# Patient Record
Sex: MALE | Race: WHITE | NOT HISPANIC OR LATINO | Employment: OTHER | URBAN - NONMETROPOLITAN AREA
[De-identification: names, ages, dates, MRNs, and addresses within clinical notes are randomized per-mention and may not be internally consistent; named-entity substitution may affect disease eponyms.]

---

## 2018-02-15 ENCOUNTER — OFFICE VISIT CONVERTED (OUTPATIENT)
Dept: FAMILY MEDICINE CLINIC | Age: 38
End: 2018-02-15
Attending: FAMILY MEDICINE

## 2018-07-27 ENCOUNTER — OFFICE VISIT CONVERTED (OUTPATIENT)
Dept: FAMILY MEDICINE CLINIC | Age: 38
End: 2018-07-27
Attending: NURSE PRACTITIONER

## 2018-08-24 ENCOUNTER — OFFICE VISIT CONVERTED (OUTPATIENT)
Dept: FAMILY MEDICINE CLINIC | Age: 38
End: 2018-08-24
Attending: FAMILY MEDICINE

## 2018-09-19 ENCOUNTER — OFFICE VISIT CONVERTED (OUTPATIENT)
Dept: FAMILY MEDICINE CLINIC | Age: 38
End: 2018-09-19
Attending: FAMILY MEDICINE

## 2019-01-09 ENCOUNTER — OFFICE VISIT CONVERTED (OUTPATIENT)
Dept: FAMILY MEDICINE CLINIC | Age: 39
End: 2019-01-09
Attending: NURSE PRACTITIONER

## 2019-11-04 ENCOUNTER — HOSPITAL ENCOUNTER (OUTPATIENT)
Dept: OTHER | Facility: HOSPITAL | Age: 39
Discharge: HOME | End: 2019-11-04

## 2019-11-04 LAB
ALBUMIN SERPL-MCNC: 4.4 G/DL (ref 3.5–5)
ALBUMIN/GLOB SERPL: 1.5 {RATIO} (ref 1.4–2.6)
ALP SERPL-CCNC: 75 U/L (ref 53–128)
ALT SERPL-CCNC: 14 U/L (ref 10–40)
ANION GAP SERPL CALC-SCNC: 19 MMOL/L (ref 8–19)
AST SERPL-CCNC: 17 U/L (ref 15–50)
BASOPHILS # BLD MANUAL: 0.03 10*3/UL (ref 0–0.2)
BASOPHILS NFR BLD MANUAL: 0.6 % (ref 0–3)
BILIRUB SERPL-MCNC: 0.41 MG/DL (ref 0.2–1.3)
BUN SERPL-MCNC: 10 MG/DL (ref 5–25)
BUN/CREAT SERPL: 12 {RATIO} (ref 6–20)
CALCIUM SERPL-MCNC: 9.6 MG/DL (ref 8.7–10.4)
CHLORIDE SERPL-SCNC: 103 MMOL/L (ref 99–111)
CHOLEST SERPL-MCNC: 179 MG/DL (ref 107–200)
CHOLEST/HDLC SERPL: 4.5 {RATIO} (ref 3–6)
CONV CO2: 23 MMOL/L (ref 22–32)
CONV TOTAL PROTEIN: 7.3 G/DL (ref 6.3–8.2)
CREAT UR-MCNC: 0.84 MG/DL (ref 0.7–1.2)
DEPRECATED RDW RBC AUTO: 40.6 FL
EOSINOPHIL # BLD MANUAL: 0.34 10*3/UL (ref 0–0.7)
EOSINOPHIL NFR BLD MANUAL: 6.9 % (ref 0–7)
ERYTHROCYTE [DISTWIDTH] IN BLOOD BY AUTOMATED COUNT: 13 % (ref 11.5–14.5)
EST. AVERAGE GLUCOSE BLD GHB EST-MCNC: 111 MG/DL
GFR SERPLBLD BASED ON 1.73 SQ M-ARVRAT: >60 ML/MIN/{1.73_M2}
GLOBULIN UR ELPH-MCNC: 2.9 G/DL (ref 2–3.5)
GLUCOSE SERPL-MCNC: 92 MG/DL (ref 70–99)
GRANS (ABSOLUTE): 2.56 10*3/UL (ref 2–8)
GRANS: 51.9 % (ref 30–85)
HBA1C MFR BLD: 14.8 G/DL (ref 14–18)
HBA1C MFR BLD: 5.5 % (ref 3.5–5.7)
HCT VFR BLD AUTO: 42.7 % (ref 42–52)
HDLC SERPL-MCNC: 40 MG/DL (ref 40–60)
IMM GRANULOCYTES # BLD: 0.01 10*3/UL (ref 0–0.54)
IMM GRANULOCYTES NFR BLD: 0.2 % (ref 0–0.43)
LDLC SERPL CALC-MCNC: 124 MG/DL (ref 70–100)
LYMPHOCYTES # BLD MANUAL: 1.67 10*3/UL (ref 1–5)
LYMPHOCYTES NFR BLD MANUAL: 6.5 % (ref 3–10)
MCH RBC QN AUTO: 29.4 PG (ref 27–31)
MCHC RBC AUTO-ENTMCNC: 34.7 G/DL (ref 33–37)
MCV RBC AUTO: 84.7 FL (ref 80–96)
MONOCYTES # BLD AUTO: 0.32 10*3/UL (ref 0.2–1.2)
OSMOLALITY SERPL CALC.SUM OF ELEC: 291 MOSM/KG (ref 273–304)
PLATELET # BLD AUTO: 191 10*3/UL (ref 130–400)
PMV BLD AUTO: 10.9 FL (ref 7.4–10.4)
POTASSIUM SERPL-SCNC: 4 MMOL/L (ref 3.5–5.3)
RBC # BLD AUTO: 5.04 10*6/UL (ref 4.7–6.1)
SODIUM SERPL-SCNC: 141 MMOL/L (ref 135–147)
TRIGL SERPL-MCNC: 77 MG/DL (ref 40–150)
VARIANT LYMPHS NFR BLD MANUAL: 33.9 % (ref 20–45)
VLDLC SERPL-MCNC: 15 MG/DL (ref 5–37)
WBC # BLD AUTO: 4.93 10*3/UL (ref 4.8–10.8)

## 2019-11-11 ENCOUNTER — OFFICE VISIT CONVERTED (OUTPATIENT)
Dept: FAMILY MEDICINE CLINIC | Age: 39
End: 2019-11-11
Attending: FAMILY MEDICINE

## 2019-11-11 ENCOUNTER — HOSPITAL ENCOUNTER (OUTPATIENT)
Dept: OTHER | Facility: HOSPITAL | Age: 39
Discharge: HOME | End: 2019-11-11
Attending: FAMILY MEDICINE

## 2019-11-11 LAB
25(OH)D3 SERPL-MCNC: 44.9 NG/ML (ref 30–100)
ALBUMIN SERPL-MCNC: 4.3 G/DL (ref 3.5–5)
ALBUMIN/GLOB SERPL: 1.4 {RATIO} (ref 1.4–2.6)
ALP SERPL-CCNC: 68 U/L (ref 53–128)
ALT SERPL-CCNC: 16 U/L (ref 10–40)
ANION GAP SERPL CALC-SCNC: 17 MMOL/L (ref 8–19)
AST SERPL-CCNC: 17 U/L (ref 15–50)
BASOPHILS # BLD MANUAL: 0.04 10*3/UL (ref 0–0.2)
BASOPHILS NFR BLD MANUAL: 0.7 % (ref 0–3)
BILIRUB SERPL-MCNC: 0.29 MG/DL (ref 0.2–1.3)
BUN SERPL-MCNC: 10 MG/DL (ref 5–25)
BUN/CREAT SERPL: 12 {RATIO} (ref 6–20)
CALCIUM SERPL-MCNC: 9.6 MG/DL (ref 8.7–10.4)
CHLORIDE SERPL-SCNC: 101 MMOL/L (ref 99–111)
CONV CO2: 24 MMOL/L (ref 22–32)
CONV TOTAL PROTEIN: 7.3 G/DL (ref 6.3–8.2)
CREAT UR-MCNC: 0.85 MG/DL (ref 0.7–1.2)
DEPRECATED RDW RBC AUTO: 39.1 FL
EOSINOPHIL # BLD MANUAL: 0.33 10*3/UL (ref 0–0.7)
EOSINOPHIL NFR BLD MANUAL: 6.1 % (ref 0–7)
ERYTHROCYTE [DISTWIDTH] IN BLOOD BY AUTOMATED COUNT: 12.9 % (ref 11.5–14.5)
GFR SERPLBLD BASED ON 1.73 SQ M-ARVRAT: >60 ML/MIN/{1.73_M2}
GLOBULIN UR ELPH-MCNC: 3 G/DL (ref 2–3.5)
GLUCOSE SERPL-MCNC: 99 MG/DL (ref 70–99)
GRANS (ABSOLUTE): 2.88 10*3/UL (ref 2–8)
GRANS: 52.9 % (ref 30–85)
HBA1C MFR BLD: 15.4 G/DL (ref 14–18)
HCT VFR BLD AUTO: 43.9 % (ref 42–52)
IMM GRANULOCYTES # BLD: 0.01 10*3/UL (ref 0–0.54)
IMM GRANULOCYTES NFR BLD: 0.2 % (ref 0–0.43)
LYMPHOCYTES # BLD MANUAL: 1.8 10*3/UL (ref 1–5)
LYMPHOCYTES NFR BLD MANUAL: 7 % (ref 3–10)
MCH RBC QN AUTO: 29.2 PG (ref 27–31)
MCHC RBC AUTO-ENTMCNC: 35.1 G/DL (ref 33–37)
MCV RBC AUTO: 83.3 FL (ref 80–96)
MONOCYTES # BLD AUTO: 0.38 10*3/UL (ref 0.2–1.2)
OSMOLALITY SERPL CALC.SUM OF ELEC: 285 MOSM/KG (ref 273–304)
PLATELET # BLD AUTO: 214 10*3/UL (ref 130–400)
PMV BLD AUTO: 10.5 FL (ref 7.4–10.4)
POTASSIUM SERPL-SCNC: 3.8 MMOL/L (ref 3.5–5.3)
RBC # BLD AUTO: 5.27 10*6/UL (ref 4.7–6.1)
SODIUM SERPL-SCNC: 138 MMOL/L (ref 135–147)
TSH SERPL-ACNC: 2.37 M[IU]/L (ref 0.27–4.2)
VARIANT LYMPHS NFR BLD MANUAL: 33.1 % (ref 20–45)
VIT B12 SERPL-MCNC: 497 PG/ML (ref 211–911)
WBC # BLD AUTO: 5.44 10*3/UL (ref 4.8–10.8)

## 2020-01-15 ENCOUNTER — HOSPITAL ENCOUNTER (OUTPATIENT)
Dept: OTHER | Facility: HOSPITAL | Age: 40
Discharge: HOME | End: 2020-01-15
Attending: FAMILY MEDICINE

## 2020-01-15 ENCOUNTER — OFFICE VISIT CONVERTED (OUTPATIENT)
Dept: FAMILY MEDICINE CLINIC | Age: 40
End: 2020-01-15
Attending: FAMILY MEDICINE

## 2020-01-15 LAB
ALBUMIN SERPL-MCNC: 4.6 G/DL (ref 3.5–5)
ALBUMIN/GLOB SERPL: 1.6 {RATIO} (ref 1.4–2.6)
ALP SERPL-CCNC: 68 U/L (ref 53–128)
ALT SERPL-CCNC: 16 U/L (ref 10–40)
ANION GAP SERPL CALC-SCNC: 19 MMOL/L (ref 8–19)
AST SERPL-CCNC: 16 U/L (ref 15–50)
BILIRUB SERPL-MCNC: 0.64 MG/DL (ref 0.2–1.3)
BUN SERPL-MCNC: 13 MG/DL (ref 5–25)
BUN/CREAT SERPL: 13 {RATIO} (ref 6–20)
CALCIUM SERPL-MCNC: 9.7 MG/DL (ref 8.7–10.4)
CHLORIDE SERPL-SCNC: 99 MMOL/L (ref 99–111)
CHOLEST SERPL-MCNC: 208 MG/DL (ref 107–200)
CHOLEST/HDLC SERPL: 5 {RATIO} (ref 3–6)
CONV CO2: 25 MMOL/L (ref 22–32)
CONV TOTAL PROTEIN: 7.5 G/DL (ref 6.3–8.2)
CREAT UR-MCNC: 1 MG/DL (ref 0.7–1.2)
ERYTHROCYTE [DISTWIDTH] IN BLOOD BY AUTOMATED COUNT: 12.9 % (ref 11.5–14.5)
GFR SERPLBLD BASED ON 1.73 SQ M-ARVRAT: >60 ML/MIN/{1.73_M2}
GLOBULIN UR ELPH-MCNC: 2.9 G/DL (ref 2–3.5)
GLUCOSE SERPL-MCNC: 79 MG/DL (ref 70–99)
HBA1C MFR BLD: 15 G/DL (ref 14–18)
HCT VFR BLD AUTO: 43.8 % (ref 42–52)
HDLC SERPL-MCNC: 42 MG/DL (ref 40–60)
LDLC SERPL CALC-MCNC: 150 MG/DL (ref 70–100)
MCH RBC QN AUTO: 29 PG (ref 27–31)
MCHC RBC AUTO-ENTMCNC: 34.2 G/DL (ref 33–37)
MCV RBC AUTO: 84.7 FL (ref 80–96)
OSMOLALITY SERPL CALC.SUM OF ELEC: 287 MOSM/KG (ref 273–304)
PLATELET # BLD AUTO: 204 10*3/UL (ref 130–400)
PMV BLD AUTO: 10.6 FL (ref 7.4–10.4)
POTASSIUM SERPL-SCNC: 3.6 MMOL/L (ref 3.5–5.3)
RBC # BLD AUTO: 5.17 10*6/UL (ref 4.7–6.1)
SODIUM SERPL-SCNC: 139 MMOL/L (ref 135–147)
TRIGL SERPL-MCNC: 82 MG/DL (ref 40–150)
TSH SERPL-ACNC: 3.07 M[IU]/L (ref 0.27–4.2)
VLDLC SERPL-MCNC: 16 MG/DL (ref 5–37)
WBC # BLD AUTO: 5.71 10*3/UL (ref 4.8–10.8)

## 2020-01-17 ENCOUNTER — HOSPITAL ENCOUNTER (OUTPATIENT)
Dept: OTHER | Facility: HOSPITAL | Age: 40
Discharge: HOME | End: 2020-01-17
Attending: FAMILY MEDICINE

## 2020-02-06 ENCOUNTER — OFFICE VISIT CONVERTED (OUTPATIENT)
Dept: PODIATRY | Facility: CLINIC | Age: 40
End: 2020-02-06
Attending: PODIATRIST

## 2020-02-06 ENCOUNTER — HOSPITAL ENCOUNTER (OUTPATIENT)
Dept: OTHER | Facility: HOSPITAL | Age: 40
Discharge: HOME | End: 2020-02-06
Attending: PODIATRIST

## 2020-02-20 ENCOUNTER — OFFICE VISIT CONVERTED (OUTPATIENT)
Dept: PODIATRY | Facility: CLINIC | Age: 40
End: 2020-02-20
Attending: PODIATRIST

## 2020-02-26 ENCOUNTER — HOSPITAL ENCOUNTER (OUTPATIENT)
Dept: OTHER | Facility: HOSPITAL | Age: 40
Discharge: HOME | End: 2020-02-26
Attending: FAMILY MEDICINE

## 2020-02-26 ENCOUNTER — OFFICE VISIT CONVERTED (OUTPATIENT)
Dept: FAMILY MEDICINE CLINIC | Age: 40
End: 2020-02-26
Attending: FAMILY MEDICINE

## 2020-03-06 ENCOUNTER — HOSPITAL ENCOUNTER (OUTPATIENT)
Dept: OTHER | Facility: HOSPITAL | Age: 40
Discharge: HOME | End: 2020-03-06
Attending: FAMILY MEDICINE

## 2020-03-12 ENCOUNTER — OFFICE VISIT CONVERTED (OUTPATIENT)
Dept: NEUROSURGERY | Facility: CLINIC | Age: 40
End: 2020-03-12
Attending: PHYSICIAN ASSISTANT

## 2020-03-12 ENCOUNTER — HOSPITAL ENCOUNTER (OUTPATIENT)
Dept: PHYSICAL THERAPY | Facility: CLINIC | Age: 40
Setting detail: RECURRING SERIES
Discharge: HOME | End: 2020-03-13
Attending: NEUROLOGICAL SURGERY

## 2020-03-24 ENCOUNTER — HOSPITAL ENCOUNTER (OUTPATIENT)
Dept: OTHER | Facility: HOSPITAL | Age: 40
Discharge: HOME | End: 2020-03-24
Attending: FAMILY MEDICINE

## 2020-04-14 ENCOUNTER — HOSPITAL ENCOUNTER (OUTPATIENT)
Dept: OTHER | Facility: HOSPITAL | Age: 40
Discharge: HOME | End: 2020-04-14
Attending: PHYSICIAN ASSISTANT

## 2020-04-30 ENCOUNTER — TELEPHONE CONVERTED (OUTPATIENT)
Dept: GASTROENTEROLOGY | Facility: CLINIC | Age: 40
End: 2020-04-30
Attending: PHYSICIAN ASSISTANT

## 2020-06-02 ENCOUNTER — HOSPITAL ENCOUNTER (OUTPATIENT)
Dept: OTHER | Facility: HOSPITAL | Age: 40
Discharge: HOME | End: 2020-06-02
Attending: PHYSICIAN ASSISTANT

## 2020-06-02 LAB
BASOPHILS # BLD MANUAL: 0.03 10*3/UL (ref 0–0.2)
BASOPHILS NFR BLD MANUAL: 0.5 % (ref 0–3)
DEPRECATED RDW RBC AUTO: 40.1 FL
EOSINOPHIL # BLD MANUAL: 0.22 10*3/UL (ref 0–0.7)
EOSINOPHIL NFR BLD MANUAL: 3.9 % (ref 0–7)
ERYTHROCYTE [DISTWIDTH] IN BLOOD BY AUTOMATED COUNT: 12.9 % (ref 11.5–14.5)
GRANS (ABSOLUTE): 3.32 10*3/UL (ref 2–8)
GRANS: 58.5 % (ref 30–85)
HBA1C MFR BLD: 14.7 G/DL (ref 14–18)
HCT VFR BLD AUTO: 43.3 % (ref 42–52)
IMM GRANULOCYTES # BLD: 0.01 10*3/UL (ref 0–0.54)
IMM GRANULOCYTES NFR BLD: 0.2 % (ref 0–0.43)
LYMPHOCYTES # BLD MANUAL: 1.74 10*3/UL (ref 1–5)
LYMPHOCYTES NFR BLD MANUAL: 6.3 % (ref 3–10)
MCH RBC QN AUTO: 29 PG (ref 27–31)
MCHC RBC AUTO-ENTMCNC: 33.9 G/DL (ref 33–37)
MCV RBC AUTO: 85.4 FL (ref 80–96)
MONOCYTES # BLD AUTO: 0.36 10*3/UL (ref 0.2–1.2)
PLATELET # BLD AUTO: 200 10*3/UL (ref 130–400)
PMV BLD AUTO: 10.9 FL (ref 7.4–10.4)
RBC # BLD AUTO: 5.07 10*6/UL (ref 4.7–6.1)
VARIANT LYMPHS NFR BLD MANUAL: 30.6 % (ref 20–45)
WBC # BLD AUTO: 5.68 10*3/UL (ref 4.8–10.8)

## 2020-06-03 LAB
ALBUMIN SERPL-MCNC: 4.2 G/DL (ref 3.5–5)
ALBUMIN/GLOB SERPL: 1.5 {RATIO} (ref 1.4–2.6)
ALP SERPL-CCNC: 65 U/L (ref 53–128)
ALT SERPL-CCNC: 12 U/L (ref 10–40)
ANION GAP SERPL CALC-SCNC: 16 MMOL/L (ref 8–19)
AST SERPL-CCNC: 13 U/L (ref 15–50)
BILIRUB SERPL-MCNC: 0.44 MG/DL (ref 0.2–1.3)
BUN SERPL-MCNC: 11 MG/DL (ref 5–25)
BUN/CREAT SERPL: 12 {RATIO} (ref 6–20)
CALCIUM SERPL-MCNC: 9.5 MG/DL (ref 8.7–10.4)
CANCER AG19-9 SERPL-ACNC: 26.5 U/ML (ref 0–35)
CHLORIDE SERPL-SCNC: 105 MMOL/L (ref 99–111)
CONV CO2: 22 MMOL/L (ref 22–32)
CONV TOTAL PROTEIN: 7 G/DL (ref 6.3–8.2)
CREAT UR-MCNC: 0.92 MG/DL (ref 0.7–1.2)
GFR SERPLBLD BASED ON 1.73 SQ M-ARVRAT: >60 ML/MIN/{1.73_M2}
GLOBULIN UR ELPH-MCNC: 2.8 G/DL (ref 2–3.5)
GLUCOSE SERPL-MCNC: 93 MG/DL (ref 70–99)
LIPASE SERPL-CCNC: 3 U/L (ref 5–51)
OSMOLALITY SERPL CALC.SUM OF ELEC: 287 MOSM/KG (ref 273–304)
POTASSIUM SERPL-SCNC: 4.1 MMOL/L (ref 3.5–5.3)
SODIUM SERPL-SCNC: 139 MMOL/L (ref 135–147)

## 2020-06-09 ENCOUNTER — OFFICE VISIT CONVERTED (OUTPATIENT)
Dept: FAMILY MEDICINE CLINIC | Age: 40
End: 2020-06-09
Attending: FAMILY MEDICINE

## 2020-06-29 ENCOUNTER — HOSPITAL ENCOUNTER (OUTPATIENT)
Dept: PHYSICAL THERAPY | Facility: CLINIC | Age: 40
Setting detail: RECURRING SERIES
Discharge: HOME | End: 2020-08-28
Attending: FAMILY MEDICINE

## 2020-07-02 ENCOUNTER — PROCEDURE VISIT CONVERTED (OUTPATIENT)
Dept: PODIATRY | Facility: CLINIC | Age: 40
End: 2020-07-02
Attending: PODIATRIST

## 2020-07-16 ENCOUNTER — OFFICE VISIT CONVERTED (OUTPATIENT)
Dept: FAMILY MEDICINE CLINIC | Age: 40
End: 2020-07-16
Attending: FAMILY MEDICINE

## 2020-08-06 ENCOUNTER — HOSPITAL ENCOUNTER (OUTPATIENT)
Dept: OTHER | Facility: HOSPITAL | Age: 40
Discharge: HOME | End: 2020-08-06
Attending: FAMILY MEDICINE

## 2020-08-06 LAB
25(OH)D3 SERPL-MCNC: 44.7 NG/ML (ref 30–100)
ALBUMIN SERPL-MCNC: 4.4 G/DL (ref 3.5–5)
ALBUMIN/GLOB SERPL: 1.6 {RATIO} (ref 1.4–2.6)
ALP SERPL-CCNC: 63 U/L (ref 53–128)
ALT SERPL-CCNC: 18 U/L (ref 10–40)
ANION GAP SERPL CALC-SCNC: 19 MMOL/L (ref 8–19)
AST SERPL-CCNC: 19 U/L (ref 15–50)
BILIRUB SERPL-MCNC: 0.28 MG/DL (ref 0.2–1.3)
BUN SERPL-MCNC: 20 MG/DL (ref 5–25)
BUN/CREAT SERPL: 18 {RATIO} (ref 6–20)
CALCIUM SERPL-MCNC: 10.1 MG/DL (ref 8.7–10.4)
CHLORIDE SERPL-SCNC: 102 MMOL/L (ref 99–111)
CHOLEST SERPL-MCNC: 239 MG/DL (ref 107–200)
CHOLEST/HDLC SERPL: 5.3 {RATIO} (ref 3–6)
CONV CO2: 25 MMOL/L (ref 22–32)
CONV TOTAL PROTEIN: 7.2 G/DL (ref 6.3–8.2)
CREAT UR-MCNC: 1.12 MG/DL (ref 0.7–1.2)
GFR SERPLBLD BASED ON 1.73 SQ M-ARVRAT: >60 ML/MIN/{1.73_M2}
GLOBULIN UR ELPH-MCNC: 2.8 G/DL (ref 2–3.5)
GLUCOSE SERPL-MCNC: 132 MG/DL (ref 70–99)
HDLC SERPL-MCNC: 45 MG/DL (ref 40–60)
LDLC SERPL CALC-MCNC: 177 MG/DL (ref 70–100)
OSMOLALITY SERPL CALC.SUM OF ELEC: 298 MOSM/KG (ref 273–304)
POTASSIUM SERPL-SCNC: 4.1 MMOL/L (ref 3.5–5.3)
SODIUM SERPL-SCNC: 142 MMOL/L (ref 135–147)
TRIGL SERPL-MCNC: 86 MG/DL (ref 40–150)
VLDLC SERPL-MCNC: 17 MG/DL (ref 5–37)

## 2020-10-12 ENCOUNTER — OFFICE VISIT CONVERTED (OUTPATIENT)
Dept: FAMILY MEDICINE CLINIC | Age: 40
End: 2020-10-12
Attending: FAMILY MEDICINE

## 2020-10-19 ENCOUNTER — PROCEDURE VISIT CONVERTED (OUTPATIENT)
Dept: PODIATRY | Facility: CLINIC | Age: 40
End: 2020-10-19
Attending: PODIATRIST

## 2020-11-02 ENCOUNTER — HOSPITAL ENCOUNTER (OUTPATIENT)
Dept: OTHER | Facility: HOSPITAL | Age: 40
Discharge: HOME | End: 2020-11-02

## 2020-11-02 LAB
ALBUMIN SERPL-MCNC: 4.4 G/DL (ref 3.5–5)
ALBUMIN/GLOB SERPL: 1.6 {RATIO} (ref 1.4–2.6)
ALP SERPL-CCNC: 72 U/L (ref 53–128)
ALT SERPL-CCNC: 17 U/L (ref 10–40)
ANION GAP SERPL CALC-SCNC: 14 MMOL/L (ref 8–19)
AST SERPL-CCNC: 16 U/L (ref 15–50)
BASOPHILS # BLD AUTO: 0.03 10*3/UL (ref 0–0.2)
BASOPHILS NFR BLD AUTO: 0.5 % (ref 0–3)
BILIRUB SERPL-MCNC: 0.22 MG/DL (ref 0.2–1.3)
BUN SERPL-MCNC: 11 MG/DL (ref 5–25)
BUN/CREAT SERPL: 13 {RATIO} (ref 6–20)
CALCIUM SERPL-MCNC: 9.5 MG/DL (ref 8.7–10.4)
CHLORIDE SERPL-SCNC: 104 MMOL/L (ref 99–111)
CHOLEST SERPL-MCNC: 159 MG/DL (ref 107–200)
CHOLEST/HDLC SERPL: 3.9 {RATIO} (ref 3–6)
CONV ABS IMM GRAN: 0.02 10*3/UL (ref 0–0.2)
CONV CO2: 26 MMOL/L (ref 22–32)
CONV IMMATURE GRAN: 0.4 % (ref 0–1.8)
CONV TOTAL PROTEIN: 7.2 G/DL (ref 6.3–8.2)
CREAT UR-MCNC: 0.87 MG/DL (ref 0.7–1.2)
DEPRECATED RDW RBC AUTO: 39.9 FL (ref 35.1–43.9)
EOSINOPHIL # BLD AUTO: 0.16 10*3/UL (ref 0–0.7)
EOSINOPHIL # BLD AUTO: 2.8 % (ref 0–7)
ERYTHROCYTE [DISTWIDTH] IN BLOOD BY AUTOMATED COUNT: 12.7 % (ref 11.6–14.4)
EST. AVERAGE GLUCOSE BLD GHB EST-MCNC: 126 MG/DL
GFR SERPLBLD BASED ON 1.73 SQ M-ARVRAT: >60 ML/MIN/{1.73_M2}
GLOBULIN UR ELPH-MCNC: 2.8 G/DL (ref 2–3.5)
GLUCOSE SERPL-MCNC: 94 MG/DL (ref 70–99)
HBA1C MFR BLD: 6 % (ref 3.5–5.7)
HCT VFR BLD AUTO: 47 % (ref 42–52)
HDLC SERPL-MCNC: 41 MG/DL (ref 40–60)
HGB BLD-MCNC: 15.8 G/DL (ref 14–18)
LDLC SERPL CALC-MCNC: 102 MG/DL (ref 70–100)
LYMPHOCYTES # BLD AUTO: 1.59 10*3/UL (ref 1–5)
LYMPHOCYTES NFR BLD AUTO: 28 % (ref 20–45)
MCH RBC QN AUTO: 29.3 PG (ref 27–31)
MCHC RBC AUTO-ENTMCNC: 33.6 G/DL (ref 33–37)
MCV RBC AUTO: 87 FL (ref 80–96)
MONOCYTES # BLD AUTO: 0.41 10*3/UL (ref 0.2–1.2)
MONOCYTES NFR BLD AUTO: 7.2 % (ref 3–10)
NEUTROPHILS # BLD AUTO: 3.46 10*3/UL (ref 2–8)
NEUTROPHILS NFR BLD AUTO: 61.1 % (ref 30–85)
NRBC CBCN: 0 % (ref 0–0.7)
OSMOLALITY SERPL CALC.SUM OF ELEC: 289 MOSM/KG (ref 273–304)
PLATELET # BLD AUTO: 206 10*3/UL (ref 130–400)
PMV BLD AUTO: 10.7 FL (ref 9.4–12.4)
POTASSIUM SERPL-SCNC: 4 MMOL/L (ref 3.5–5.3)
PROLACTIN SERPL-MCNC: 21.31 NG/ML
RBC # BLD AUTO: 5.4 10*6/UL (ref 4.7–6.1)
SODIUM SERPL-SCNC: 140 MMOL/L (ref 135–147)
TRIGL SERPL-MCNC: 80 MG/DL (ref 40–150)
VLDLC SERPL-MCNC: 16 MG/DL (ref 5–37)
WBC # BLD AUTO: 5.67 10*3/UL (ref 4.8–10.8)

## 2020-12-08 ENCOUNTER — OFFICE VISIT CONVERTED (OUTPATIENT)
Dept: GASTROENTEROLOGY | Facility: CLINIC | Age: 40
End: 2020-12-08
Attending: INTERNAL MEDICINE

## 2020-12-14 ENCOUNTER — HOSPITAL ENCOUNTER (OUTPATIENT)
Dept: SLEEP MEDICINE | Facility: HOSPITAL | Age: 40
Discharge: HOME | End: 2020-12-14
Attending: INTERNAL MEDICINE

## 2020-12-14 ENCOUNTER — OUTSIDE FACILITY SERVICE (OUTPATIENT)
Dept: SLEEP MEDICINE | Facility: HOSPITAL | Age: 40
End: 2020-12-14

## 2020-12-14 PROCEDURE — 99204 OFFICE O/P NEW MOD 45 MIN: CPT | Performed by: INTERNAL MEDICINE

## 2020-12-30 ENCOUNTER — HOSPITAL ENCOUNTER (OUTPATIENT)
Dept: SLEEP MEDICINE | Facility: HOSPITAL | Age: 40
Discharge: HOME | End: 2020-12-30
Attending: INTERNAL MEDICINE

## 2021-01-04 ENCOUNTER — OUTSIDE FACILITY SERVICE (OUTPATIENT)
Dept: SLEEP MEDICINE | Facility: HOSPITAL | Age: 41
End: 2021-01-04

## 2021-01-04 PROCEDURE — 95810 POLYSOM 6/> YRS 4/> PARAM: CPT | Performed by: INTERNAL MEDICINE

## 2021-01-08 ENCOUNTER — HOSPITAL ENCOUNTER (OUTPATIENT)
Dept: PREADMISSION TESTING | Facility: HOSPITAL | Age: 41
Discharge: HOME | End: 2021-01-08
Attending: INTERNAL MEDICINE

## 2021-01-09 LAB — SARS-COV-2 RNA SPEC QL NAA+PROBE: NOT DETECTED

## 2021-01-11 ENCOUNTER — PROCEDURE VISIT CONVERTED (OUTPATIENT)
Dept: PODIATRY | Facility: CLINIC | Age: 41
End: 2021-01-11
Attending: PODIATRIST

## 2021-01-13 ENCOUNTER — HOSPITAL ENCOUNTER (OUTPATIENT)
Dept: SLEEP MEDICINE | Facility: HOSPITAL | Age: 41
Discharge: HOME | End: 2021-01-13
Attending: INTERNAL MEDICINE

## 2021-01-18 ENCOUNTER — OUTSIDE FACILITY SERVICE (OUTPATIENT)
Dept: SLEEP MEDICINE | Facility: HOSPITAL | Age: 41
End: 2021-01-18

## 2021-01-18 ENCOUNTER — HOSPITAL ENCOUNTER (OUTPATIENT)
Dept: MRI IMAGING | Facility: HOSPITAL | Age: 41
Discharge: HOME | End: 2021-01-18
Attending: INTERNAL MEDICINE

## 2021-01-18 LAB
CREAT BLD-MCNC: 0.9 MG/DL (ref 0.6–1.4)
GFR SERPLBLD BASED ON 1.73 SQ M-ARVRAT: >60 ML/MIN/{1.73_M2}

## 2021-01-18 PROCEDURE — 95811 POLYSOM 6/>YRS CPAP 4/> PARM: CPT | Performed by: INTERNAL MEDICINE

## 2021-04-05 ENCOUNTER — PROCEDURE VISIT CONVERTED (OUTPATIENT)
Dept: PODIATRY | Facility: CLINIC | Age: 41
End: 2021-04-05
Attending: PODIATRIST

## 2021-05-06 ENCOUNTER — HOSPITAL ENCOUNTER (OUTPATIENT)
Dept: OTHER | Facility: HOSPITAL | Age: 41
Discharge: HOME | End: 2021-05-06
Attending: FAMILY MEDICINE

## 2021-05-06 ENCOUNTER — OFFICE VISIT CONVERTED (OUTPATIENT)
Dept: FAMILY MEDICINE CLINIC | Age: 41
End: 2021-05-06
Attending: FAMILY MEDICINE

## 2021-05-06 LAB
25(OH)D3 SERPL-MCNC: 40.5 NG/ML (ref 30–100)
ALBUMIN SERPL-MCNC: 4.6 G/DL (ref 3.5–5)
ALBUMIN/GLOB SERPL: 1.4 {RATIO} (ref 1.4–2.6)
ALP SERPL-CCNC: 77 U/L (ref 53–128)
ALT SERPL-CCNC: 21 U/L (ref 10–40)
ANION GAP SERPL CALC-SCNC: 13 MMOL/L (ref 8–19)
AST SERPL-CCNC: 16 U/L (ref 15–50)
BASOPHILS # BLD MANUAL: 0.03 10*3/UL (ref 0–0.2)
BASOPHILS NFR BLD MANUAL: 0.4 % (ref 0–3)
BILIRUB SERPL-MCNC: 0.26 MG/DL (ref 0.2–1.3)
BUN SERPL-MCNC: 8 MG/DL (ref 5–25)
BUN/CREAT SERPL: 9 {RATIO} (ref 6–20)
CALCIUM SERPL-MCNC: 9.9 MG/DL (ref 8.7–10.4)
CHLORIDE SERPL-SCNC: 102 MMOL/L (ref 99–111)
CHOLEST SERPL-MCNC: 174 MG/DL (ref 107–200)
CHOLEST/HDLC SERPL: 3.7 {RATIO} (ref 3–6)
CONV CO2: 29 MMOL/L (ref 22–32)
CONV TOTAL PROTEIN: 7.8 G/DL (ref 6.3–8.2)
CREAT UR-MCNC: 0.92 MG/DL (ref 0.7–1.2)
DEPRECATED RDW RBC AUTO: 41.2 FL
EOSINOPHIL # BLD MANUAL: 0.07 10*3/UL (ref 0–0.7)
EOSINOPHIL NFR BLD MANUAL: 0.9 % (ref 0–7)
ERYTHROCYTE [DISTWIDTH] IN BLOOD BY AUTOMATED COUNT: 13 % (ref 11.5–14.5)
GFR SERPLBLD BASED ON 1.73 SQ M-ARVRAT: >60 ML/MIN/{1.73_M2}
GLOBULIN UR ELPH-MCNC: 3.2 G/DL (ref 2–3.5)
GLUCOSE SERPL-MCNC: 95 MG/DL (ref 70–99)
GRANS (ABSOLUTE): 5.73 10*3/UL (ref 2–8)
GRANS: 72.8 % (ref 30–85)
HBA1C MFR BLD: 15.7 G/DL (ref 14–18)
HCT VFR BLD AUTO: 46.2 % (ref 42–52)
HDLC SERPL-MCNC: 47 MG/DL (ref 40–60)
IMM GRANULOCYTES # BLD: 0.01 10*3/UL (ref 0–0.54)
IMM GRANULOCYTES NFR BLD: 0.1 % (ref 0–0.43)
LDLC SERPL CALC-MCNC: 110 MG/DL (ref 70–100)
LYMPHOCYTES # BLD MANUAL: 1.51 10*3/UL (ref 1–5)
LYMPHOCYTES NFR BLD MANUAL: 6.6 % (ref 3–10)
MCH RBC QN AUTO: 29.5 PG (ref 27–31)
MCHC RBC AUTO-ENTMCNC: 34 G/DL (ref 33–37)
MCV RBC AUTO: 86.7 FL (ref 80–96)
MONOCYTES # BLD AUTO: 0.52 10*3/UL (ref 0.2–1.2)
OSMOLALITY SERPL CALC.SUM OF ELEC: 286 MOSM/KG (ref 273–304)
PLATELET # BLD AUTO: 219 10*3/UL (ref 130–400)
PMV BLD AUTO: 9.9 FL (ref 7.4–10.4)
POTASSIUM SERPL-SCNC: 4.8 MMOL/L (ref 3.5–5.3)
RBC # BLD AUTO: 5.33 10*6/UL (ref 4.7–6.1)
SODIUM SERPL-SCNC: 139 MMOL/L (ref 135–147)
TRIGL SERPL-MCNC: 87 MG/DL (ref 40–150)
TSH SERPL-ACNC: 1.99 M[IU]/L (ref 0.27–4.2)
VARIANT LYMPHS NFR BLD MANUAL: 19.2 % (ref 20–45)
VLDLC SERPL-MCNC: 17 MG/DL (ref 5–37)
WBC # BLD AUTO: 7.87 10*3/UL (ref 4.8–10.8)

## 2021-05-10 ENCOUNTER — OUTSIDE FACILITY SERVICE (OUTPATIENT)
Dept: SLEEP MEDICINE | Facility: HOSPITAL | Age: 41
End: 2021-05-10

## 2021-05-10 ENCOUNTER — HOSPITAL ENCOUNTER (OUTPATIENT)
Dept: SLEEP MEDICINE | Facility: HOSPITAL | Age: 41
Discharge: HOME | End: 2021-05-10
Attending: INTERNAL MEDICINE

## 2021-05-10 PROCEDURE — 99214 OFFICE O/P EST MOD 30 MIN: CPT | Performed by: INTERNAL MEDICINE

## 2021-05-12 NOTE — PROGRESS NOTES
Quick Note      Patient Name: Kang Grey   Patient ID: [id number]   Sex: Male   YOB: 1980    Primary Care Provider: Hien Parham MD   Referring Provider: Hien Parham MD    Visit Date: April 30, 2020    Provider: Isaac Page PA-C   Location: [facility]   Location Address: [address] [place] [address]  [id number]   Location Phone: [phone]          History Of Present Illness  TELEHEALTH TELEPHONE VISIT  Chief Complaint: Pancreatic lesion   Kang Grey is a 39 year old /White male who is presenting for evaluation via telehealth telephone visit. Verbal consent obtained before beginning visit.   Provider spent 11 minutes with the patient during telehealth visit.   The following staff were present during this visit: Agatha Neri MA   Past Medical History/Overview of Patient Symptoms     39 year old male with cerebral palsy whos mother is on the phone agrees to a telehealth visit.  He had a CT scan A/P with contrast 03/2020 showing a 2.3 simple appearing cyst at the head of the pancreas.  This was an incident finding as he had the CT scan for a fall and had back pain.  His mother denies abdominal pain, jaundice, altered bowel habits.           Assessment  · Pancreatic mass     577.8/K86.89  · Abnormal abdominal CT scan     793.6/R93.5      Plan  · Orders  o Physician Telephone Evaluation, 11-20 minutes (12638) - 577.8/K86.89, 793.6/R93.5 - 04/30/2020  o CBC with Auto Diff [facility] (26534) - 577.8/K86.89, 793.6/R93.5 - 04/30/2020  o CMP Non-fasting H (79041) - 577.8/K86.89, 793.6/R93.5 - 04/30/2020  o Lipase (85289) - 577.8/K86.89, 793.6/R93.5 - 04/30/2020  o CA 19-9 ag ser/plas (85787) - 577.8/K86.89, 793.6/R93.5 - 04/30/2020  o MRCP and MRI ABD wwo [facility] ([id number], 10031) - 577.8/K86.89, 793.6/R93.5 - 04/30/2020  · Medications  o Medications have been Reconciled  o Transition of Care or Provider Policy  · Instructions  o Plan Of Care: 39 year old male with cerebral palsy with an  incidental finding of a 2.3 cm cyst at the head of the pancreas. I will order the labwork above and MRI/MRCP to further characterize the cyst and ensure stability. His follow up will be determined upon labs and imaging.   o Chronic conditions reviewed and taken into consideration for today's treatment plan.  o Patient instructed to seek medical attention urgently for new or worsening symptoms.  o Patient was educated/instructed on their diagnosis, treatment and medications prior to discharge from the clinic today.            Electronically Signed by: Isaac Page PA-C -Author on April 30, 2020 09:34:17 AM  Electronically Co-signed by: Ivett Delgadillo MA -Reviewer on May 4, 2020 02:48:31 PM  Electronically Co-signed by: Willian Boggs MD -Reviewer on May 12, 2020 03:39:40 PM

## 2021-05-13 NOTE — PROGRESS NOTES
Progress Note      Patient Name: Kang Grey   Patient ID: [id number]   Sex: Male   YOB: 1980    Primary Care Provider: Hien Parham MD   Referring Provider: Hien Parham MD    Visit Date: July 2, 2020    Provider: Roni Osullivan DPM   Location: [facility]   Location Address: [address] [place] [address] [facility]  [address]  [place], [place]  [id number]   Location Phone: [phone]          Chief Complaint  · Routine Foot Care Visit      History Of Present Illness  Kang Grey is a 40 year old /White male who presents to the [facility] today a follow up for:   Kang Grey complains of painful, elongated toenails which are thickened, yellowed, chalky, and cause pain with shoe gear and ambulation.      New, Established, New Problem: Established  Location: Toenails  Duration:  Greater than five years  Onset: Gradual  Nature: sore with palpation.  Stable, worsening, improving: Worsening  Aggravating factors: Pain with shoe gear and ambulation.  Previous Treatment: Unable to trim himself  Patient denies any fevers, chills, nausea, vomiting, shortness of breathe, nor any other constitutional signs nor symptoms.    Patient's mother reports no medical changes for the patient since his last visit.       Past Medical History  Ankle pain, left; Bunion; Compression fracture; Epilepsy; Foot pain, right; Hammertoe; Hyperlipemia; Ingrown toenail; Maltracking of right patella; Meningitis; Seizures         Past Surgical History  Hammertoe repair; Hypospadias; Lumbar puncture         Medication List  buspirone 10 mg oral tablet; Calcium 600 600 mg calcium (1,500 mg) oral tablet; folic acid 800 mcg oral tablet; Invega Sustenna 234 mg/1.5 mL intramuscular syringe; Keppra 500 mg oral tablet; Lamictal 150 mg oral tablet; lamotrigine 100 mg oral tablet; Norco 5-325 mg oral tablet; venlafaxine 150 mg oral tablet extended release 24hr; Vitamin C 1,000 mg oral tablet; Vitamin D3  "1,000 unit oral capsule         Allergy List  Depakote       Allergies Reconciled  Family Medical History  Diabetes, unspecified type; - No Family History of Colorectal Cancer; Family history of certain chronic disabling diseases; arthritis         Social History  Alcohol Use (Never); lives with parents; Recreational Drug Use (Never); Single.; Tobacco (Never); Unemployed.         Review of Systems  · Constitutional  o Denies  o : fatigue, night sweats  · Eyes  o Denies  o : double vision, blurred vision  · HENT  o Denies  o : vertigo, recent head injury  · Cardiovascular  o Denies  o : chest pain, irregular heart beats  · Respiratory  o Denies  o : shortness of breath, productive cough  · Gastrointestinal  o Denies  o : nausea, vomiting  · Genitourinary  o Denies  o : dysuria, urinary retention  · Integument  o * See HPI  · Neurologic  o * See HPI  · Musculoskeletal  o * See HPI  · Endocrine  o Denies  o : cold intolerance, heat intolerance  · Heme-Lymph  o Denies  o : petechiae, lymph node enlargement or tenderness  · Allergic-Immunologic  o Denies  o : frequent illnesses      Vitals  Date Time BP Position Site L\R Cuff Size HR RR TEMP (F) WT  HT  BMI kg/m2 BSA m2 O2 Sat        07/02/2020 09:22 /65 Sitting    108 - R   172lbs 3oz 5'  9.5\" 25.06 1.96 92 %          Physical Examination  · Constitutional  o Appearance  o : well-nourished, normal body habitus, multiple deformities present, appears to be chronically ill   · Respiratory  o Respiratory Effort  o : No labored breathing. Good respiratory effort.   · Cardiovascular  o Peripheral Vascular System  o :   § Pedal Pulses  § : pulses 2 + and symmetrical  § Extremities  § : no edema in lower extremities  · Musculoskeletal  o General  o :   § General Musculoskeletal  § : Lower extremity muscle and strength and range of motion is equal and symmetrical bilaterally. The knees are noted to be normal in alignment. General weakness throughout lower extremities " bilaterally. Right side shows 0/5 in the anterior muscle group and 1/5 in the lateral muscle groups in the lower leg. Otherwise, 4/5 muscle strength in all other muscle and tendon insertions in the foot and ankle. The left side shows 4/5 muscle strength in all other muscle and tendon insertions in the foot and ankle. Right first MPJ 0/5 dorsiflexion. Overlapping second toe. Well-healed surgical scars along the first ray. No range of motion in the IP joint of the hallux consistent with a probable effusion.  o Extremeties/Joint  o : Lower extremity muscle strength and range of motion is equal and symmetrical bilaterally. The knees are noted to be in normal alignment.   · Skin and Subcutaneous Tissue  o General Inspection  o : Skin is noted to have normal texture and turgor, with no excrescences noted.   o Digits and Nails  o : The toenails are noted to be without disese.  · Neurologic  o Sensation  o : Sharp/dull sensation is diminished bilaterally. Monofilament sensation examination of the left foot is diminished. Monofilament sensation examination of the right foot is diminished.   · Toes  o Toes: Right Foot  o :   § Toenails  § : Toenails are hypertrophic, mycotic, dystrophic, brittle toenail(s) at nail 1, 2, 3, 4, 5 with onycholysis of the right foot. The 1st, 2nd, 3rd, 4th, 5th toenail(s) on the right have 2 mm in thickness with subungual detritus. There is an incurvated toenail at the distal border of the 1st, 2nd, 3rd, 4th, 5th toe  o Toes: Left Foot  o :   § Toenails  § : There are hypertrophic, mycotic, dystrophic, brittle toenail(s) at the 1, 2, 3, 4, 5 with onycholysis of the left foot. The 1st, 2nd, 3rd, 4th, 5th toenail(s) on the left have 2 mm in thickness with subungual detritus. There is an incurvated toenail at the distal border of the 1st, 2nd, 3rd, 4th, 5th toe  · Procedures  o Nail Debridement  o : Nail debridement is indicated for the following toenails:, left hallux, left 2nd toe, left 3rd toe,  left 4th toe, left 5th toe, right hallux, right 2nd toe, right 3rd toe, right 4th toe, right 5th toe. The nail was debrided of excessive thickness to appropriate levels of comfort and contour using, nail nippers. The procedure was without complications                Assessment  · Foot pain, left     729.5/M79.672  · Foot pain, right     729.5/M79.671  · Dropfoot     736.79/M21.379  · Hallux abducto valgus, right     735.0/M20.11  · Weakness     780.79/R53.1  · Neuropathy     355.9/G62.9  · Difficulty walking     719.7/R26.2  · Ingrowing nail     703.0/L60.0  · Tinea unguium     110.1/B35.1      Plan  · Orders  o Debridement of six or more nails (88132) - - 07/02/2020  · Medications  o Medications have been Reconciled  o Transition of Care or Provider Policy  · Instructions  o Discuss Findings: I have discussed the findings of this evaluation with the patient. The discussion included a complete verbal explanation of any changes in the examination results, diagnosis, and the current treatment plan. A schedule for future care needs was explained. If any questions should arise after returning home, I have encouraged the patient to feel free to contact Dr. Osullivan. The patient states understanding and agreement with this plan.  o Follow Up in 9 weeks  o Pt to monitor for problems and to contact Dr. Osullivan for follow-up should such signs occur. Patient states understanding and agreement with this plan.   o Onychomycosis is present in 2-3% of the population with the most common source of contamination coming from the patient's own skin. Fifteen to 20 percent of people between the ages of 40 and 60 have onychomycosis, 32 percent of 60- to [id number] have nail fungus and approximately 50 percent of those over 70 are afflicted. Seventy-five percent of the people who have this infection exhibit psychosocial concerns. These people face the dilemma of not being able to go to the swimming pool, public shower areas or even  wear open-toed sandals. More important is the fact that 48 percent of the people with onychomycosis have pain. The pain is intense enough to have these patients miss 1.8 days of work on average over a six-month period. Traditional topical therapies used alone are generally ineffective for clearing the primary infection, and even oral therapy is associated with a high rate of initial treatment failure or recurrence. In many patients combination oral and topical therapy is the treatment of choice.   o I have recommended debridement of the devitalized or contaminated tissue. Debridement will relieve the pressure of the necrotic presence of on the nail and provides for a better cosmetic appearance. Debulking the nail does help in combination with other treatments in that it can decrease the fungal load of the nail itself.   o Electronically Identified Patient Education Materials Provided Electronically  · Disposition  o Call or Return if symptoms worsen or persist.  · Referrals  o ID: [id number] Date: 02/06/2020 Type: Inbound  Specialty: Podiatry            Electronically Signed by: Roni Osullivan DPM -Author on July 2, 2020 09:40:00 AM

## 2021-05-13 NOTE — PROGRESS NOTES
Progress Note      Patient Name: Kang Grey   Patient ID: [id number]   Sex: Male   YOB: 1980    Primary Care Provider: Hien Parham MD    Visit Date: December 8, 2020    Provider: Willian Boggs MD   [facility]   Location Address: [address] [place] [address]  [id number]   Location Phone: [phone]          Chief Complaint     Follow-up pancreatic cyst       History Of Present Illness     40-year-old male with cerebral palsy returns along with his mother in follow-up of a pancreatic head cyst.  Most recent imaging in June 2020 showed a 2 to 3 cm pancreatic head cyst that was thought to be stable in nature.  The patient currently has no symptoms of this finding had been found previously incidentally.  He denies nausea vomiting fevers chills abdominal pain weight loss or weight gain.       Past Medical History  Ankle pain, left; Bunion; Compression fracture; Epilepsy; Foot pain, right; Hammertoe; Hyperlipemia; Ingrown toenail; Maltracking of right patella; Meningitis; Seizures         Past Surgical History  Hammertoe repair; Hypospadias; Lumbar puncture         Medication List  buspirone 10 mg oral tablet; Calcium 600 600 mg calcium (1,500 mg) oral tablet; folic acid 800 mcg oral tablet; ibuprofen 800 mg oral tablet; Invega Sustenna 234 mg/1.5 mL intramuscular syringe; Keppra 500 mg oral tablet; Lamictal 150 mg oral tablet; lamotrigine 100 mg oral tablet; melatonin 5 mg oral capsule; pravastatin 20 mg oral tablet; venlafaxine 150 mg oral tablet extended release 24hr; Vitamin C 1,000 mg oral tablet; Vitamin D3 1,000 unit oral capsule         Allergy List  Depakote       Allergies Reconciled  Family Medical History  Diabetes, unspecified type; - No Family History of Colorectal Cancer; Family history of certain chronic disabling diseases; arthritis         Social History  Alcohol Use (Never); lives with parents; Recreational Drug Use (Never); Single.; Tobacco  "(Never); Unemployed.         Review of Systems  · Constitutional  o Denies  o : chills, fever  · Cardiovascular  o Denies  o : exertional chest pain  · Respiratory  o Denies  o : shortness of breath  · Gastrointestinal  o Denies  o : nausea, vomiting, dysphagia  · Endocrine  o Denies  o : weight gain, weight loss      Vitals  Date Time BP Position Site L\R Cuff Size HR RR TEMP (F) WT  HT  BMI kg/m2 BSA m2 O2 Sat FR L/min FiO2 HC       12/08/2020 01:33 /85 Sitting    94 - R 12  181lbs 7oz 5'  9\" 26.79 2 98 %            Physical Examination  · Constitutional  o Appearance  o : Healthy-appearing, awake and alert in no acute distress  · Head and Face  o Head  o : Normocephalic with no worriesome skin lesions  · Eyes  o Vision  o :   § Visual Fields  § : eyes move symmetrical in all directions  o Sclerae  o : sclerae anicteric  · Neck  o Inspection/Palpation  o : Trachea is midline, no adenopathy  · Respiratory  o Respiratory Effort  o : Breathing is unlabored.  o Inspection of Chest  o : normal appearance  o Auscultation of Lungs  o : Chest is clear to auscultation bilaterally.  · Cardiovascular  o Heart  o :   § Auscultation of Heart  § : no murmurs, rubs, or gallops  o Peripheral Vascular System  o :   § Extremities  § : no cyanosis, clubbing or edema;   · Gastrointestinal  o Abdominal Examination  o : Abdomen is soft, nontender to palpation, with normal active bowel sounds, no appreciable hepatosplenomegaly.          Assessment  · Pancreatic cyst     577.2/K86.2      Plan  · Medications  o Medications have been Reconciled  o Transition of Care or Provider Policy  · Instructions  o I will schedule the patient for an MRI/MRCP of the abdomen to ensure stability of the pancreatic head cyst previously seen by imaging. If the lesion has enlarged then I will consider referral for endoscopic ultrasound. If stable we will consider long-term imaging surveillance.            Electronically Signed by: Willian Boggs MD " -Author on December 8, 2020 02:07:09 PM

## 2021-05-13 NOTE — PROGRESS NOTES
Progress Note      Patient Name: Kang Grey   Patient ID: [id number]   Sex: Male   YOB: 1980    Primary Care Provider: Hien Parham MD   Referring Provider: Hien Parham MD    Visit Date: October 19, 2020    Provider: Roni Osullivan DPM   Location: [facility]   Location Address: [address] [place] [address]  [id number]   Location Phone: [phone]          Chief Complaint  · Routine Foot Care Visit      History Of Present Illness  Kang Grey is a 40 year old /White male who presents to the [facility] today a follow up for:   Kang Grey complains of painful, elongated toenails which are thickened, yellowed, chalky, and cause pain with shoe gear and ambulation.      New, Established, New Problem: Established  Location: Toenails  Duration:  Greater than five years  Onset: Gradual  Nature: sore with palpation.  Stable, worsening, improving: Worsening  Aggravating factors: Pain with shoe gear and ambulation.  Previous Treatment: debridement    Patient denies any fevers, chills, nausea, vomiting, shortness of breathe, nor any other constitutional signs nor symptoms.    Patient reports that no changes in their medications with their recent appointment with their primary care provider.       Past Medical History  Ankle pain, left; Bunion; Compression fracture; Epilepsy; Foot pain, right; Hammertoe; Hyperlipemia; Ingrown toenail; Maltracking of right patella; Meningitis; Seizures         Past Surgical History  Hammertoe repair; Hypospadias; Lumbar puncture         Medication List  buspirone 10 mg oral tablet; Calcium 600 600 mg calcium (1,500 mg) oral tablet; folic acid 800 mcg oral tablet; Invega Sustenna 234 mg/1.5 mL intramuscular syringe; Keppra 500 mg oral tablet; Lamictal 150 mg oral tablet; lamotrigine 100 mg oral tablet; Norco 5-325 mg oral tablet; venlafaxine 150 mg oral tablet extended release 24hr; Vitamin C 1,000 mg oral tablet;  "Vitamin D3 1,000 unit oral capsule         Allergy List  Depakote       Allergies Reconciled  Family Medical History  Diabetes, unspecified type; - No Family History of Colorectal Cancer; Family history of certain chronic disabling diseases; arthritis         Social History  Alcohol Use (Never); lives with parents; Recreational Drug Use (Never); Single.; Tobacco (Never); Unemployed.         Review of Systems  · Constitutional  o Denies  o : fatigue, night sweats  · Eyes  o Denies  o : double vision, blurred vision  · HENT  o Denies  o : vertigo, recent head injury  · Cardiovascular  o Denies  o : chest pain, irregular heart beats  · Respiratory  o Denies  o : shortness of breath, productive cough  · Gastrointestinal  o Denies  o : nausea, vomiting  · Genitourinary  o Denies  o : dysuria, urinary retention  · Integument  o * See HPI  · Neurologic  o * See HPI  · Musculoskeletal  o * See HPI  · Endocrine  o Denies  o : cold intolerance, heat intolerance  · Heme-Lymph  o Denies  o : petechiae, lymph node enlargement or tenderness  · Allergic-Immunologic  o Denies  o : frequent illnesses      Vitals  Date Time BP Position Site L\R Cuff Size HR RR TEMP (F) WT  HT  BMI kg/m2 BSA m2 O2 Sat FR L/min FiO2 HC       10/19/2020 10:46 /78 Sitting    99 - R  97.5 178lbs 0oz 5'  9\" 26.29 1.98 97 %            Physical Examination  · Constitutional  o Appearance  o : well-nourished, normal body habitus, multiple deformities present, appears to be chronically ill   · Respiratory  o Respiratory Effort  o : No labored breathing. Good respiratory effort.   · Cardiovascular  o Peripheral Vascular System  o :   § Pedal Pulses  § : pulses 2 + and symmetrical  § Extremities  § : no edema in lower extremities  · Musculoskeletal  o General  o :   § General Musculoskeletal  § : Lower extremity muscle and strength and range of motion is equal and symmetrical bilaterally. The knees are noted to be normal in alignment. General weakness " throughout lower extremities bilaterally. Right side shows 0/5 in the anterior muscle group and 1/5 in the lateral muscle groups in the lower leg. Otherwise, 4/5 muscle strength in all other muscle and tendon insertions in the foot and ankle. The left side shows 4/5 muscle strength in all other muscle and tendon insertions in the foot and ankle. Right first MPJ 0/5 dorsiflexion. Overlapping second toe. Well-healed surgical scars along the first ray. No range of motion in the IP joint of the hallux consistent with a probable effusion.  o Extremeties/Joint  o : Lower extremity muscle strength and range of motion is equal and symmetrical bilaterally. The knees are noted to be in normal alignment.   · Skin and Subcutaneous Tissue  o General Inspection  o : Skin is noted to have normal texture and turgor, with no excrescences noted.   o Digits and Nails  o : The toenails are noted to be without disese.  · Neurologic  o Sensation  o : Sharp/dull sensation is diminished bilaterally. Monofilament sensation examination of the left foot is diminished. Monofilament sensation examination of the right foot is diminished.   · Toes  o Toes: Right Foot  o :   § Toenails  § : Toenails are hypertrophic, mycotic, dystrophic, brittle toenail(s) at nail 1, 2, 3, 4, 5 with onycholysis of the right foot. The 1st, 2nd, 3rd, 4th, 5th toenail(s) on the right have 2 mm in thickness with subungual detritus. There is an incurvated toenail at the distal border of the 1st, 2nd, 3rd, 4th, 5th toe  o Toes: Left Foot  o :   § Toenails  § : There are hypertrophic, mycotic, dystrophic, brittle toenail(s) at the 1, 2, 3, 4, 5 with onycholysis of the left foot. The 1st, 2nd, 3rd, 4th, 5th toenail(s) on the left have 2 mm in thickness with subungual detritus. There is an incurvated toenail at the distal border of the 1st, 2nd, 3rd, 4th, 5th toe  · Procedures  o Nail Debridement  o : Nail debridement is indicated for the following toenails:, left hallux,  left 2nd toe, left 3rd toe, left 4th toe, left 5th toe, right hallux, right 2nd toe, right 3rd toe, right 4th toe, right 5th toe. The nail was debrided of excessive thickness to appropriate levels of comfort and contour using, nail nippers. The procedure was without complications                Assessment  · Foot pain, left     729.5/M79.672  · Foot pain, right     729.5/M79.671  · Dropfoot     736.79/M21.379  · Hallux abducto valgus, right     735.0/M20.11  · Weakness     780.79/R53.1  · Neuropathy     355.9/G62.9  · Difficulty walking     719.7/R26.2  · Ingrowing nail     703.0/L60.0  · Tinea unguium     110.1/B35.1      Plan  · Orders  o Debridement of six or more nails (00932) - - 10/19/2020  · Medications  o Medications have been Reconciled  o Transition of Care or Provider Policy  · Instructions  o Discuss Findings: I have discussed the findings of this evaluation with the patient. The discussion included a complete verbal explanation of any changes in the examination results, diagnosis, and the current treatment plan. A schedule for future care needs was explained. If any questions should arise after returning home, I have encouraged the patient to feel free to contact Dr. Osullivan. The patient states understanding and agreement with this plan.  o Follow Up in 9 weeks  o Pt to monitor for problems and to contact Dr. Osullivan for follow-up should such signs occur. Patient states understanding and agreement with this plan.   o Onychomycosis is present in 2-3% of the population with the most common source of contamination coming from the patient's own skin. Fifteen to 20 percent of people between the ages of 40 and 60 have onychomycosis, 32 percent of 60- to [id number] have nail fungus and approximately 50 percent of those over 70 are afflicted. Seventy-five percent of the people who have this infection exhibit psychosocial concerns. These people face the dilemma of not being able to go to the swimming pool,  public shower areas or even wear open-toed sandals. More important is the fact that 48 percent of the people with onychomycosis have pain. The pain is intense enough to have these patients miss 1.8 days of work on average over a six-month period. Traditional topical therapies used alone are generally ineffective for clearing the primary infection, and even oral therapy is associated with a high rate of initial treatment failure or recurrence. In many patients combination oral and topical therapy is the treatment of choice.   o I have recommended debridement of the devitalized or contaminated tissue. Debridement will relieve the pressure of the necrotic presence of on the nail and provides for a better cosmetic appearance. Debulking the nail does help in combination with other treatments in that it can decrease the fungal load of the nail itself.   o Electronically Identified Patient Education Materials Provided Electronically  · Disposition  o Call or Return if symptoms worsen or persist.  · Referrals  o ID: [id number] Date: 02/06/2020 Type: Inbound  Specialty: Podiatry            Electronically Signed by: Roni Osullivan DPM -Author on October 19, 2020 11:15:22 AM

## 2021-05-14 VITALS
DIASTOLIC BLOOD PRESSURE: 85 MMHG | HEIGHT: 69 IN | RESPIRATION RATE: 12 BRPM | OXYGEN SATURATION: 98 % | WEIGHT: 181.44 LBS | HEART RATE: 94 BPM | BODY MASS INDEX: 26.87 KG/M2 | SYSTOLIC BLOOD PRESSURE: 125 MMHG

## 2021-05-14 VITALS
WEIGHT: 188 LBS | DIASTOLIC BLOOD PRESSURE: 74 MMHG | OXYGEN SATURATION: 94 % | SYSTOLIC BLOOD PRESSURE: 109 MMHG | HEIGHT: 69 IN | HEART RATE: 103 BPM | BODY MASS INDEX: 27.85 KG/M2 | TEMPERATURE: 97.6 F

## 2021-05-14 VITALS
BODY MASS INDEX: 26.36 KG/M2 | WEIGHT: 178 LBS | DIASTOLIC BLOOD PRESSURE: 78 MMHG | HEIGHT: 69 IN | HEART RATE: 99 BPM | OXYGEN SATURATION: 97 % | TEMPERATURE: 97.5 F | SYSTOLIC BLOOD PRESSURE: 125 MMHG

## 2021-05-14 VITALS
SYSTOLIC BLOOD PRESSURE: 118 MMHG | DIASTOLIC BLOOD PRESSURE: 77 MMHG | HEIGHT: 69 IN | HEART RATE: 103 BPM | WEIGHT: 182 LBS | OXYGEN SATURATION: 99 % | BODY MASS INDEX: 26.96 KG/M2

## 2021-05-14 NOTE — PROGRESS NOTES
Progress Note      Patient Name: Kang Grey   Patient ID: [id number]   Sex: Male   YOB: 1980    Primary Care Provider: Hien Parham MD   Referring Provider: Hien Parham MD    Visit Date: January 11, 2021    Provider: Roni Osullivan DPM   Location: [facility]   Location Address: [address] [place] [address]  [id number]   Location Phone: [phone]          Chief Complaint  · Routine Foot Care Visit      History Of Present Illness  Kang Grey is a 40 year old /White male who presents to the [facility] today a follow up for:   Kang Grey complains of painful, elongated toenails which are thickened, yellowed, chalky, and cause pain with shoe gear and ambulation.      New, Established, New Problem: Established  Location: Toenails  Duration:  Greater than five years  Onset: Gradual  Nature: sore with palpation.  Stable, worsening, improving: Worsening  Aggravating factors: Pain with shoe gear and ambulation.  Previous Treatment: debridement    Patient denies any fevers, chills, nausea, vomiting, shortness of breathe, nor any other constitutional signs nor symptoms.    Patient reports the following medical changes since their last visit:    -Recently diagnosed with sleep apnea and will be starting a CPAP machine soon.       Past Medical History  Ankle pain, left; Bunion; Compression fracture; Epilepsy; Foot pain, right; Hammertoe; Hyperlipemia; Ingrown toenail; Maltracking of right patella; Meningitis; Seizures         Past Surgical History  Hammertoe repair; Hypospadias; Lumbar puncture         Medication List  buspirone 10 mg oral tablet; Calcium 600 600 mg calcium (1,500 mg) oral tablet; folic acid 800 mcg oral tablet; ibuprofen 800 mg oral tablet; Invega Sustenna 234 mg/1.5 mL intramuscular syringe; Keppra 500 mg oral tablet; Lamictal 150 mg oral tablet; lamotrigine 100 mg oral tablet; melatonin 5 mg oral capsule; pravastatin 20 mg oral  "tablet; venlafaxine 150 mg oral tablet extended release 24hr; Vitamin C 1,000 mg oral tablet; Vitamin D3 1,000 unit oral capsule         Allergy List  Depakote       Allergies Reconciled  Family Medical History  Diabetes, unspecified type; - No Family History of Colorectal Cancer; Family history of certain chronic disabling diseases; arthritis         Social History  Alcohol Use (Never); lives with parents; Recreational Drug Use (Never); Single.; Tobacco (Never); Unemployed.         Review of Systems  · Constitutional  o Denies  o : fatigue, night sweats  · Eyes  o Denies  o : double vision, blurred vision  · HENT  o Denies  o : vertigo, recent head injury  · Cardiovascular  o Denies  o : chest pain, irregular heart beats  · Respiratory  o Denies  o : shortness of breath, productive cough  · Gastrointestinal  o Denies  o : nausea, vomiting  · Genitourinary  o Denies  o : dysuria, urinary retention  · Integument  o * See HPI  · Neurologic  o * See HPI  · Musculoskeletal  o * See HPI  · Endocrine  o Denies  o : cold intolerance, heat intolerance  · Heme-Lymph  o Denies  o : petechiae, lymph node enlargement or tenderness  · Allergic-Immunologic  o Denies  o : frequent illnesses      Vitals  Date Time BP Position Site L\R Cuff Size HR RR TEMP (F) WT  HT  BMI kg/m2 BSA m2 O2 Sat FR L/min FiO2 HC       01/11/2021 11:04 /77 Sitting    103 - R   182lbs 0oz 5'  9\" 26.88 2 99 %            Physical Examination  · Constitutional  o Appearance  o : well-nourished, normal body habitus, multiple deformities present, appears to be chronically ill   · Respiratory  o Respiratory Effort  o : No labored breathing. Good respiratory effort.   · Cardiovascular  o Peripheral Vascular System  o :   § Pedal Pulses  § : pulses 2 + and symmetrical  § Extremities  § : no edema in lower extremities  · Musculoskeletal  o General  o :   § General Musculoskeletal  § : Lower extremity muscle and strength and range of motion is equal and " symmetrical bilaterally. The knees are noted to be normal in alignment. General weakness throughout lower extremities bilaterally. Right side shows 0/5 in the anterior muscle group and 1/5 in the lateral muscle groups in the lower leg. Otherwise, 4/5 muscle strength in all other muscle and tendon insertions in the foot and ankle. The left side shows 4/5 muscle strength in all other muscle and tendon insertions in the foot and ankle. Right first MPJ 0/5 dorsiflexion. Overlapping second toe. Well-healed surgical scars along the first ray. No range of motion in the IP joint of the hallux consistent with a probable effusion.  o Extremeties/Joint  o : Lower extremity muscle strength and range of motion is equal and symmetrical bilaterally. The knees are noted to be in normal alignment.   · Skin and Subcutaneous Tissue  o General Inspection  o : Skin is noted to have normal texture and turgor, with no excrescences noted.   o Digits and Nails  o : The toenails are noted to be without disese.  · Neurologic  o Sensation  o : Sharp/dull sensation is diminished bilaterally. Monofilament sensation examination of the left foot is diminished. Monofilament sensation examination of the right foot is diminished.   · Toes  o Toes: Right Foot  o :   § Toenails  § : Toenails are hypertrophic, mycotic, dystrophic, brittle toenail(s) at nail 1, 2, 3, 4, 5 with onycholysis of the right foot. The 1st, 2nd, 3rd, 4th, 5th toenail(s) on the right have 2 mm in thickness with subungual detritus. There is an incurvated toenail at the distal border of the 1st, 2nd, 3rd, 4th, 5th toe  o Toes: Left Foot  o :   § Toenails  § : There are hypertrophic, mycotic, dystrophic, brittle toenail(s) at the 1, 2, 3, 4, 5 with onycholysis of the left foot. The 1st, 2nd, 3rd, 4th, 5th toenail(s) on the left have 2 mm in thickness with subungual detritus. There is an incurvated toenail at the distal border of the 1st, 2nd, 3rd, 4th, 5th toe  · Procedures  o Nail  Debridement  o : Nail debridement is indicated for the following toenails:, left hallux, left 2nd toe, left 3rd toe, left 4th toe, left 5th toe, right hallux, right 2nd toe, right 3rd toe, right 4th toe, right 5th toe. The nail was debrided of excessive thickness to appropriate levels of comfort and contour using, nail nippers. The procedure was without complications                Assessment  · Foot pain, left     729.5/M79.672  · Foot pain, right     729.5/M79.671  · Dropfoot     736.79/M21.379  · Hallux abducto valgus, right     735.0/M20.11  · Weakness     780.79/R53.1  · Neuropathy     355.9/G62.9  · Difficulty walking     719.7/R26.2  · Ingrowing nail     703.0/L60.0  · Tinea unguium     110.1/B35.1      Plan  · Orders  o Debridement of six or more nails (35289) - - 01/11/2021  · Medications  o Medications have been Reconciled  o Transition of Care or Provider Policy  · Instructions  o Discuss Findings: I have discussed the findings of this evaluation with the patient. The discussion included a complete verbal explanation of any changes in the examination results, diagnosis, and the current treatment plan. A schedule for future care needs was explained. If any questions should arise after returning home, I have encouraged the patient to feel free to contact Dr. Osullivan. The patient states understanding and agreement with this plan.  o Follow Up in 9 weeks  o Pt to monitor for problems and to contact Dr. Osullivan for follow-up should such signs occur. Patient states understanding and agreement with this plan.   o Onychomycosis is present in 2-3% of the population with the most common source of contamination coming from the patient's own skin. Fifteen to 20 percent of people between the ages of 40 and 60 have onychomycosis, 32 percent of 60- to [id number] have nail fungus and approximately 50 percent of those over 70 are afflicted. Seventy-five percent of the people who have this infection exhibit  psychosocial concerns. These people face the dilemma of not being able to go to the swimming pool, public shower areas or even wear open-toed sandals. More important is the fact that 48 percent of the people with onychomycosis have pain. The pain is intense enough to have these patients miss 1.8 days of work on average over a six-month period. Traditional topical therapies used alone are generally ineffective for clearing the primary infection, and even oral therapy is associated with a high rate of initial treatment failure or recurrence. In many patients combination oral and topical therapy is the treatment of choice.   o I have recommended debridement of the devitalized or contaminated tissue. Debridement will relieve the pressure of the necrotic presence of on the nail and provides for a better cosmetic appearance. Debulking the nail does help in combination with other treatments in that it can decrease the fungal load of the nail itself.   o Electronically Identified Patient Education Materials Provided Electronically  · Disposition  o Call or Return if symptoms worsen or persist.  · Referrals  o ID: [id number] Date: 02/06/2020 Type: Inbound  Specialty: Podiatry            Electronically Signed by: Roni Osullivan DPM -Author on January 11, 2021 11:10:55 AM

## 2021-05-14 NOTE — PROGRESS NOTES
Progress Note      Patient Name: Kang Grey   Patient ID: [id number]   Sex: Male   YOB: 1980    Primary Care Provider: Hien Parham MD   Referring Provider: Hien Parham MD    Visit Date: April 5, 2021    Provider: Roni Osullivan DPM   Location: [facility]   Location Address: [address] [place] [address]  [id number]   Location Phone: [phone]          Chief Complaint  · Routine Foot Care Visit      History Of Present Illness  Kang Grey is a 40 year old /White male who presents to the [facility] today a follow up for:   Kang Grey complains of painful, elongated toenails which are thickened, yellowed, chalky, and cause pain with shoe gear and ambulation.      New, Established, New Problem: Established  Location: Toenails  Duration:  Greater than five years  Onset: Gradual  Nature: sore with palpation.  Stable, worsening, improving: Worsening  Aggravating factors: Pain with shoe gear and ambulation.  Previous Treatment: debridement    Patient denies any fevers, chills, nausea, vomiting, shortness of breathe, nor any other constitutional signs nor symptoms.    Patient presents with Nikky Ramos.       Past Medical History  Ankle pain, left; Bunion; Compression fracture; Epilepsy; Foot pain, right; Hammertoe; Hyperlipemia; Ingrown toenail; Maltracking of right patella; Meningitis; Seizures         Past Surgical History  Hammertoe repair; Hypospadias; Lumbar puncture         Medication List  buspirone 10 mg oral tablet; Calcium 600 600 mg calcium (1,500 mg) oral tablet; folic acid 800 mcg oral tablet; ibuprofen 800 mg oral tablet; Invega Sustenna 234 mg/1.5 mL intramuscular syringe; Keppra 500 mg oral tablet; Lamictal 150 mg oral tablet; lamotrigine 100 mg oral tablet; melatonin 5 mg oral capsule; pravastatin 20 mg oral tablet; venlafaxine 150 mg oral tablet extended release 24hr; Vitamin C 1,000 mg oral tablet; Vitamin D3 1,000 unit oral  "capsule         Allergy List  Depakote       Allergies Reconciled  Family Medical History  Diabetes, unspecified type; - No Family History of Colorectal Cancer; Family history of certain chronic disabling diseases; arthritis         Social History  Alcohol Use (Never); lives with parents; Recreational Drug Use (Never); Single.; Tobacco (Never); Unemployed.         Review of Systems  · Constitutional  o Denies  o : fatigue, night sweats  · Eyes  o Denies  o : double vision, blurred vision  · HENT  o Denies  o : vertigo, recent head injury  · Cardiovascular  o Denies  o : chest pain, irregular heart beats  · Respiratory  o Denies  o : shortness of breath, productive cough  · Gastrointestinal  o Denies  o : nausea, vomiting  · Genitourinary  o Denies  o : dysuria, urinary retention  · Integument  o * See HPI  · Neurologic  o * See HPI  · Musculoskeletal  o * See HPI  · Endocrine  o Denies  o : cold intolerance, heat intolerance  · Heme-Lymph  o Denies  o : petechiae, lymph node enlargement or tenderness  · Allergic-Immunologic  o Denies  o : frequent illnesses      Vitals  Date Time BP Position Site L\R Cuff Size HR RR TEMP (F) WT  HT  BMI kg/m2 BSA m2 O2 Sat FR L/min FiO2        04/05/2021 10:49 /74 Sitting    103 - R  97.6 188lbs 0oz 5'  9\" 27.76 2.04 94 %            Physical Examination  · Constitutional  o Appearance  o : well-nourished, normal body habitus, multiple deformities present, appears to be chronically ill   · Respiratory  o Respiratory Effort  o : No labored breathing. Good respiratory effort.   · Cardiovascular  o Peripheral Vascular System  o :   § Pedal Pulses  § : pulses 2 + and symmetrical  § Extremities  § : no edema in lower extremities  · Musculoskeletal  o General  o :   § General Musculoskeletal  § : Lower extremity muscle and strength and range of motion is equal and symmetrical bilaterally. The knees are noted to be normal in alignment. General weakness throughout lower extremities " bilaterally. Right side shows 0/5 in the anterior muscle group and 1/5 in the lateral muscle groups in the lower leg. Otherwise, 4/5 muscle strength in all other muscle and tendon insertions in the foot and ankle. The left side shows 4/5 muscle strength in all other muscle and tendon insertions in the foot and ankle. Right first MPJ 0/5 dorsiflexion. Overlapping second toe. Well-healed surgical scars along the first ray. No range of motion in the IP joint of the hallux consistent with a probable effusion.  o Extremeties/Joint  o : Lower extremity muscle strength and range of motion is equal and symmetrical bilaterally. The knees are noted to be in normal alignment.   · Skin and Subcutaneous Tissue  o General Inspection  o : Skin is noted to have normal texture and turgor, with no excrescences noted.   o Digits and Nails  o : The toenails are noted to be without disese.  · Neurologic  o Sensation  o : Sharp/dull sensation is diminished bilaterally. Monofilament sensation examination of the left foot is diminished. Monofilament sensation examination of the right foot is diminished.   · Toes  o Toes: Right Foot  o :   § Toenails  § : Toenails are hypertrophic, mycotic, dystrophic, brittle toenail(s) at nail 1, 2, 3, 4, 5 with onycholysis of the right foot. The 1st, 2nd, 3rd, 4th, 5th toenail(s) on the right have 2 mm in thickness with subungual detritus. There is an incurvated toenail at the distal border of the 1st, 2nd, 3rd, 4th, 5th toe  o Toes: Left Foot  o :   § Toenails  § : There are hypertrophic, mycotic, dystrophic, brittle toenail(s) at the 1, 2, 3, 4, 5 with onycholysis of the left foot. The 1st, 2nd, 3rd, 4th, 5th toenail(s) on the left have 2 mm in thickness with subungual detritus. There is an incurvated toenail at the distal border of the 1st, 2nd, 3rd, 4th, 5th toe  · Procedures  o Nail Debridement  o : Nail debridement is indicated for the following toenails:, left hallux, left 2nd toe, left 3rd toe,  left 4th toe, left 5th toe, right hallux, right 2nd toe, right 3rd toe, right 4th toe, right 5th toe. The nail was debrided of excessive thickness to appropriate levels of comfort and contour using, nail nippers. The procedure was without complications                Assessment  · Foot pain, left     729.5/M79.672  · Foot pain, right     729.5/M79.671  · Dropfoot     736.79/M21.379  · Hallux abducto valgus, right     735.0/M20.11  · Weakness     780.79/R53.1  · Neuropathy     355.9/G62.9  · Difficulty walking     719.7/R26.2  · Ingrowing nail     703.0/L60.0  · Tinea unguium     110.1/B35.1      Plan  · Orders  o Debridement of six or more nails (51488) - - 04/05/2021  · Medications  o Medications have been Reconciled  o Transition of Care or Provider Policy  · Instructions  o Discuss Findings: I have discussed the findings of this evaluation with the patient. The discussion included a complete verbal explanation of any changes in the examination results, diagnosis, and the current treatment plan. A schedule for future care needs was explained. If any questions should arise after returning home, I have encouraged the patient to feel free to contact Dr. Osullivan. The patient states understanding and agreement with this plan.  o Follow Up in 9 weeks  o Pt to monitor for problems and to contact Dr. Osullivan for follow-up should such signs occur. Patient states understanding and agreement with this plan.   o Onychomycosis is present in 2-3% of the population with the most common source of contamination coming from the patient's own skin. Fifteen to 20 percent of people between the ages of 40 and 60 have onychomycosis, 32 percent of 60- to [id number] have nail fungus and approximately 50 percent of those over 70 are afflicted. Seventy-five percent of the people who have this infection exhibit psychosocial concerns. These people face the dilemma of not being able to go to the swimming pool, public shower areas or even  wear open-toed sandals. More important is the fact that 48 percent of the people with onychomycosis have pain. The pain is intense enough to have these patients miss 1.8 days of work on average over a six-month period. Traditional topical therapies used alone are generally ineffective for clearing the primary infection, and even oral therapy is associated with a high rate of initial treatment failure or recurrence. In many patients combination oral and topical therapy is the treatment of choice.   o I have recommended debridement of the devitalized or contaminated tissue. Debridement will relieve the pressure of the necrotic presence of on the nail and provides for a better cosmetic appearance. Debulking the nail does help in combination with other treatments in that it can decrease the fungal load of the nail itself.   o Electronically Identified Patient Education Materials Provided Electronically  · Referrals  o ID: [id number] Date: 02/06/2020 Type: Inbound  Specialty: Podiatry            Electronically Signed by: Roni Osullivan DPM -Author on April 5, 2021 11:01:19 AM

## 2021-05-15 VITALS
DIASTOLIC BLOOD PRESSURE: 77 MMHG | HEIGHT: 68 IN | SYSTOLIC BLOOD PRESSURE: 121 MMHG | WEIGHT: 156 LBS | HEART RATE: 77 BPM | BODY MASS INDEX: 23.64 KG/M2 | OXYGEN SATURATION: 93 %

## 2021-05-15 VITALS
SYSTOLIC BLOOD PRESSURE: 132 MMHG | DIASTOLIC BLOOD PRESSURE: 90 MMHG | HEART RATE: 122 BPM | HEIGHT: 68 IN | BODY MASS INDEX: 26.37 KG/M2 | OXYGEN SATURATION: 96 % | WEIGHT: 174 LBS

## 2021-05-15 VITALS
DIASTOLIC BLOOD PRESSURE: 65 MMHG | OXYGEN SATURATION: 92 % | HEART RATE: 108 BPM | WEIGHT: 172.19 LBS | SYSTOLIC BLOOD PRESSURE: 111 MMHG | HEIGHT: 69 IN | BODY MASS INDEX: 25.5 KG/M2

## 2021-05-15 VITALS
SYSTOLIC BLOOD PRESSURE: 120 MMHG | DIASTOLIC BLOOD PRESSURE: 76 MMHG | HEIGHT: 69 IN | BODY MASS INDEX: 26.43 KG/M2 | WEIGHT: 178.44 LBS

## 2021-05-18 NOTE — PROGRESS NOTES
"Kang Grey. 1980     Office/Outpatient Visit    Visit Date: Thu, Feb 15, 2018 12:06 pm    Provider: Hien Parham MD (Assistant: Anna Marie Arthur MA)    Location: [facility]        Electronically signed by Hien Parham MD on  02/22/2018 01:15:03 PM                             SUBJECTIVE:        CC:     Mr. Grey is a 37 year old White male.  He is here today following a transition of care from an inpatient hospital: 2/7/18. schizoaffective disorder.   Patient was admitted to [facility] on 2-7-18 and discharged on 2-10-18.  During the recent hospitalization patient was treated by MD Dickinson.   Mother complains pt is sleeping a lot and having a lot of anger         HPI:     Kang is a 38 yo male with underlying h/o cerebral palsy, hydrocephalus, depression and seizure disorder.  Psychiatric history includes being hospitalized twice in past for depression, without suicidal attempts.  On 2/9/18, he was taken to ER for severe depression and anxiety with fatigue and terrible anger and mood swings (per his mother).  He was easily agitated, not eating well, feeling hopeless and hlepless.  He did not have any suicidal ideations.  He did report no hallucinations the night of admit, but states he was more \"intuitive\" and that \"mother earth\" often talks to him.  Home meds include buspar, effexor, keppra, trazodone and ziprasidone.  Kang was admitted for recurrent severe major depression without suicidal ideation, and for anxiety.  His medications were adjusted while in the hospital by psychiatry, he was d/c home off effexor and was started on trintellix, buspar was increased to 10 mg tid,  and ziprasidone was increased  to 40 mg bid.  He was also dx with schizoaffective disorder.  His mom states he is no better now that he is home, and they have been unable to start him on his trintellix because insurance will not pay for it.  Kang states his depression is better but not great as he is still having the " anger spells and mood swings.   He is sleeping a lot and not getting out of bed.  He denies suicidal thoughts. He is still not eating well. BM are normal     ROS:     CONSTITUTIONAL:  Positive for fatigue.   Negative for chills or fever.      EYES:  Negative for blurred vision.      E/N/T:  Negative for ear pain, nasal congestion and sore throat.      CARDIOVASCULAR:  Negative for chest pain, dizziness, palpitations and tachycardia.      GASTROINTESTINAL:  Positive for loose stools.   Negative for abdominal pain, constipation, nausea or vomiting.      INTEGUMENTARY:  Negative for rash.      NEUROLOGICAL:  Positive for headaches.   Negative for dizziness or weakness.          PMH/FMH/SH:     Last Reviewed on 8/23/2017 01:03 PM by Hien Parham    Past Medical History:             PAST MEDICAL HISTORY         Cerebral Palsy: diagnoised as infant;         CURRENT MEDICAL PROVIDERS:    Chiropractor    Neurologist             ADVANCE DIRECTIVES: Living will on file, signed 7/23/2013./pr and Organ Donation         Surgical History:         hypospadius repair;    hamstring repair a few years go;         Family History:     Father: Healthy     Mother:    Positive for Hypothyroidism;     Brother(s): 1 brother(s) total    ; Positive for PUD;     Sister(s): 1 sister(s) total         Social History:     Occupation:    Disabled     Marital Status: Single     Children: None         Tobacco/Alcohol/Supplements:     Last Reviewed on 2/15/2018 12:10 PM by Anna Marie Arthur    Tobacco: He has never smoked.          Substance Abuse History:     Last Reviewed on 11/11/2016 10:14 AM by Xiao Villalba        Mental Health History:     Last Reviewed on 11/11/2016 10:14 AM by Xiao Villalba        Communicable Diseases (eg STDs):     Last Reviewed on 11/11/2016 10:14 AM by Xiao Villalba            Allergies:     Last Reviewed on 2/15/2018 12:09 PM by Anna Marie Arthur    Depakote:        Current Medications:     Last Reviewed  on 2/15/2018 12:08 PM by Anna Marie Arthur    Pravastatin 20mg Tablet Take 1 tablet(s) by mouth daily     Venlafaxine HCl 225mg Tablets, Extended Release Take 1 tablet(s) by mouth daily     Calcium 600 1.5gm Tablet Take 1 tablet(s) by mouth bid     Vitamin D3 1,000IU Tablet Take 2 tablet(s) by mouth daily     Keppra 500mg Tablet Take 3 tablet(s) by mouth q12h     Buspirone HCl 5mg Tablet 1 tab PO TID prn anxiety     Trazodone HCl 100mg Tablet 1 tab at bedtime     Ziprasidone 20mg Capsules 1 Tab bid         OBJECTIVE:        Vitals:         Current: 2/15/2018 12:08:53 PM    Ht:  5 ft, 9.5 in;  Wt: 168.8 lbs;  BMI: 24.6    T: 97.5 F (oral);  BP: 105/68 mm Hg (left arm, sitting);  P: 78 bpm (left arm (BP Cuff), sitting);  sCr: 0.91 mg/dL;  GFR: 103.58        Exams:     PHYSICAL EXAM:     GENERAL: Vitals recorded well developed, well nourished;  no apparent distress;     NECK: trachea is midline; thyroid is non-palpable;     RESPIRATORY: normal respiratory rate and pattern with no distress; normal breath sounds with no rales, rhonchi, wheezes or rubs;     CARDIOVASCULAR: normal rate; rhythm is regular;  no systolic murmur; no edema;     GASTROINTESTINAL: nontender;     MUSCULOSKELETAL: gait: unsteady and uses a cane;     NEUROLOGIC: mental status: alert and oriented x 3; GROSSLY INTACT     PSYCHIATRIC: affect/demeanor: flat;  psychomotor: displays psychomotor retardation;  speech pattern: flat;  normal thought and perception;         Procedures:     Vaccination against other viral diseases, Influenza     1. Influenza, seasonal PF (children 3 years to adult): 0.5 ml unit dose given IM in the left upper arm; administered by ;  lot number [id number]; expires 30JUN18 Regarding contraindications to an Influenza vaccine: Denies moderate/severe illness with/without fever; serious reaction to eggs, egg proteins, gentamicin, gelatin, arginine, neomycin or polymixin; serious reaction after recieving previous influenza vaccines;  and history of Guillain-Yutan Syndrome.              ASSESSMENT           331.4   G91.4  Hydrocephalus              DDx:     296.22   F32.1  Moderate depression              DDx:     345.10   G40.409  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              DDx:     295.70   F25.9  Schizoaffective disorder, unspecified              DDx:     V04.81   Z23  Vaccination against other viral diseases, Influenza              DDx:         ORDERS:         Other Orders:       95789  Influenza virus vaccine, quadrivalent, split virus, preservative free 3 years of age & older  (In-House)           Administration of influenza virus vaccine (x1)                 PLAN:          Hydrocephalus no acute symptoms          Moderate depression ongoing          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 stable          Schizoaffective disorder, unspecified he sees his therapist tomorrow and then [facility] next week          Vaccination against other viral diseases, Influenza           Orders:       [id number]  Influenza virus vaccine, quadrivalent, split virus, preservative free 3 years of age & older  (In-House)                     Administration of influenza virus vaccine (x1)             CHARGE CAPTURE           **Please note: ICD descriptions below are intended for billing purposes only and may not represent clinical diagnoses**        Primary Diagnosis:         331.4 Hydrocephalus            G91.4    Hydrocephalus in diseases classified elsewhere              Orders:          [id number]   Transitional care manage service 7 day discharge  (In-House)           296.22 Moderate depression            F32.1    Major depressive disorder, single episode, moderate    345.10 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    295.70 Schizoaffective disorder, unspecified            F25.9    Schizoaffective disorder, unspecified    V04.81 Vaccination against other viral diseases,  Influenza            Z23    Encounter for immunization              Orders:          [id number]   Influenza virus vaccine, quadrivalent, split virus, preservative free 3 years of age & older  (In-House)                                           Administration of influenza virus vaccine (x1)

## 2021-05-18 NOTE — PROGRESS NOTES
Kang Grey  1980     Office/Outpatient Visit    Visit Date: Thu, Jul 16, 2020 09:27 am    Provider: Hien Parham MD (Assistant: Alina Noland MA)    Location: [facility]        Electronically signed by Hien Parham MD on  07/21/2020 02:13:18 PM                             Subjective:        CC: Mr. Grey is a 40 year old White male.  This is a follow-up visit.          HPI:           Patient presents with pure hypercholesterolemia, unspecified.  Current treatment includes a lipid lowering agent.  Compliance with treatment has been good.  He denies experiencing any hypercholesterolemia related symptoms.  Most recent lab tests include Total Cholesterol:  208 (mg/dL) (01/15/2020), HDL:  42 (mg/dL) (01/15/2020), Triglycerides:  82 (mg/dL) (01/15/2020), LDL:  150 (mg/dL) (01/15/2020), Vitamin D, 25-Hydroxy:  44.9 (ng/mL) (11/11/2019), Creatinine, Serum:  1.00 (mg/dl) (01/15/2020), Glom Filt Rate, Est:  >60 (ml/min/1.73m2) (01/15/2020), Alkaline Phosphatase, Serum:  68 (U/L) (01/15/2020), ALT (SGPT):  16 (U/L) (01/15/2020), AST (SGOT):  16 (U/L) (01/15/2020), Hemoglobin:  15.00 (gm/dl) (01/15/2020), Hematocrit:  43.8 (%) (01/15/2020).        chronic ongoing constipation, he is on a stool softener every other day and gets diarrhea with this.     ROS:     CONSTITUTIONAL:  Positive for fatigue.   Negative for chills or fever.      EYES:  Negative for blurred vision.      E/N/T:  Negative for nasal congestion and sore throat.      CARDIOVASCULAR:  Negative for chest pain and palpitations.      RESPIRATORY:  Negative for recent cough and dyspnea.      GASTROINTESTINAL:  Negative for abdominal pain, constipation, diarrhea, nausea and vomiting.      MUSCULOSKELETAL:  Negative for myalgias.      NEUROLOGICAL:  Negative for paresthesias and weakness.          Past Medical History / Family History / Social History:         Last Reviewed on 7/16/2020 10:20 AM by Hien Parham    Past  Medical History:             PAST MEDICAL HISTORY         Cerebral Palsy: diagnoised as infant;         CURRENT MEDICAL PROVIDERS:    Chiropractor    Neurologist: Dr. Dr Sanford Mccoy    Ophthalmologist: Dr. Dr Roman    Psychologist: Dr. Burger             ADVANCE DIRECTIVES: Living will on file, signed 7/23/2013./pr and Organ Donation         PREVENTIVE HEALTH MAINTENANCE             PSA: none documented ./mlb         PAST MEDICAL HISTORY             CURRENT MEDICAL PROVIDERS:    Neurologist: HEIKE MCCOY    Ophthalmologist: HIEU    Psychiatrist: KIARA BURGER- DR GEORGES         Surgical History:         hypospadius repair;    hamstring repair a few years go;         Family History:     Father: Healthy     Mother:    Positive for Hypothyroidism;     Brother(s): 1 brother(s) total    ; Positive for PUD;     Sister(s): 1 sister(s) total         Social History:     Occupation:    Disabled     Marital Status: Single     Children: None         Tobacco/Alcohol/Supplements:     Last Reviewed on 7/16/2020 09:30 AM by Alina Noland    Tobacco: He has never smoked.          Substance Abuse History:     Last Reviewed on 11/11/2019 06:37 PM by Dinesh Carter        Mental Health History:     Last Reviewed on 11/11/2019 06:37 PM by Dinesh Carter        Communicable Diseases (eg STDs):     Last Reviewed on 11/11/2019 06:37 PM by Dinesh Carter        Allergies:     Last Reviewed on 6/09/2020 04:22 PM by Bridgette Maldonado    Depakote:          Current Medications:     Last Reviewed on 6/09/2020 04:22 PM by Bridgette Maldonado    vitamin c  [1000mg]    folic acid   [800mcg]    lamoTRIgine 100 mg oral tablet [take 1 tablet (100 mg) by oral route daily]    Keppra 500 mg oral tablet [Take 3 tablet(s) by mouth q12h]    Calcium 600 600 mg calcium (1,500 mg) oral tablet [Take 1 tablet(s) by mouth bid]    Vitamin D3 25 mcg (1,000 unit) oral tablet [TAKE TWO TABLETS BY MOUTH ONCE DAILY]    Invega Sustenna 234 mg/1.5 mL  intramuscular Syringe [Inject monthly]    BUSPIRONE    TAB 10MG  [ TID, PRN]    VENLAFAXINE  CAP 150MG ER  [Q DAY]    ibuprofen 800 mg oral tablet [take 1 tablet (800 mg) by oral route 3 times per day with food]        Objective:        Vitals:         Current: 7/16/2020 9:29:58 AM    Ht:  5 ft, 9.5 in;  Wt: 176 lbs;  BMI: 25.6T: 97.8 F (oral);  BP: 118/86 mm Hg (left arm, sitting);  P: 93 bpm (finger clip, sitting);  sCr: 1 mg/dL;  GFR: 93.22        Exams:     PHYSICAL EXAM:     GENERAL: Vitals recorded well developed, well nourished;  no apparent distress;     EYES: conjunctiva and cornea are normal;     E/N/T: EARS: external auditory canal normal bilaterally;  both TMs are red;  NOSE: nasal mucosa is erythematous;  OROPHARYNX: posterior pharynx shows no exudate and erythema;     NECK: trachea is midline; thyroid is non-palpable;     RESPIRATORY: CTA B, no wheezing/rales/rhonchi     CARDIOVASCULAR: normal rate; rhythm is regular;  No murmurs, clicks, gallops or rubs appreciated; no edema;     GASTROINTESTINAL: nontender; Soft and nondistended; normal bowel sounds; no organomegaly;     SKIN:  no significant rashes or lesions; no suspicious moles;     NEUROLOGIC: Grossly intact; mental status: alert and oriented x 3;     PSYCHIATRIC: appropriate affect and demeanor; normal speech pattern; thought/perception: delusions;         Assessment:         E78.00   Pure hypercholesterolemia, unspecified       G40.409   Other generalized epilepsy and epileptic syndromes, not intractable, without status epilepticus       F32.1   Major depressive disorder, single episode, moderate       F51.01   Primary insomnia       E55.9   Vitamin D deficiency, unspecified       F25.9   Schizoaffective disorder, unspecified       K59.00   Constipation, unspecified       R53.83   Other fatigue           ORDERS:         Meds Prescribed:       [Recorded] melatonin 5 mg oral tablet [1 po qhs nightly.]       [Refilled] ibuprofen 800 mg oral tablet  [take 1 tablet (800 mg) by oral route 3 times per day with food], #60 (sixty) tablets, Refills: 0 (zero)       [Refilled] melatonin 5 mg oral tablet [1 po qhs nightly.], #100 (one hundred) tablets, Refills: 0 (zero)       [Recorded] inulin 2 gram oral tablet,chewable [1 po daily]       [Refilled] inulin 2 gram oral tablet,chewable [1 po daily], #100 (one hundred) tablets, Refills: 1 (one)         Lab Orders:       [id number]  HTNLP - [facility] CMP AND LIPID: [id number], [id number]  (Send-Out)            82060  VITD - [facility] Vitamin D, 25 Hydroxy  (Send-Out)                      Plan:         Pure hypercholesterolemia, unspecified    LABORATORY:  Labs ordered to be performed today include HTN/Lipid Panel: CMP, Lipid.            Orders:       [id number]  HTNLP - [facility] CMP AND LIPID: [id number], [id number]  (Send-Out)              Other generalized epilepsy and epileptic syndromes, not intractable, without status epilepticusstable on kera, he sees his neurologist again in Nov.        Major depressive disorder, single episode, moderatestable, he is sleeping a lot        Primary insomniatry melatonin 5 mg nightly        Vitamin D deficiency, unspecified    LABORATORY:  Labs ordered to be performed today include Vitamin D.            Orders:       [id number]  VITD - [facility] Vitamin D, 25 Hydroxy  (Send-Out)              Schizoaffective disorder, unspecifiedstable        Constipation, unspecifiedchronic ongoing constipation, he is on a stool softener every other day and gets diarrhea with this.  will start him on 4 prunes a day and fiber gummy        Other fatiguehe is on vitamin B12 2000 mcg a night          Prescriptions:       [Recorded] melatonin 5 mg oral tablet [1 po qhs nightly.]       [Refilled] ibuprofen 800 mg oral tablet [take 1 tablet (800 mg) by oral route 3 times per day with food], #60 (sixty) tablets, Refills: 0 (zero)       [Refilled] melatonin 5 mg oral tablet [1 po qhs nightly.], #100 (one hundred) tablets, Refills: 0 (zero)       [Recorded] inulin 2  gram oral tablet,chewable [1 po daily]       [Refilled] inulin 2 gram oral tablet,chewable [1 po daily], #100 (one hundred) tablets, Refills: 1 (one)             Charge Capture:         Primary Diagnosis:     E78.00  Pure hypercholesterolemia, unspecified           Orders:      [id number]  Office/outpatient visit; established patient, level 4  (In-House)              G40.409  Other generalized epilepsy and epileptic syndromes, not intractable, without status epilepticus     F32.1  Major depressive disorder, single episode, moderate     F51.01  Primary insomnia     E55.9  Vitamin D deficiency, unspecified     F25.9  Schizoaffective disorder, unspecified     K59.00  Constipation, unspecified     R53.83  Other fatigue

## 2021-05-18 NOTE — PROGRESS NOTES
Kang Grey  1980     Office/Outpatient Visit    Visit Date: Wed, Claude 15, 2020 08:30 am    Provider: Hien Parham MD (Assistant: Alina Noland MA)    Location: [facility]        Electronically signed by Hien Parham MD on  01/15/2020 03:10:31 PM                             Subjective:        CC: Mr. Grey is a 39 year old White male.  MEDICARE WELLNESS         HPI:           Mr. Grey is here for a Medicare wellness visit.  The required HRA questions are integrated within this visit note. Family medical history and individual medical/surgical history were reviewed and updated.  A current height, weight, BMI, blood pressure, and pulse were recorded in the vitals section of the note and have been reviewed. Patient's medications, including supplements, were recorded in the chart and reviewed.  Current providers and suppliers were reviewed and updated.          Self-Assessment of Health: He rates his health as poor. He rates his confidence of being able to control/manage most of his health problems as not very confident. His physical/emotional health has limited his social activites extremely.  A review of possible cognitive impairment was performed and the following was noted: he is having difficulty driving;  memory changes are noted;  he is having trouble with his finances A review of functional ability, including bathing, dressing, walking, and urine/bowel continence as well as level of safety was performed and was found to be negative.  Falls Risk: Has not had any falls or only one fall without injury in the past year.  In regard to hearing, he reports having to strain to hear or understand conversations, but not having trouble hearing the TV/radio when others do not or wearing hearing aid(s).  Concerning home safety, he reports that at home he DOES have a skid resistant shower/tub, grab bars in the bath, handrails on stairs, functioning smoke alarms and absence of throw rugs,  "but not adequate lighting.  Physical Activity: He exercises for at least 20 minutes 3 or more days/week.; Type of diet patient normally eats is described as poor--needs improvement.  Tobacco: He has never smoked.  Preventative Health updated today.            PHQ-9 Depression Screening: Completed form scanned and in chart; Total Score 11       Kang is a 36 yo male with underlying h/o cerebral palsy, hydrocephalus, depression and seizure disorder.  Psychiatric history includes being hospitalized twice in past for depression, without suicidal attempts.  On 2/9/18, he was taken to ER for severe depression and anxiety with fatigue and terrible anger and mood swings (per his mother).  He was easily agitated, not eating well, feeling hopeless and hlepless.  He did not have any suicidal ideations.  He did report no hallucinations the night of admit, but states he was more \"intuitive\" and that \"mother earth\" often talks to him.  Home meds include buspar, effexor, keppra, trazodone and ziprasidone.  Kang was admitted for recurrent severe major depression without suicidal ideation, and for anxiety.  His medications were adjusted while in the hospital by psychiatry, he was d/c home off effexor and was started on trintellix, buspar was increased to 10 mg tid,  and ziprasidone was increased  to 40 mg bid.  He was also dx with schizoaffective disorder.  His mom states he is no better now that he is home, and they have been unable to start him on his trintellix because insurance will not pay for it.  Kang states his depression is better but not great as he is still having the anger spells and mood swings.   He is sleeping a lot and not getting out of bed.  He denies suicidal thoughts. He is still not eating well. BM are normal     ROS:     CONSTITUTIONAL:  Positive for fatigue.   Negative for chills or fever.      EYES:  Negative for blurred vision.      E/N/T:  Negative for nasal congestion and sore throat.      " CARDIOVASCULAR:  Negative for chest pain and palpitations.      RESPIRATORY:  Negative for recent cough and dyspnea.      GASTROINTESTINAL:  Negative for abdominal pain, constipation, diarrhea, nausea and vomiting.      MUSCULOSKELETAL:  Negative for myalgias.      NEUROLOGICAL:  Negative for paresthesias and weakness.          Past Medical History / Family History / Social History:         Last Reviewed on 11/11/2019 06:37 PM by Dinesh Carter    Past Medical History:             PAST MEDICAL HISTORY         Cerebral Palsy: diagnoised as infant;         CURRENT MEDICAL PROVIDERS:    Chiropractor    Neurologist: Dr. Dr Sanford Mccoy    Ophthalmologist: Dr. Dr Roman    Psychologist: Dr. Burger             ADVANCE DIRECTIVES: Living will on file, signed 7/23/2013./pr and Organ Donation         PREVENTIVE HEALTH MAINTENANCE             PSA: none documented ./mlb         Surgical History:         hypospadius repair;    hamstring repair a few years go;         Family History:     Father: Healthy     Mother:    Positive for Hypothyroidism;     Brother(s): 1 brother(s) total    ; Positive for PUD;     Sister(s): 1 sister(s) total         Social History:     Occupation:    Disabled     Marital Status: Single     Children: None         Tobacco/Alcohol/Supplements:     Last Reviewed on 11/11/2019 06:37 PM by Dinesh Carter    Tobacco: He has never smoked.          Substance Abuse History:     Last Reviewed on 11/11/2019 06:37 PM by Dinesh Carter        Mental Health History:     Last Reviewed on 11/11/2019 06:37 PM by Dinesh Carter        Communicable Diseases (eg STDs):     Last Reviewed on 11/11/2019 06:37 PM by Dinesh Carter        Allergies:     Last Reviewed on 11/11/2019 06:37 PM by Dinesh Carter    Depakote:          Current Medications:     Last Reviewed on 1/15/2020 08:42 AM by Alina Nolandra 500 mg oral tablet [Take 3 tablet(s) by mouth q12h]    Calcium 600 600 mg calcium (1,500 mg) oral tablet  "[Take 1 tablet(s) by mouth bid]    Vitamin D3 25 mcg (1,000 unit) oral tablet [Take 2 tablet(s) by mouth daily]    Melatonin 5 mg oral tablet [Take 1 tablet(s) by mouth at bedtime]    Invega Sustenna 234 mg/1.5 mL intramuscular Syringe [Inject monthly]    vitamin c  [1000mg]    folic acid   [800mcg]    lamoTRIgine 100 mg oral tablet [take 1 tablet (100 mg) by oral route daily]    BUSPIRONE    TAB 10MG  [ TID, PRN]    VENLAFAXINE  CAP 150MG ER  [Q DAY]        Objective:        Vitals:         Current: 1/15/2020 8:48:24 AM    Ht:  5 ft, 9.5 in;  Wt: 177.8 lbs;  BMI: 25.9T: 97.5 F (oral);  BP: 96/70 mm Hg (right arm, sitting);  P: 91 bpm (right arm (BP Cuff), sitting);  sCr: 0.85 mg/dL;  GFR: 111.22VA: 20/100 OD, 20/400 OS (near, with correction)        Repeat:     9:0:24 AM  BP:   106/68mm Hg (right arm, sitting, HR  98)     Exams:     PHYSICAL EXAM:     GENERAL: Vitals recorded well developed, well nourished;  no apparent distress;     EYES: conjunctiva and cornea are normal;     E/N/T: EARS: external auditory canal normal bilaterally;  both TMs are red;  NOSE: nasal mucosa is erythematous;  OROPHARYNX: posterior pharynx shows no exudate and erythema;     NECK: trachea is midline; thyroid is non-palpable;     RESPIRATORY: Clear to auscultation bilateally; no rales (\"crackles\") present; no rhonchi; no wheezes;     CARDIOVASCULAR: normal rate; rhythm is regular;  No murmurs, clicks, gallops or rubs appreciated; no edema;     GASTROINTESTINAL: nontender; Soft and nondistended; normal bowel sounds; no organomegaly; no masses;     LYMPHATIC: no enlargement of cervical or facial nodes; no supraclavicular nodes;     SKIN:  No significant rashes, lesions or suspicious moles within limits of examination; 2 skin tags under R eye 2 mm and 4 mm    NEUROLOGIC: Grossly intact; mental status: alert and oriented x 3;     PSYCHIATRIC: appropriate affect and demeanor; normal speech pattern; thought/perception: delusions ( (DESCRIBE if " desired) );         Assessment:         Z00.00   Encounter for general adult medical examination without abnormal findings       Z13.31   Encounter for screening for depression       Z13.6   Encounter for screening for cardiovascular disorders       R53.83   Other fatigue       E78.00   Pure hypercholesterolemia, unspecified       G40.409   Other generalized epilepsy and epileptic syndromes, not intractable, without status epilepticus       G80.8   Other cerebral palsy       F32.1   Major depressive disorder, single episode, moderate       F51.01   Primary insomnia       E55.9   Vitamin D deficiency, unspecified       G91.4   Hydrocephalus in diseases classified elsewhere       K59.00   Constipation, unspecified       F20.0   Paranoid schizophrenia       M81.0   Age-related osteoporosis without current pathological fracture       L91.8   Other hypertrophic disorders of the skin       M25.572   Pain in left ankle and joints of left foot           ORDERS:         Radiology/Test Orders:       90703  DXA, bone density study, 1 or more sites; axial skeleton (eg hips, pelvis, spine)  (Send-Out)            43539ID  Radiologic examination, left ankle; complete minimum 3 views  (Send-Out)              Lab Orders:       [id number]  HTNLP - [facility] CMP AND LIPID: [id number], [id number]  (Send-Out)            57286  BDCB2 - [facility] CBC w/o diff  (Send-Out)            93209  TSH - [facility] TSH  (Send-Out)              Procedures Ordered:         Annual wellness visit, includes a PPPS, subsequent visit  (In-House)              Other Orders:         Depression screen positive and follow up plan documented  (In-House)            1101F  Pt screen for fall risk; document no falls in past year or only 1 fall w/o injury in past year (DARWIN)  (In-House)                      Plan:         Encounter for general adult medical examination without abnormal findingsMEDICARE WELLNESS EXAM-HE DOES NOT NEED A COLONOSCOPY AT HIS AGE,  UTD ONFLU VACCINE, INCREASED  FALL RISK-NEEDS TO USE HIS CANE, ONGOING STABLE MEMORY ISSUES-MR,  DEPRESSION IS UNDER TREATMENT, HE LIVES WITH HIS PARENTS, SAFETY ISSUES REVIEWED WITH HIM TODAY. HE DOES NOT DRIVE DUE TO POOR EYESIGHT, HE IS ABLE PERFORM ADL'S-GETS ASSISTANCE WITH MEDICATION AND FINANCES, HE WAS EDUCATED ON THE LIVING WILL AND A PREV SERVICES/SAFETY HANDOUT WAS GIVEN TO HIM,  HEARING IS ADEQUATE     ADVANCE DIRECTIVES (Please update in [facility]): Living will on file and Durable Power of  on file           Orders:         Annual wellness visit, includes a PPPS, subsequent visit  (In-House)              Encounter for screening for depression    MIPS PHQ-9 Depression Screening: Completed form scanned and in chart; Total Score 11 Positive Depression Screen: Suicide Risk Assessment completed--denies suicidal/homicidal ideation; Stable on medications. No suicidal ideation.            Orders:         Depression screen positive and follow up plan documented  (In-House)            1101F  Pt screen for fall risk; document no falls in past year or only 1 fall w/o injury in past year (DARWIN)  (In-House)              Encounter for screening for cardiovascular disorders    LABORATORY:  Labs ordered to be performed today include HTN/Lipid Panel: CMP, Lipid.            Orders:       81898  HTNLP - [facility] CMP AND LIPID: [id number], [id number]  (Send-Out)              Other fatigue    LABORATORY:  Labs ordered to be performed today include CBC W/O DIFF and TSH.            Orders:       [id number]  BDCB2 - [facility] CBC w/o diff  (Send-Out)            56147  TSH - [facility] TSH  (Send-Out)              Pure hypercholesterolemia, unspecifiedNOT ON MEDICATION, DIET CONTROLLED, DUE FOR LABS        Other generalized epilepsy and epileptic syndromes, not intractable, without status epilepticusstable on medication, he sees Dr Mccoy neurology today        Other cerebral palsyunchanged        Major depressive disorder, single episode, moderatecont eval with psychiatry, he is  overall stable on his medications lamotrigine, buspirone and venlafexine        Primary insomniastable        Vitamin D deficiency, unspecifiedstable on vitamin D        Hydrocephalus in diseases classified elsewhereunchanged        Constipation, unspecifiedstable        Paranoid schizophreniacont eval with psychiatry, he is overall stable on his medications lamotrigine, buspirone and venlafexine        Age-related osteoporosis without current pathological fracture          Orders:       95757  DXA, bone density study, 1 or more sites; axial skeleton (eg hips, pelvis, spine)  (Send-Out)              Other hypertrophic disorders of the skinhe wants the skin tags removed-he is to f/u for excision at a later day        Pain in left ankle and joints of left foot        RADIOLOGY:  I have ordered a left ankle xray to be done today.            Orders:       [id number]  Radiologic examination, left ankle; complete minimum 3 views  (Send-Out)                  Patient Recommendations:        For  Pain in left ankle and joints of left foot:    left ankle x-ray             Charge Capture:         Primary Diagnosis:     Z00.00  Encounter for general adult medical examination without abnormal findings           Orders:      57616  Preventive medicine, established patient, age 18-39 years  (In-House)              Annual wellness visit, includes a PPPS, subsequent visit  (In-House)              Z13.31  Encounter for screening for depression           Orders:      61556-38  Office/outpatient visit; established patient, level 4  (In-House)              Depression screen positive and follow up plan documented  (In-House)            1101F  Pt screen for fall risk; document no falls in past year or only 1 fall w/o injury in past year (DARWIN)  (In-House)              Z13.6  Encounter for screening for cardiovascular disorders     R53.83  Other fatigue     E78.00  Pure hypercholesterolemia, unspecified     G40.409  Other generalized  epilepsy and epileptic syndromes, not intractable, without status epilepticus     G80.8  Other cerebral palsy     F32.1  Major depressive disorder, single episode, moderate     F51.01  Primary insomnia     E55.9  Vitamin D deficiency, unspecified     G91.4  Hydrocephalus in diseases classified elsewhere     K59.00  Constipation, unspecified     F20.0  Paranoid schizophrenia     M81.0  Age-related osteoporosis without current pathological fracture     L91.8  Other hypertrophic disorders of the skin     M25.572  Pain in left ankle and joints of left foot

## 2021-05-18 NOTE — PROGRESS NOTES
Kang Grey  1980     Office/Outpatient Visit    Visit Date: Thu, May 6, 2021 10:07 am    Provider: Hien Parham MD (Assistant: Fabienne Yang,  )    Location: [facility]        Electronically signed by Hien Parham MD on  05/10/2021 05:37:40 PM                             Subjective:        CC: Medicare wellness exam, back pain    HPI:           Mr. Grey is here for a Medicare wellness visit.          Self-Assessment of Health: He rates his health as poor. He rates his confidence of being able to control/manage most of his health problems as not very confident. His physical/emotional health has limited his social activites extremely.  A review of possible cognitive impairment was performed and the following was noted: he is having difficulty driving;  memory changes are noted;  he is having trouble with his finances A review of functional ability and level of safety was performed and the following was noted: Transferring: ( Performs with assistance ); Urine and Bowel continence: ( Abnormal ) Falls Risk: Has fallen 2 or more times or had one fall with injury in the past year.  In regard to hearing, he reports having trouble hearing the TV/radio when others do not and having to strain to hear or understand conversations, but not wearing hearing aid(s).  Concerning home safety, he reports that at home he DOES have adequate lighting, a skid resistant shower/tub, grab bars in the bath and functioning smoke alarms, but not handrails on stairs or absence of throw rugs.  Physical Activity: He exercises but less than 20 minutes 3 days per week; Type of diet patient normally eats is described as poor--needs improvement.  Tobacco: He has never smoked.  Preventative Health updated today.            PHQ-9 Depression Screening: Completed form scanned and in chart; Total Score 12       He is in today with worsening LBp and R foot toe pain, s/p a fall backwards at his [employer] class, her R  foot toes all hurt and he had surgery with R great toe fusion and hammer toe fixation in past.          He has paranoid schizophrenia and is managed by psychiatry for this        He has sleep apnea and is on cpap and he is not using it as he is suppose to, he doesnt tolerate it        chronic constipation and he quit using his prunes          Concerning pure hypercholesterolemia, unspecified, current treatment includes a lipid lowering agent.  Compliance with treatment has been good.  He denies experiencing any hypercholesterolemia related symptoms.      ROS:     CONSTITUTIONAL:  Positive for fatigue.   Negative for chills or fever.      EYES:  Negative for blurred vision.      E/N/T:  Negative for nasal congestion and sore throat.      CARDIOVASCULAR:  Negative for chest pain and palpitations.      RESPIRATORY:  Negative for recent cough and dyspnea.      GASTROINTESTINAL:  Negative for abdominal pain, constipation, diarrhea, nausea and vomiting.      MUSCULOSKELETAL:  Positive for back pain.   Negative for myalgias.      INTEGUMENTARY:  Negative for rash.      NEUROLOGICAL:  Negative for paresthesias and weakness.          Past Medical History / Family History / Social History:         Last Reviewed on 5/06/2021 12:31 PM by Hien Parham    Past Medical History:             PAST MEDICAL HISTORY         Cerebral Palsy: diagnoised as infant;         CURRENT MEDICAL PROVIDERS:    Chiropractor    Neurologist: Dr. Dr Sanford Mccoy    Ophthalmologist: Dr. Dr Roman    Psychologist: Dr. Burger             ADVANCE DIRECTIVES: Living will on file, signed 7/23/2013./pr and Organ Donation         PREVENTIVE HEALTH MAINTENANCE             PSA: none documented ./mlb         PAST MEDICAL HISTORY             CURRENT MEDICAL PROVIDERS:    Neurologist: HEIKE MCCOY    Ophthalmologist: HIEU    Psychiatrist: KIARA BURGER- DR GEORGES         Surgical History:         hypospadius repair;    hamstring repair a few  years go;         Family History:     Father: Healthy     Mother:    Positive for Hypothyroidism;     Brother(s): 1 brother(s) total    ; Positive for PUD;     Sister(s): 1 sister(s) total         Social History:     Occupation:    Disabled     Marital Status: Single     Children: None         Tobacco/Alcohol/Supplements:     Last Reviewed on 5/06/2021 10:12 AM by Fabienne Yang    Tobacco: He has never smoked.          Substance Abuse History:     Last Reviewed on 10/12/2020 02:15 PM by Riccardo Richardson        Mental Health History:     Last Reviewed on 10/12/2020 02:15 PM by Riccardo Richardson        Communicable Diseases (eg STDs):     Last Reviewed on 10/12/2020 02:15 PM by Riccardo Richardson        Allergies:     Last Reviewed on 10/12/2020 02:15 PM by Riccardo Richardson    Depakote:          Current Medications:     Last Reviewed on 10/12/2020 02:15 PM by Riccardo Richardson    vitamin c  [1000mg]    folic acid   [800mcg]    fiber tablets     Keppra 500 mg oral tablet [Take 3 tablet(s) by mouth q12h]    Calcium 600 600 mg calcium (1,500 mg) oral tablet [Take 1 tablet(s) by mouth bid]    Vitamin D3 25 mcg (1,000 unit) oral tablet [TAKE TWO TABLETS BY MOUTH ONCE DAILY]    Invega Sustenna 234 mg/1.5 mL intramuscular Syringe [Inject monthly]    BUSPIRONE    TAB 10MG  [ TID, PRN]    VENLAFAXINE  CAP 150MG ER  [Q DAY]    ibuprofen 800 mg oral tablet [take 1 tablet (800 mg) by oral route 3 times per day with food]    melatonin 5 mg oral tablet [TAKE ONE TABLET BY MOUTH AT BEDTIME NIGHTLY]    Fiber Gummies 2 gram oral tablet,chewable [CHEW AND SWALLOW 1 TABLET ONCE DAILY]    pravastatin 20 mg oral tablet [TAKE ONE TABLET BY MOUTH ONCE DAILY AT BEDTIME FOR CHOLESTEROL.]    LAMOTRIGINE  TAB 200MG         Objective:        Vitals:         Current: 5/6/2021 10:09:48 AM    Ht:  5 ft, 9.5 in;  Wt: 189.8 lbs;  BMI: 27.6BP: 120/90 mm Hg (left arm, sitting);  P: 78 bpm (apical, sitting);  sCr: 0.87 mg/dL;  GFR: 110.64        Exams:     " PHYSICAL EXAM:     GENERAL: Vitals recorded well developed, well nourished;  no apparent distress;     EYES: conjunctiva and cornea are normal;     E/N/T: EARS: external auditory canal normal bilaterally;  both TMs are red;  NOSE: nasal mucosa is erythematous;  OROPHARYNX: posterior pharynx shows no exudate and erythema;     NECK: trachea is midline; thyroid is non-palpable;     RESPIRATORY: Clear to auscultation bilateally; no rales (\"crackles\") present; no rhonchi; no wheezes;     CARDIOVASCULAR: normal rate; rhythm is regular;  No murmurs, clicks, gallops or rubs appreciated; no edema;     GASTROINTESTINAL: nontender; Soft and nondistended; normal bowel sounds; no organomegaly; no masses;     LYMPHATIC: no enlargement of cervical or facial nodes; no supraclavicular nodes;     SKIN:  No significant rashes, lesions or suspicious moles within limits of examination;     MUSCULOSKELETAL: gait: unsteady;  5/5 LE/UE B WITH symm sensory exam B, 2/4 patellar DTR B,; tender over paralumbar muscles B, neg straight leg raise B; R toes, FROM, NT palpation, no bruising/edema/warmth/erythema;     NEUROLOGIC: mental status: alert and oriented x 3; Grossly intact;     PSYCHIATRIC: appropriate affect and demeanor; normal speech pattern; thought/perception: delusions;         Assessment:         Z00.00   Encounter for general adult medical examination without abnormal findings       Z13.31   Encounter for screening for depression       M54.5   Low back pain       M79.674   Pain in right toe(s)       E78.00   Pure hypercholesterolemia, unspecified       G80.8   Other cerebral palsy       F32.1   Major depressive disorder, single episode, moderate       F51.01   Primary insomnia       E55.9   Vitamin D deficiency, unspecified       K59.00   Constipation, unspecified       F20.0   Paranoid schizophrenia       Z13.6   Encounter for screening for cardiovascular disorders           ORDERS:         Meds Prescribed:       [Recorded] " diclofenac sodium 1 % Topical Gel [apply 2 grams to the affected area(s) by topical route 4 times per day]       [Recorded] tiZANidine 2 mg oral capsule [take 1-2 capsule (2-4 mg) by oral route QHS]       [Refilled] diclofenac sodium 1 % Topical Gel [apply 2 grams to the affected area(s) by topical route 4 times per day], #100 (one hundred) grams, Refills: 2 (two)       [Refilled] tiZANidine 2 mg oral capsule [take 1-2 capsule (2-4 mg) by oral route QHS], #40 (forty) capsules, Refills: 2 (two)       [Refilled] Calcium 600 600 mg calcium (1,500 mg) oral tablet [Take 1 tablet(s) by mouth bid], #60 (sixty) tablets, Refills: 5 (five)       [Refilled] ibuprofen 800 mg oral tablet [take 1 tablet (800 mg) by oral route 3 times per day with food], #60 (sixty) tablets, Refills: 2 (two)       [Refilled] Vitamin D3 25 mcg (1,000 unit) oral tablet [TAKE TWO TABLETS BY MOUTH ONCE DAILY], #60 (sixty) tablets, Refills: 5 (five)       [Refilled] melatonin 10 mg oral tablet [1 po qhs], #90 (ninety) tablets, Refills: 1 (one)       [Refilled] pravastatin 20 mg oral tablet [TAKE ONE TABLET BY MOUTH ONCE DAILY AT BEDTIME FOR CHOLESTEROL.], #90 (ninety) tablets, Refills: 1 (one)         Lab Orders:       10184  HTNLP - [facility] CMP AND LIPID: [id number]  (Send-Out)            53640  VITD - H Vitamin D, 25 Hydroxy  (Send-Out)            86791  HTNLP - [facility] CMP AND LIPID: [id number]  (Send-Out)            80484  BDCB2 - H CBC w/o diff  (Send-Out)            64343  TSH - [facility] TSH  (Send-Out)              Procedures Ordered:         Annual wellness visit, includes a PPPS, subsequent visit  (In-House)            RFPT  Physical/Occupational Therapy Referral  (Send-Out)              Other Orders:         Depression screen positive and follow up plan documented  (In-House)            1100F  Pt screen for fall risk; document 2+ falls in the past yr or any fall w/injury in past year (DARWIN)  (In-House)                      Plan:          Encounter for general adult medical examination without abnormal findingsMEDICARE WELLNESS EXAM-HE DOES NOT NEED A COLONOSCOPY AT HIS AGE,  UTD ON FLU VACCINE, INCREASED FALL RISK-NEEDS TO USE HIS CANE, ONGOING STABLE MEMORY ISSUES-MR/CEREBRAL PALSY,  DEPRESSION IS UNDER TREATMENT-SEES PSYCHIATRY, HE LIVES WITH HIS PARENTS, SAFETY ISSUES REVIEWED WITH HIM TODAY. HE DOES NOT DRIVE DUE TO POOR EYESIGHT, HE IS ABLE PERFORM ADL'S-GETS ASSISTANCE WITH MEDICATION AND FINANCES, HE WAS EDUCATED ON THE LIVING WILL AND A PREV SERVICES/SAFETY HANDOUT WAS GIVEN TO HIM,  HEARING IS ADEQUATE     ADVANCE DIRECTIVES (Please update in PMH): Living will on file and Durable Power of  on file               Orders:         Annual wellness visit, includes a PPPS, subsequent visit  (In-House)              Encounter for screening for depression    MIPS PHQ-9 Depression Screening: Completed form scanned and in chart; Total Score 12 Positive Depression Screen: Suicide Risk Assessment completed--denies suicidal/homicidal ideation; Stable on medications. No suicidal ideation.            Orders:         Depression screen positive and follow up plan documented  (In-House)            1100F  Pt screen for fall risk; document 2+ falls in the past yr or any fall w/injury in past year (DARWIN)  (In-House)              Low back painHe is in today with worsening LBp and R foot toe pain, s/p a fall backwards at his [employer] class, her R foot toes all hurt and he had surgery with R great toe fusion and hammer toe fixation in past.          -will get him back in to PT        REFERRALS:  Referral initiated to physical therapy ( [facility] ) for evaluation and treatment.            Prescriptions:       [Recorded] diclofenac sodium 1 % Topical Gel [apply 2 grams to the affected area(s) by topical route 4 times per day]       [Recorded] tiZANidine 2 mg oral capsule [take 1-2 capsule (2-4 mg) by oral route QHS]        [Refilled] diclofenac sodium 1 % Topical Gel [apply 2 grams to the affected area(s) by topical route 4 times per day], #100 (one hundred) grams, Refills: 2 (two)       [Refilled] tiZANidine 2 mg oral capsule [take 1-2 capsule (2-4 mg) by oral route QHS], #40 (forty) capsules, Refills: 2 (two)       [Refilled] Calcium 600 600 mg calcium (1,500 mg) oral tablet [Take 1 tablet(s) by mouth bid], #60 (sixty) tablets, Refills: 5 (five)       [Refilled] ibuprofen 800 mg oral tablet [take 1 tablet (800 mg) by oral route 3 times per day with food], #60 (sixty) tablets, Refills: 2 (two)       [Refilled] Vitamin D3 25 mcg (1,000 unit) oral tablet [TAKE TWO TABLETS BY MOUTH ONCE DAILY], #60 (sixty) tablets, Refills: 5 (five)       [Refilled] melatonin 10 mg oral tablet [1 po qhs], #90 (ninety) tablets, Refills: 1 (one)       [Refilled] pravastatin 20 mg oral tablet [TAKE ONE TABLET BY MOUTH ONCE DAILY AT BEDTIME FOR CHOLESTEROL.], #90 (ninety) tablets, Refills: 1 (one)           Orders:       RFPT  Physical/Occupational Therapy Referral  (Send-Out)              Pain in right toe(s)toe sprain, no sign of fracture, advised to use ice and will start him on topical diclofenac        Pure hypercholesterolemia, unspecified    LABORATORY:  Labs ordered to be performed today include HTN/Lipid Panel: CMP, Lipid.      RECOMMENDATIONS given include: avoidance of caffeine, avoidance of cigarette smoke, keep blood pressure log as directed, healthy carb, high healthy protein and high fiber diet, avoid salt in diet, f/u every 6 months for BP checks and labs, and exercise 30 min 3-4 days a week.            Orders:       84039  HTNLP - [facility] CMP AND LIPID: [id number]  (Send-Out)              Other cerebral palsynothing new        Major depressive disorder, single episode, moderatef/u psychiatry, cont meds        Primary insomnianot sleeping well, not tolerating his cpap        Vitamin D deficiency, unspecified    LABORATORY:  Labs ordered to be  performed today include Vitamin D.            Orders:       83750  VITD - [facility] Vitamin D, 25 Hydroxy  (Send-Out)              Constipation, unspecifiedrestart 4 prunes a day        Paranoid schizophreniaHe has paranoid schizophrenia and is managed by psychiatry for this                Encounter for screening for cardiovascular disorders    LABORATORY:  Labs ordered to be performed today include CBC W/O DIFF, HTN/Lipid Panel: CMP, Lipid, and TSH.      RECOMMENDATIONS given include: avoidance of caffeine, avoidance of cigarette smoke, keep blood pressure log as directed, healthy carb, high healthy protein and high fiber diet, avoid salt in diet, f/u every 6 months for BP checks and labs, and exercise 30 min 3-4 days a week.            Orders:       87420  HTNLP - [facility] CMP AND LIPID: [id number]  (Send-Out)            45027  BDCB2 - [facility] CBC w/o diff  (Send-Out)            70790  TSH - [facility] TSH  (Send-Out)                  Patient Recommendations:        For  Pure hypercholesterolemia, unspecified:    Try to avoid or reduce the amount of caffeine intake. Avoid cigarette smoke. Keep a daily blood pressure log and report elevated blood pressure to provider as directed. Drink plenty of fluids.  Fever increases the loss of fluids and can lead to dehydration.          For  Encounter for screening for cardiovascular disorders:    Try to avoid or reduce the amount of caffeine intake. Avoid cigarette smoke. Keep a daily blood pressure log and report elevated blood pressure to provider as directed. Drink plenty of fluids.  Fever increases the loss of fluids and can lead to dehydration.              Charge Capture:         Primary Diagnosis:     Z00.00  Encounter for general adult medical examination without abnormal findings           Orders:      14530  Preventive medicine, established patient, age 40-64 years  (In-House)              Annual wellness visit, includes a PPPS, subsequent visit  (In-House)              Z13.31   Encounter for screening for depression           Orders:      02253-85  Office/outpatient visit; established patient, level 4  (In-House)              Depression screen positive and follow up plan documented  (In-House)            1100F  Pt screen for fall risk; document 2+ falls in the past yr or any fall w/injury in past year (DARWIN)  (In-House)              M54.5  Low back pain     M79.674  Pain in right toe(s)     E78.00  Pure hypercholesterolemia, unspecified     G80.8  Other cerebral palsy     F32.1  Major depressive disorder, single episode, moderate     F51.01  Primary insomnia     E55.9  Vitamin D deficiency, unspecified     K59.00  Constipation, unspecified     F20.0  Paranoid schizophrenia     Z13.6  Encounter for screening for cardiovascular disorders

## 2021-05-18 NOTE — PROGRESS NOTES
"Kang Grey  1980     Office/Outpatient Visit    Visit Date: Mon, Nov 11, 2019 02:53 pm    Provider: Dinesh Carter MD (Assistant: Leana Littlejohn MA)    Location: [facility]        Electronically signed by Dinesh Carter MD on  11/11/2019 06:37:49 PM                             Subjective:        CC: Mr. Grey is a 39 year old White male.  \"just dont feel good' no energy med refill         HPI: Patient c/o that he \"just hasn't felt good. I just don't feel right. Something is off.\" His mother reports that he has been overly tired. He endorses cough and sneezing with associated congestion. No fever or  chills. No nausea, vomiting or diarrhea. No new medications but he says that his venlafaxine dose was changed back to 25 mg last week after a failed attempt at decreasing. He says that he has been feeling down lately. He denies irritability.  No SI or HI.      Patient has a known history of vitamin D deficiency.  He says that he is out of his vitamin D3 and is requesting a refill today.  His most recent vitamin D level was 41.6 on 9/19/2018.    ROS:     CONSTITUTIONAL:  Positive for fatigue.   Negative for chills or fever.      EYES:  Negative for blurred vision.      E/N/T:  Positive for nasal congestion and frequent rhinorrhea.   Negative for ear pain, tinnitus, hoarseness, sinus pressure or sore throat.      CARDIOVASCULAR:  Negative for chest pain, dizziness, palpitations and edema.      RESPIRATORY:  Positive for cough.   Negative for dyspnea.      GASTROINTESTINAL:  Negative for diarrhea, nausea and vomiting.      NEUROLOGICAL:  Negative for headaches, paresthesias and weakness.      ENDOCRINE:  Negative for hair loss, heat/cold intolerance, polydipsia, and polyphagia.      PSYCHIATRIC:  Positive for schizoaffective disorder.          Past Medical History / Family History / Social History:         Last Reviewed on 11/11/2019 06:37 PM by Dinesh Carter    Past Medical History:             PAST " MEDICAL HISTORY         Cerebral Palsy: diagnoised as infant;         CURRENT MEDICAL PROVIDERS:    Chiropractor    Neurologist: Dr. Dr Sanford Mccoy    Ophthalmologist: Dr. Dr Roman    Psychologist: Dr. Burger             ADVANCE DIRECTIVES: Living will on file, signed 7/23/2013./pr and Organ Donation         PREVENTIVE HEALTH MAINTENANCE             PSA: none documented ./mlb         Surgical History:         hypospadius repair;    hamstring repair a few years go;         Family History:     Father: Healthy     Mother:    Positive for Hypothyroidism;     Brother(s): 1 brother(s) total    ; Positive for PUD;     Sister(s): 1 sister(s) total         Social History:     Occupation:    Disabled     Marital Status: Single     Children: None         Tobacco/Alcohol/Supplements:     Last Reviewed on 11/11/2019 06:37 PM by Dinesh Carter    Tobacco: He has never smoked.          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    Other generalized epilepsy and epileptic syndromes, not intractable, without status epilepticus    Other cerebral palsy    Infantile cerebral palsy    Major depressive disorder, single episode, moderate    Acquired hammer toe    Moderate depression    Primary insomnia    Chronic insomnia    Vitamin D deficiency, unspecified    Vitamin D deficiency, unspecified    Hydrocephalus in diseases classified elsewhere    Hydrocephalus    Osteopenia    Age-related osteoporosis without current pathological fracture    Schizoaffective disorder, unspecified     Schizoaffective disorder, unspecified    Constipation, unspecified    Constipation    Aggressive personality disorder    Paranoid schizophrenia    Acute upper respiratory infection, unspecified    Other fatigue        Immunizations:     zzFluzone pf-quadrivalent 3 and up 10/14/2014    zzFluzone pf-quadrivalent 3 and up 1/20/2016    zzFluzone pf-quadrivalent 3 and up 10/7/2016    zzFluzone pf-quadrivalent 3 and up 2/15/2018    Fluzone (3 + years dose) 10/5/2011    Fluzone pf (3+ years dose) 10/1/2013    Fluzone Quadrivalent (3+ years) 9/19/2018    Fluzone Quadrivalent (3+ years) 11/4/2019        Allergies:     Last Reviewed on 11/11/2019 06:37 PM by Dinesh Carter    Depakote:          Current Medications:     Last Reviewed on 11/11/2019 06:37 PM by Dinesh Carter    Keppra 500 mg oral tablet [Take 3 tablet(s) by mouth q12h]    Calcium 600 600 mg calcium (1,500 mg) oral tablet [Take 1 tablet(s) by mouth bid]    Vitamin D3 1,000IU Tablet [Take 2 tablet(s) by mouth daily]    Pravastatin 20mg Tablet [Take 1 tablet(s) by mouth daily]    Melatonin 5 mg oral tablet [Take 1 tablet(s) by mouth at bedtime]    Venlafaxine HCl 225mg Tablets, Extended Release [Take 1 tablet(s) by mouth daily]    busPIRone 5 mg oral tablet [1 tab PO TID prn anxiety]    Invega Sustenna 234 mg/1.5 mL intramuscular Syringe [Inject monthly]        Objective:        Vitals:         Current: 11/11/2019 3:02:02 PM    Ht:  5 ft, 9.5 in;  Wt: 175.4 lbs;  BMI: 25.5T: 97.9 F (oral);  BP: 106/72 mm Hg (right arm, sitting);  P: 100 bpm (right arm (BP Cuff), sitting);  sCr: 0.93 mg/dL;  GFR: 101.07        Exams:     PHYSICAL EXAM:     GENERAL: Vitals recorded well developed, well nourished;  no apparent distress;     EYES: conjunctiva and cornea are normal;     E/N/T: EARS: external auditory canal normal bilaterally;  both TMs are red;  NOSE: nasal mucosa is erythematous;  OROPHARYNX: posterior pharynx shows no exudate and erythema;     NECK: trachea is midline;  "thyroid is non-palpable;     RESPIRATORY: Clear to auscultation bilateally; no rales (\"crackles\") present; no rhonchi; no wheezes;     CARDIOVASCULAR: normal rate; rhythm is regular;  No murmurs, clicks, gallops or rubs appreciated; no edema;     GASTROINTESTINAL: nontender; Soft and nondistended; normal bowel sounds; no organomegaly; no masses;     LYMPHATIC: no enlargement of cervical or facial nodes; no supraclavicular nodes;     SKIN:  No significant rashes, lesions or suspicious moles within limits of examination;     NEUROLOGIC: mental status: alert and oriented x 3; Grossly intact;     PSYCHIATRIC: appropriate affect and demeanor; normal speech pattern; thought/perception: delusions ( (DESCRIBE if desired) );         Assessment:         J06.9   Acute upper respiratory infection, unspecified       R53.83   Other fatigue         DDx: - DDx is long and includes but is not limited to Viral illness vs medication side effect     E55.9   Vitamin D deficiency, unspecified           ORDERS:         Meds Prescribed:       [Refilled] Vitamin D3 25 mcg (1,000 unit) oral tablet [Take 2 tablet(s) by mouth daily], #60 (sixty) tablets, Refills: 2 (two)         Lab Orders:       [id number]  FFAT - H CBC, CMP, TSH, B12 levels FATIGUE PANEL  (Send-Out)            30551  VITD - H Vitamin D, 25 Hydroxy  (Send-Out)                      Plan:         Acute upper respiratory infection, unspecified- We will treat supportively: Adequate rest, good hydration, over-the-counter decongestants class/cough suppressants as needed and Tylenol/ibuprofen as needed for fever/discomfort.  ED/return precautions given        Other fatigue- Basic fatigue work-up ordered including CBC, CMP, TSH, B12 and vitamin D.  If no abnormalities are found on this work-up, will discuss current medication regimen and possibly related anxiety/mood symptoms with patient    LABORATORY:  Labs ordered to be performed today include Female Fatigue Panel (CBC, CMP, TSH, " B12).            Orders:       [id number]  FFAT - [facility] CBC, CMP, TSH, B12 levels FATIGUE PANEL  (Send-Out)              Vitamin D deficiency, unspecified- Continue vitamin D3 1000 units daily; refill provided today.  Vitamin D level ordered.    LABORATORY:  Labs ordered to be performed today include Vitamin D.            Prescriptions:       [Refilled] Vitamin D3 25 mcg (1,000 unit) oral tablet [Take 2 tablet(s) by mouth daily], #60 (sixty) tablets, Refills: 2 (two)           Orders:       [id number]  VITD - [facility] Vitamin D, 25 Hydroxy  (Send-Out)                  Charge Capture:         Primary Diagnosis:     J06.9  Acute upper respiratory infection, unspecified           Orders:      [id number]  Office/outpatient visit; established patient, level 4  (In-House)              R53.83  Other fatigue     E55.9  Vitamin D deficiency, unspecified

## 2021-05-18 NOTE — PROGRESS NOTES
"Kang Grey 1980     Office/Outpatient Visit    Visit Date: Fri, Aug 24, 2018 11:53 am    Provider: Hien Parham MD (Assistant: Delores Saleh LPN)    Location: [facility]        Electronically signed by Hien Parham MD on  08/28/2018 11:17:15 AM                             SUBJECTIVE:        CC: ongoing fatigue         HPI:         Patient to be evaluated for fatigue.  Patient describes for the last 8 months.  This has been a constant problem.  Sleep quality: He has problems initiating sleep.  He averages 9 to 10 hours of sleep per night.  Patient states that he is depressed. He denies contemplating suicide.  He denies associated symptoms, including abdominal pain, bruising, chest pain, cough, fever, headaches, myalgia, night sweats, pedal edema, restless legs and vomiting.  Current affective symptoms include anhedonia.  He denies anxious mood, a change in appetite, altered sleep habits, crying spells, guilt, sadness or feelings of worthlessness.  Medical history is pertinent for depression.  Mr. Grey denies significant social or occupational stressors.  Other details: sleep study normal 2 yrs ago at dx 2 go per mother..      Kang is schizoaffective with underlying grand mal seizure d/o, he sees neurology and states his \"eyes\" are jumping but that is the only seizure symptom he has.  He also goes to  [facility]  Dr Chambers in  [facility] and he is doing well.     ROS:     CONSTITUTIONAL:  Positive for fatigue.   Negative for chills or fever.      EYES:  Negative for blurred vision.      E/N/T:  Negative for nasal congestion and sore throat.      CARDIOVASCULAR:  Negative for chest pain and palpitations.      RESPIRATORY:  Negative for recent cough and dyspnea.      GASTROINTESTINAL:  Positive for diarrhea.   Negative for abdominal pain, constipation, nausea or vomiting.      MUSCULOSKELETAL:  Negative for myalgias.      NEUROLOGICAL:  Negative for paresthesias and weakness.          " PMH/FMH/SH:     Last Reviewed on 8/24/2018 12:14 PM by Hien Parham    Past Medical History:             PAST MEDICAL HISTORY         Cerebral Palsy: diagnoised as infant;         CURRENT MEDICAL PROVIDERS:    Chiropractor    Neurologist             ADVANCE DIRECTIVES: Living will on file, signed 7/23/2013./pr and Organ Donation         Surgical History:         hypospadius repair;    hamstring repair a few years go;         Family History:     Father: Healthy     Mother:    Positive for Hypothyroidism;     Brother(s): 1 brother(s) total    ; Positive for PUD;     Sister(s): 1 sister(s) total         Social History:     Occupation:    Disabled     Marital Status: Single     Children: None         Tobacco/Alcohol/Supplements:     Last Reviewed on 8/24/2018 12:14 PM by Hien Parham    Tobacco: He has never smoked.          Substance Abuse History:     Last Reviewed on 11/11/2016 10:14 AM by Xiao Villalba        Mental Health History:     Last Reviewed on 11/11/2016 10:14 AM by Xiao Villalba        Communicable Diseases (eg STDs):     Last Reviewed on 11/11/2016 10:14 AM by Xiao Villalba            Allergies:     Last Reviewed on 8/24/2018 11:53 AM by Delores Saleh April    Depakote:        Current Medications:     Last Reviewed on 8/24/2018 11:54 AM by Delores Saleh April    Pravastatin 20mg Tablet Take 1 tablet(s) by mouth daily     Vitamin D3 1,000IU Tablet Take 2 tablet(s) by mouth daily     Venlafaxine HCl 225mg Tablets, Extended Release Take 1 tablet(s) by mouth daily     Calcium 600 1.5gm Tablet Take 1 tablet(s) by mouth bid     Keppra 500mg Tablet Take 3 tablet(s) by mouth q12h     Invega Sustenna 117mg Injection Suspension, Extended-Release 1 x month     Melatonin 5mg Tablet Take 1 tablet(s) by mouth at bedtime     Buspirone HCl 5mg Tablet 1 tab PO TID prn anxiety         OBJECTIVE:        Vitals:         Current: 8/24/2018 11:53:39 AM    Ht:  5 ft, 9.5 in;  Wt: 180.4 lbs;  BMI:  26.3    T: 97.6 F (oral);  BP: 109/69 mm Hg (left arm, sitting);  P: 102 bpm (left arm (BP Cuff), sitting);  sCr: 1.08 mg/dL;  GFR: 88.93        Exams:     PHYSICAL EXAM:     GENERAL: Vitals recorded well developed, well nourished;  well groomed;  no apparent distress;     EYES: PERRL, EOMI     E/N/T: OROPHARYNX:  normal mucosa, dentition, gingiva, and posterior pharynx;     NECK: thyroid exam reveals not enlarged;     RESPIRATORY: normal respiratory rate and pattern with no distress; normal breath sounds with no rales, rhonchi, wheezes or rubs;     CARDIOVASCULAR: normal rate; rhythm is regular;  no edema;     GASTROINTESTINAL: nontender; normal bowel sounds;     MUSCULOSKELETAL: normal gait; normal overall tone     NEUROLOGICAL:  cranial nerves, motor and sensory function, reflexes, gait and coordination are all intact;     PSYCHIATRIC:  appropriate affect and demeanor; normal speech pattern; grossly normal memory;         ASSESSMENT           780.79   R53.83  Fatigue              DDx:     295.70   F25.9  Schizoaffective disorder, unspecified              DDx:     345.10   G40.409  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              DDx:     343.8   G80.8  Infantile cerebral palsy              DDx:     268.9   E55.9  Vitamin D deficiency, unspecified              DDx:     272.4   E78.00  Hyperlipidemia              DDx:     331.4   G91.4  Hydrocephalus              DDx:     564.01   K59.00  Constipation              DDx:         ORDERS:         Lab Orders:       [id number]  VITD - [facility] Vitamin D, 25 Hydroxy  (Send-Out)         18383  LPDP - [facility] Lipid Panel  (Send-Out)                   PLAN:          Fatigue taking melatonin for sleep which is helping, anemia profile, TSH,testosterone, kidney, liver and B12 all normal on labs, will check vit D and lipids today          Schizoaffective disorder, unspecified communicare          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            Orders:       [id number]  VITD - [facility] Vitamin D, 25 Hydroxy  (Send-Out)            Hyperlipidemia     LABORATORY:  Labs ordered to be performed today include lipid panel.            Orders:       [id number]  LPDP - [facility] Lipid Panel  (Send-Out)            Hydrocephalus stable, no HA          Constipation start senokot daily             CHARGE CAPTURE           **Please note: ICD descriptions below are intended for billing purposes only and may not represent clinical diagnoses**        Primary Diagnosis:         780.79 Fatigue            R53.83    Other fatigue              Orders:          91959   Office/outpatient visit; established patient, level 4  (In-House)           295.70 Schizoaffective disorder, unspecified            F25.9    Schizoaffective disorder, unspecified    345.10 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    343.8 Infantile cerebral palsy            G80.8    Other cerebral palsy    268.9 Vitamin D deficiency, unspecified            E55.9    Vitamin D deficiency, unspecified    272.4 Hyperlipidemia            E78.00    Pure hypercholesterolemia, unspecified    331.4 Hydrocephalus            G91.4    Hydrocephalus in diseases classified elsewhere    564.01 Constipation            K59.00    Constipation, unspecified

## 2021-05-18 NOTE — PROGRESS NOTES
Kang GreyMarshall 1980     Office/Outpatient Visit    Visit Date: Wed, Jan 9, 2019 12:47 pm    Provider: Scott Cherry N.P. (Assistant: Anna Marie Arthur MA)    Location: [facility]        Electronically signed by Scott Cherry N.P. on  01/09/2019 01:38:16 PM                             SUBJECTIVE:        CC:     Mr. Grey is a 38 year old White male.  Patient complains of cough and sinus congestion, along with fatigue.          HPI:         Mr. Grey is here for a Medicare wellness visit.  ADVANCE DIRECTIVES (Please update in PMH): Living will on file Returning to health checkup, the required HRA questions are integrated within this visit note. Family medical history and individual medical/surgical history were reviewed and updated.  A current height, weight, BMI, blood pressure, and pulse were recorded in the vitals section of the note and have been reviewed. Patient's medications, including supplements, were recorded in the chart and reviewed.  Current providers and suppliers were reviewed and updated.          Self-Assessment of Health: He rates his health as poor. He rates his confidence of being able to control/manage most of his health problems as not very confident. His physical/emotional health has limited his social activites extremely.  A review of possible cognitive impairment was performed and the following was noted: he is having difficulty driving;  memory changes are noted A review of functional ability, including bathing, dressing, walking, and urine/bowel continence as well as level of safety was performed and was found to be negative.  Falls Risk: Has not had any falls or only one fall without injury in the past year.  He denies having trouble hearing the TV/radio when others do not, having to strain to hear or understand conversations and wearing hearing aid(s).  Concerning home safety, he reports that at home he DOES have a skid resistant shower/tub, grab bars in the  bath, functioning smoke alarms and absence of throw rugs, but not adequate lighting or handrails on stairs.          Immunization Status: Up to date; Physical Activity: He never excercises.; Type of diet patient normally eats is described as poor--needs improvement.  Tobacco: He has never smoked.  Preventative Health updated today.      ROS:     CONSTITUTIONAL:  Positive for fatigue and Wanted to do MW at time of visit.   Negative for chills, fever or weight change.      E/N/T:  Positive for nasal congestion and sinus pressure.      CARDIOVASCULAR:  Negative for chest pain, orthopnea, paroxysmal nocturnal dyspnea and pedal edema.      RESPIRATORY:  Positive for recent cough ( with copious amounts of purulent sputum ).   Negative for dyspnea.      GASTROINTESTINAL:  Negative for abdominal pain, heartburn, constipation, diarrhea, and stool changes.      NEUROLOGICAL:  Negative for fainting.      PSYCHIATRIC:  Negative for anxiety and depression.          PMH/FMH/SH:     Last Reviewed on 1/09/2019 01:22 PM by Scott Cherry    Past Medical History:             PAST MEDICAL HISTORY         Cerebral Palsy: diagnoised as infant;         CURRENT MEDICAL PROVIDERS:    Chiropractor    Neurologist: Dr. Dr Sanford Mccoy    Ophthalmologist: Dr. Dr Roman    Psychologist: Dr. Burger             ADVANCE DIRECTIVES: Living will on file, signed 7/23/2013./pr and Organ Donation         PREVENTIVE HEALTH MAINTENANCE             PSA: none documented ./mlb         Surgical History:         hypospadius repair;    hamstring repair a few years go;         Family History:     Father: Healthy     Mother:    Positive for Hypothyroidism;     Brother(s): 1 brother(s) total    ; Positive for PUD;     Sister(s): 1 sister(s) total         Social History:     Occupation:    Disabled     Marital Status: Single     Children: None         Tobacco/Alcohol/Supplements:     Last Reviewed on 1/09/2019 01:22 PM by Scott Cherry    Tobacco:  He has never smoked.          Mental Health History:     Reviewed.             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     Hyperlipidemia     Osteoporosis     Benign neoplasm of hard palate     Acute sinusitis, unspecified         Immunizations:     zzFluzone pf-quadrivalent 3 and up 10/14/2014     zzFluzone pf-quadrivalent 3 and up 1/20/2016     zzFluzone pf-quadrivalent 3 and up 10/7/2016     zzFluzone pf-quadrivalent 3 and up 2/15/2018     Fluzone (3 + years dose) 10/5/2011     Fluzone pf (3+ years dose) 10/1/2013     Fluzone Quadrivalent (3+ years) 9/19/2018         Allergies:     Last Reviewed on 1/09/2019 01:22 PM by Scott Cherry    Depakote:        Current Medications:     Last Reviewed on 1/09/2019 01:22 PM by Scott Cherry    Vitamin D3 1,000IU Tablet Take 2 tablet(s) by mouth daily     Pravastatin 20mg Tablet Take 1 tablet(s) by mouth daily     Venlafaxine HCl 225mg Tablets, Extended Release Take 1 tablet(s) by mouth daily     Calcium 600 1.5gm Tablet Take 1 tablet(s) by mouth bid     Keppra 500mg Tablet Take 3 tablet(s) by mouth q12h     Invega Sustenna 234mg Injection Suspension, Extended-Release Inject monthly     Melatonin 5mg Tablet Take 1 tablet(s) by mouth at bedtime     Buspirone HCl 5mg Tablet 1 tab PO TID prn anxiety         OBJECTIVE:        Vitals:         Current: 1/9/2019 12:53:38 PM    Ht:  5 ft, 9.5 in;  Wt: 186.4 lbs;  BMI: 27.1    T: 98.1 F (oral);  BP: 107/86 mm Hg (right arm, sitting);  P: 102 bpm (right arm (BP Cuff), sitting);  sCr: 0.93 mg/dL;  GFR: 104.72    VA: 20/100 OD, 20/100 OS (near, with correction)        Exams:     PHYSICAL  EXAM:     GENERAL: Vitals recorded well developed, well nourished;  well groomed;  no apparent distress;     E/N/T: NOSE: right maxillary and right frontal sinus tenderness present;     RESPIRATORY: normal respiratory rate and pattern with no distress; normal breath sounds with no rales, rhonchi, wheezes or rubs;     CARDIOVASCULAR: normal rate; rhythm is regular;  normal S1; normal S2; no systolic murmur; no cyanosis; no edema;     GASTROINTESTINAL: nontender, nondistended; no hepatosplenomegaly or masses; no bruits;     SKIN:  no significant rashes or lesions; no suspicious moles;     MUSCULOSKELETAL: normal gait, no change in tone;     NEUROLOGIC: GROSSLY INTACT     PSYCHIATRIC:  appropriate affect and demeanor; normal speech pattern; grossly normal memory;         ASSESSMENT:           V70.0   Z00.00  Health checkup              DDx:     461.9   J01.90  Acute sinusitis, unspecified              DDx:         ORDERS:         Meds Prescribed:       Amoxicillin 875mg Tablet One PO BID X 10 days.  #20 (Twenty) tablet(s) Refills: 0       Guaifenesin 200mg Tablet take 2 tablets twice daily  #40 (Forty) tablet(s) Refills: 0         Procedures Ordered:         Annual wellness visit, includes a PPPS, subsequent visit  (In-House)           Other Orders:       1101F  Pt screen for fall risk; document no falls in past year or only 1 fall w/o injury in past year (DARWIN)  (In-House)           Negative EtOH screen  (In-House)           Calculated BMI above the upper parameter and a follow-up plan was documented in the medical record  (In-House)                   PLAN:          Health checkup     MIPS Current diagnosis of depression and/or bipolar disorder Negative alcohol screen     BMI Elevated - Follow-Up Plan: He was provided education on weight loss strategies           Orders:       1101F  Pt screen for fall risk; document no falls in past year or only 1 fall w/o injury in past year (DARWIN)  (In-House)            Negative EtOH screen  (In-House)           Calculated BMI above the upper parameter and a follow-up plan was documented in the medical record  (In-House)           Annual wellness visit, includes a PPPS, subsequent visit  (In-House)            Acute sinusitis, unspecified           Prescriptions:       Amoxicillin 875mg Tablet One PO BID X 10 days.  #20 (Twenty) tablet(s) Refills: 0       Guaifenesin 200mg Tablet take 2 tablets twice daily  #40 (Forty) tablet(s) Refills: 0             CHARGE CAPTURE:           Primary Diagnosis:     V70.0 Health checkup            Z00.00    Encounter for general adult medical examination without abnormal findings              Orders:          99134   Preventive medicine, established patient, age 18-39 years  (In-House)             1101F   Pt screen for fall risk; document no falls in past year or only 1 fall w/o injury in past year (DARWIN)  (In-House)                Negative EtOH screen  (In-House)                Calculated BMI above the upper parameter and a follow-up plan was documented in the medical record  (In-House)                Annual wellness visit, includes a PPPS, subsequent visit  (In-House)           461.9 Acute sinusitis, unspecified            J01.90    Acute sinusitis, unspecified

## 2021-05-18 NOTE — PROGRESS NOTES
Kang Grey  1980     Office/Outpatient Visit    Visit Date: Wed, Feb 26, 2020 02:41 pm    Provider: Hien Parham MD (Assistant: Alina Noland MA)    Location: [facility]        Electronically signed by Hien Parham MD on  02/27/2020 04:36:45 PM                             Subjective:        CC: Mr. Grey is a 39 year old White male.  pt fell 2/21/20, complained of back/buttock pain after, 5-6 times since. Denies dizziness         HPI:       repeated falls forward, onset two week ago, the first time he fell backwards (on his buttocks-hardwood surface) and since he has been falling forwards, he hurt his back with his first fall, did not hit his head and he denies N/V/loss of consciousness/dizzy spells/HA/abd pain/CP/SOA.  He c/o back pain in his back, he cant pinpoint where it hurts, pain is 9/10 and constant and does not radiate.  He is not sleeping well due to pain.He has been taking ibuprofen without relief.  LE strength and sensation are normal    ROS:     CONSTITUTIONAL:  Positive for fatigue.   Negative for chills or fever.      EYES:  Negative for blurred vision.      E/N/T:  Negative for nasal congestion and sore throat.      CARDIOVASCULAR:  Negative for chest pain and palpitations.      RESPIRATORY:  Negative for recent cough and dyspnea.      GASTROINTESTINAL:  Negative for abdominal pain, constipation, diarrhea, nausea and vomiting.      MUSCULOSKELETAL:  Negative for myalgias.      NEUROLOGICAL:  Negative for paresthesias and weakness.          Past Medical History / Family History / Social History:         Last Reviewed on 2/26/2020 03:16 PM by Hien Parham    Past Medical History:             PAST MEDICAL HISTORY         Cerebral Palsy: diagnoised as infant;         CURRENT MEDICAL PROVIDERS:    Chiropractor    Neurologist: Dr. Dr Sanford Mccoy    Ophthalmologist: Dr. Dr Roman    Psychologist: Dr. Burger             ADVANCE DIRECTIVES: Living will on  file, signed 7/23/2013./pr and Organ Donation         PREVENTIVE HEALTH MAINTENANCE             PSA: none documented ./mlb         PAST MEDICAL HISTORY             CURRENT MEDICAL PROVIDERS:    Neurologist: HEIKE VARGAS    Ophthalmologist: HIEU    Psychiatrist: KIARA GEORGES         Surgical History:         hypospadius repair;    hamstring repair a few years go;         Family History:     Father: Healthy     Mother:    Positive for Hypothyroidism;     Brother(s): 1 brother(s) total    ; Positive for PUD;     Sister(s): 1 sister(s) total         Social History:     Occupation:    Disabled     Marital Status: Single     Children: None         Tobacco/Alcohol/Supplements:     Last Reviewed on 2/26/2020 02:47 PM by Alina Noland    Tobacco: He has never smoked.          Substance Abuse History:     Last Reviewed on 11/11/2019 06:37 PM by Dinesh Carter        Mental Health History:     Last Reviewed on 11/11/2019 06:37 PM by Dinesh Carter        Communicable Diseases (eg STDs):     Last Reviewed on 11/11/2019 06:37 PM by Dinesh Carter        Allergies:     Last Reviewed on 1/15/2020 08:38 AM by Alina Noland    Depakote:          Current Medications:     Last Reviewed on 2/26/2020 02:48 PM by Alina Noland    vitamin c  [1000mg]    folic acid   [800mcg]    lamoTRIgine 100 mg oral tablet [take 1 tablet (100 mg) by oral route daily]    Keppra 500 mg oral tablet [Take 3 tablet(s) by mouth q12h]    Calcium 600 600 mg calcium (1,500 mg) oral tablet [Take 1 tablet(s) by mouth bid]    Vitamin D3 25 mcg (1,000 unit) oral tablet [TAKE TWO TABLETS BY MOUTH ONCE DAILY]    Invega Sustenna 234 mg/1.5 mL intramuscular Syringe [Inject monthly]    BUSPIRONE    TAB 10MG  [ TID, PRN]    VENLAFAXINE  CAP 150MG ER  [Q DAY]        Objective:        Vitals:         Current: 2/26/2020 2:50:57 PM    Ht:  5 ft, 9.5 in;  Wt: 177.4 lbs;  BMI: 25.8T: 97.5 F (oral);  BP: 102/72 mm Hg (left arm, sitting);  P: 115  "bpm (left arm (BP Cuff), sitting);  sCr: 1 mg/dL;  GFR: 94.45O2 Sat: 97 % (room air)        Repeat:     2:51:11 PM  P:   113bpm (finger clip, sitting)     Exams:     PHYSICAL EXAM:     GENERAL: Vitals recorded well developed, well nourished;  no apparent distress;     EYES: conjunctiva and cornea are normal;     E/N/T: EARS:  normal external auditory canals and tympanic membranes;  grossly normal hearing; OROPHARYNX: posterior pharynx shows no exudate and erythema;     NECK: trachea is midline; thyroid is non-palpable;     RESPIRATORY: Clear to auscultation bilateally; no rales (\"crackles\") present; no rhonchi; no wheezes;     CARDIOVASCULAR: normal rate; rhythm is regular;  No murmurs, clicks, gallops or rubs appreciated; no edema;     GASTROINTESTINAL: nontender; Soft and nondistended; normal bowel sounds; no organomegaly; no masses;     SKIN:  no bruising;     MUSCULOSKELETAL: gait: slowed and stooped;  muscle strength: 5/5 in all major muscle groups;  neg straight leg raise B, FROM of hips B, tender to palpation over posterior SI joints B, neg pain over T and L spine;     NEUROLOGIC: Grossly intact; mental status: alert and oriented x 3;         Assessment:         R29.6   Repeated falls       M54.5   Low back pain       M54.6   Pain in thoracic spine       R10.2   Pelvic and perineal pain           ORDERS:         Meds Prescribed:       [Recorded] ibuprofen 800 mg oral tablet [take 1 tablet (800 mg) by oral route 3 times per day with food]       [Refilled] ibuprofen 800 mg oral tablet [take 1 tablet (800 mg) by oral route 3 times per day with food], #60 (sixty) tablets, Refills: 0 (zero)         Radiology/Test Orders:       [id number]  Radiologic examination, pelvis; complete, minimum of three views  (Send-Out; Stat)            29687  Radiologic examination, spine; thoracic, three views  (Send-Out; Stat)            [id number]  Radiologic examination, spine, lumbosacral;  minimum of four views  (Send-Out; Stat)            " [id number]  Magnetic resonance imaging, spinal canal and contents, lumbar; without contrast  (Send-Out)                      Plan:         Repeated fallswill d/w his psychiatrist at [facility] in [place] Dr [facility] about his medications, I am concerned this is the cause of his falls        Low back painWedge compression deformities of L1 and L4 that may be more acute. Pt and mom aware, will order MRI L spine no contrast to further eval due to significant pain and post traumatic fall.           Prescriptions:       [Recorded] ibuprofen 800 mg oral tablet [take 1 tablet (800 mg) by oral route 3 times per day with food]       [Refilled] ibuprofen 800 mg oral tablet [take 1 tablet (800 mg) by oral route 3 times per day with food], #60 (sixty) tablets, Refills: 0 (zero)           Orders:       [id number]  Radiologic examination, spine, lumbosacral;  minimum of four views  (Send-Out; Stat)              Pain in thoracic spineneg xray T spine        RADIOLOGY:  I have ordered MRI Lumbar Spine w/o contrast and Thoracic Spine to be done today.            Orders:       [id number]  Radiologic examination, spine; thoracic, three views  (Send-Out; Stat)            92854  Magnetic resonance imaging, spinal canal and contents, lumbar; without contrast  (Send-Out)              Pelvic and perineal painneg xray pelvis          Orders:       [id number]  Radiologic examination, pelvis; complete, minimum of three views  (Send-Out; Stat)                  Charge Capture:         Primary Diagnosis:     R29.6  Repeated falls           Orders:      [id number]  Office/outpatient visit; established patient, level 4  (In-House)              M54.5  Low back pain     M54.6  Pain in thoracic spine     R10.2  Pelvic and perineal pain

## 2021-05-18 NOTE — PROGRESS NOTES
Kang GreyMarshall 1980     Office/Outpatient Visit    Visit Date: Fri, Jul 27, 2018 03:37 pm    Provider: Esthela Osuna N.P. (Assistant: Darlin Contreras MA)    Location: [facility]        Electronically signed by Esthela Osuna N.P. on  07/30/2018 07:47:56 PM                             SUBJECTIVE:        CC:     Mr. Grey is a 38 year old White male.  Very tired         HPI: kunal - [facility]-  changing to [facility] in [place].  sees neurology in december.  no seizures. stopped trazadone 2 months ago and changed to melatonin     sleep study 2 yrs ago normal per dx 2 go     Fatigue noted.  Patient describes for the last 6 months.  This has been a constant problem.  Sleep quality: He has problems initiating sleep.  He averages 9 to 10 hours of sleep per night.  Patient states that he is not depressed.  He denies associated symptoms.  Currently, he is doing well without any significant affective symptoms.  Medical history is pertinent for depression.  Mr. Grey denies significant social or occupational stressors.  Other details: sleep study normal 2 yrs ago at dx 2 go per mother..          has been seeing kunal with [facility].  changing to [facility] in [place].  stopped trazadone 2 months ago due to excessive drowsiness.  changed to melatonin.          sees neurology in december (annual visits).  denies any recent seizures.      ROS:     CONSTITUTIONAL:  Negative for chills and fever.      EYES:  Negative for blurred vision.      E/N/T:  Negative for nasal congestion and sore throat.      CARDIOVASCULAR:  Negative for chest pain and palpitations.      RESPIRATORY:  Negative for recent cough and dyspnea.      GASTROINTESTINAL:  Negative for abdominal pain, nausea and vomiting.      MUSCULOSKELETAL:  Negative for myalgias.      NEUROLOGICAL:  Negative for paresthesias and weakness.      PSYCHIATRIC:  Negative for anxiety and depression.          PMH/FMH/SH:     Last Reviewed on 8/23/2017 01:03 PM  by Hien Parham    Past Medical History:             PAST MEDICAL HISTORY         Cerebral Palsy: diagnoised as infant;         CURRENT MEDICAL PROVIDERS:    Chiropractor    Neurologist             ADVANCE DIRECTIVES: Living will on file, signed 7/23/2013./pr and Organ Donation         Surgical History:         hypospadius repair;    hamstring repair a few years go;         Family History:     Father: Healthy     Mother:    Positive for Hypothyroidism;     Brother(s): 1 brother(s) total    ; Positive for PUD;     Sister(s): 1 sister(s) total         Social History:     Occupation:    Disabled     Marital Status: Single     Children: None         Tobacco/Alcohol/Supplements:     Last Reviewed on 2/15/2018 12:10 PM by Anna Marie Arthur    Tobacco: He has never smoked.          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     Hyperlipidemia     Osteoporosis     Benign neoplasm of hard palate         Immunizations:     zzFluzone pf-quadrivalent 3 and up 10/14/2014     zzFluzone pf-quadrivalent 3 and up 1/20/2016     zzFluzone pf-quadrivalent 3 and up 10/7/2016     zzFluzone pf-quadrivalent 3 and up 2/15/2018     Fluzone (3 + years dose) 10/5/2011     Fluzone pf (3+ years dose) 10/1/2013         Allergies:     Last Reviewed on 7/27/2018 03:41 PM by Darlin Contreras:        Current Medications:     Last Reviewed on 7/27/2018 03:42 PM by  Darlin Contreras    Pravastatin 20mg Tablet Take 1 tablet(s) by mouth daily     Venlafaxine HCl 225mg Tablets, Extended Release Take 1 tablet(s) by mouth daily     Calcium 600 1.5gm Tablet Take 1 tablet(s) by mouth bid     Vitamin D3 1,000IU Tablet Take 2 tablet(s) by mouth daily     Keppra 500mg Tablet Take 3 tablet(s) by mouth q12h     Buspirone HCl 5mg Tablet 1 tab PO TID prn anxiety     Invega Sustenna 117mg Injection Suspension, Extended-Release 1 x month     Melatonin 5mg Tablet Take 1 tablet(s) by mouth at bedtime         OBJECTIVE:        Vitals:         Historical:     02/15/2018  BP:   105/68 mm Hg ( (left arm, , sitting, );)     02/15/2018  Wt:   168.8lbs        Current: 7/27/2018 3:40:55 PM    Ht:  5 ft, 9.5 in;  Wt: 175.4 lbs;  BMI: 25.5    T: 97.1 F (oral);  BP: 125/68 mm Hg (left arm, sitting);  P: 109 bpm (left arm (BP Cuff), sitting);  sCr: 0.91 mg/dL;  GFR: 104.29        Exams:     PHYSICAL EXAM:     GENERAL: Vitals recorded well developed, well nourished;  well groomed;  no apparent distress;     E/N/T: EARS: external auditory canal occluded by cerumen bilaterally;  OROPHARYNX:  normal mucosa, dentition, gingiva, and posterior pharynx;     NECK: thyroid exam reveals not enlarged;     RESPIRATORY: normal respiratory rate and pattern with no distress; normal breath sounds with no rales, rhonchi, wheezes or rubs;     CARDIOVASCULAR: normal rate; rhythm is regular;     Peripheral Pulses: posterior tibial: 2+ L and 2+ R;  no edema;     GASTROINTESTINAL: nontender; normal bowel sounds;     LYMPHATIC: no enlargement of cervical or facial nodes; no supraclavicular nodes;     MUSCULOSKELETAL: normal gait; normal overall tone     NEUROLOGIC: mental status: alert and oriented x 3; reflexes: brachioradialis: 2+; knee jerks: 2+;     PSYCHIATRIC:  appropriate affect and demeanor; normal speech pattern; grossly normal memory;         Procedures:     Cerumen impaction         Cerumen/Wax removal: Cerumen  impaction is noted in both ears The degree of wax accumulation is minor in the left ear and right ear.  With minimal difficulty, using a syringe irrigation, the wax is removed.  Removed from ear was hard balls of wax.  The patient tolerated the procedure well.      There were no complications.  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            Orders:       [id number]  [facility] MALE FATIGUE CBC, CMP, TSH, B12, Testosterone levels  (Send-Out)            Cerumen impaction Ears rechecked after wax removal, both TM clear with slight amount of fluid behind them.           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 neurology             CHARGE CAPTURE           **Please note: ICD descriptions below are intended for billing purposes only and may not represent clinical diagnoses**        Primary Diagnosis:         780.79 Fatigue            R53.83    Other fatigue              Orders:          [id number]   Office/outpatient visit; established patient, level 3  (In-House)           380.4 Cerumen  impaction            H61.23    Impacted cerumen, bilateral              Orders:          [id number]   Removal of impacted cerumen left ear (NURSE)  (In-House)             [id number]   Removal of impacted cerumen right ear (NURSE)  (In-House)           295.70 Schizoaffective disorder, unspecified            F25.9    Schizoaffective disorder, unspecified    345.10 

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
## 2021-05-18 NOTE — PROGRESS NOTES
Kang Grey  1980     Office/Outpatient Visit    Visit Date: Tue, Jun 9, 2020 04:12 pm    Provider: Dinesh Carter MD (Assistant: Bridgette Maldonado MA)    Location: [facility]        Electronically signed by Dinesh Carter MD on  08/20/2020 01:23:51 PM                             Subjective:        CC: Mr. Grey is a 39 year old White male.  This is a follow-up visit.  still having back pain         HPI:       Kang presents clinic today for further evaluation back pain. He has had back pain for a while now and had an MRI of his lumbar spine on 3/6/2020.  At that time he was found to have acute versus early subacute compression fracture of L4 as well as multilevel degenerative changes. He was referred to neurosurgery and was found to be nonsurgical. . He was given a back brace but says that this does not help very he had been previously given hydrocodone but said that he did not find that very helpful he is also taking ibuprofen 800 mg 3 times daily as needed.  He is continued to have pain that is worse with rotation and flexing movements of the trunk. No numbness or tingling.  No weakness.    ROS:     CONSTITUTIONAL:  Negative for chills, fatigue, fever, and weight change.      CARDIOVASCULAR:  Negative for chest pain, dizziness, palpitations and edema.      RESPIRATORY:  Negative for dyspnea and cough.      GASTROINTESTINAL:  Positive for constipation.   Negative for abdominal pain, diarrhea, nausea or vomiting.      MUSCULOSKELETAL:  Positive for back pain.      NEUROLOGICAL:  Negative for headaches, paresthesias and weakness.          Past Medical History / Family History / Social History:         Last Reviewed on 8/20/2020 01:23 PM by Dinesh Carter    Past Medical History:             PAST MEDICAL HISTORY         Cerebral Palsy: diagnoised as infant;         CURRENT MEDICAL PROVIDERS:    Chiropractor    Neurologist: Dr. Dr Sanford Mccoy    Ophthalmologist: Dr. Dr Roman     Psychologist: Dr. Burger             ADVANCE DIRECTIVES: Living will on file, signed 7/23/2013./pr and Organ Donation         PREVENTIVE HEALTH MAINTENANCE             PSA: none documented ./mlb         PAST MEDICAL HISTORY             CURRENT MEDICAL PROVIDERS:    Neurologist: HEIKE VARGAS    Ophthalmologist: HIEU    Psychiatrist: KIARA BURGER- DR GEORGES         Surgical History:         hypospadius repair;    hamstring repair a few years go;         Family History:     Father: Healthy     Mother:    Positive for Hypothyroidism;     Brother(s): 1 brother(s) total    ; Positive for PUD;     Sister(s): 1 sister(s) total         Social History:     Occupation:    Disabled     Marital Status: Single     Children: None         Tobacco/Alcohol/Supplements:     Last Reviewed on 8/20/2020 01:23 PM by Dinesh Carter    Tobacco: He has never smoked.          Substance Abuse History:     Last Reviewed on 8/20/2020 01:23 PM by Dinesh Carter        Mental Health History:     Last Reviewed on 8/20/2020 01:23 PM by Dinesh Carter        Communicable Diseases (eg STDs):     Last Reviewed on 8/20/2020 01:23 PM by Dinesh Carter        Current Problems:     Last Reviewed on 8/20/2020 01:23 PM by Dinesh Carter    Pure hypercholesterolemia, unspecified    Other generalized epilepsy and epileptic syndromes, not intractable, without status epilepticus    Other cerebral palsy    Major depressive disorder, single episode, moderate    Primary insomnia    Vitamin D deficiency, unspecified    Hydrocephalus in diseases classified elsewhere    Age-related osteoporosis without current pathological fracture    Schizoaffective disorder, unspecified    Constipation, unspecified    Paranoid schizophrenia    Acute upper respiratory infection, unspecified    Other fatigue    Encounter for general adult medical examination without abnormal findings    Encounter for screening for depression    Encounter for screening for cardiovascular  disorders    Other hypertrophic disorders of the skin    Pain in left ankle and joints of left foot    Low back pain    Pain in thoracic spine    Pelvic and perineal pain    Repeated falls    Disease of pancreas, unspecified    Wedge compression fracture of fourth lumbar vertebra, subsequent encounter for fracture with routine healing    Other abnormal glucose        Immunizations:     zzFluzone pf-quadrivalent 3 and up 10/14/2014    zzFluzone pf-quadrivalent 3 and up 1/20/2016    zzFluzone pf-quadrivalent 3 and up 10/7/2016    zzFluzone pf-quadrivalent 3 and up 2/15/2018    Fluzone (3 + years dose) 10/5/2011    Fluzone pf (3+ years dose) 10/1/2013    Fluzone Quadrivalent (3+ years) 9/19/2018    Fluzone Quadrivalent (3+ years) 11/4/2019        Allergies:     Last Reviewed on 8/20/2020 01:23 PM by Dinesh Carter    Depakote:          Current Medications:     Last Reviewed on 8/20/2020 01:23 PM by Dinesh Carter    vitamin c  [1000mg]    folic acid   [800mcg]    lamoTRIgine 100 mg oral tablet [take 1 tablet (100 mg) by oral route daily]    Keppra 500 mg oral tablet [Take 3 tablet(s) by mouth q12h]    Calcium 600 600 mg calcium (1,500 mg) oral tablet [Take 1 tablet(s) by mouth bid]    Invega Sustenna 234 mg/1.5 mL intramuscular Syringe [Inject monthly]    BUSPIRONE    TAB 10MG  [ TID, PRN]    VENLAFAXINE  CAP 150MG ER  [Q DAY]    Vitamin D3 25 mcg (1,000 unit) oral tablet [TAKE TWO TABLETS BY MOUTH ONCE DAILY]    ibuprofen 800 mg oral tablet [take 1 tablet (800 mg) by oral route 3 times per day with food]        Objective:        Vitals:         Current: 6/9/2020 4:22:22 PM    Ht:  5 ft, 9.5 in;  Wt: 166.4 lbs;  BMI: 24.2T: 98.2 F (oral);  BP: 104/70 mm Hg (left arm, sitting);  P: 72 bpm (left arm (BP Cuff), sitting);  sCr: 1 mg/dL;  GFR: 91.91        Exams:     PHYSICAL EXAM:     GENERAL: Vitals recorded well developed, well nourished;  no apparent distress;     EYES: conjunctiva and cornea are normal;     RESPIRATORY:  "Clear to auscultation bilateally; no rales (\"crackles\") present; no rhonchi; no wheezes;     CARDIOVASCULAR: normal rate; rhythm is regular;  No murmurs, clicks, gallops or rubs appreciated; no edema;     SKIN:  No significant rashes, lesions or suspicious moles within limits of examination;     MUSCULOSKELETAL: Tenderness to palpation of lumbar spinous processes; associated paraspinal lumbar muscle spasm present.;     NEUROLOGIC: mental status: alert and oriented x 3; Grossly intact;     PSYCHIATRIC: appropriate affect and demeanor; normal speech pattern; Normal behavior;         Assessment:         S32.040D   Wedge compression fracture of fourth lumbar vertebra, subsequent encounter for fracture with routine healing           ORDERS:         Procedures Ordered:       RFPT  Physical/Occupational Therapy Referral  (Send-Out)                      Plan:         Wedge compression fracture of fourth lumbar vertebra, subsequent encounter for fracture with routine healing- Continue to follow with neurosurgery as directed.  He had previously begin hydrocodone but due to its lack of efficacy will leave further prescription to its original prescribing provider, Dr. Sams.  Continue ibuprofen 800 mg 3 times daily as needed.  Referral placed for physical therapy today.        REFERRALS:  Referral initiated to physical therapy.            Orders:       RFPT  Physical/Occupational Therapy Referral  (Send-Out)                  Charge Capture:         Primary Diagnosis:     S32.040D  Wedge compression fracture of fourth lumbar vertebra, subsequent encounter for fracture with routine healing           Orders:      26214  Office/outpatient visit; established patient, level 3  (In-House)                     "

## 2021-05-18 NOTE — PROGRESS NOTES
"Kang Grey  1980     Office/Outpatient Visit    Visit Date: Mon, Oct 12, 2020 11:23 am    Provider: Riccardo Richardson MD (Assistant: Delia Diaz MA)    Location: [facility]        Electronically signed by Riccardo Richardson MD on  10/12/2020 02:22:30 PM                             Subjective:        CC: Mr. Grey is a 40 year old White male.  presents today due to fatigue         HPI:           PHQ-9 Depression Screening: Completed form scanned and in chart; Total Score 16       BP today is 117/82 with a HR of 98. Pt is here for chronic fatigue for \"years.\" Initially he says there is no change in fatigue for the last week or month but then he says its been worse over the last month. He goes to bed around 10-10:30pm but it might take 1 hour to fall asleep. He sleeps for 13 hours and then gets up around noon. He plays on his phone from noon to 2 and then naps from around 2-4pm. He plays on his phone from 4-10pm. He goes to [facility] in [place] and [facility] at [place] [facility] for depression. He is on efffexor, lamictal, and buspar. He had a sleep study     ROS:     CONSTITUTIONAL:  Positive for fatigue.   Negative for chills or fever.      EYES:  Negative for blurred vision.      E/N/T:  Negative for nasal congestion and sore throat.      CARDIOVASCULAR:  Negative for chest pain and palpitations.      RESPIRATORY:  Negative for recent cough and dyspnea.      GASTROINTESTINAL:  Negative for abdominal pain, constipation, diarrhea, nausea and vomiting.      PSYCHIATRIC:  Positive for depression and hypersomnia.          Past Medical History / Family History / Social History:         Last Reviewed on 10/12/2020 02:15 PM by Riccardo Richardson    Past Medical History:             PAST MEDICAL HISTORY         Cerebral Palsy: diagnoised as infant;         CURRENT MEDICAL PROVIDERS:    Chiropractor    Neurologist: Dr. Dr Sanford Mccoy    Ophthalmologist: Dr. Dr Roman    Psychologist: Dr." Jennyfer             ADVANCE DIRECTIVES: Living will on file, signed 7/23/2013./pr and Organ Donation         PREVENTIVE HEALTH MAINTENANCE             PSA: none documented ./mlb         PAST MEDICAL HISTORY             CURRENT MEDICAL PROVIDERS:    Neurologist: HEIKE VARGAS    Ophthalmologist: HIEU    Psychiatrist: KIARA RESENDEZ- DR GEORGES         Surgical History:         hypospadius repair;    hamstring repair a few years go;         Family History:     Father: Healthy     Mother:    Positive for Hypothyroidism;     Brother(s): 1 brother(s) total    ; Positive for PUD;     Sister(s): 1 sister(s) total         Social History:     Occupation:    Disabled     Marital Status: Single     Children: None         Tobacco/Alcohol/Supplements:     Last Reviewed on 10/12/2020 02:15 PM by Riccardo Richardson    Tobacco: He has never smoked.          Substance Abuse History:     Last Reviewed on 10/12/2020 02:15 PM by Riccardo Richardson        Mental Health History:     Last Reviewed on 10/12/2020 02:15 PM by Riccardo Richardson        Communicable Diseases (eg STDs):     Last Reviewed on 10/12/2020 02:15 PM by Riccardo Richardson        Current Problems:     Last Reviewed on 10/12/2020 02:15 PM by Riccardo Richardson    Pure hypercholesterolemia, unspecified    Other generalized epilepsy and epileptic syndromes, not intractable, without status epilepticus    Other cerebral palsy    Major depressive disorder, single episode, moderate    Primary insomnia    Vitamin D deficiency, unspecified    Hydrocephalus in diseases classified elsewhere    Age-related osteoporosis without current pathological fracture    Schizoaffective disorder, unspecified    Constipation, unspecified    Paranoid schizophrenia    Acute upper respiratory infection, unspecified    Other fatigue    Encounter for general adult medical examination without abnormal findings    Encounter for screening for depression    Encounter for screening for cardiovascular  disorders    Other hypertrophic disorders of the skin    Pain in left ankle and joints of left foot    Low back pain    Pain in thoracic spine    Pelvic and perineal pain    Repeated falls    Disease of pancreas, unspecified    Wedge compression fracture of fourth lumbar vertebra, subsequent encounter for fracture with routine healing    Other abnormal glucose    Somnolence        Immunizations:     zzFluzone pf-quadrivalent 3 and up 10/14/2014    zzFluzone pf-quadrivalent 3 and up 1/20/2016    zzFluzone pf-quadrivalent 3 and up 10/7/2016    zzFluzone pf-quadrivalent 3 and up 2/15/2018    Fluzone (3 + years dose) 10/5/2011    Fluzone pf (3+ years dose) 10/1/2013    Fluzone Quadrivalent (3+ years) 9/19/2018    Fluzone Quadrivalent (3+ years) 11/4/2019        Allergies:     Last Reviewed on 10/12/2020 02:15 PM by Riccardo Richardson    Depakote:          Current Medications:     Last Reviewed on 10/12/2020 02:15 PM by Riccardo Richardson    vitamin c  [1000mg]    folic acid   [800mcg]    Keppra 500 mg oral tablet [Take 3 tablet(s) by mouth q12h]    Calcium 600 600 mg calcium (1,500 mg) oral tablet [Take 1 tablet(s) by mouth bid]    Vitamin D3 25 mcg (1,000 unit) oral tablet [TAKE TWO TABLETS BY MOUTH ONCE DAILY]    Invega Sustenna 234 mg/1.5 mL intramuscular Syringe [Inject monthly]    BUSPIRONE    TAB 10MG  [ TID, PRN]    VENLAFAXINE  CAP 150MG ER  [Q DAY]    ibuprofen 800 mg oral tablet [take 1 tablet (800 mg) by oral route 3 times per day with food]    melatonin 5 mg oral tablet [1 po qhs nightly.]    inulin 2 gram oral tablet,chewable [1 po daily]    pravastatin 20 mg oral tablet [take 1 tablet (20 mg) by oral route once daily]    fiber tablets     LAMOTRIGINE  TAB 200MG         Objective:        Vitals:         Current: 10/12/2020 11:30:28 AM    Ht:  5 ft, 9.5 in;  Wt: 178.2 lbs;  BMI: 25.9T: 97.7 F (oral);  BP: 117/82 mm Hg (right arm, sitting);  P: 98 bpm (right arm (BP Cuff), sitting);  sCr: 1.12 mg/dL;  GFR:  "83.67        Exams:     PHYSICAL EXAM:     GENERAL: Vitals recorded well developed, well nourished;  well groomed;  no apparent distress;     EYES: PERRL, EOMI     E/N/T: OROPHARYNX: posterior pharynx shows no exudate and erythema;     NECK: trachea is midline;     RESPIRATORY: normal respiratory rate and pattern with no distress; Clear to auscultation bilateally; no rales (\"crackles\") present; no rhonchi; no wheezes;     CARDIOVASCULAR: normal rate; rhythm is regular;  No murmurs, clicks, gallops or rubs appreciated; no edema;     GASTROINTESTINAL: nontender;     MUSCULOSKELETAL: gait: ataxic;     NEUROLOGIC: mental status: oriented to person and place only;     PSYCHIATRIC: appropriate affect and demeanor; psychomotor: displays psychomotor retardation;  speech pattern: dysarthric;  normal thought and perception;         Assessment:         Z13.31   Encounter for screening for depression       R40.0   Somnolence           ORDERS:         Procedures Ordered:       REFER  Referral to Specialist or Other Facility  (Send-Out)              Other Orders:         Depression screen positive and follow up plan documented  (In-House)                      Plan:         Encounter for screening for depression    MIPS PHQ-9 Depression Screening: Completed form scanned and in chart; Total Score 16 Positive Depression Screen: Suicide Risk Assessment completed--denies suicidal/homicidal ideation           Orders:         Depression screen positive and follow up plan documented  (In-House)              SomnolenceSx today seem to stem from either sleep apnea, (unclear what test results were when done several years ago) or atypical depression with hypersomnia. Pt sleeps 13 hours a night plus a 2 hour nap during the day. He has little to do or motivate him so he is encouraged to pursue a passion or hobby or help his mother with her chores and/or projects. Pt referred for sleep study and he advised to discuss depression with " counselor.         REFERRALS:  Referral initiated to [facility].            Orders:       REFER  Referral to Specialist or Other Facility  (Send-Out)                  Charge Capture:         Primary Diagnosis:     Z13.31  Encounter for screening for depression           Orders:      33019  Office/outpatient visit; established patient, level 3  (In-House)              Depression screen positive and follow up plan documented  (In-House)              R40.0  Somnolence

## 2021-05-22 ENCOUNTER — TRANSCRIBE ORDERS (OUTPATIENT)
Dept: GASTROENTEROLOGY | Facility: CLINIC | Age: 41
End: 2021-05-22

## 2021-05-22 DIAGNOSIS — K86.2 PANCREATIC CYST: Primary | ICD-10-CM

## 2021-05-27 ENCOUNTER — TRANSCRIBE ORDERS (OUTPATIENT)
Dept: PHYSICAL THERAPY | Facility: CLINIC | Age: 41
End: 2021-05-27

## 2021-05-27 DIAGNOSIS — M54.50 LOW BACK PAIN, UNSPECIFIED BACK PAIN LATERALITY, UNSPECIFIED CHRONICITY, UNSPECIFIED WHETHER SCIATICA PRESENT: Primary | ICD-10-CM

## 2021-06-03 NOTE — CONSULTS
Patient: MELISA CROCKETT     Acct: [id number]     Report: #[id number]  MR #:  [id number]     DOS: 2021 0650     : 1980  DICTATING: JM SWEENEY  ***Signed***  --------------------------------------------------------------------------------------------------------------------                              [employer] [facility]                          [place], [place]  [address]           __________________________________________________________________________         Patient Name:                   Attending Physician:    Melisa Crockett M.D.         Patient Visit # MR #            Admit Date  Disch Date     Location    [id number]    [id number]      05/10/2021                 SLEEP- -         Date of Birth    1980    __________________________________________________________________________    0814 - SLEEP OFFICE VISIT         DATE OF SERVICE:  05/10/2021         SLEEP FOLLOW-UP         REASON FOR VISIT:    Mixed complex sleep apnea and hydrocephalus.         HISTORY OF PRESENT ILLNESS:    This is a 40-year-old gentleman who is accompanied by his mother. He has a    history of seizures from his childhood and also a history of hydrocephalus    and blindness in the left eye. He underwent a diagnostic polysomnography    which showed both central sleep apnea and obstructive sleep apnea along with    snoring. He underwent an ASV titration, there is also auto servo ventilator    titration for central sleep apnea and obstructive sleep apnea and he is on    ASV and he is doing well. The patient states that he is uncomfortable but he    is still using it. He feels much better. Normally goes to bed around 9 p.m.    and wakes up around 9 a.m. He wakes up 1 to 2 times to go to the bathroom.         PAST MEDICAL HISTORY:    1.  Seizures.    2.  Blindness in the left eye.    3.  History of  hydrocephalus.    4.  Complex sleep apnea with both central sleep apnea and obstructive sleep        apnea.         ALLERGIES:    Depakote.         MEDICATIONS:    1.  Keppra.    2.  Venlafaxine.    3.  Pravastatin.    4.  BuSpar.    5.  Melatonin.    6.  Lamictal.    7.  Vitamin D.    8.  Ibuprofen.    9.  Valium.    10. Folic acid.    11. Vitamin C.         SOCIAL HISTORY:    He lives with his mother, does not smoke cigarettes, does not drink alcohol.    He drinks 3 hai.         PHYSICAL EXAMINATION:    CONSTITUTIONAL/VITALS: Blood pressure is 112/84, heart rate is 80, body mass    index is 27, weight is 186 pounds.    GENERAL: This is an adult male who is very pleasant but is blind in the left    eye.    NECK: Trachea in the midline. Thyroid is not enlarged.    RESPIRATORY: Breath sounds are equal on both sides. No wheezes or crackles.    CARDIOVASCULAR: Heart rate is regular without murmur. No edema.    EXTREMITIES: No cyanosis, clubbing, or edema.         [employer] DOWNLOAD:    Shows for the past 8 weeks his compliance is excellent. His compliance is    98%, average is about 6 hours and 52 minutes, more than 4 hours is also 80%.    He is on auto ASV and his residual apnea-hypopnea index is 3.2 which is    excellent. Less than 5 is normal. His DME Company is [email or web address] and he uses a    full facemask.         ASSESSMENT/PLAN:    1.  Complex sleep apnea with both central and obstructive sleep apnea. I had        a long talk with the patient about the central apneas and he is aware of        central apneas and consequence of untreated central apneas which includes        the effects of death because he is not getting signals from the brain to        breath. It is very important for him to use the ASV and we stressed the        importance of using the ASV and he agrees. He is getting full benefit of        the ASV for his central sleep apnea. He will continue to get supplies        from [email or web address] and I will see him in  1 year. I have encouraged him to call        the Sleep Center if he has any problems.    2.  History of seizures. On medications.    3.  History of hydrocephalus which may be the reason why he has central sleep        apnea.         To be electronically signed in [employer]    [id number] JM SWEENEY M.D.         RR:boris    D:  05/10/2021 16:02    T:  05/12/2021 06:35    #[id number]         CC: SLEEP LAB        YUMI CANALES M.D.         Until signed, this is an unconfirmed preliminary report that may contain    errors and is subject to change.         Electronically signed by JM SWEENEY  05/12/2021 13:57     Disclaimer: Converted hospital document may not contain table formatting or lab diagrams. Please see [facility] for authenticated document.

## 2021-06-14 ENCOUNTER — TELEPHONE (OUTPATIENT)
Dept: FAMILY MEDICINE CLINIC | Age: 41
End: 2021-06-14

## 2021-06-14 NOTE — TELEPHONE ENCOUNTER
Caller: Kang Grey    Relationship: Self    Best call back number:[phone]    What is the best time to reach you: ANYTIME    Who are you requesting to speak with (clinical staff, provider,  specific staff member): DR. JACKSON    Do you know the name of the person who called:     What was the call regarding:RESCHEDULE APPOINTMENT FOR PHYSICAL THERAPY    Do you require a callback:

## 2021-06-18 ENCOUNTER — TREATMENT (OUTPATIENT)
Dept: PHYSICAL THERAPY | Facility: CLINIC | Age: 41
End: 2021-06-18

## 2021-06-18 DIAGNOSIS — M54.50 MIDLINE LOW BACK PAIN, UNSPECIFIED CHRONICITY, UNSPECIFIED WHETHER SCIATICA PRESENT: ICD-10-CM

## 2021-06-18 DIAGNOSIS — M54.50 CHRONIC MIDLINE LOW BACK PAIN, UNSPECIFIED WHETHER SCIATICA PRESENT: Primary | ICD-10-CM

## 2021-06-18 DIAGNOSIS — G89.29 CHRONIC MIDLINE LOW BACK PAIN, UNSPECIFIED WHETHER SCIATICA PRESENT: Primary | ICD-10-CM

## 2021-06-18 PROCEDURE — 97161 PT EVAL LOW COMPLEX 20 MIN: CPT | Performed by: PHYSICAL THERAPIST

## 2021-06-18 NOTE — PROGRESS NOTES
Physical Therapy Initial Evaluation and Plan of Care      Patient: Kang Grey   : 1980  Diagnosis/ICD-10 Code:  Chronic midline low back pain, unspecified whether sciatica present [M54.5, G89.29]  Referring practitioner: Hien Parham MD  Date of Initial Visit: 2021  Today's Date: 2021  Patient seen for 1 sessions           Subjective Questionnaire: LAURO:       Subjective Evaluation    History of Present Illness    Subjective comment: Pt states that he has had back pain since 2020.  He has been to PT prior, but he hasn't been doing his exercises.  He has most pain  with movement.  It hurts in the center of the lower back.  He takes  800mg Ibuprofen and has a medication cream.  He states the Ibuprofen does not help. He does Taekwando. Quality of life: good    Pain  Current pain ratin  Quality: sore.  Relieving factors: heat and rest (hot water helps with pain, hot bath)    Treatments  Previous treatment: physical therapy  Current treatment: physical therapy  Patient Goals  Patient goals for therapy: decreased pain, increased motion, return to sport/leisure activities and increased strength             Objective          Static Posture     Comments  Forward flexed, rounded shoulders    Tenderness     Lumbar Spine  Tenderness in the spinous process.     Neurological Testing     Sensation     Lumbar   Left   Intact: light touch    Right   Intact: light touch    Active Range of Motion     Additional Active Range of Motion Details  LUMBAR AROM degrees    Flexion mid thighs  Extension to neutral   Right Lateral Flexion 25 degrees  Left Lateral Flexion 30 degrees  Right Rotation 25 degrees  Left Rotation 35 degrees    Strength/Myotome Testing     Left Hip   Planes of Motion   Flexion: 4  Extension: 4  Abduction: 4  Adduction: 4    Right Hip   Planes of Motion   Flexion: 4  Extension: 4-  Abduction: 4-  Adduction: 4-    Lumbar Flexibility Comments:   B hamstrings are very tight.   As a 10 y/o child, pt had bilateral hamstring lengthening surgery performed.           Assessment & Plan     Assessment  Impairments: abnormal coordination, abnormal gait, abnormal muscle tone, abnormal or restricted ROM, activity intolerance, impaired balance, impaired physical strength, lacks appropriate home exercise program, pain with function and safety issue  Assessment details: Pt presents with limitations, noted below, that impede his ability to sit or stand for periods of time, lifting weights, personal care duties, and travelling long periods. The skills of a therapist will be required to safely and effectively implement the following treatment plan to restore maximal level of function.  Prognosis: fair  Prognosis details: Pt has had back pain over a year now, pain hasn't really improved.  See imaging for new reports.   Functional Limitations: sleeping, uncomfortable because of pain, sitting and standing  Goals  Plan Goals: LOW BACK PROBLEMS:    1. The patient complains of low back pain.  LTG 1: 12 weeks:  The patient will report a pain rating of 4/10 or better in order to improve  tolerance to activities of daily living and improve sleep quality.  STATUS:  New  STG 1a: 4 weeks:  The patient will report a pain rating of 6/10 or better.  STATUS:  New  TREATMENT:  Therapeutic exercises, manual therapy, aquatic therapy, home exercise   instruction, and modalities as needed for pain to include:  electrical stimulation, moist heat, ice, ultrasound, and diathermy.      2. The patient demonstrates weakness of the bilateral hip.  LTG 2: 12 weeks:  The patient will demonstrate 5 /5 strength for bilateral hip flexion, abduction,  and extension in order to improve hip stability.  STATUS:  New  STG 2a: 4 weeks:  The patient will demonstrate 4+ /5 strength for bilateral hip flexion, abduction,  and extension.  STATUS:  New  TREATMENT: Therapeutic exercises, manual therapy, aquatic therapy, home exercise  instruction,  and modalities as needed for pain to include:  electrical stimulation, moist heat, ice, ultrasound, and   diathermy.    3. Mobility: Walking/Moving Around Functional Limitation    LTG 3a: 12 weeks:  The patient will demonstrate by achieving a score of 15/45 on the LAURO.  STATUS:  New  STG 3 a: 4 weeks:  The patient will demonstrate by achieving a score of 19/45 on the LAURO.    STATUS:  New  TREATMENT:  Manual therapy, therapeutic exercise    Plan  Therapy options: will be seen for skilled physical therapy services  Planned modality interventions: hydrotherapy, cryotherapy, dry needling, TENS and ultrasound (NO TRACTION - fracture at L4 per lumbar x-rays)  Planned therapy interventions: flexibility, functional ROM exercises, strengthening, stretching, manual therapy, abdominal trunk stabilization, home exercise program, soft tissue mobilization, postural training and therapeutic activities  Frequency: 3x month  Treatment plan discussed with: patient and caregiver        Visit Diagnoses:    ICD-10-CM ICD-9-CM   1. Chronic midline low back pain, unspecified whether sciatica present  M54.5 724.2    G89.29 338.29       Timed:  Manual Therapy:         mins  02779;  Therapeutic Exercise:         mins  00532;     Neuromuscular Citlaly:        mins  48883;    Therapeutic Activity:          mins  67663;     Gait Training:           mins  00464;     Ultrasound:          mins  71802;    Electrical Stimulation:         mins  98353 ( );    Untimed:  Electrical Stimulation:         mins  35360 ( );  Mechanical Traction:         mins  84089;   Dry Needling:                      mins self pay    Timed Treatment:      mins   Total Treatment:     39   mins    PT SIGNATURE: Nikky Sandoval PT, DPT   Physical Therapist        Initial Certification  Certification Period: 6/18/2021 thru 9/16/2021  I certify that the therapy services are furnished while this patient is under my care.  The services outlined above are  required by this patient, and will be reviewed every 90 days.     PHYSICIAN: Hien Parham MD      DATE:     Please sign and return via fax to [phone].. Thank you, [facility].

## 2021-06-22 ENCOUNTER — TREATMENT (OUTPATIENT)
Dept: PHYSICAL THERAPY | Facility: CLINIC | Age: 41
End: 2021-06-22

## 2021-06-22 DIAGNOSIS — G89.29 CHRONIC MIDLINE LOW BACK PAIN, UNSPECIFIED WHETHER SCIATICA PRESENT: Primary | ICD-10-CM

## 2021-06-22 DIAGNOSIS — M54.50 CHRONIC MIDLINE LOW BACK PAIN, UNSPECIFIED WHETHER SCIATICA PRESENT: Primary | ICD-10-CM

## 2021-06-22 DIAGNOSIS — M54.50 MIDLINE LOW BACK PAIN, UNSPECIFIED CHRONICITY, UNSPECIFIED WHETHER SCIATICA PRESENT: ICD-10-CM

## 2021-06-22 PROCEDURE — 97140 MANUAL THERAPY 1/> REGIONS: CPT | Performed by: PHYSICAL THERAPIST

## 2021-06-22 PROCEDURE — 97110 THERAPEUTIC EXERCISES: CPT | Performed by: PHYSICAL THERAPIST

## 2021-06-22 NOTE — PROGRESS NOTES
Physical Therapy Daily Treatment Note      Patient: Kang Grey   : 1980  Referring practitioner: Hien Parham MD  Date of Initial Visit: Type: THERAPY  Noted: 2021  Today's Date: 2021  Patient seen for 2 sessions           Subjective Evaluation    History of Present Illness    Subjective comment: Kang is tired today, he didn't sleep well.  His back hurts, 8/10 pain today.  He hasn't done his HEP today because his back hurts. Pain  Current pain ratin  Quality: dull ache             Objective   See Exercise, Manual, and Modality Logs for complete treatment.       Assessment & Plan     Assessment  Impairments: abnormal gait, abnormal or restricted ROM, impaired physical strength and lacks appropriate home exercise program  Assessment details: Progressed today with core stabilization exercises and lumbar/core strength and flexibility.  Max verbal and tactile cueing given for proper posture and technique of exercises. IMDN information provided to pt and his mother to help with tightness in his lumbar and BLE to help with improvements in his pain levels and overall function.   Functional Limitations: lifting, sleeping, walking, uncomfortable because of pain, sitting and standing  Plan  Therapy options: will be seen for skilled physical therapy services  Planned modality interventions: dry needling and cryotherapy  Planned therapy interventions: abdominal trunk stabilization, manual therapy, flexibility, gait training, functional ROM exercises, strengthening, stretching, therapeutic activities, postural training and neuromuscular re-education  Treatment plan discussed with: patient and caregiver  Plan details: Next visit, review exercises for any changes needed and possible IMDN If pt and caregiver are interested - handout was provided at today's session with further info and FAQ.        Visit Diagnoses:    ICD-10-CM ICD-9-CM   1. Chronic midline low back pain, unspecified whether  sciatica present  M54.5 724.2    G89.29 338.29   2. Midline low back pain, unspecified chronicity, unspecified whether sciatica present  M54.5 724.2       Progress per Plan of Care and Progress strengthening /stabilization /functional activity           Timed:  Manual Therapy:    9     mins  16106;  Therapeutic Exercise:    17     mins  15414;     Neuromuscular Citlaly:        mins  60088;    Therapeutic Activity:          mins  85698;     Gait Training:           mins  81053;     Ultrasound:          mins  64203;    Electrical Stimulation:         mins  11983 ( );    Untimed:  Electrical Stimulation:         mins  79713 ( );  Mechanical Traction:         mins  27533;   Dry Needling:                      mins self pay    Timed Treatment:   26   mins   Total Treatment:     31   mins    Nikky Sandoval, PT, DPT  Physical Therapist

## 2021-06-24 ENCOUNTER — TREATMENT (OUTPATIENT)
Dept: PHYSICAL THERAPY | Facility: CLINIC | Age: 41
End: 2021-06-24

## 2021-06-24 DIAGNOSIS — M54.50 MIDLINE LOW BACK PAIN, UNSPECIFIED CHRONICITY, UNSPECIFIED WHETHER SCIATICA PRESENT: ICD-10-CM

## 2021-06-24 DIAGNOSIS — M54.50 CHRONIC MIDLINE LOW BACK PAIN, UNSPECIFIED WHETHER SCIATICA PRESENT: Primary | ICD-10-CM

## 2021-06-24 DIAGNOSIS — G89.29 CHRONIC MIDLINE LOW BACK PAIN, UNSPECIFIED WHETHER SCIATICA PRESENT: Primary | ICD-10-CM

## 2021-06-24 PROCEDURE — 97110 THERAPEUTIC EXERCISES: CPT | Performed by: PHYSICAL THERAPIST

## 2021-06-24 NOTE — PROGRESS NOTES
Physical Therapy Daily Treatment Note      Patient: Kang Grey   : 1980  Referring practitioner: No ref. provider found  Date of Initial Visit: Type: THERAPY  Noted: 2021  Today's Date: 2021  Patient seen for 3 sessions           Subjective Evaluation    History of Present Illness    Subjective comment: 8/10 pain today in the lower back and legs.  He has been doing some of his exercises.          Objective          Ambulation     Comments   Standing postural improvements noted following IMDN.       See Exercise, Manual, and Modality Logs for complete treatment.       Assessment & Plan     Assessment  Impairments: abnormal gait, abnormal or restricted ROM, activity intolerance, impaired balance, impaired physical strength, pain with function and safety issue  Assessment details: IMDN helped with the pain in his legs lower back and hamstrings, pain decreased from 8/10 to a 4/10.   Functional Limitations: walking and standing  Plan  Therapy options: will be seen for skilled physical therapy services  Planned modality interventions: dry needling and ultrasound  Planned therapy interventions: flexibility, functional ROM exercises, gait training, home exercise program, manual therapy, strengthening, stretching and therapeutic activities  Plan details: Next visit, assess IMDN results and continue to progress with stretching and ROM.         Visit Diagnoses:    ICD-10-CM ICD-9-CM   1. Chronic midline low back pain, unspecified whether sciatica present  M54.5 724.2    G89.29 338.29   2. Midline low back pain, unspecified chronicity, unspecified whether sciatica present  M54.5 724.2       Progress per Plan of Care and Progress strengthening /stabilization /functional activity           Timed:  Manual Therapy:    8     mins  25100;  Therapeutic Exercise:    12    mins  46615;     Neuromuscular Citlaly:        mins  63739;    Therapeutic Activity:          mins  68854;     Gait Training:           mins   82832;     Ultrasound:          mins  70359;    Electrical Stimulation:         mins  02304 ( );    Untimed:  Electrical Stimulation:         mins  18376 ( );  Mechanical Traction:         mins  65480;   Dry Needlin      mins self pay    Timed Treatment:   20   mins   Total Treatment:     35   mins    Nikky Sandoval PT, DPT  Physical Therapist

## 2021-06-25 PROBLEM — M25.572 ANKLE PAIN, LEFT: Status: ACTIVE | Noted: 2021-06-25

## 2021-06-25 PROBLEM — L60.0 INGROWN TOENAIL: Status: ACTIVE | Noted: 2021-06-25

## 2021-06-25 PROBLEM — G03.9 MENINGITIS: Status: ACTIVE | Noted: 2021-06-25

## 2021-06-25 PROBLEM — R56.9 SEIZURES: Status: ACTIVE | Noted: 2021-06-25

## 2021-06-25 PROBLEM — M20.40 HAMMERTOE: Status: ACTIVE | Noted: 2021-06-25

## 2021-06-25 PROBLEM — R51.9 HEADACHE: Status: ACTIVE | Noted: 2017-09-29

## 2021-06-25 PROBLEM — M79.671 FOOT PAIN, RIGHT: Status: ACTIVE | Noted: 2020-02-06

## 2021-06-25 PROBLEM — T14.8XXA CLOSED FRACTURE OF BONE: Status: ACTIVE | Noted: 2021-06-25

## 2021-06-25 PROBLEM — E78.5 HYPERLIPEMIA: Status: ACTIVE | Noted: 2021-06-25

## 2021-06-25 PROBLEM — G40.909 EPILEPSY: Status: ACTIVE | Noted: 2021-06-25

## 2021-06-28 ENCOUNTER — OFFICE VISIT (OUTPATIENT)
Dept: PODIATRY | Facility: CLINIC | Age: 41
End: 2021-06-28

## 2021-06-28 VITALS
BODY MASS INDEX: 29.25 KG/M2 | DIASTOLIC BLOOD PRESSURE: 71 MMHG | OXYGEN SATURATION: 94 % | HEART RATE: 116 BPM | SYSTOLIC BLOOD PRESSURE: 109 MMHG | WEIGHT: 193 LBS | TEMPERATURE: 97.5 F | HEIGHT: 68 IN

## 2021-06-28 DIAGNOSIS — L60.0 ONYCHOCRYPTOSIS: ICD-10-CM

## 2021-06-28 DIAGNOSIS — R26.2 DIFFICULTY WALKING: ICD-10-CM

## 2021-06-28 DIAGNOSIS — M79.671 FOOT PAIN, BILATERAL: Primary | ICD-10-CM

## 2021-06-28 DIAGNOSIS — M21.371 FOOT DROP, BILATERAL: ICD-10-CM

## 2021-06-28 DIAGNOSIS — M79.672 FOOT PAIN, BILATERAL: Primary | ICD-10-CM

## 2021-06-28 DIAGNOSIS — G62.9 NEUROPATHY: ICD-10-CM

## 2021-06-28 DIAGNOSIS — M21.372 FOOT DROP, BILATERAL: ICD-10-CM

## 2021-06-28 DIAGNOSIS — B35.1 ONYCHOMYCOSIS: ICD-10-CM

## 2021-06-28 PROCEDURE — 11721 DEBRIDE NAIL 6 OR MORE: CPT | Performed by: PODIATRIST

## 2021-06-28 NOTE — PROGRESS NOTES
[facility] - PODIATRY    Today's Date: 06/28/21    Patient Name: Kang Grey  MRN: [id number]  CSN: [id number]  PCP: Riccardo Richardson  Referring Provider: No ref. provider found    SUBJECTIVE     Chief Complaint   Patient presents with   • Left Foot - Nail Problem   • Right Foot - Nail Problem     HPI: Kang Grey, a 41 y.o.male, comes to clinic.    New, Established, New Problem:  established   Location:  Toenails  Duration:   Greater than five years  Onset:  Gradual  Nature:  sore with palpation.  Stable, worsening, improving:   Stable  Aggravating factors:  Pain with shoe gear and ambulation.  Previous Treatment:  debridement    Patient reports the following medical changes since their last visit: None.    No other pedal complaints at this time.    Patient denies any fevers, chills, nausea, vomiting, shortness of breathe, nor any other constitutional signs nor symptoms.       Patient presents with caregiver.    Past Medical History:   Diagnosis Date   • Acute upper respiratory infection    • Age-related osteoporosis without current pathological fracture    • Cerebral palsy (CMS/[facility])     diagnosed as infant   • Constipation    • Disease of pancreas    • Hydrocephalus in diseases classified elsewhere (CMS/HCC)    • Injury of back 02/2020   • Low back pain    • Major depressive disorder     single episode, moderate   • Other abnormal glucose    • Other cerebral palsy (CMS/HCC)    • Other fatigue    • Other generalized epilepsy and epileptic syndromes, not intractable, without status epilepticus (CMS/HCC)    • Other hypertrophic disorders of the skin    • Pain in left ankle and joints of left foot    • Pain in right toe(s)    • Pain in thoracic spine    • Paranoid schizophrenia (CMS/HCC)    • Pelvic and perineal pain    • Primary insomnia    • Pure hypercholesterolemia    • Repeated falls    • Schizoaffective disorder, unspecified (CMS/HCC)    • Seizures (CMS/HCC)     last seizure was 5 years  ago; medication controlled   • Somnolence    • Vitamin D deficiency    • Wedge compression fracture of fourth thoracic vertebra, subsequent encounter for fracture with routine healing      Past Surgical History:   Procedure Laterality Date   • HAMSTRING REPAIR     • OTHER SURGICAL HISTORY      hyposadius repair     Family History   Problem Relation Age of Onset   • Hypothyroidism Mother    • No Known Problems Father    • Ulcers Brother      Social History     Socioeconomic History   • Marital status: Single     Spouse name: Not on file   • Number of children: Not on file   • Years of education: Not on file   • Highest education level: Not on file   Tobacco Use   • Smoking status: Never Smoker   • Smokeless tobacco: Never Used   Vaping Use   • Vaping Use: Never used   Substance and Sexual Activity   • Alcohol use: Never   • Drug use: Never   • Sexual activity: Defer     Allergies   Allergen Reactions   • Valproic Acid Unknown - Low Severity and Irritability     DIZZY       Current Outpatient Medications   Medication Sig Dispense Refill   • ascorbic acid (VITAMIN C) 1000 MG tablet Take 1 tablet by mouth Daily.     • busPIRone (BUSPAR) 10 MG tablet Take 1 tablet by mouth 3 (Three) Times a Day As Needed.     • Calcium Carbonate 1500 (600 Ca) MG tablet Take 1 tablet by mouth 2 (two) times a day.     • cholecalciferol (VITAMIN D3) 25 MCG (1000 UT) tablet Take 2 tablets by mouth Daily.     • Diclofenac Sodium (VOLTAREN) 1 % gel gel Apply 1 g topically to the appropriate area as directed As Needed.     • folic acid (FOLVITE) 800 MCG tablet Take 1 tablet by mouth Daily.     • HM TRUEplus Fiber 2 g chewable tablet Chew 1 tablet Daily.     • ibuprofen (ADVIL,MOTRIN) 800 MG tablet Take 1 tablet by mouth 3 (Three) Times a Day.     • Invega Sustenna 234 MG/1.5ML suspension prefilled syringe IM injection Inject 1.5 mL into the appropriate muscle as directed by prescriber Every 30 (Thirty) Days.     • Keppra 500 MG tablet Take 3  tablets by mouth Every 12 (Twelve) Hours As Needed.     • lamoTRIgine (LaMICtal) 200 MG tablet Take 1 tablet by mouth Daily.     • Melatonin 10 MG tablet Take 1 tablet by mouth every night at bedtime.     • pravastatin (PRAVACHOL) 20 MG tablet Take 1 tablet by mouth every night at bedtime.     • tiZANidine (ZANAFLEX) 2 MG tablet Take 2 tablets by mouth Daily.     • venlafaxine XR (EFFEXOR-XR) 150 MG 24 hr capsule Take 1 capsule by mouth Daily.       No current facility-administered medications for this visit.     Review of Systems   Constitutional: Negative.    Musculoskeletal:        Requires bilateral Go braces for ambulation.   Skin:        Painful toenails bilaterally   All other systems reviewed and are negative.      OBJECTIVE     Vitals:    06/28/21 1041   BP: 109/71   Pulse: 116   Temp: 97.5 °F (36.4 °C)   SpO2: 94%       Patient seen in no apparent distress.      PHYSICAL EXAM:     Foot/Ankle Exam:       General:   Orientation: unable to assess    Affect: inappropriate    Gait: antalgic    Shoe Gear:  Casual shoes (With bilateral Go braces.  Braces are well worn.)    VASCULAR      Right Foot Vascularity   Normal vascular exam    Dorsalis pedis:  2+  Posterior tibial:  2+  Skin Temperature: warm    Edema Grading:  None  CFT:  < 3 seconds  Pedal Hair Growth:  Present  Varicosities: none       Left Foot Vascularity   Normal vascular exam    Dorsalis pedis:  2+  Posterior tibial:  2+  Skin Temperature: warm    Edema Grading:  None  CFT:  < 3 seconds  Pedal Hair Growth:  Present  Varicosities: none        NEUROLOGIC     Right Foot Neurologic   Light touch sensation:  Diminished  Vibratory sensation:  Diminished  Hot/Cold sensation: diminished       Left Foot Neurologic   Light touch sensation:  Diminished  Hot/cold sensation: diminished       MUSCULOSKELETAL      Right Foot Musculoskeletal   Hammertoe:  First toe     Left Foot Musculoskeletal   Hammertoe:  First toe     MUSCLE STRENGTH     Right Foot  Muscle Strength   Foot dorsiflexion:  1  Foot plantar flexion:  4-  Foot inversion:  2  Foot eversion:  2     Left Foot Muscle Strength   Foot dorsiflexion:  1  Foot plantar flexion:  4-  Foot inversion:  2  Foot eversion:  2     DERMATOLOGIC     Right Foot Dermatologic   Skin: skin intact    Nails: onychomycosis, abnormally thick, subungual debris, dystrophic nails and ingrown toenail    Nails comment:  Toenails 1 through 5     Left Foot Dermatologic   Skin: skin intact    Nails: onychomycosis, abnormally thick, subungual debris, dystrophic nails and ingrown toenail    Nails comment:  Toenails 1 through 5      ASSESSMENT/PLAN     Diagnoses and all orders for this visit:    1. Foot pain, bilateral (Primary)    2. Onychocryptosis    3. Onychomycosis    4. Foot drop, bilateral  -     Ortho Ftwear Shoe Additions    5. Difficulty walking    6. Neuropathy        Comprehensive lower extremity examination and evaluation was performed.    Discussed findings and treatment plan including risks, benefits, and treatment options with patient in detail. Patient agreed with treatment plan.    Affected toenails were debrided in thickness and length and then smoothed with a Dremel Tool.  Tolerated the procedure well without complications.    A prescription was written for new braces bilaterally.    An After Visit Summary was printed and given to the patient at discharge, including (if requested) any available informative/educational handouts regarding diagnosis, treatment, or medications. All questions were answered to patient/family satisfaction. Should symptoms fail to improve or worsen they agree to call or return to clinic or to go to the Emergency Department. Discussed the importance of following up with any needed screening tests/labs/specialist appointments and any requested follow-up recommended by me today. Importance of maintaining follow-up discussed and patient accepts that missed appointments can delay diagnosis and  potentially lead to worsening of conditions.    Return in about 9 weeks (around 8/30/2021) for Toenail Care., or sooner if acute issues arise.    This document has been electronically signed by Roni Osullivan DPM on June 28, 2021 13:01 EDT

## 2021-06-28 NOTE — PATIENT INSTRUCTIONS
Patient is to monitor for recurrence and any new symptoms and to contact Dr. Osullivan's office for a follow-up appointment.      The patient states understanding and agreement with this plan.

## 2021-06-29 ENCOUNTER — TREATMENT (OUTPATIENT)
Dept: PHYSICAL THERAPY | Facility: CLINIC | Age: 41
End: 2021-06-29

## 2021-06-29 DIAGNOSIS — M54.50 CHRONIC MIDLINE LOW BACK PAIN, UNSPECIFIED WHETHER SCIATICA PRESENT: ICD-10-CM

## 2021-06-29 DIAGNOSIS — M54.50 MIDLINE LOW BACK PAIN, UNSPECIFIED CHRONICITY, UNSPECIFIED WHETHER SCIATICA PRESENT: Primary | ICD-10-CM

## 2021-06-29 DIAGNOSIS — G89.29 CHRONIC MIDLINE LOW BACK PAIN, UNSPECIFIED WHETHER SCIATICA PRESENT: ICD-10-CM

## 2021-06-29 PROCEDURE — 97110 THERAPEUTIC EXERCISES: CPT | Performed by: PHYSICAL THERAPIST

## 2021-06-29 NOTE — PROGRESS NOTES
Physical Therapy Daily Treatment Note      Patient: Kang Grey   : 1980  Referring practitioner: No ref. provider found Hien Parham MD  Date of Initial Visit: Type: THERAPY  Noted: 2021  Today's Date: 2021  Patient seen for 4 sessions           Subjective Evaluation    History of Present Illness    Subjective comment: 8/10 pain in the center of the back.  He felt like the IMDN was helpful - he got relief for a couple hours.  Pain  Current pain ratin             Objective   See Exercise, Manual, and Modality Logs for complete treatment.       Assessment & Plan     Assessment  Impairments: abnormal gait, abnormal or restricted ROM, activity intolerance, impaired balance, impaired physical strength, lacks appropriate home exercise program and pain with function  Assessment details: Added in BLE flexibility and lumbar/core strengthening today.  Functional Limitations: sleeping, walking and uncomfortable because of pain  Plan  Planned modality interventions: dry needling, cryotherapy and TENS  Planned therapy interventions: abdominal trunk stabilization, manual therapy, functional ROM exercises, flexibility, home exercise program, strengthening, stretching, therapeutic activities, soft tissue mobilization and postural training  Plan details: Continue to progress with lumbar and BLE flexibility and strength.  IMDN next visit, if needed.        Visit Diagnoses:    ICD-10-CM ICD-9-CM   1. Midline low back pain, unspecified chronicity, unspecified whether sciatica present  M54.5 724.2   2. Chronic midline low back pain, unspecified whether sciatica present  M54.5 724.2    G89.29 338.29       Progress per Plan of Care and Progress strengthening /stabilization /functional activity           Timed:  Manual Therapy:    6     mins  80262;  Therapeutic Exercise:    19     mins  28541;     Neuromuscular Citlaly:        mins  38146;    Therapeutic Activity:          mins  77333;     Gait Training:            mins  55564;     Ultrasound:          mins  46222;    Electrical Stimulation:         mins  62342 ( );    Untimed:  Electrical Stimulation:         mins  25648 ( );  Mechanical Traction:         mins  38827;   Dry Needling:                      mins self pay    Timed Treatment:   25   mins   Total Treatment:     28   mins    Nikky Sandoval PT, DPT  Physical Therapist

## 2021-07-01 ENCOUNTER — TREATMENT (OUTPATIENT)
Dept: PHYSICAL THERAPY | Facility: CLINIC | Age: 41
End: 2021-07-01

## 2021-07-01 VITALS
SYSTOLIC BLOOD PRESSURE: 106 MMHG | TEMPERATURE: 97.9 F | HEART RATE: 100 BPM | BODY MASS INDEX: 25.11 KG/M2 | WEIGHT: 175.4 LBS | DIASTOLIC BLOOD PRESSURE: 72 MMHG | HEIGHT: 70 IN

## 2021-07-01 VITALS
HEART RATE: 78 BPM | SYSTOLIC BLOOD PRESSURE: 105 MMHG | DIASTOLIC BLOOD PRESSURE: 68 MMHG | BODY MASS INDEX: 24.17 KG/M2 | HEIGHT: 70 IN | WEIGHT: 168.8 LBS | TEMPERATURE: 97.5 F

## 2021-07-01 VITALS
TEMPERATURE: 98.1 F | DIASTOLIC BLOOD PRESSURE: 86 MMHG | SYSTOLIC BLOOD PRESSURE: 107 MMHG | HEIGHT: 70 IN | WEIGHT: 186.4 LBS | BODY MASS INDEX: 26.69 KG/M2 | HEART RATE: 102 BPM

## 2021-07-01 VITALS
SYSTOLIC BLOOD PRESSURE: 112 MMHG | DIASTOLIC BLOOD PRESSURE: 79 MMHG | HEIGHT: 70 IN | HEART RATE: 101 BPM | TEMPERATURE: 97.8 F | BODY MASS INDEX: 25.71 KG/M2 | WEIGHT: 179.6 LBS

## 2021-07-01 VITALS
HEART RATE: 102 BPM | DIASTOLIC BLOOD PRESSURE: 69 MMHG | WEIGHT: 180.4 LBS | TEMPERATURE: 97.6 F | SYSTOLIC BLOOD PRESSURE: 109 MMHG | BODY MASS INDEX: 25.83 KG/M2 | HEIGHT: 70 IN

## 2021-07-01 VITALS
BODY MASS INDEX: 25.11 KG/M2 | DIASTOLIC BLOOD PRESSURE: 68 MMHG | HEART RATE: 109 BPM | WEIGHT: 175.4 LBS | TEMPERATURE: 97.1 F | HEIGHT: 70 IN | SYSTOLIC BLOOD PRESSURE: 125 MMHG

## 2021-07-01 DIAGNOSIS — M54.50 CHRONIC MIDLINE LOW BACK PAIN, UNSPECIFIED WHETHER SCIATICA PRESENT: ICD-10-CM

## 2021-07-01 DIAGNOSIS — G89.29 CHRONIC MIDLINE LOW BACK PAIN, UNSPECIFIED WHETHER SCIATICA PRESENT: ICD-10-CM

## 2021-07-01 DIAGNOSIS — M54.50 MIDLINE LOW BACK PAIN, UNSPECIFIED CHRONICITY, UNSPECIFIED WHETHER SCIATICA PRESENT: Primary | ICD-10-CM

## 2021-07-01 PROCEDURE — 97110 THERAPEUTIC EXERCISES: CPT | Performed by: PHYSICAL THERAPIST

## 2021-07-01 NOTE — PROGRESS NOTES
Physical Therapy Daily Treatment Note      Patient: Kang Grey   : 1980  Referring practitioner: Hien Parham MD  Date of Initial Visit: Type: THERAPY  Noted: 2021  Today's Date: 2021  Patient seen for 5 sessions           Subjective Evaluation    History of Present Illness    Subjective comment: 8/10 back pain today         Objective   See Exercise, Manual, and Modality Logs for complete treatment.       Assessment & Plan     Assessment  Impairments: abnormal gait, activity intolerance, impaired balance and impaired physical strength  Assessment details: Continued to progress with strengthening for the core and spine today, pt tolerated well.  Added in resistance bands to help with postural strength so he doesn't lean back or sit on his lower back with poor posture. Pain decreased a few degrees, but some is still present following the session.   Functional Limitations: uncomfortable because of pain, standing and stooping  Plan  Planned modality interventions: cryotherapy and dry needling  Planned therapy interventions: abdominal trunk stabilization, manual therapy, postural training, flexibility, functional ROM exercises, strengthening, stretching, gait training, therapeutic activities and soft tissue mobilization  Plan details: Next visit, IMDN for the lumbar and B hips/gltue, hamstrings.         Visit Diagnoses:    ICD-10-CM ICD-9-CM   1. Midline low back pain, unspecified chronicity, unspecified whether sciatica present  M54.5 724.2   2. Chronic midline low back pain, unspecified whether sciatica present  M54.5 724.2    G89.29 338.29       Progress per Plan of Care and Progress strengthening /stabilization /functional activity           Timed:  Manual Therapy:         mins  35187;  Therapeutic Exercise:    26     mins  42471;     Neuromuscular Citlaly:        mins  87730;    Therapeutic Activity:          mins  92783;     Gait Training:           mins  42000;     Ultrasound:           mins  90458;    Electrical Stimulation:         mins  72457 ( );    Untimed:  Electrical Stimulation:         mins  51634 ( );  Mechanical Traction:         mins  99947;   Dry Needling:                      mins self pay    Timed Treatment:   26   mins   Total Treatment:     30   mins    Nikky Sandoval PT, DPT  Physical Therapist

## 2021-07-02 VITALS
SYSTOLIC BLOOD PRESSURE: 104 MMHG | DIASTOLIC BLOOD PRESSURE: 70 MMHG | TEMPERATURE: 98.2 F | HEIGHT: 70 IN | BODY MASS INDEX: 23.82 KG/M2 | HEART RATE: 72 BPM | WEIGHT: 166.4 LBS

## 2021-07-02 VITALS
SYSTOLIC BLOOD PRESSURE: 120 MMHG | DIASTOLIC BLOOD PRESSURE: 90 MMHG | BODY MASS INDEX: 27.17 KG/M2 | HEART RATE: 78 BPM | WEIGHT: 189.8 LBS | HEIGHT: 70 IN

## 2021-07-02 VITALS
HEIGHT: 70 IN | WEIGHT: 176 LBS | HEART RATE: 93 BPM | BODY MASS INDEX: 25.2 KG/M2 | DIASTOLIC BLOOD PRESSURE: 86 MMHG | SYSTOLIC BLOOD PRESSURE: 118 MMHG | TEMPERATURE: 97.8 F

## 2021-07-02 VITALS
DIASTOLIC BLOOD PRESSURE: 82 MMHG | SYSTOLIC BLOOD PRESSURE: 117 MMHG | WEIGHT: 178.2 LBS | HEIGHT: 70 IN | TEMPERATURE: 97.7 F | HEART RATE: 98 BPM | BODY MASS INDEX: 25.51 KG/M2

## 2021-07-02 VITALS
TEMPERATURE: 97.5 F | BODY MASS INDEX: 25.4 KG/M2 | DIASTOLIC BLOOD PRESSURE: 72 MMHG | HEART RATE: 113 BPM | SYSTOLIC BLOOD PRESSURE: 102 MMHG | WEIGHT: 177.4 LBS | OXYGEN SATURATION: 97 % | HEIGHT: 70 IN

## 2021-07-02 VITALS
SYSTOLIC BLOOD PRESSURE: 106 MMHG | DIASTOLIC BLOOD PRESSURE: 68 MMHG | WEIGHT: 177.8 LBS | TEMPERATURE: 97.5 F | HEART RATE: 91 BPM | HEIGHT: 70 IN | BODY MASS INDEX: 25.45 KG/M2

## 2021-07-20 ENCOUNTER — TREATMENT (OUTPATIENT)
Dept: PHYSICAL THERAPY | Facility: CLINIC | Age: 41
End: 2021-07-20

## 2021-07-20 DIAGNOSIS — M54.50 MIDLINE LOW BACK PAIN, UNSPECIFIED CHRONICITY, UNSPECIFIED WHETHER SCIATICA PRESENT: Primary | ICD-10-CM

## 2021-07-20 DIAGNOSIS — G89.29 CHRONIC MIDLINE LOW BACK PAIN, UNSPECIFIED WHETHER SCIATICA PRESENT: ICD-10-CM

## 2021-07-20 DIAGNOSIS — M54.50 CHRONIC MIDLINE LOW BACK PAIN, UNSPECIFIED WHETHER SCIATICA PRESENT: ICD-10-CM

## 2021-07-20 PROCEDURE — 97110 THERAPEUTIC EXERCISES: CPT | Performed by: PHYSICAL THERAPIST

## 2021-07-20 PROCEDURE — DRYNDL PR CUSTOM DRY NEEDLING SELF PAY: Performed by: PHYSICAL THERAPIST

## 2021-07-20 NOTE — PROGRESS NOTES
Re-Assessment / Re-Certification        Patient: Kang Grey   : 1980  Diagnosis/ICD-10 Code:  Midline low back pain, unspecified chronicity, unspecified whether sciatica present [M54.5]  Referring practitioner: Hien Parham MD  Date of Initial Visit: Type: THERAPY  Noted: 2021  Today's Date: 2021  Patient seen for 6 sessions      Subjective:     Subjective Questionnaire:  LAURO  Clinical Progress:  IMPROVED  Home Program Compliance: YES  Treatment has included: therapeutic exercise, manual therapy and dry needling    Subjective Evaluation    History of Present Illness    Subjective comment: Pain is better today, 3/10.  He wants to do the IMDN.  He is still having pain in his back, more on the R side today than the L.  He forgets to do his exercises some days.      Objective          Static Posture     Comments  Forward flexed, rounded shoulders    Tenderness     Additional Tenderness Details  R QL region very tender/tight    Neurological Testing     Sensation     Lumbar   Left   Intact: light touch    Right   Intact: light touch    Active Range of Motion     Additional Active Range of Motion Details  LUMBAR AROM degrees    Flexion mid shins  Extension to neutral   Right Lateral Flexion 45 degrees, pull on R QL  Left Lateral Flexion 45 degrees  Right Rotation 45 degrees  Left Rotation 45 degrees    Strength/Myotome Testing     Left Hip   Planes of Motion   Flexion: 4+  Extension: 4  Abduction: 4  Adduction: 4    Right Hip   Planes of Motion   Flexion: 4+  Extension: 4-  Abduction: 4  Adduction: 4-    Lumbar Flexibility Comments:   B hamstrings are very tight. As a 10 y/o child, pt had bilateral hamstring lengthening surgery performed.       Assessment & Plan     Assessment  Impairments: abnormal coordination, abnormal gait, abnormal muscle tone, abnormal or restricted ROM, activity intolerance, impaired balance, impaired physical strength, lacks appropriate home exercise program, pain  with function and safety issue  Assessment details: Kang's lumbar ROM has improved slightly, most notable with the forward flexion.  He is getting most all ROM in the pain-free directions.  At end ranges, he will get tightness.  B hamstrings still very tight, pulling pelvis into a tilt and decreasing postural support.  IMDN was very helpful in pain relief and improvements with flexibility. He is progress with PT, continue to benefit from further services to address remaining strength deficits, pain levels, lumbar and BLE ROM.   Prognosis: fair  Functional Limitations: sleeping, uncomfortable because of pain, sitting and standing  Goals  Plan Goals: LOW BACK PROBLEMS:    1. The patient complains of low back pain.  LTG 1: 12 weeks:  The patient will report a pain rating of 4/10 or better in order to improve  tolerance to activities of daily living and improve sleep quality.  STATUS:  Met  STG 1a: 4 weeks:  The patient will report a pain rating of 6/10 or better.  STATUS:  Met  TREATMENT:  Therapeutic exercises, manual therapy, aquatic therapy, home exercise   instruction, and modalities as needed for pain to include:  electrical stimulation, moist heat, ice, ultrasound, and diathermy.      2. The patient demonstrates weakness of the bilateral hip.  LTG 2: 12 weeks:  The patient will demonstrate 5 /5 strength for bilateral hip flexion, abduction,and extension in order to improve hip stability.  STATUS:  Progressing  STG 2a: 4 weeks:  The patient will demonstrate 4+ /5 strength for bilateral hip flexion, abduction,  and extension.  STATUS:  Progressing, met flexion  TREATMENT: Therapeutic exercises, manual therapy, aquatic therapy, home exercise instruction,  and modalities as needed for pain to include:  electrical stimulation, moist heat, ice, ultrasound, and   diathermy.    3. Mobility: Walking/Moving Around Functional Limitation    LTG 3a: 12 weeks:  The patient will demonstrate by achieving a score of 15/45 on  the LAURO.  STATUS:  Progressing  STG 3 a: 4 weeks:  The patient will demonstrate by achieving a score of 19/45 on the LAURO.    STATUS:  Met  TREATMENT:  Manual therapy, therapeutic exercise    Plan  Therapy options: will be seen for skilled physical therapy services  Planned modality interventions: hydrotherapy, cryotherapy, dry needling, TENS and ultrasound (NO TRACTION - fracture at L4 per lumbar x-rays)  Planned therapy interventions: flexibility, functional ROM exercises, strengthening, stretching, manual therapy, abdominal trunk stabilization, home exercise program, soft tissue mobilization, postural training and therapeutic activities  Frequency: 3x month  Treatment plan discussed with: patient  Plan details: Next visit, progress with core/lumbar strengthening, LE flexibility, and assess IMDN results.         Visit Diagnoses:    ICD-10-CM ICD-9-CM   1. Midline low back pain, unspecified chronicity, unspecified whether sciatica present  M54.5 724.2   2. Chronic midline low back pain, unspecified whether sciatica present  M54.5 724.2    G89.29 338.29       Progress toward previous goals: Partially Met        Recommendations: Continue as planned  Timeframe: 2 months  Prognosis to achieve goals: good    PT Signature: Nikky Sandoval, PT, DPT  Physical Therapist      Based upon review of the patient's progress and continued therapy plan, it is my medical opinion that Kang Grey should continue physical therapy treatment at [facility] PH[facility]  [address] [place] [id number] [phone].    Signature: __________________________________  Hien Parham MD    Timed:  Manual Therapy:         mins  05765;  Therapeutic Exercise:    9     mins  75994;     Neuromuscular Citlaly:        mins  30988;    Therapeutic Activity:          mins  79361;     Gait Training:           mins  16942;     Ultrasound:          mins  34271;        Untimed:  Electrical  Stimulation:         mins  28926 ( );  Mechanical Traction:         mins  05750;   Dry Needlin      mins self pay    _______________________________________    Timed Treatment:   9   mins   Total Treatment:     30   mins

## 2021-07-22 ENCOUNTER — TREATMENT (OUTPATIENT)
Dept: PHYSICAL THERAPY | Facility: CLINIC | Age: 41
End: 2021-07-22

## 2021-07-22 DIAGNOSIS — M54.50 CHRONIC MIDLINE LOW BACK PAIN, UNSPECIFIED WHETHER SCIATICA PRESENT: ICD-10-CM

## 2021-07-22 DIAGNOSIS — M54.50 MIDLINE LOW BACK PAIN, UNSPECIFIED CHRONICITY, UNSPECIFIED WHETHER SCIATICA PRESENT: Primary | ICD-10-CM

## 2021-07-22 DIAGNOSIS — G89.29 CHRONIC MIDLINE LOW BACK PAIN, UNSPECIFIED WHETHER SCIATICA PRESENT: ICD-10-CM

## 2021-07-22 PROCEDURE — 97110 THERAPEUTIC EXERCISES: CPT | Performed by: PHYSICAL THERAPIST

## 2021-07-22 NOTE — PROGRESS NOTES
Physical Therapy Daily Treatment Note      Patient: Kang Grey   : 1980  Referring practitioner: Hien Parham MD  Date of Initial Visit: Type: THERAPY  Noted: 2021  Today's Date: 2021  Patient seen for 7 sessions           Subjective Evaluation    History of Present Illness    Subjective comment: DN helped last visit.  /10 pain today. He said that he forgets to do his HEP.         Objective   See Exercise, Manual, and Modality Logs for complete treatment.       Assessment & Plan     Assessment  Assessment details: Progressed with trunk stabilization seated on unlevel surfaces and added in postural strengthening.  Added in cybex and resistance with green bands. Continued to focus on hamstring flexibility to help with improvements in posture. Education given to patient about continuing with his HEP.     Plan  Therapy options: will be seen for skilled physical therapy services  Planned therapy interventions: abdominal trunk stabilization, manual therapy, postural training, strengthening, therapeutic activities, flexibility and functional ROM exercises  Treatment plan discussed with: patient  Plan details: Next visit, progress with core strengthening and lumbar stretches.         Visit Diagnoses:    ICD-10-CM ICD-9-CM   1. Midline low back pain, unspecified chronicity, unspecified whether sciatica present  M54.5 724.2   2. Chronic midline low back pain, unspecified whether sciatica present  M54.5 724.2    G89.29 338.29       Progress per Plan of Care and Progress strengthening /stabilization /functional activity           Timed:  Manual Therapy:    5     mins  65431;  Therapeutic Exercise:    23     mins  69573;     Neuromuscular Citlaly:        mins  76710;    Therapeutic Activity:          mins  13394;     Gait Training:           mins  01739;     Ultrasound:          mins  37779;        Untimed:  Electrical Stimulation:         mins  79112 ( );  Mechanical Traction:         mins   28582;   Dry Needling:                      mins self pay    Timed Treatment:   28   mins   Total Treatment:     30   mins    Nikky Sandoval PT, DPT  Physical Therapist

## 2021-07-26 ENCOUNTER — TELEPHONE (OUTPATIENT)
Dept: PHYSICAL THERAPY | Facility: CLINIC | Age: 41
End: 2021-07-26

## 2021-07-28 ENCOUNTER — TREATMENT (OUTPATIENT)
Dept: PHYSICAL THERAPY | Facility: CLINIC | Age: 41
End: 2021-07-28

## 2021-07-28 ENCOUNTER — HOSPITAL ENCOUNTER (OUTPATIENT)
Dept: MRI IMAGING | Facility: HOSPITAL | Age: 41
Discharge: HOME | End: 2021-07-28
Admitting: NURSE PRACTITIONER

## 2021-07-28 DIAGNOSIS — M54.50 MIDLINE LOW BACK PAIN, UNSPECIFIED CHRONICITY, UNSPECIFIED WHETHER SCIATICA PRESENT: Primary | ICD-10-CM

## 2021-07-28 DIAGNOSIS — K86.2 PANCREATIC CYST: ICD-10-CM

## 2021-07-28 DIAGNOSIS — M54.50 CHRONIC MIDLINE LOW BACK PAIN, UNSPECIFIED WHETHER SCIATICA PRESENT: ICD-10-CM

## 2021-07-28 DIAGNOSIS — G89.29 CHRONIC MIDLINE LOW BACK PAIN, UNSPECIFIED WHETHER SCIATICA PRESENT: ICD-10-CM

## 2021-07-28 LAB — CREAT BLDA-MCNC: 1 MG/DL

## 2021-07-28 PROCEDURE — 0 GADOBENATE DIMEGLUMINE 529 MG/ML SOLUTION: Performed by: NURSE PRACTITIONER

## 2021-07-28 PROCEDURE — A9577 INJ MULTIHANCE: HCPCS | Performed by: NURSE PRACTITIONER

## 2021-07-28 PROCEDURE — 97112 NEUROMUSCULAR REEDUCATION: CPT | Performed by: PHYSICAL THERAPIST

## 2021-07-28 PROCEDURE — 97110 THERAPEUTIC EXERCISES: CPT | Performed by: PHYSICAL THERAPIST

## 2021-07-28 PROCEDURE — 74183 MRI ABD W/O CNTR FLWD CNTR: CPT

## 2021-07-28 PROCEDURE — 82565 ASSAY OF CREATININE: CPT

## 2021-07-28 RX ADMIN — GADOBENATE DIMEGLUMINE 17 ML: 529 INJECTION, SOLUTION INTRAVENOUS at 09:55

## 2021-07-28 NOTE — PROGRESS NOTES
Physical Therapy Daily Treatment Note      Patient: Kang Grey   : 1980  Referring practitioner: Hien Parham MD  Date of Initial Visit: Type: THERAPY  Noted: 2021  Today's Date: 2021  Patient seen for 8 sessions           Subjective 3/10    Objective   See Exercise, Manual, and Modality Logs for complete treatment.       Assessment/Plan  Pt started new exercises requiring vc and demonstration for safe and effective performance. Pt cont to be off balance with amb and activities.  Poor trunk rotation noted.  Cont with the POC to progress pt toward goals.      Visit Diagnoses:    ICD-10-CM ICD-9-CM   1. Midline low back pain, unspecified chronicity, unspecified whether sciatica present  M54.5 724.2   2. Chronic midline low back pain, unspecified whether sciatica present  M54.5 724.2    G89.29 338.29       Progress per Plan of Care    Timed:    Therapeutic Exercise:    10     mins  38885;  Manual Therapy:         mins  95131;     Neuromuscular Citlaly:   30    mins  26889;    Therapeutic Activity:          mins  60718;     Gait Training:           mins  86197;     Ultrasound:          mins  30397;      Untimed:  Electrical Stimulation:         mins  19815 ( );  Mechanical Traction:         mins  80222;     Timed Treatment:   40   mins   Total Treatment:     45   mins      Landy Soler PTA  Physical Therapist Assistant

## 2021-08-09 ENCOUNTER — TREATMENT (OUTPATIENT)
Dept: PHYSICAL THERAPY | Facility: CLINIC | Age: 41
End: 2021-08-09

## 2021-08-09 DIAGNOSIS — G89.29 CHRONIC MIDLINE LOW BACK PAIN, UNSPECIFIED WHETHER SCIATICA PRESENT: ICD-10-CM

## 2021-08-09 DIAGNOSIS — M54.50 CHRONIC MIDLINE LOW BACK PAIN, UNSPECIFIED WHETHER SCIATICA PRESENT: ICD-10-CM

## 2021-08-09 DIAGNOSIS — M54.50 MIDLINE LOW BACK PAIN, UNSPECIFIED CHRONICITY, UNSPECIFIED WHETHER SCIATICA PRESENT: Primary | ICD-10-CM

## 2021-08-09 PROCEDURE — 97110 THERAPEUTIC EXERCISES: CPT | Performed by: PHYSICAL THERAPIST

## 2021-08-09 NOTE — PROGRESS NOTES
Physical Therapy Daily Treatment Note      Patient: Kang Grey   : 1980  Referring practitioner: Hien Parham MD  Date of Initial Visit: Type: THERAPY  Noted: 2021  Today's Date: 2021  Patient seen for 9 sessions           Subjective Evaluation    History of Present Illness    Subjective comment: Pt states that his back pain is 3/10.  It is a lot better, but it could still be better he said.          Objective   See Exercise, Manual, and Modality Logs for complete treatment.       Assessment & Plan     Assessment  Assessment details: Progressed with core strengthening, seated and standing, and seated balance on bosu to improve overall balance and postural stability.  No pain was present with activities today.     Plan  Therapy options: will be seen for skilled physical therapy services  Planned therapy interventions: abdominal trunk stabilization, flexibility, functional ROM exercises and strengthening  Plan details: Next visit, continue to progress with core/lumbar stability and postural control as tolerated.         Visit Diagnoses:    ICD-10-CM ICD-9-CM   1. Midline low back pain, unspecified chronicity, unspecified whether sciatica present  M54.5 724.2   2. Chronic midline low back pain, unspecified whether sciatica present  M54.5 724.2    G89.29 338.29       Progress per Plan of Care and Progress strengthening /stabilization /functional activity           Timed:  Manual Therapy:         mins  50314;  Therapeutic Exercise:    23     mins  26611;     Neuromuscular Ciltaly:        mins  26311;    Therapeutic Activity:          mins  59428;     Gait Training:           mins  31082;     Ultrasound:          mins  64417;        Untimed:  Electrical Stimulation:         mins  55164 ( );  Mechanical Traction:         mins  36735;   Dry Needling:                      mins self pay    Timed Treatment:   23   mins   Total Treatment:     25   mins    Nikky Sandoval PT, DPT  Physical Therapist

## 2021-08-13 DIAGNOSIS — K86.2 PANCREATIC CYST: Primary | ICD-10-CM

## 2021-08-23 ENCOUNTER — TREATMENT (OUTPATIENT)
Dept: PHYSICAL THERAPY | Facility: CLINIC | Age: 41
End: 2021-08-23

## 2021-08-23 DIAGNOSIS — M54.50 CHRONIC MIDLINE LOW BACK PAIN, UNSPECIFIED WHETHER SCIATICA PRESENT: ICD-10-CM

## 2021-08-23 DIAGNOSIS — M54.50 MIDLINE LOW BACK PAIN, UNSPECIFIED CHRONICITY, UNSPECIFIED WHETHER SCIATICA PRESENT: Primary | ICD-10-CM

## 2021-08-23 DIAGNOSIS — G89.29 CHRONIC MIDLINE LOW BACK PAIN, UNSPECIFIED WHETHER SCIATICA PRESENT: ICD-10-CM

## 2021-08-23 PROCEDURE — 97110 THERAPEUTIC EXERCISES: CPT | Performed by: PHYSICAL THERAPIST

## 2021-08-23 NOTE — PROGRESS NOTES
"Re-Assessment / Re-Certification        Patient: Kang Grey   : 1980  Diagnosis/ICD-10 Code:  Midline low back pain, unspecified chronicity, unspecified whether sciatica present [M54.5]  Referring practitioner: Hien Parham MD  Date of Initial Visit: Type: THERAPY  Noted: 2021  Today's Date: 2021  Patient seen for 10 sessions      Subjective:     Subjective Questionnaire: LAURO   Clinical Progress:  IMPROVED  Treatment has included: therapeutic exercise, manual therapy and dry needling    Subjective Evaluation    History of Present Illness    Subjective comment: 4/10 pain level in his back.  He said, \"I think my back is damaged for life.\"  He thinks PT has helped, but only 10%. He would like to continue with PT.      Objective          Static Posture     Comments  Forward flexed, rounded shoulders    Tenderness     Lumbar Spine  Tenderness in the spinous process.     Neurological Testing     Sensation     Lumbar   Left   Intact: light touch    Right   Intact: light touch    Active Range of Motion     Additional Active Range of Motion Details  LUMBAR AROM degrees    Flexion mid thighs  Extension to neutral   Right Lateral Flexion lateral thigh, tightness on the L  Left Lateral Flexion lateral thigh, tightness on the L  Right Rotation 40 degrees  Left Rotation 35 degrees    Strength/Myotome Testing     Left Hip   Planes of Motion   Flexion: 4  Extension: 4  Abduction: 4+  Adduction: 4+    Right Hip   Planes of Motion   Flexion: 4+  Extension: 4  Abduction: 4+  Adduction: 4+    Lumbar Flexibility Comments:   B hamstrings are very tight. As a 10 y/o child, pt had bilateral hamstring lengthening surgery performed.       Assessment & Plan     Assessment  Impairments: abnormal coordination, abnormal gait, abnormal muscle tone, abnormal or restricted ROM, activity intolerance, impaired balance, impaired physical strength, lacks appropriate home exercise program, pain with function and safety " issue  Assessment details: Kang has progressed with PT. He met his hip strength short term goal on the R side, progress with L side.  He continues to have tightness in the lumbar and BLE, especially hamstrings. He will continue to benefit from further PT services to reach his long term goals for function and strength.  His pain levels have improved since starting PT.   Prognosis: fair  Functional Limitations: sleeping, uncomfortable because of pain, sitting and standing  Goals  Plan Goals: LOW BACK PROBLEMS:    1. The patient complains of low back pain.  LTG 1: 12 weeks:  The patient will report a pain rating of 4/10 or better in order to improve  tolerance to activities of daily living and improve sleep quality.  STATUS:  Met  STG 1a: 4 weeks:  The patient will report a pain rating of 6/10 or better.  STATUS:  Met  TREATMENT:  Therapeutic exercises, manual therapy, aquatic therapy, home exercise   instruction, and modalities as needed for pain to include:  electrical stimulation, moist heat, ice, ultrasound, and diathermy.      2. The patient demonstrates weakness of the bilateral hip.  LTG 2: 12 weeks:  The patient will demonstrate 5 /5 strength for bilateral hip flexion, abduction, and extension in order to improve hip stability.  STATUS:  Progressing  STG 2a: 4 weeks:  The patient will demonstrate 4+ /5 strength for bilateral hip flexion, abduction, and extension.  STATUS:  Met R, progressing L  TREATMENT: Therapeutic exercises, manual therapy, aquatic therapy, home exercise instruction,  and modalities as needed for pain to include:  electrical stimulation, moist heat, ice, ultrasound, and   diathermy.    3. Mobility: Walking/Moving Around Functional Limitation    LTG 3a: 12 weeks:  The patient will demonstrate by achieving a score of 15/45 on the LAURO.  STATUS:  Progressing  STG 3 a: 4 weeks:  The patient will demonstrate by achieving a score of 19/45 on the LAURO.    STATUS:  Met  TREATMENT:  Manual therapy,  therapeutic exercise    Plan  Therapy options: will be seen for skilled physical therapy services  Planned modality interventions: hydrotherapy, cryotherapy, dry needling, TENS and ultrasound (NO TRACTION - fracture at L4 per lumbar x-rays)  Planned therapy interventions: flexibility, functional ROM exercises, strengthening, stretching, manual therapy, abdominal trunk stabilization, home exercise program, soft tissue mobilization, postural training, therapeutic activities, neuromuscular re-education, ADL retraining and balance/weight-bearing training  Frequency: 3x month  Treatment plan discussed with: patient  Plan details: Continue to progress with lumbar pain and core strength, postural stabilization.         Visit Diagnoses:    ICD-10-CM ICD-9-CM   1. Midline low back pain, unspecified chronicity, unspecified whether sciatica present  M54.5 724.2   2. Chronic midline low back pain, unspecified whether sciatica present  M54.5 724.2    G89.29 338.29       Progress toward previous goals: Partially Met        Recommendations: Continue as planned  Timeframe: 6 weeks  Prognosis to achieve goals: good    PT Signature: Nikky Sandoval, PT, DPT  Physical Therapist      Based upon review of the patient's progress and continued therapy plan, it is my medical opinion that Kang Grey should continue physical therapy treatment at [facility] PH[facility]AP[facility]  [address] [place] [id number] [phone].    Signature: __________________________________  Hien Parham MD    Timed:  Manual Therapy:         mins  44862;  Therapeutic Exercise:    23     mins  30559;     Neuromuscular Citlaly:        mins  70922;    Therapeutic Activity:          mins  56268;     Gait Training:           mins  50480;     Ultrasound:          mins  43023;        Untimed:  Electrical Stimulation:         mins  46942 ( );  Mechanical Traction:         mins  08041;   Dry Needling:                       mins self pay    _______________________________________    Timed Treatment:   23   mins   Total Treatment:     28   mins

## 2021-08-30 ENCOUNTER — TREATMENT (OUTPATIENT)
Dept: PHYSICAL THERAPY | Facility: CLINIC | Age: 41
End: 2021-08-30

## 2021-08-30 DIAGNOSIS — M54.50 MIDLINE LOW BACK PAIN, UNSPECIFIED CHRONICITY, UNSPECIFIED WHETHER SCIATICA PRESENT: Primary | ICD-10-CM

## 2021-08-30 DIAGNOSIS — G89.29 CHRONIC MIDLINE LOW BACK PAIN, UNSPECIFIED WHETHER SCIATICA PRESENT: ICD-10-CM

## 2021-08-30 DIAGNOSIS — M54.50 CHRONIC MIDLINE LOW BACK PAIN, UNSPECIFIED WHETHER SCIATICA PRESENT: ICD-10-CM

## 2021-08-30 PROCEDURE — 97110 THERAPEUTIC EXERCISES: CPT | Performed by: PHYSICAL THERAPIST

## 2021-08-30 NOTE — PROGRESS NOTES
Physical Therapy Daily Treatment Note      Patient: Kang Grey   : 1980  Referring practitioner: Hien Parham MD  Date of Initial Visit: Type: THERAPY  Noted: 2021  Today's Date: 2021  Patient seen for 11 sessions           Subjective Evaluation    History of Present Illness    Subjective comment: His spirit is exhausted today, his back isn't too bad today.  His back pain is 3/10.          Objective   See Exercise, Manual, and Modality Logs for complete treatment.       Assessment & Plan     Assessment  Impairments: abnormal gait, abnormal or restricted ROM, impaired balance, impaired physical strength and pain with function  Assessment details: Progressed with core strengthening and lumbar stability.      Plan  Therapy options: will be seen for skilled physical therapy services  Planned modality interventions: dry needling, TENS and thermotherapy (hydrocollator packs)  Planned therapy interventions: manual therapy, abdominal trunk stabilization, flexibility, functional ROM exercises, strengthening, stretching, therapeutic activities and postural training  Plan details: Added 4 more visits to further progress pain levels and core/lumbar stability.         Visit Diagnoses:    ICD-10-CM ICD-9-CM   1. Midline low back pain, unspecified chronicity, unspecified whether sciatica present  M54.5 724.2   2. Chronic midline low back pain, unspecified whether sciatica present  M54.5 724.2    G89.29 338.29       Progress per Plan of Care and Progress strengthening /stabilization /functional activity           Timed:  Manual Therapy:         mins  53113;  Therapeutic Exercise:    25     mins  07234;     Neuromuscular Citlaly:        mins  95623;    Therapeutic Activity:          mins  33185;     Gait Training:           mins  88431;     Ultrasound:          mins  66477;        Untimed:  Electrical Stimulation:         mins  77381 ( );  Mechanical Traction:         mins  44527;   Dry Needling:                       mins self pay    Timed Treatment:   25   mins   Total Treatment:     29   mins    Nikky Sandoval, PT, DPT  Physical Therapist

## 2021-09-03 ENCOUNTER — TREATMENT (OUTPATIENT)
Dept: PHYSICAL THERAPY | Facility: CLINIC | Age: 41
End: 2021-09-03

## 2021-09-03 DIAGNOSIS — G89.29 CHRONIC MIDLINE LOW BACK PAIN, UNSPECIFIED WHETHER SCIATICA PRESENT: ICD-10-CM

## 2021-09-03 DIAGNOSIS — M54.50 MIDLINE LOW BACK PAIN, UNSPECIFIED CHRONICITY, UNSPECIFIED WHETHER SCIATICA PRESENT: Primary | ICD-10-CM

## 2021-09-03 DIAGNOSIS — M54.50 CHRONIC MIDLINE LOW BACK PAIN, UNSPECIFIED WHETHER SCIATICA PRESENT: ICD-10-CM

## 2021-09-03 PROCEDURE — 97110 THERAPEUTIC EXERCISES: CPT | Performed by: PHYSICAL THERAPIST

## 2021-09-03 PROCEDURE — 97140 MANUAL THERAPY 1/> REGIONS: CPT | Performed by: PHYSICAL THERAPIST

## 2021-09-03 NOTE — PROGRESS NOTES
Physical Therapy Daily Treatment Note      Patient: Kang Grey   : 1980  Referring practitioner: Hien Parham MD  Date of Initial Visit: Type: THERAPY  Noted: 2021  Today's Date: 9/3/2021  Patient seen for 12 sessions           Subjective Evaluation    History of Present Illness    Subjective comment: 3/10 back pain. He didn't sleep well last night.  He got new AFOs. He hasn't been doing his HEP - can't remember and can't see the paper very well.          Objective   See Exercise, Manual, and Modality Logs for complete treatment.       Assessment & Plan     Assessment  Assessment details: Updated HEP handout - made them larger pictures and words so patient can be compliant with them. Added in manual to the B hip flexors, decreased tendon, and improvements with posture.     Plan  Plan details: Next visit, IMDN        Visit Diagnoses:    ICD-10-CM ICD-9-CM   1. Midline low back pain, unspecified chronicity, unspecified whether sciatica present  M54.5 724.2   2. Chronic midline low back pain, unspecified whether sciatica present  M54.5 724.2    G89.29 338.29       Progress per Plan of Care and Progress strengthening /stabilization /functional activity           Timed:  Manual Therapy:    8     mins  51551;  Therapeutic Exercise:    20     mins  29624;     Neuromuscular Citlaly:        mins  34642;    Therapeutic Activity:          mins  01337;     Gait Training:           mins  08424;     Ultrasound:          mins  52640;        Untimed:  Electrical Stimulation:         mins  02241 ( );  Mechanical Traction:         mins  29575;   Dry Needling:                      mins self pay    Timed Treatment:   28   mins   Total Treatment:     30   mins    Nikky Sandoval PT, DPT  Physical Therapist

## 2021-09-07 ENCOUNTER — TREATMENT (OUTPATIENT)
Dept: PHYSICAL THERAPY | Facility: CLINIC | Age: 41
End: 2021-09-07

## 2021-09-07 DIAGNOSIS — M54.50 CHRONIC MIDLINE LOW BACK PAIN, UNSPECIFIED WHETHER SCIATICA PRESENT: ICD-10-CM

## 2021-09-07 DIAGNOSIS — M54.50 MIDLINE LOW BACK PAIN, UNSPECIFIED CHRONICITY, UNSPECIFIED WHETHER SCIATICA PRESENT: Primary | ICD-10-CM

## 2021-09-07 DIAGNOSIS — G89.29 CHRONIC MIDLINE LOW BACK PAIN, UNSPECIFIED WHETHER SCIATICA PRESENT: ICD-10-CM

## 2021-09-07 PROCEDURE — 97110 THERAPEUTIC EXERCISES: CPT | Performed by: PHYSICAL THERAPIST

## 2021-09-07 NOTE — PROGRESS NOTES
Physical Therapy Daily Treatment Note      Patient: Kang Grey   : 1980  Referring practitioner: Hien Parham MD  Date of Initial Visit: Type: THERAPY  Noted: 2021  Today's Date: 2021  Patient seen for 13 sessions           Subjective Evaluation    History of Present Illness    Subjective comment: Kang states that he is stiff all over today.           Objective   See Exercise, Manual, and Modality Logs for complete treatment.       Assessment & Plan     Assessment  Assessment details: Stretching used to improve tightness and flexibility in the lumbar and BLE due to complaints of stiffness today. Added stretching to his HEP.  Explained the importance to Kang on perform his HEP for carryover.     He noted he felt better after the session today.     Plan  Plan details: Next visit, IMDN if needed.         Visit Diagnoses:    ICD-10-CM ICD-9-CM   1. Midline low back pain, unspecified chronicity, unspecified whether sciatica present  M54.5 724.2   2. Chronic midline low back pain, unspecified whether sciatica present  M54.5 724.2    G89.29 338.29       Progress per Plan of Care and Progress strengthening /stabilization /functional activity           Timed:  Manual Therapy:         mins  24226;  Therapeutic Exercise:    23     mins  22727;     Neuromuscular Citlaly:        mins  84126;    Therapeutic Activity:          mins  69664;     Gait Training:           mins  83994;     Ultrasound:          mins  42505;        Untimed:  Electrical Stimulation:         mins  03803 ( );  Mechanical Traction:         mins  83567;   Dry Needling:                      mins self pay    Timed Treatment:   23   mins   Total Treatment:     28   mins    Nikky Sandoval PT, DPT  Physical Therapist

## 2021-09-14 ENCOUNTER — TREATMENT (OUTPATIENT)
Dept: PHYSICAL THERAPY | Facility: CLINIC | Age: 41
End: 2021-09-14

## 2021-09-14 DIAGNOSIS — M54.50 CHRONIC MIDLINE LOW BACK PAIN, UNSPECIFIED WHETHER SCIATICA PRESENT: ICD-10-CM

## 2021-09-14 DIAGNOSIS — G89.29 CHRONIC MIDLINE LOW BACK PAIN, UNSPECIFIED WHETHER SCIATICA PRESENT: ICD-10-CM

## 2021-09-14 DIAGNOSIS — M54.50 MIDLINE LOW BACK PAIN, UNSPECIFIED CHRONICITY, UNSPECIFIED WHETHER SCIATICA PRESENT: Primary | ICD-10-CM

## 2021-09-14 PROCEDURE — 97112 NEUROMUSCULAR REEDUCATION: CPT | Performed by: PHYSICAL THERAPIST

## 2021-09-14 PROCEDURE — 97110 THERAPEUTIC EXERCISES: CPT | Performed by: PHYSICAL THERAPIST

## 2021-09-14 NOTE — PROGRESS NOTES
Physical Therapy Daily Treatment Note      Patient: Kang Grey   : 1980  Referring practitioner: Hien Parham MD  Date of Initial Visit: Type: THERAPY  Noted: 2021  Today's Date: 2021  Patient seen for 14 sessions           Subjective Evaluation    History of Present Illness    Subjective comment: Pt is sore in his glutes today.  He wants to try DN next visit.          Objective   See Exercise, Manual, and Modality Logs for complete treatment.       Assessment & Plan     Assessment  Assessment details: Progressed with stretching for the BLE and lumbar.  Worked on PPT position and proper technique to retrain posture and core/lumbar position.  Pain decreased throughout session.     Plan  Plan details: Pt would like to do the Dry Needling next visit.         Visit Diagnoses:    ICD-10-CM ICD-9-CM   1. Midline low back pain, unspecified chronicity, unspecified whether sciatica present  M54.5 724.2   2. Chronic midline low back pain, unspecified whether sciatica present  M54.5 724.2    G89.29 338.29       Progress per Plan of Care and Progress strengthening /stabilization /functional activity           Timed:  Manual Therapy:         mins  26171;  Therapeutic Exercise:    16     mins  99534;     Neuromuscular Citlaly:    9    mins  42373;    Therapeutic Activity:          mins  56613;     Gait Training:           mins  58904;     Ultrasound:          mins  29702;        Untimed:  Electrical Stimulation:         mins  88784 ( );  Mechanical Traction:         mins  59736;   Dry Needling:                      mins self pay    Timed Treatment:   25   mins   Total Treatment:     28   mins    Nikky Sandoval, PT, DPT  Physical Therapist

## 2021-09-16 ENCOUNTER — TREATMENT (OUTPATIENT)
Dept: PHYSICAL THERAPY | Facility: CLINIC | Age: 41
End: 2021-09-16

## 2021-09-16 DIAGNOSIS — M54.50 CHRONIC MIDLINE LOW BACK PAIN, UNSPECIFIED WHETHER SCIATICA PRESENT: ICD-10-CM

## 2021-09-16 DIAGNOSIS — G89.29 CHRONIC MIDLINE LOW BACK PAIN, UNSPECIFIED WHETHER SCIATICA PRESENT: ICD-10-CM

## 2021-09-16 DIAGNOSIS — M54.50 MIDLINE LOW BACK PAIN, UNSPECIFIED CHRONICITY, UNSPECIFIED WHETHER SCIATICA PRESENT: Primary | ICD-10-CM

## 2021-09-16 PROCEDURE — 20561 NDL INSJ W/O NJX 3+ MUSC: CPT | Performed by: PHYSICAL THERAPIST

## 2021-09-16 PROCEDURE — 97110 THERAPEUTIC EXERCISES: CPT | Performed by: PHYSICAL THERAPIST

## 2021-09-16 NOTE — PROGRESS NOTES
Re-Assessment / Re-Certification        Patient: Kang Grey   : 1980  Diagnosis/ICD-10 Code:  Midline low back pain, unspecified chronicity, unspecified whether sciatica present [M54.5]  Referring practitioner: Hien Parham MD  Date of Initial Visit: Type: THERAPY  Noted: 2021  Today's Date: 2021  Patient seen for 15 sessions      Subjective:     Clinical Progress: IMPROVED  Home Program Compliance: Not consistently  Treatment has included: therapeutic exercise, manual therapy, gait training and dry needling    Subjective Evaluation    History of Present Illness    Subjective comment: Kang is having some pain and stiffness in his back and legs today, 4/10 pain levels.  He feels overall the PT has helped.  He wants to try dry needling today.  He does his HEP when he remembers.  He works on his [employer] positions at home.      Objective          Static Posture     Comments  Forward flexed, rounded shoulders    Neurological Testing     Sensation     Lumbar   Left   Intact: light touch    Right   Intact: light touch    Active Range of Motion     Additional Active Range of Motion Details  LUMBAR AROM degrees    Flexion shins  Extension to neutral   Right Lateral Flexion lateral thigh, tightness on the L  Left Lateral Flexion lateral thigh, tightness on the L  Right Rotation 45 degrees  Left Rotation 45 degrees    Strength/Myotome Testing     Left Hip   Planes of Motion   Flexion: 5  Extension: 5  Abduction: 5  Adduction: 5    Right Hip   Planes of Motion   Flexion: 5  Extension: 5  Abduction: 5  Adduction: 5    Lumbar Flexibility Comments:   B hamstrings are very tight. As a 10 y/o child, pt had bilateral hamstring lengthening surgery performed.       Assessment & Plan     Assessment  Impairments: abnormal coordination, abnormal gait, abnormal muscle tone, abnormal or restricted ROM, activity intolerance, impaired balance, impaired physical strength, lacks appropriate home exercise  program, pain with function and safety issue  Assessment details: Kang will be discharged from PT services at this time.  He will continue to perform his HEP for further gains in his strength and mobility.  He has met all of his goals - strength, pain, and function. Dry needling performed today and gave Kang relief from 4/10 pain to 0/10 pain when leaving.  All HEP was reviewed with Kang prior to leaving.   Prognosis: fair  Functional Limitations: sleeping, uncomfortable because of pain, sitting and standing  Goals  Plan Goals: LOW BACK PROBLEMS:    1. The patient complains of low back pain.  LTG 1: 12 weeks:  The patient will report a pain rating of 4/10 or better in order to improve  tolerance to activities of daily living and improve sleep quality.  STATUS:  Met  STG 1a: 4 weeks:  The patient will report a pain rating of 6/10 or better.  STATUS:  Met  TREATMENT:  Therapeutic exercises, manual therapy, aquatic therapy, home exercise   instruction, and modalities as needed for pain to include:  electrical stimulation, moist heat, ice, ultrasound, and diathermy.      2. The patient demonstrates weakness of the bilateral hip.  LTG 2: 12 weeks:  The patient will demonstrate 5 /5 strength for bilateral hip flexion, abduction, and extension in order to improve hip stability.  STATUS:  Met  STG 2a: 4 weeks:  The patient will demonstrate 4+ /5 strength for bilateral hip flexion, abduction, and extension.  STATUS:  Met   TREATMENT: Therapeutic exercises, manual therapy, aquatic therapy, home exercise instruction,  and modalities as needed for pain to include:  electrical stimulation, moist heat, ice, ultrasound, and   diathermy.    3. Mobility: Walking/Moving Around Functional Limitation    LTG 3a: 12 weeks:  The patient will demonstrate by achieving a score of 15/45 on the LAURO.  STATUS:  Met  STG 3 a: 4 weeks:  The patient will demonstrate by achieving a score of 19/45 on the LAURO.    STATUS:  Met  TREATMENT:   Manual therapy, therapeutic exercise    Plan  Planned modality interventions: dry needling (NO TRACTION - fracture at L4 per lumbar x-rays)  Planned therapy interventions: strengthening, stretching and home exercise program  Frequency: 3x month  Treatment plan discussed with: patient  Plan details: Discharge, continue with HEP        Visit Diagnoses:    ICD-10-CM ICD-9-CM   1. Midline low back pain, unspecified chronicity, unspecified whether sciatica present  M54.5 724.2   2. Chronic midline low back pain, unspecified whether sciatica present  M54.5 724.2    G89.29 338.29       Progress toward previous goals: All Met        Recommendations: Discharge      PT Signature: Nikky Sandoval, PT, DPT  Physical Therapist      Based upon review of the patient's progress and continued therapy plan, it is my medical opinion that Kang Grey should continue physical therapy treatment at [facility]  [address] [place] [id number] [phone].    Signature: __________________________________  Hien Parham MD    Timed:  Manual Therapy:         mins  37242;  Therapeutic Exercise:    11     mins  39833;     Neuromuscular Citlaly:        mins  68146;    Therapeutic Activity:          mins  96697;     Gait Training:           mins  65438;     Ultrasound:          mins  77395;        Untimed:  Electrical Stimulation:         mins  66680 ( );  Mechanical Traction:         mins  39885;   Dry Needlin    mins self pay    _______________________________________    Timed Treatment:   11   mins   Total Treatment:     26   mins    Discharge Summary  Discharge Summary from Physical Therapy Report    Patient Information  Kang Grey  1980    Number of Visits: 15     Goals: All Met    Visit Diagnoses:    ICD-10-CM ICD-9-CM   1. Midline low back pain, unspecified chronicity, unspecified whether sciatica present  M54.5 724.2   2. Chronic  midline low back pain, unspecified whether sciatica present  M54.5 724.2    G89.29 338.29       Discharge Plan: Continue with current home exercise program as instructed    Date of Discharge: 9/16/2021      Nikky Sandoval, PT, DPT  Physical Therapist

## 2021-09-20 ENCOUNTER — OFFICE VISIT (OUTPATIENT)
Dept: PODIATRY | Facility: CLINIC | Age: 41
End: 2021-09-20

## 2021-09-20 VITALS
DIASTOLIC BLOOD PRESSURE: 89 MMHG | HEIGHT: 68 IN | WEIGHT: 191 LBS | TEMPERATURE: 97.6 F | BODY MASS INDEX: 28.95 KG/M2 | SYSTOLIC BLOOD PRESSURE: 125 MMHG | HEART RATE: 109 BPM | OXYGEN SATURATION: 95 %

## 2021-09-20 DIAGNOSIS — B35.1 ONYCHOMYCOSIS: ICD-10-CM

## 2021-09-20 DIAGNOSIS — R26.2 DIFFICULTY WALKING: ICD-10-CM

## 2021-09-20 DIAGNOSIS — G62.9 NEUROPATHY: ICD-10-CM

## 2021-09-20 DIAGNOSIS — L60.0 ONYCHOCRYPTOSIS: ICD-10-CM

## 2021-09-20 DIAGNOSIS — M21.372 FOOT DROP, BILATERAL: ICD-10-CM

## 2021-09-20 DIAGNOSIS — M21.371 FOOT DROP, BILATERAL: ICD-10-CM

## 2021-09-20 DIAGNOSIS — M79.671 FOOT PAIN, BILATERAL: Primary | ICD-10-CM

## 2021-09-20 DIAGNOSIS — M79.672 FOOT PAIN, BILATERAL: Primary | ICD-10-CM

## 2021-09-20 PROCEDURE — 11721 DEBRIDE NAIL 6 OR MORE: CPT | Performed by: PODIATRIST

## 2021-09-20 NOTE — PROGRESS NOTES
[facility] - PODIATRY    Today's Date: 09/20/21    Patient Name: Kang Grey  MRN: [id number]  CSN: [id number]  PCP: Hien Parham MD  Referring Provider: No ref. provider found    SUBJECTIVE     Chief Complaint   Patient presents with   • Left Foot - Nail Problem   • Right Foot - Nail Problem     HPI: Kang Grey, a 41 y.o.male, comes to clinic.    New, Established, New Problem:  established   Location:  Toenails  Duration:   Greater than five years  Onset:  Gradual  Nature:  sore with palpation.  Stable, worsening, improving:   Stable  Aggravating factors:  Pain with shoe gear and ambulation.  Previous Treatment:  debridement    Patient relates no medical changes since their last visit.    No other pedal complaints at this time.    Patient denies any fevers, chills, nausea, vomiting, shortness of breathe, nor any other constitutional signs nor symptoms.         Past Medical History:   Diagnosis Date   • Acute upper respiratory infection    • Age-related osteoporosis without current pathological fracture    • Cerebral palsy (CMS/HCC)     diagnosed as infant   • Constipation    • Disease of pancreas    • Hydrocephalus in diseases classified elsewhere (CMS/HCC)    • Injury of back 02/2020   • Low back pain    • Major depressive disorder     single episode, moderate   • Other abnormal glucose    • Other cerebral palsy (CMS/HCC)    • Other fatigue    • Other generalized epilepsy and epileptic syndromes, not intractable, without status epilepticus (CMS/HCC)    • Other hypertrophic disorders of the skin    • Pain in left ankle and joints of left foot    • Pain in right toe(s)    • Pain in thoracic spine    • Paranoid schizophrenia (CMS/HCC)    • Pelvic and perineal pain    • Primary insomnia    • Pure hypercholesterolemia    • Repeated falls    • Schizoaffective disorder, unspecified (CMS/HCC)    • Seizures (CMS/HCC)     last seizure was 5 years ago; medication controlled   • Somnolence    •  Vitamin D deficiency    • Wedge compression fracture of fourth thoracic vertebra, subsequent encounter for fracture with routine healing      Past Surgical History:   Procedure Laterality Date   • HAMSTRING REPAIR     • OTHER SURGICAL HISTORY      hyposadius repair     Family History   Problem Relation Age of Onset   • Hypothyroidism Mother    • No Known Problems Father    • Ulcers Brother      Social History     Socioeconomic History   • Marital status: Single     Spouse name: Not on file   • Number of children: Not on file   • Years of education: Not on file   • Highest education level: Not on file   Tobacco Use   • Smoking status: Never Smoker   • Smokeless tobacco: Never Used   Vaping Use   • Vaping Use: Never used   Substance and Sexual Activity   • Alcohol use: Never   • Drug use: Never   • Sexual activity: Defer     Allergies   Allergen Reactions   • Valproic Acid Unknown - Low Severity and Irritability     DIZZY       Current Outpatient Medications   Medication Sig Dispense Refill   • ascorbic acid (VITAMIN C) 1000 MG tablet Take 1 tablet by mouth Daily.     • busPIRone (BUSPAR) 10 MG tablet Take 1 tablet by mouth 3 (Three) Times a Day As Needed.     • Calcium Carbonate 1500 (600 Ca) MG tablet Take 1 tablet by mouth 2 (two) times a day.     • cholecalciferol (VITAMIN D3) 25 MCG (1000 UT) tablet Take 2 tablets by mouth Daily.     • Diclofenac Sodium (VOLTAREN) 1 % gel gel Apply 1 g topically to the appropriate area as directed As Needed.     • folic acid (FOLVITE) 800 MCG tablet Take 1 tablet by mouth Daily.     • HM TRUEplus Fiber 2 g chewable tablet Chew 1 tablet Daily.     • ibuprofen (ADVIL,MOTRIN) 800 MG tablet Take 1 tablet by mouth 3 (Three) Times a Day.     • Invega Sustenna 234 MG/1.5ML suspension prefilled syringe IM injection Inject 1.5 mL into the appropriate muscle as directed by prescriber Every 30 (Thirty) Days.     • Keppra 500 MG tablet Take 3 tablets by mouth Every 12 (Twelve) Hours As  Needed.     • lamoTRIgine (LaMICtal) 200 MG tablet Take 1 tablet by mouth Daily.     • Melatonin 10 MG tablet Take 1 tablet by mouth every night at bedtime.     • pravastatin (PRAVACHOL) 20 MG tablet Take 1 tablet by mouth every night at bedtime.     • tiZANidine (ZANAFLEX) 2 MG tablet Take 2 tablets by mouth Daily.     • venlafaxine XR (EFFEXOR-XR) 150 MG 24 hr capsule Take 1 capsule by mouth Daily.       No current facility-administered medications for this visit.     Review of Systems   Constitutional: Negative.    Musculoskeletal:        Requires bilateral Go braces for ambulation.   Skin:        Painful toenails bilaterally   All other systems reviewed and are negative.      OBJECTIVE     Vitals:    09/20/21 1012   BP: 125/89   Pulse: 109   Temp: 97.6 °F (36.4 °C)   SpO2: 95%       Patient seen in no apparent distress.      PHYSICAL EXAM:     Foot/Ankle Exam:       General:   Orientation: unable to assess    Affect: inappropriate    Gait: antalgic    Shoe Gear:  Casual shoes (With bilateral Go braces.  Braces are well worn.)    VASCULAR      Right Foot Vascularity   Normal vascular exam    Dorsalis pedis:  2+  Posterior tibial:  2+  Skin Temperature: warm    Edema Grading:  None  CFT:  < 3 seconds  Pedal Hair Growth:  Present  Varicosities: none       Left Foot Vascularity   Normal vascular exam    Dorsalis pedis:  2+  Posterior tibial:  2+  Skin Temperature: warm    Edema Grading:  None  CFT:  < 3 seconds  Pedal Hair Growth:  Present  Varicosities: none        NEUROLOGIC     Right Foot Neurologic   Light touch sensation:  Diminished  Vibratory sensation:  Diminished  Hot/Cold sensation: diminished       Left Foot Neurologic   Light touch sensation:  Diminished  Hot/cold sensation: diminished       MUSCULOSKELETAL      Right Foot Musculoskeletal   Hammertoe:  First toe     Left Foot Musculoskeletal   Hammertoe:  First toe     MUSCLE STRENGTH     Right Foot Muscle Strength   Foot dorsiflexion:  1  Foot  plantar flexion:  4-  Foot inversion:  2  Foot eversion:  2     Left Foot Muscle Strength   Foot dorsiflexion:  1  Foot plantar flexion:  4-  Foot inversion:  2  Foot eversion:  2     DERMATOLOGIC     Right Foot Dermatologic   Skin: skin intact    Nails: onychomycosis, abnormally thick, subungual debris, dystrophic nails and ingrown toenail    Nails comment:  Toenails 1 through 5     Left Foot Dermatologic   Skin: skin intact    Nails: onychomycosis, abnormally thick, subungual debris, dystrophic nails and ingrown toenail    Nails comment:  Toenails 1 through 5      ASSESSMENT/PLAN     Diagnoses and all orders for this visit:    1. Foot pain, bilateral (Primary)    2. Onychomycosis    3. Onychocryptosis    4. Foot drop, bilateral    5. Neuropathy    6. Difficulty walking        Comprehensive lower extremity examination and evaluation was performed.    Discussed findings and treatment plan including risks, benefits, and treatment options with patient in detail. Patient agreed with treatment plan.    Toenails 1 through 5 bilaterally were debrided in thickness and length and then smoothed with a Dremel Tool.  Tolerated the procedure well without complications.    A prescription was written for new braces bilaterally.    An After Visit Summary was printed and given to the patient at discharge, including (if requested) any available informative/educational handouts regarding diagnosis, treatment, or medications. All questions were answered to patient/family satisfaction. Should symptoms fail to improve or worsen they agree to call or return to clinic or to go to the Emergency Department. Discussed the importance of following up with any needed screening tests/labs/specialist appointments and any requested follow-up recommended by me today. Importance of maintaining follow-up discussed and patient accepts that missed appointments can delay diagnosis and potentially lead to worsening of conditions.    Return in about 9  weeks (around 11/22/2021) for Toenail Care., or sooner if acute issues arise.    This document has been electronically signed by Roni Osullivan DPM on September 20, 2021 10:33 EDT

## 2021-10-21 ENCOUNTER — TRANSCRIBE ORDERS (OUTPATIENT)
Dept: ADMINISTRATIVE | Facility: HOSPITAL | Age: 41
End: 2021-10-21

## 2021-10-21 ENCOUNTER — LAB (OUTPATIENT)
Dept: LAB | Facility: HOSPITAL | Age: 41
End: 2021-10-21

## 2021-10-21 DIAGNOSIS — Z79.899 ENCOUNTER FOR LONG-TERM (CURRENT) USE OF OTHER MEDICATIONS: ICD-10-CM

## 2021-10-21 DIAGNOSIS — Z79.899 ENCOUNTER FOR LONG-TERM (CURRENT) USE OF OTHER MEDICATIONS: Primary | ICD-10-CM

## 2021-10-21 LAB
ALBUMIN SERPL-MCNC: 4.3 G/DL (ref 3.5–5.2)
ALBUMIN/GLOB SERPL: 1.7 G/DL
ALP SERPL-CCNC: 66 U/L (ref 39–117)
ALT SERPL W P-5'-P-CCNC: 20 U/L (ref 1–41)
ANION GAP SERPL CALCULATED.3IONS-SCNC: 10 MMOL/L (ref 5–15)
AST SERPL-CCNC: 15 U/L (ref 1–40)
BASOPHILS # BLD AUTO: 0.02 10*3/MM3 (ref 0–0.2)
BASOPHILS NFR BLD AUTO: 0.5 % (ref 0–1.5)
BILIRUB SERPL-MCNC: 0.2 MG/DL (ref 0–1.2)
BUN SERPL-MCNC: 15 MG/DL (ref 6–20)
BUN/CREAT SERPL: 13.9 (ref 7–25)
CALCIUM SPEC-SCNC: 9.3 MG/DL (ref 8.6–10.5)
CHLORIDE SERPL-SCNC: 104 MMOL/L (ref 98–107)
CHOLEST SERPL-MCNC: 142 MG/DL (ref 0–200)
CO2 SERPL-SCNC: 28 MMOL/L (ref 22–29)
CREAT SERPL-MCNC: 1.08 MG/DL (ref 0.76–1.27)
DEPRECATED RDW RBC AUTO: 39.7 FL (ref 37–54)
EOSINOPHIL # BLD AUTO: 0.14 10*3/MM3 (ref 0–0.4)
EOSINOPHIL NFR BLD AUTO: 3.2 % (ref 0.3–6.2)
ERYTHROCYTE [DISTWIDTH] IN BLOOD BY AUTOMATED COUNT: 12.6 % (ref 12.3–15.4)
GFR SERPL CREATININE-BSD FRML MDRD: 75 ML/MIN/1.73
GLOBULIN UR ELPH-MCNC: 2.5 GM/DL
GLUCOSE SERPL-MCNC: 112 MG/DL (ref 65–99)
HBA1C MFR BLD: 5.2 % (ref 4.8–5.6)
HCT VFR BLD AUTO: 40.3 % (ref 37.5–51)
HDLC SERPL-MCNC: 36 MG/DL (ref 40–60)
HGB BLD-MCNC: 14.1 G/DL (ref 13–17.7)
IMM GRANULOCYTES # BLD AUTO: 0.01 10*3/MM3 (ref 0–0.05)
IMM GRANULOCYTES NFR BLD AUTO: 0.2 % (ref 0–0.5)
LDLC SERPL CALC-MCNC: 94 MG/DL (ref 0–100)
LDLC/HDLC SERPL: 2.61 {RATIO}
LYMPHOCYTES # BLD AUTO: 1.3 10*3/MM3 (ref 0.7–3.1)
LYMPHOCYTES NFR BLD AUTO: 30 % (ref 19.6–45.3)
MCH RBC QN AUTO: 29.9 PG (ref 26.6–33)
MCHC RBC AUTO-ENTMCNC: 35 G/DL (ref 31.5–35.7)
MCV RBC AUTO: 85.6 FL (ref 79–97)
MONOCYTES # BLD AUTO: 0.27 10*3/MM3 (ref 0.1–0.9)
MONOCYTES NFR BLD AUTO: 6.2 % (ref 5–12)
NEUTROPHILS NFR BLD AUTO: 2.6 10*3/MM3 (ref 1.7–7)
NEUTROPHILS NFR BLD AUTO: 59.9 % (ref 42.7–76)
PLATELET # BLD AUTO: 183 10*3/MM3 (ref 140–450)
PMV BLD AUTO: 10.7 FL (ref 6–12)
POTASSIUM SERPL-SCNC: 3.9 MMOL/L (ref 3.5–5.2)
PROT SERPL-MCNC: 6.8 G/DL (ref 6–8.5)
RBC # BLD AUTO: 4.71 10*6/MM3 (ref 4.14–5.8)
SODIUM SERPL-SCNC: 142 MMOL/L (ref 136–145)
TRIGL SERPL-MCNC: 60 MG/DL (ref 0–150)
VLDLC SERPL-MCNC: 12 MG/DL (ref 5–40)
WBC # BLD AUTO: 4.34 10*3/MM3 (ref 3.4–10.8)

## 2021-10-21 PROCEDURE — 80053 COMPREHEN METABOLIC PANEL: CPT

## 2021-10-21 PROCEDURE — 83036 HEMOGLOBIN GLYCOSYLATED A1C: CPT

## 2021-10-21 PROCEDURE — 85025 COMPLETE CBC W/AUTO DIFF WBC: CPT

## 2021-10-21 PROCEDURE — 36415 COLL VENOUS BLD VENIPUNCTURE: CPT

## 2021-10-21 PROCEDURE — 80061 LIPID PANEL: CPT

## 2021-10-27 RX ORDER — IBUPROFEN 800 MG/1
TABLET ORAL
Qty: 60 TABLET | Refills: 2 | Status: SHIPPED | OUTPATIENT
Start: 2021-10-27 | End: 2022-03-29 | Stop reason: SDUPTHER

## 2021-11-17 ENCOUNTER — OFFICE VISIT (OUTPATIENT)
Dept: SLEEP MEDICINE | Facility: HOSPITAL | Age: 41
End: 2021-11-17

## 2021-11-17 VITALS
OXYGEN SATURATION: 96 % | BODY MASS INDEX: 29.7 KG/M2 | HEART RATE: 101 BPM | TEMPERATURE: 98.2 F | WEIGHT: 196 LBS | SYSTOLIC BLOOD PRESSURE: 124 MMHG | HEIGHT: 68 IN | DIASTOLIC BLOOD PRESSURE: 76 MMHG

## 2021-11-17 DIAGNOSIS — G80.9 CEREBRAL PALSY, UNSPECIFIED TYPE (HCC): ICD-10-CM

## 2021-11-17 DIAGNOSIS — G47.31 COMPLEX SLEEP APNEA SYNDROME: Primary | ICD-10-CM

## 2021-11-17 DIAGNOSIS — G40.209 COMPLEX PARTIAL SEIZURE EVOLVING TO GENERALIZED SEIZURE (HCC): ICD-10-CM

## 2021-11-17 PROBLEM — G47.39 COMPLEX SLEEP APNEA SYNDROME: Status: ACTIVE | Noted: 2021-11-17

## 2021-11-17 PROCEDURE — 99213 OFFICE O/P EST LOW 20 MIN: CPT | Performed by: INTERNAL MEDICINE

## 2021-11-17 PROCEDURE — G0463 HOSPITAL OUTPT CLINIC VISIT: HCPCS | Performed by: INTERNAL MEDICINE

## 2021-11-17 NOTE — PROGRESS NOTES
"  [address] [address]  Phone: [phone]  Fax: [phone]      SLEEP CLINIC FOLLOW UP PROGRESS NOTE.    Kang Grey  1980  41 y.o.  male      PCP: Hien Parham MD      Date of visit: 11/17/2021    Chief Complaint   Patient presents with   • Sleep Apnea   Complex sleep apnea  Hydrocephalus  History of seizures    HPI:  This is a 41 y.o. years old patient who has a history of complex leep apnea is here for  the annual compliance follow-up. Patient is using positive airway pressure therapy with auto ASV and the symptoms of snoring, non-restorative sleep and daytime excessive sleepiness have improved significantly on the therapy. Normally goes to bed at 9 PM and wakes up at 9 AM.  The patient wakes up 1 time(s) during the night and has no problem going back to sleep.  He is accompanied by his mother who is the caregiver.  Medications and allergies are reviewed by me and documented in the encounter.     SOCIAL ( habits pertaining to sleep medicine)  History tobacco use:No   History of alcohol use: 0 per week  Caffeine use: 2     REVIEW OF SYSTEMS:   [place] Sleepiness Scale :Total score: 13   Nasal congestion:Yes   Dry mouth/nose:No   Post nasal drip; No   Acid reflux/Heartburn:No   Abd bloating:No   Morning headache:No   Anxiety:Yes   Depression:Yes    PHYSICAL EXAMINATION:  CONSTITUTIONAL:  Vitals:    11/17/21 0900   BP: 124/76   Pulse: 101   Temp: 98.2 °F (36.8 °C)   SpO2: 96%   Weight: 88.9 kg (196 lb)   Height: 172.7 cm (68\")    Body mass index is 29.8 kg/m².   NOSE: nasal passages are clear, no nasal polyps, septum in the midline.  THROAT: throat is clear, oral airway Mallampati class 3  RESP SYSTEM: Breath sounds are normal, no wheezes or crackles  CARDIOVASULAR: Heart rate is regular without murmur. No edema      Data reviewed:  The Smart card downloaded on 11/17/2021 has been reviewed independently by me for compliance and discussed " the data with the patient.   Compliance; 75%  More than 4 hr use, 70%  Average use of the device 7 hours and 26 per night  Residual AHI: 3.8 /hr (goal < 5.0 /hr)  Mask type: Full facemask  DME: [employer]      ASSESSMENT AND PLAN:  · Complex sleep apnea ( G 47.33).  The symptoms of sleep apnea have improved with the device and the treatment.  Patient's compliance with the device is excellent for treatment of sleep apnea.  I have independently reviewed the smart card down load and discussed with the patient the download data and encouarged the patient to continue to use the device.The residual AHI is acceptable. The device is benefiting the patient and the device is medically necessary.  Without proper control of sleep apnea and good compliance there is a increased risk for hypertension, diabetes mellitus and nonrestorative sleep with hypersomnia which can increase risk for motor vehicle accidents.  Untreated sleep apnea is also a risk factor for development of atrial fibrillation, pulmonary hypertension and stroke. The patient is also instructed to get the supplies from the DME company and and change them on a regular basis.  A prescription for supplies has been sent to the DME company.  I have also discussed the good sleep hygiene habits and adequate amount of sleep needed for good health.  Mother has registered the unit for the recall replacement  · History of hydrocephalus  · History of seizures  · Return in about 1 year (around 11/17/2022) for Annual visit with smartcard download. . Patient's questions were answered.        Jimbo Paz MD  Sleep Medicine.  Medical Director, [facility]  11/17/2021 ,

## 2021-12-07 ENCOUNTER — OFFICE VISIT (OUTPATIENT)
Dept: FAMILY MEDICINE CLINIC | Age: 41
End: 2021-12-07

## 2021-12-07 ENCOUNTER — LAB (OUTPATIENT)
Dept: LAB | Facility: HOSPITAL | Age: 41
End: 2021-12-07

## 2021-12-07 VITALS
SYSTOLIC BLOOD PRESSURE: 113 MMHG | BODY MASS INDEX: 29.1 KG/M2 | DIASTOLIC BLOOD PRESSURE: 85 MMHG | OXYGEN SATURATION: 94 % | HEART RATE: 116 BPM | HEIGHT: 68 IN | WEIGHT: 192 LBS

## 2021-12-07 DIAGNOSIS — E55.9 VITAMIN D DEFICIENCY: ICD-10-CM

## 2021-12-07 DIAGNOSIS — R53.83 FATIGUE, UNSPECIFIED TYPE: ICD-10-CM

## 2021-12-07 DIAGNOSIS — G80.9 CEREBRAL PALSY, UNSPECIFIED TYPE (HCC): Primary | ICD-10-CM

## 2021-12-07 DIAGNOSIS — G47.31 COMPLEX SLEEP APNEA SYNDROME: ICD-10-CM

## 2021-12-07 DIAGNOSIS — R56.9 SEIZURES (HCC): ICD-10-CM

## 2021-12-07 DIAGNOSIS — Z23 ENCOUNTER FOR IMMUNIZATION: ICD-10-CM

## 2021-12-07 DIAGNOSIS — F20.0 PARANOID SCHIZOPHRENIA (HCC): ICD-10-CM

## 2021-12-07 DIAGNOSIS — E78.2 MIXED HYPERLIPIDEMIA: ICD-10-CM

## 2021-12-07 DIAGNOSIS — F32.1 CURRENT MODERATE EPISODE OF MAJOR DEPRESSIVE DISORDER WITHOUT PRIOR EPISODE (HCC): ICD-10-CM

## 2021-12-07 DIAGNOSIS — F51.01 PRIMARY INSOMNIA: ICD-10-CM

## 2021-12-07 PROCEDURE — 99214 OFFICE O/P EST MOD 30 MIN: CPT | Performed by: FAMILY MEDICINE

## 2021-12-07 PROCEDURE — 84443 ASSAY THYROID STIM HORMONE: CPT

## 2021-12-07 PROCEDURE — 36415 COLL VENOUS BLD VENIPUNCTURE: CPT

## 2021-12-07 PROCEDURE — G0008 ADMIN INFLUENZA VIRUS VAC: HCPCS | Performed by: FAMILY MEDICINE

## 2021-12-07 PROCEDURE — 82607 VITAMIN B-12: CPT

## 2021-12-07 PROCEDURE — 90686 IIV4 VACC NO PRSV 0.5 ML IM: CPT | Performed by: FAMILY MEDICINE

## 2021-12-07 PROCEDURE — 82306 VITAMIN D 25 HYDROXY: CPT

## 2021-12-07 PROCEDURE — 84439 ASSAY OF FREE THYROXINE: CPT

## 2021-12-07 PROCEDURE — 80053 COMPREHEN METABOLIC PANEL: CPT

## 2021-12-07 RX ORDER — MELATONIN
2000 DAILY
Qty: 90 TABLET | Refills: 1 | Status: SHIPPED | OUTPATIENT
Start: 2021-12-07 | End: 2022-04-19

## 2021-12-07 RX ORDER — PHENOL 1.4 %
1 AEROSOL, SPRAY (ML) MUCOUS MEMBRANE
Qty: 90 TABLET | Refills: 1 | Status: SHIPPED | OUTPATIENT
Start: 2021-12-07 | End: 2023-04-04 | Stop reason: SDUPTHER

## 2021-12-07 RX ORDER — PHENOL 1.4 %
AEROSOL, SPRAY (ML) MUCOUS MEMBRANE
Qty: 30 TABLET | Refills: 1 | Status: SHIPPED | OUTPATIENT
Start: 2021-12-07 | End: 2021-12-07 | Stop reason: SDUPTHER

## 2021-12-07 NOTE — PROGRESS NOTES
Kang Grey presents to [facility].    Chief Complaint: med refills    Subjective       History of Present Illness:  HPI         He has paranoid schizophrenia and is managed by psychiatry for this, he missed his shot on Nov 17 and they are not getting him in for the Invega shot until next week, so he is more depressed and tired.  He is on lamictal, keppra, effexor and buspar as well as folic acid.        He has sleep apnea and is on cpap and he is still not very compliant with using the cpap, he states he forgets to use it. He is also on melatonin.            Concerning pure hypercholesterolemia, unspecified, current treatment includes a lipid lowering agent.  Compliance with treatment has been good.  He denies experiencing any hypercholesterolemia related symptoms.     Patient presents with pure hypercholesterolemia, unspecified.  Current treatment includes a lipid lowering agent pravastatin.  Compliance with treatment has been good.  He denies experiencing any hypercholesterolemia related symptoms.            chronic ongoing constipation and he states this is better, he is on fiber and not eating his prunes like he should    Review of Systems:  Review of Systems   Constitutional: Positive for fatigue. Negative for chills and fever.   HENT: Negative for congestion, ear discharge and sore throat.    Respiratory: Negative for shortness of breath.    Cardiovascular: Negative for chest pain.   Gastrointestinal: Negative for abdominal pain, constipation, diarrhea, nausea, vomiting and GERD.   Genitourinary: Negative for flank pain.   Neurological: Negative for dizziness and headache.   Psychiatric/Behavioral: Positive for sleep disturbance. Negative for suicidal ideas.        Objective   Medical History:  Past Medical History:   • Acute upper respiratory infection   • Age-related osteoporosis without current pathological fracture   • Cerebral palsy (HCC)    diagnosed as infant   •  Constipation   • Disease of pancreas   • Hydrocephalus in diseases classified elsewhere ([facility])   • Injury of back   • Low back pain   • Major depressive disorder    single episode, moderate   • Other abnormal glucose   • Other cerebral palsy ([facility])   • Other fatigue   • Other generalized epilepsy and epileptic syndromes, not intractable, without status epilepticus ([facility])   • Other hypertrophic disorders of the skin   • Pain in left ankle and joints of left foot   • Pain in right toe(s)   • Pain in thoracic spine   • Paranoid schizophrenia ([facility])   • Pelvic and perineal pain   • Primary insomnia   • Pure hypercholesterolemia   • Repeated falls   • Schizoaffective disorder, unspecified ([facility])   • Seizures ([facility])    last seizure was 5 years ago; medication controlled   • Somnolence   • Vitamin D deficiency   • Wedge compression fracture of fourth thoracic vertebra, subsequent encounter for fracture with routine healing     Past Surgical History:   • HAMSTRING REPAIR   • OTHER SURGICAL HISTORY    hyposadius repair      Family History   Problem Relation Age of Onset   • Hypothyroidism Mother    • No Known Problems Father    • Ulcers Brother      Social History     Tobacco Use   • Smoking status: Never Smoker   • Smokeless tobacco: Never Used   Substance Use Topics   • Alcohol use: Never       Health Maintenance Due   Topic Date Due   • HEPATITIS C SCREENING  Never done   • ANNUAL WELLNESS VISIT  05/27/2021   • INFLUENZA VACCINE  08/01/2021        Immunization History   Administered Date(s) Administered   • COVID-19 (MODERNA) 1st, 2nd, 3rd Dose Only 03/25/2021, 04/22/2021   • Influenza Quad Vaccine (Inpatient) 10/01/2013   • Influenza, Unspecified 10/07/2016   • Tdap 05/05/2016       Allergies   Allergen Reactions   • Valproic Acid Unknown - Low Severity and Irritability     DIZZY    Other reaction(s): Irritability, Unknown - Low Severity  DIZZY        Medications:  Current Outpatient Medications on File Prior to Visit  "  Medication Sig   • ascorbic acid (VITAMIN C) 1000 MG tablet Take 1 tablet by mouth Daily.   • busPIRone (BUSPAR) 10 MG tablet Take 1 tablet by mouth 3 (Three) Times a Day As Needed.   • Calcium Carbonate 1500 (600 Ca) MG tablet Take 1 tablet by mouth 2 (two) times a day.   • Diclofenac Sodium (VOLTAREN) 1 % gel gel Apply 1 g topically to the appropriate area as directed As Needed.   • folic acid (FOLVITE) 800 MCG tablet Take 1 tablet by mouth Daily.   • HM TRUEplus Fiber 2 g chewable tablet Chew 1 tablet Daily.   • ibuprofen (ADVIL,MOTRIN) 800 MG tablet TAKE ONE TABLET BY MOUTH 3 TIMES DAILY WITH FOOD   • Invega Sustenna 234 MG/1.5ML suspension prefilled syringe IM injection Inject 1.5 mL into the appropriate muscle as directed by prescriber Every 30 (Thirty) Days.   • Keppra 500 MG tablet Take 3 tablets by mouth Every 12 (Twelve) Hours As Needed.   • lamoTRIgine (LaMICtal) 200 MG tablet Take 1 tablet by mouth Daily.   • pravastatin (PRAVACHOL) 20 MG tablet Take 1 tablet by mouth every night at bedtime.   • tiZANidine (ZANAFLEX) 2 MG tablet Take 2 tablets by mouth Daily.   • [DISCONTINUED] cholecalciferol (VITAMIN D3) 25 MCG (1000 UT) tablet Take 2 tablets by mouth Daily.   • [DISCONTINUED] Melatonin 10 MG tablet TAKE ONE TABLET BY MOUTH AT BEDTIME   • venlafaxine XR (EFFEXOR-XR) 150 MG 24 hr capsule Take 1 capsule by mouth Daily.   • [DISCONTINUED] Melatonin 10 MG tablet Take 1 tablet by mouth every night at bedtime.     No current facility-administered medications on file prior to visit.       Vital Signs:   /85   Pulse 116   Ht 172.7 cm (67.99\")   Wt 87.1 kg (192 lb)   SpO2 94%   BMI 29.20 kg/m²       Physical Exam:  Physical Exam  Vitals and nursing note reviewed.   Constitutional:       General: He is not in acute distress.     Appearance: Normal appearance. He is not ill-appearing, toxic-appearing or diaphoretic.   HENT:      Head: Normocephalic and atraumatic.      Right Ear: Tympanic membrane, " ear canal and external ear normal.      Left Ear: Tympanic membrane, ear canal and external ear normal.      Nose: No congestion or rhinorrhea.      Mouth/Throat:      Pharynx: Oropharynx is clear. No oropharyngeal exudate or posterior oropharyngeal erythema.   Eyes:      Extraocular Movements: Extraocular movements intact.      Conjunctiva/sclera: Conjunctivae normal.   Cardiovascular:      Rate and Rhythm: Normal rate and regular rhythm.      Heart sounds: Normal heart sounds.   Pulmonary:      Breath sounds: Normal breath sounds. No wheezing, rhonchi or rales.   Abdominal:      General: Abdomen is flat.      Palpations: Abdomen is soft.   Musculoskeletal:      Cervical back: Neck supple. No rigidity.   Lymphadenopathy:      Cervical: No cervical adenopathy.   Skin:     General: Skin is warm and dry.   Neurological:      Mental Status: He is alert and oriented to person, place, and time.   Psychiatric:         Mood and Affect: Mood normal.         Behavior: Behavior normal.         Result Review      The following data was reviewed by Hien Parham MD on 12/07/2021.  Lab Results   Component Value Date    WBC 4.34 10/21/2021    HGB 14.1 10/21/2021    HCT 40.3 10/21/2021    MCV 85.6 10/21/2021     10/21/2021     Lab Results   Component Value Date    GLUCOSE 112 (H) 10/21/2021    BUN 15 10/21/2021    CREATININE 1.08 10/21/2021    EGFRIFNONA 75 10/21/2021    BCR 13.9 10/21/2021    K 3.9 10/21/2021    CO2 28.0 10/21/2021    CALCIUM 9.3 10/21/2021    ALBUMIN 4.30 10/21/2021    LABIL2 1.4 05/06/2021    AST 15 10/21/2021    ALT 20 10/21/2021     Lab Results   Component Value Date    CHOL 142 10/21/2021    CHLPL 174 05/06/2021    TRIG 60 10/21/2021    HDL 36 (L) 10/21/2021    LDL 94 10/21/2021     Lab Results   Component Value Date    TSH 1.990 05/06/2021     Lab Results   Component Value Date    HGBA1C 5.20 10/21/2021     No results found for: PSA                    Assessment and Plan:          Diagnoses and  all orders for this visit:    1. Cerebral palsy, unspecified type (HCC) (Primary)  Comments:  No acute issues    2. Complex sleep apnea syndrome  Comments:  Counseled on the need for him to wear his CPAP machine every single night    3. Seizures (HCC)  Comments:  Stable on Lamictal and Keppra.    4. Mixed hyperlipidemia  Comments:  Needs to be on a low-cholesterol diet    5. Primary insomnia  Comments:  Continue melatonin nightly and he is to start wearing a CPAP routinely  Orders:  -     Melatonin 10 MG tablet; Take 1 tablet by mouth every night at bedtime.  Dispense: 90 tablet; Refill: 1    6. Vitamin D deficiency  -     cholecalciferol (VITAMIN D3) 25 MCG (1000 UT) tablet; Take 2 tablets by mouth Daily.  Dispense: 90 tablet; Refill: 1  -     Vitamin D 25 Hydroxy; Future    7. Paranoid schizophrenia (HCC)  Comments:  He is to follow-up with psychiatry.  He is overall stable on current medications    8. Current moderate episode of major depressive disorder without prior episode (HCC)  Comments:  Worse but he has not gotten his shot this past month.  He is pending seeing psychiatry and getting his medicine and shots updated next week.  No suicidal ideati    9. Fatigue, unspecified type  Comments:  CBC was normal last month.  Will check vitamin D thyroid and B12 level on him today  Orders:  -     Comprehensive metabolic panel; Future  -     Vitamin B12; Future  -     Vitamin D 1,25 dihydroxy; Future  -     TSH+Free T4; Future    10. Encounter for immunization  -     FluLaval/Fluarix/Fluzone >6 Months          Follow Up   No follow-ups on file.

## 2021-12-08 LAB
25(OH)D3 SERPL-MCNC: 44.1 NG/ML
ALBUMIN SERPL-MCNC: 4.6 G/DL (ref 3.5–5.2)
ALBUMIN/GLOB SERPL: 1.6 G/DL
ALP SERPL-CCNC: 75 U/L (ref 39–117)
ALT SERPL W P-5'-P-CCNC: 23 U/L (ref 1–41)
ANION GAP SERPL CALCULATED.3IONS-SCNC: 11.2 MMOL/L (ref 5–15)
AST SERPL-CCNC: 14 U/L (ref 1–40)
BILIRUB SERPL-MCNC: 0.3 MG/DL (ref 0–1.2)
BUN SERPL-MCNC: 16 MG/DL (ref 6–20)
BUN/CREAT SERPL: 15.5 (ref 7–25)
CALCIUM SPEC-SCNC: 10.3 MG/DL (ref 8.6–10.5)
CHLORIDE SERPL-SCNC: 103 MMOL/L (ref 98–107)
CO2 SERPL-SCNC: 26.8 MMOL/L (ref 22–29)
CREAT SERPL-MCNC: 1.03 MG/DL (ref 0.76–1.27)
GFR SERPL CREATININE-BSD FRML MDRD: 80 ML/MIN/1.73
GLOBULIN UR ELPH-MCNC: 2.8 GM/DL
GLUCOSE SERPL-MCNC: 88 MG/DL (ref 65–99)
POTASSIUM SERPL-SCNC: 4.1 MMOL/L (ref 3.5–5.2)
PROT SERPL-MCNC: 7.4 G/DL (ref 6–8.5)
SODIUM SERPL-SCNC: 141 MMOL/L (ref 136–145)
T4 FREE SERPL-MCNC: 1.55 NG/DL (ref 0.93–1.7)
TSH SERPL DL<=0.05 MIU/L-ACNC: 1.59 UIU/ML (ref 0.27–4.2)
VIT B12 BLD-MCNC: 786 PG/ML (ref 211–946)

## 2021-12-13 ENCOUNTER — OFFICE VISIT (OUTPATIENT)
Dept: PODIATRY | Facility: CLINIC | Age: 41
End: 2021-12-13

## 2021-12-13 VITALS
HEIGHT: 68 IN | WEIGHT: 193 LBS | BODY MASS INDEX: 29.25 KG/M2 | TEMPERATURE: 97.1 F | SYSTOLIC BLOOD PRESSURE: 154 MMHG | OXYGEN SATURATION: 97 % | DIASTOLIC BLOOD PRESSURE: 83 MMHG | HEART RATE: 85 BPM

## 2021-12-13 DIAGNOSIS — M79.671 FOOT PAIN, BILATERAL: Primary | ICD-10-CM

## 2021-12-13 DIAGNOSIS — M21.372 FOOT DROP, BILATERAL: ICD-10-CM

## 2021-12-13 DIAGNOSIS — B35.1 ONYCHOMYCOSIS: ICD-10-CM

## 2021-12-13 DIAGNOSIS — G62.9 NEUROPATHY: ICD-10-CM

## 2021-12-13 DIAGNOSIS — M79.672 FOOT PAIN, BILATERAL: Primary | ICD-10-CM

## 2021-12-13 DIAGNOSIS — M21.371 FOOT DROP, BILATERAL: ICD-10-CM

## 2021-12-13 DIAGNOSIS — R26.2 DIFFICULTY WALKING: ICD-10-CM

## 2021-12-13 DIAGNOSIS — L60.0 ONYCHOCRYPTOSIS: ICD-10-CM

## 2021-12-13 PROCEDURE — 11721 DEBRIDE NAIL 6 OR MORE: CPT | Performed by: PODIATRIST

## 2021-12-13 NOTE — PROGRESS NOTES
[facility] - PODIATRY    Today's Date: 12/13/21    Patient Name: Kang Grey  MRN: [id number]  CSN: [id number]  PCP: Hien Parham MD, Last PCP Visit: 7 December 2021  Referring Provider: No ref. provider found    SUBJECTIVE     Chief Complaint   Patient presents with   • Left Foot - Nail Problem   • Right Foot - Nail Problem     HPI: Kang Grey, a 41 y.o.male, comes to clinic.    New, Established, New Problem:  established   Location:  Toenails  Duration:   Greater than five years  Onset:  Gradual  Nature:  sore with palpation.  Stable, worsening, improving:   Stable  Aggravating factors:  Pain with shoe gear and ambulation.  Previous Treatment:  debridement    Medical changes: Flu vaccination update    No other pedal complaints at this time.    Patient denies any fevers, chills, nausea, vomiting, shortness of breathe, nor any other constitutional signs nor symptoms.         Past Medical History:   Diagnosis Date   • Acute upper respiratory infection    • Age-related osteoporosis without current pathological fracture    • Cerebral palsy ([facility])     diagnosed as infant   • Constipation    • Disease of pancreas    • Hydrocephalus in diseases classified elsewhere ([facility])    • Injury of back 02/2020   • Low back pain    • Major depressive disorder     single episode, moderate   • Other abnormal glucose    • Other cerebral palsy ([facility])    • Other fatigue    • Other generalized epilepsy and epileptic syndromes, not intractable, without status epilepticus ([facility])    • Other hypertrophic disorders of the skin    • Pain in left ankle and joints of left foot    • Pain in right toe(s)    • Pain in thoracic spine    • Paranoid schizophrenia ([facility])    • Pelvic and perineal pain    • Primary insomnia    • Pure hypercholesterolemia    • Repeated falls    • Schizoaffective disorder, unspecified ([facility])    • Seizures ([facility])     last seizure was 5 years ago; medication controlled   • Somnolence    • Vitamin D  deficiency    • Wedge compression fracture of fourth thoracic vertebra, subsequent encounter for fracture with routine healing      Past Surgical History:   Procedure Laterality Date   • HAMSTRING REPAIR     • OTHER SURGICAL HISTORY      hyposadius repair     Family History   Problem Relation Age of Onset   • Hypothyroidism Mother    • No Known Problems Father    • Ulcers Brother      Social History     Socioeconomic History   • Marital status: Single   Tobacco Use   • Smoking status: Never Smoker   • Smokeless tobacco: Never Used   Vaping Use   • Vaping Use: Never used   Substance and Sexual Activity   • Alcohol use: Never   • Drug use: Never   • Sexual activity: Defer     Allergies   Allergen Reactions   • Valproic Acid Unknown - Low Severity and Irritability     DIZZY    Other reaction(s): Irritability, Unknown - Low Severity  DIZZY     Current Outpatient Medications   Medication Sig Dispense Refill   • ascorbic acid (VITAMIN C) 1000 MG tablet Take 1 tablet by mouth Daily.     • busPIRone (BUSPAR) 10 MG tablet Take 1 tablet by mouth 3 (Three) Times a Day As Needed.     • Calcium Carbonate 1500 (600 Ca) MG tablet Take 1 tablet by mouth 2 (two) times a day.     • cholecalciferol (VITAMIN D3) 25 MCG (1000 UT) tablet Take 2 tablets by mouth Daily. 90 tablet 1   • Diclofenac Sodium (VOLTAREN) 1 % gel gel Apply 1 g topically to the appropriate area as directed As Needed.     • folic acid (FOLVITE) 800 MCG tablet Take 1 tablet by mouth Daily.     • HM TRUEplus Fiber 2 g chewable tablet Chew 1 tablet Daily.     • ibuprofen (ADVIL,MOTRIN) 800 MG tablet TAKE ONE TABLET BY MOUTH 3 TIMES DAILY WITH FOOD 60 tablet 2   • Invega Sustenna 234 MG/1.5ML suspension prefilled syringe IM injection Inject 1.5 mL into the appropriate muscle as directed by prescriber Every 30 (Thirty) Days.     • Keppra 500 MG tablet Take 3 tablets by mouth Every 12 (Twelve) Hours As Needed.     • lamoTRIgine (LaMICtal) 200 MG tablet Take 1 tablet by  mouth Daily.     • Melatonin 10 MG tablet Take 1 tablet by mouth every night at bedtime. 90 tablet 1   • pravastatin (PRAVACHOL) 20 MG tablet Take 1 tablet by mouth every night at bedtime.     • tiZANidine (ZANAFLEX) 2 MG tablet Take 2 tablets by mouth Daily.     • venlafaxine XR (EFFEXOR-XR) 150 MG 24 hr capsule Take 1 capsule by mouth Daily.       No current facility-administered medications for this visit.     Review of Systems   Constitutional: Negative.    Musculoskeletal:        Requires bilateral Go braces for ambulation.   Skin:        Painful toenails bilaterally   All other systems reviewed and are negative.      OBJECTIVE     Vitals:    12/13/21 0952   BP: 154/83   Pulse: 85   Temp: 97.1 °F (36.2 °C)   SpO2: 97%       Patient seen in no apparent distress.      PHYSICAL EXAM:     Foot/Ankle Exam:       General:   Orientation: unable to assess    Affect: inappropriate    Gait: antalgic    Shoe Gear:  Casual shoes (With bilateral Go braces.  Braces are well worn.)    VASCULAR      Right Foot Vascularity   Normal vascular exam    Dorsalis pedis:  2+  Posterior tibial:  2+  Skin Temperature: warm    Edema Grading:  None  CFT:  < 3 seconds  Pedal Hair Growth:  Present  Varicosities: none       Left Foot Vascularity   Normal vascular exam    Dorsalis pedis:  2+  Posterior tibial:  2+  Skin Temperature: warm    Edema Grading:  None  CFT:  < 3 seconds  Pedal Hair Growth:  Present  Varicosities: none        NEUROLOGIC     Right Foot Neurologic   Light touch sensation:  Diminished  Vibratory sensation:  Diminished  Hot/Cold sensation: diminished       Left Foot Neurologic   Light touch sensation:  Diminished  Hot/cold sensation: diminished       MUSCULOSKELETAL      Right Foot Musculoskeletal   Hammertoe:  First toe     Left Foot Musculoskeletal   Hamm[place]oe:  First toe     MUSCLE STRENGTH     Right Foot Muscle Strength   Foot dorsiflexion:  1  Foot plantar flexion:  4-  Foot inversion:  2  Foot eversion:   2     Left Foot Muscle Strength   Foot dorsiflexion:  1  Foot plantar flexion:  4-  Foot inversion:  2  Foot eversion:  2     DERMATOLOGIC     Right Foot Dermatologic   Skin: skin intact    Nails: onychomycosis, abnormally thick, subungual debris, dystrophic nails and ingrown toenail    Nails comment:  Toenails 1 through 5     Left Foot Dermatologic   Skin: skin intact    Nails: onychomycosis, abnormally thick, subungual debris, dystrophic nails and ingrown toenail    Nails comment:  Toenails 1 through 5      ASSESSMENT/PLAN     Diagnoses and all orders for this visit:    1. Foot pain, bilateral (Primary)    2. Onychomycosis    3. Onychocryptosis    4. Foot drop, bilateral    5. Difficulty walking    6. Neuropathy        Comprehensive lower extremity examination and evaluation was performed.    Discussed findings and treatment plan including risks, benefits, and treatment options with patient in detail. Patient agreed with treatment plan.    Toenails 1 through 5 bilaterally were debrided in thickness and length and then smoothed with a Dremel Tool.  Tolerated the procedure well without complications.    A prescription was written for new braces bilaterally.    An After Visit Summary was printed and given to the patient at discharge, including (if requested) any available informative/educational handouts regarding diagnosis, treatment, or medications. All questions were answered to patient/family satisfaction. Should symptoms fail to improve or worsen they agree to call or return to clinic or to go to the Emergency Department. Discussed the importance of following up with any needed screening tests/labs/specialist appointments and any requested follow-up recommended by me today. Importance of maintaining follow-up discussed and patient accepts that missed appointments can delay diagnosis and potentially lead to worsening of conditions.    Return in about 9 weeks (around 2/14/2022) for Toenail Care., or sooner if  acute issues arise.    This document has been electronically signed by Roni Osullivan DPM on December 13, 2021 10:03 EST

## 2021-12-15 RX ORDER — PRAVASTATIN SODIUM 20 MG
TABLET ORAL
Qty: 30 TABLET | Refills: 1 | Status: SHIPPED | OUTPATIENT
Start: 2021-12-15 | End: 2022-03-07

## 2021-12-27 ENCOUNTER — OFFICE VISIT (OUTPATIENT)
Dept: FAMILY MEDICINE CLINIC | Age: 41
End: 2021-12-27

## 2021-12-27 ENCOUNTER — HOSPITAL ENCOUNTER (OUTPATIENT)
Dept: GENERAL RADIOLOGY | Facility: HOSPITAL | Age: 41
Discharge: HOME | End: 2021-12-27

## 2021-12-27 VITALS
OXYGEN SATURATION: 95 % | WEIGHT: 184.4 LBS | DIASTOLIC BLOOD PRESSURE: 71 MMHG | TEMPERATURE: 98.8 F | BODY MASS INDEX: 28.04 KG/M2 | SYSTOLIC BLOOD PRESSURE: 123 MMHG | HEART RATE: 120 BPM

## 2021-12-27 DIAGNOSIS — G89.29 CHRONIC MIDLINE LOW BACK PAIN WITHOUT SCIATICA: ICD-10-CM

## 2021-12-27 DIAGNOSIS — M54.50 CHRONIC MIDLINE LOW BACK PAIN WITHOUT SCIATICA: ICD-10-CM

## 2021-12-27 DIAGNOSIS — R05.9 COUGH: Primary | ICD-10-CM

## 2021-12-27 DIAGNOSIS — S32.040G COMPRESSION FRACTURE OF L4 VERTEBRA WITH DELAYED HEALING, SUBSEQUENT ENCOUNTER: ICD-10-CM

## 2021-12-27 DIAGNOSIS — J02.0 STREP PHARYNGITIS: ICD-10-CM

## 2021-12-27 LAB
EXPIRATION DATE: ABNORMAL
EXPIRATION DATE: NORMAL
FLUAV AG UPPER RESP QL IA.RAPID: NOT DETECTED
FLUBV AG UPPER RESP QL IA.RAPID: NOT DETECTED
INTERNAL CONTROL: ABNORMAL
INTERNAL CONTROL: NORMAL
Lab: ABNORMAL
Lab: NORMAL
S PYO AG THROAT QL: POSITIVE
SARS-COV-2 AG UPPER RESP QL IA.RAPID: NOT DETECTED

## 2021-12-27 PROCEDURE — 87081 CULTURE SCREEN ONLY: CPT | Performed by: FAMILY MEDICINE

## 2021-12-27 PROCEDURE — 87880 STREP A ASSAY W/OPTIC: CPT | Performed by: FAMILY MEDICINE

## 2021-12-27 PROCEDURE — 87428 SARSCOV & INF VIR A&B AG IA: CPT | Performed by: FAMILY MEDICINE

## 2021-12-27 PROCEDURE — 99214 OFFICE O/P EST MOD 30 MIN: CPT | Performed by: FAMILY MEDICINE

## 2021-12-27 PROCEDURE — 72100 X-RAY EXAM L-S SPINE 2/3 VWS: CPT

## 2021-12-27 PROCEDURE — 71046 X-RAY EXAM CHEST 2 VIEWS: CPT

## 2021-12-27 RX ORDER — METHYLPREDNISOLONE 4 MG/1
TABLET ORAL
Qty: 1 EACH | Refills: 0 | Status: SHIPPED | OUTPATIENT
Start: 2021-12-27 | End: 2022-03-29

## 2021-12-27 RX ORDER — DOXYCYCLINE HYCLATE 100 MG/1
100 CAPSULE ORAL 2 TIMES DAILY
Qty: 20 CAPSULE | Refills: 0 | Status: SHIPPED | OUTPATIENT
Start: 2021-12-27 | End: 2022-05-12

## 2021-12-27 RX ORDER — CYCLOBENZAPRINE HCL 5 MG
1 TABLET ORAL EVERY 8 HOURS PRN
COMMUNITY
Start: 2021-12-23 | End: 2022-07-26 | Stop reason: SDUPTHER

## 2021-12-27 RX ORDER — ALBUTEROL SULFATE 90 UG/1
2 AEROSOL, METERED RESPIRATORY (INHALATION) EVERY 4 HOURS PRN
Qty: 18 G | Refills: 1 | Status: SHIPPED | OUTPATIENT
Start: 2021-12-27 | End: 2022-05-12

## 2021-12-27 NOTE — PROGRESS NOTES
Chief Complaint  Back Pain and Cough (sore throat, runny nose x 4 days)    Subjective          Kang Grey presents to [facility]  History of Present Illness 41-year-old male, in the office today for a couple of problems.  First thing is he has has back pain.  He is also complaining of a cough with sore throat and runny nose for the last 4 days. Start this he has taken multiple over-the-counter preparations according to his mother who accompanies him today. These are not really helping him. He still has copious nasal drainage which is thick and green and he also has a productive cough. He says he has had a low-grade fever of 98.9 at home but no shaking chills. He is also taken Motrin for his back along with a muscle relaxer.    As to his back pain, mother reports that he has fallen within the last week and that is when his chronic back pain got worse. He apparently has a known L4 endplate compression fracture which on the last x-ray from April 2020 appeared to have some healing. He has not seen his neurosurgeon in quite some time though. He denies any pain or numbness or tingling radiating into either leg. The pain is aching stabbing sensation he points to the middle of his lower back. No change in bowel or bladder habits.    Review of Systems   Constitutional: Positive for fatigue. Negative for fever.   HENT: Positive for congestion, postnasal drip, rhinorrhea, sinus pressure, sneezing and sore throat. Negative for ear discharge, ear pain and nosebleeds.    Eyes: Negative.    Respiratory: Positive for cough and chest tightness. Negative for shortness of breath.    Cardiovascular: Negative.    Gastrointestinal: Negative.    Genitourinary: Negative.    Musculoskeletal: Positive for back pain. Negative for arthralgias, gait problem, joint swelling and myalgias.        Allergies   Allergen Reactions   • Valproic Acid Unknown - Low Severity and Irritability     DIZZY    Other  reaction(s): Irritability, Unknown - Low Severity  DIZZY       Current Outpatient Medications:   •  cyclobenzaprine (FLEXERIL) 5 MG tablet, Take 1 tablet by mouth Every 8 (Eight) Hours As Needed., Disp: , Rfl:   •  albuterol sulfate  (90 Base) MCG/ACT inhaler, Inhale 2 puffs Every 4 (Four) Hours As Needed for Wheezing or Shortness of Air (cough)., Disp: 18 g, Rfl: 1  •  ascorbic acid (VITAMIN C) 1000 MG tablet, Take 1 tablet by mouth Daily., Disp: , Rfl:   •  busPIRone (BUSPAR) 10 MG tablet, Take 1 tablet by mouth 3 (Three) Times a Day As Needed., Disp: , Rfl:   •  Calcium Carbonate 1500 (600 Ca) MG tablet, Take 1 tablet by mouth 2 (two) times a day., Disp: , Rfl:   •  cholecalciferol (VITAMIN D3) 25 MCG (1000 UT) tablet, Take 2 tablets by mouth Daily., Disp: 90 tablet, Rfl: 1  •  Diclofenac Sodium (VOLTAREN) 1 % gel gel, Apply 1 g topically to the appropriate area as directed As Needed., Disp: , Rfl:   •  doxycycline (VIBRAMYCIN) 100 MG capsule, Take 1 capsule by mouth 2 (Two) Times a Day., Disp: 20 capsule, Rfl: 0  •  folic acid (FOLVITE) 800 MCG tablet, Take 1 tablet by mouth Daily., Disp: , Rfl:   •  HM TRUEplus Fiber 2 g chewable tablet, Chew 1 tablet Daily., Disp: , Rfl:   •  ibuprofen (ADVIL,MOTRIN) 800 MG tablet, TAKE ONE TABLET BY MOUTH 3 TIMES DAILY WITH FOOD, Disp: 60 tablet, Rfl: 2  •  Invega Sustenna 234 MG/1.5ML suspension prefilled syringe IM injection, Inject 1.5 mL into the appropriate muscle as directed by prescriber Every 30 (Thirty) Days., Disp: , Rfl:   •  Keppra 500 MG tablet, Take 3 tablets by mouth Every 12 (Twelve) Hours As Needed., Disp: , Rfl:   •  lamoTRIgine (LaMICtal) 200 MG tablet, Take 1 tablet by mouth Daily., Disp: , Rfl:   •  Melatonin 10 MG tablet, Take 1 tablet by mouth every night at bedtime., Disp: 90 tablet, Rfl: 1  •  methylPREDNISolone (MEDROL) 4 MG dose pack, Take as directed on package instructions., Disp: 1 each, Rfl: 0  •  pravastatin (PRAVACHOL) 20 MG tablet,  TAKE ONE TABLET BY MOUTH ONCE DAILY AT BEDTIME FOR CHOLESTEROL., Disp: 30 tablet, Rfl: 1  •  tiZANidine (ZANAFLEX) 2 MG tablet, Take 2 tablets by mouth Daily., Disp: , Rfl:     Objective   Vital Signs:   /71 (BP Location: Right arm, Patient Position: Sitting)   Pulse 120   Temp 98.8 °F (37.1 °C) (Oral)   Wt 83.6 kg (184 lb 6.4 oz)   SpO2 95%   BMI 28.04 kg/m²     Physical Exam  Constitutional:       Appearance: Normal appearance.   HENT:      Head: Normocephalic and atraumatic.      Nose: Congestion and rhinorrhea present.      Mouth/Throat:      Mouth: Mucous membranes are dry.      Pharynx: Posterior oropharyngeal erythema present. No oropharyngeal exudate.      Comments: Thick purulent drainage in the nasal vault and posterior oropharynx.  Eyes:      Extraocular Movements: Extraocular movements intact.      Pupils: Pupils are equal, round, and reactive to light.   Cardiovascular:      Rate and Rhythm: Normal rate and regular rhythm.   Pulmonary:      Effort: Pulmonary effort is normal. No respiratory distress.      Breath sounds: No stridor. Wheezing and rhonchi present. No rales.   Chest:      Chest wall: No tenderness.   Abdominal:      General: Bowel sounds are normal.      Palpations: Abdomen is soft.   Musculoskeletal:         General: Tenderness present. No deformity.      Cervical back: Normal range of motion and neck supple.      Comments: Gait is slightly antalgic.  There is some hypertonicity in the lumbar area bilaterally.  No step-off or deformity.   Skin:     General: Skin is warm and dry.      Capillary Refill: Capillary refill takes less than 2 seconds.   Neurological:      Mental Status: He is alert.        Result Review :   Lab Results   Component Value Date    RAPSCRN Positive (A) 12/27/2021     SARS Antigen   Date Value Ref Range Status   12/27/2021 Not Detected Not Detected Final     Influenza A Antigen TERESA   Date Value Ref Range Status   12/27/2021 Not Detected  Final     Influenza  B Antigen TERESA   Date Value Ref Range Status   12/27/2021 Not Detected  Final                   Assessment and Plan    Diagnoses and all orders for this visit:    1. Cough (Primary)  -     POCT SARS-CoV-2 Antigen TERESA + Flu  -     XR Chest PA & Lateral  -     doxycycline (VIBRAMYCIN) 100 MG capsule; Take 1 capsule by mouth 2 (Two) Times a Day.  Dispense: 20 capsule; Refill: 0  -     albuterol sulfate  (90 Base) MCG/ACT inhaler; Inhale 2 puffs Every 4 (Four) Hours As Needed for Wheezing or Shortness of Air (cough).  Dispense: 18 g; Refill: 1  -     methylPREDNISolone (MEDROL) 4 MG dose pack; Take as directed on package instructions.  Dispense: 1 each; Refill: 0    2. Strep pharyngitis  -     POCT rapid strep A  Patient will be started on doxycycline for the next 10 days.  In addition, I want to get a chest x-ray today.  Fortunately his Covid test was negative but he does have strep group A.  Culture will be sent.    3. Chronic midline low back pain without sciatica  -     XR Spine Lumbar 2 or 3 View; Future  -     Ambulatory Referral to Neurosurgery  Patient will be given a Medrol Dosepak which can also help with his back pain.  He was advised to use Tylenol and avoid nonsteroidal anti-inflammatories and I explained this to his mother as well.  He can only take Tylenol while on the steroid pack.  4. Compression fracture of L4 vertebra with delayed healing, subsequent encounter  -     XR Spine Lumbar 2 or 3 View; Future  -     Ambulatory Referral to Neurosurgery  Patient has recently fallen and could have sustained another compression fracture.  I think at this point we will get the x-ray and then also refer him to neurosurgery he would like to see Dr. Morgan in [facility].  I will let mother and patient both know when the x-ray results mentioned above both the chest x-ray and the lumbar films have been finally resulted.    Patient and mother verbalized understanding and are agreeable to the above plan.      Follow Up    No follow-ups on file.  Patient was given instructions and counseling regarding his condition or for health maintenance advice. Please see specific information pulled into the AVS if appropriate.

## 2021-12-27 NOTE — PROGRESS NOTES
Please call patient/mother and inform them that the back films did not show anything acute however, before he can see the neurosurgeon, they want him to have an MRI.  Since his x-ray was negative, I think we should have him come back and see his usual primary care physician in 2 to 4 weeks for reevaluation before ordering an MRI.  He may need to do physical therapy prior to getting an MRI as well.  Thank you

## 2021-12-27 NOTE — PROGRESS NOTES
Chief Complaint  Back Pain and Cough (sore throat, runny nose x 4 days)    Subjective          Kang Grey presents to [facility]  History of Present Illness     Review of Systems     Allergies   Allergen Reactions   • Valproic Acid Unknown - Low Severity and Irritability     DIZZY    Other reaction(s): Irritability, Unknown - Low Severity  DIZZY       Current Outpatient Medications:   •  cyclobenzaprine (FLEXERIL) 5 MG tablet, Take 1 tablet by mouth Every 8 (Eight) Hours As Needed., Disp: , Rfl:   •  albuterol sulfate  (90 Base) MCG/ACT inhaler, Inhale 2 puffs Every 4 (Four) Hours As Needed for Wheezing or Shortness of Air (cough)., Disp: 18 g, Rfl: 1  •  ascorbic acid (VITAMIN C) 1000 MG tablet, Take 1 tablet by mouth Daily., Disp: , Rfl:   •  busPIRone (BUSPAR) 10 MG tablet, Take 1 tablet by mouth 3 (Three) Times a Day As Needed., Disp: , Rfl:   •  Calcium Carbonate 1500 (600 Ca) MG tablet, Take 1 tablet by mouth 2 (two) times a day., Disp: , Rfl:   •  cholecalciferol (VITAMIN D3) 25 MCG (1000 UT) tablet, Take 2 tablets by mouth Daily., Disp: 90 tablet, Rfl: 1  •  Diclofenac Sodium (VOLTAREN) 1 % gel gel, Apply 1 g topically to the appropriate area as directed As Needed., Disp: , Rfl:   •  doxycycline (VIBRAMYCIN) 100 MG capsule, Take 1 capsule by mouth 2 (Two) Times a Day., Disp: 20 capsule, Rfl: 0  •  folic acid (FOLVITE) 800 MCG tablet, Take 1 tablet by mouth Daily., Disp: , Rfl:   •  HM TRUEplus Fiber 2 g chewable tablet, Chew 1 tablet Daily., Disp: , Rfl:   •  ibuprofen (ADVIL,MOTRIN) 800 MG tablet, TAKE ONE TABLET BY MOUTH 3 TIMES DAILY WITH FOOD, Disp: 60 tablet, Rfl: 2  •  Invega Sustenna 234 MG/1.5ML suspension prefilled syringe IM injection, Inject 1.5 mL into the appropriate muscle as directed by prescriber Every 30 (Thirty) Days., Disp: , Rfl:   •  Keppra 500 MG tablet, Take 3 tablets by mouth Every 12 (Twelve) Hours As Needed., Disp: , Rfl:   •  lamoTRIgine  (LaMICtal) 200 MG tablet, Take 1 tablet by mouth Daily., Disp: , Rfl:   •  Melatonin 10 MG tablet, Take 1 tablet by mouth every night at bedtime., Disp: 90 tablet, Rfl: 1  •  methylPREDNISolone (MEDROL) 4 MG dose pack, Take as directed on package instructions., Disp: 1 each, Rfl: 0  •  pravastatin (PRAVACHOL) 20 MG tablet, TAKE ONE TABLET BY MOUTH ONCE DAILY AT BEDTIME FOR CHOLESTEROL., Disp: 30 tablet, Rfl: 1  •  tiZANidine (ZANAFLEX) 2 MG tablet, Take 2 tablets by mouth Daily., Disp: , Rfl:     Objective   Vital Signs:   /71 (BP Location: Right arm, Patient Position: Sitting)   Pulse 120   Temp 98.8 °F (37.1 °C) (Oral)   Wt 83.6 kg (184 lb 6.4 oz)   SpO2 95%   BMI 28.04 kg/m²     Physical Exam   Result Review :              Assessment and Plan    Diagnoses and all orders for this visit:    1. Cough (Primary)  -     POCT SARS-CoV-2 Antigen TERESA + Flu  -     XR Chest PA & Lateral  -     doxycycline (VIBRAMYCIN) 100 MG capsule; Take 1 capsule by mouth 2 (Two) Times a Day.  Dispense: 20 capsule; Refill: 0  -     albuterol sulfate  (90 Base) MCG/ACT inhaler; Inhale 2 puffs Every 4 (Four) Hours As Needed for Wheezing or Shortness of Air (cough).  Dispense: 18 g; Refill: 1  -     methylPREDNISolone (MEDROL) 4 MG dose pack; Take as directed on package instructions.  Dispense: 1 each; Refill: 0    2. Strep pharyngitis  -     POCT rapid strep A  -     Beta Strep Culture, Throat - , Throat; Future  -     Beta Strep Culture, Throat - Swab, Throat    3. Chronic midline low back pain without sciatica  -     XR Spine Lumbar 2 or 3 View; Future  -     Ambulatory Referral to Neurosurgery    4. Compression fracture of L4 vertebra with delayed healing, subsequent encounter  -     XR Spine Lumbar 2 or 3 View; Future  -     Ambulatory Referral to Neurosurgery        Follow Up   No follow-ups on file.  Patient was given instructions and counseling regarding his condition or for health maintenance advice. Please see  specific information pulled into the AVS if appropriate.       Of note, patient was informed of his positive strep test and his negative Covid and flu test.    Of note, neurosurgery requires patient to have an MRI done prior prior to being seen.  His spinal films were essentially negative for any acute changes even though he recently fell.  I think at this juncture, we will go inform the patient and have him see his usual primary care physician for a recheck in 2 to 4 weeks to see how he is doing with his back pain.

## 2021-12-27 NOTE — PROGRESS NOTES
Please advise patient his chest x-ray is negative for any pneumonia or other acute findings.  His x-ray of the lumbar spine shows once again his old compression deformities of L1 and L4 but no new compression fractures.  He does have arthritis in his back.  Take his medication as prescribed.  If he is having problems within the next week or 2 he should come back to the office for reevaluation.  Thank you

## 2021-12-28 NOTE — PROGRESS NOTES
Please advise patient that his throat culture for strep was negative.  However if he is feeling overall better on the antibiotic, this will treat a sinusitis and a sore throat rather well.  Continue the antibiotic if he feels it is helping.  Once again he should see his usual provider in 2 to 4 weeks if he still having back pain.  Thank you

## 2021-12-29 LAB — BACTERIA SPEC AEROBE CULT: NORMAL

## 2022-01-25 ENCOUNTER — HOSPITAL ENCOUNTER (OUTPATIENT)
Dept: MRI IMAGING | Facility: HOSPITAL | Age: 42
Discharge: HOME | End: 2022-01-25
Admitting: NURSE PRACTITIONER

## 2022-01-25 DIAGNOSIS — K86.2 PANCREATIC CYST: ICD-10-CM

## 2022-01-25 LAB — CREAT BLDA-MCNC: 0.9 MG/DL

## 2022-01-25 PROCEDURE — 74183 MRI ABD W/O CNTR FLWD CNTR: CPT

## 2022-01-25 PROCEDURE — 82565 ASSAY OF CREATININE: CPT

## 2022-01-25 PROCEDURE — 0 GADOBENATE DIMEGLUMINE 529 MG/ML SOLUTION: Performed by: NURSE PRACTITIONER

## 2022-01-25 PROCEDURE — A9577 INJ MULTIHANCE: HCPCS | Performed by: NURSE PRACTITIONER

## 2022-01-25 RX ADMIN — GADOBENATE DIMEGLUMINE 16 ML: 529 INJECTION, SOLUTION INTRAVENOUS at 09:41

## 2022-01-28 DIAGNOSIS — K86.2 PANCREATIC CYST: Primary | ICD-10-CM

## 2022-02-01 DIAGNOSIS — S32.040G COMPRESSION FRACTURE OF L4 VERTEBRA WITH DELAYED HEALING, SUBSEQUENT ENCOUNTER: Primary | ICD-10-CM

## 2022-02-28 ENCOUNTER — HOSPITAL ENCOUNTER (OUTPATIENT)
Dept: MRI IMAGING | Facility: HOSPITAL | Age: 42
Discharge: HOME | End: 2022-02-28
Admitting: FAMILY MEDICINE

## 2022-02-28 DIAGNOSIS — S32.040G COMPRESSION FRACTURE OF L4 VERTEBRA WITH DELAYED HEALING, SUBSEQUENT ENCOUNTER: ICD-10-CM

## 2022-02-28 DIAGNOSIS — M51.36 DDD (DEGENERATIVE DISC DISEASE), LUMBAR: ICD-10-CM

## 2022-02-28 DIAGNOSIS — M48.56XS NONTRAUMATIC COMPRESSION FRACTURE OF L1 VERTEBRA, SEQUELA: Primary | ICD-10-CM

## 2022-02-28 DIAGNOSIS — N32.89 DISTENDED BLADDER: ICD-10-CM

## 2022-02-28 PROCEDURE — 72148 MRI LUMBAR SPINE W/O DYE: CPT

## 2022-03-01 ENCOUNTER — TELEPHONE (OUTPATIENT)
Dept: GASTROENTEROLOGY | Facility: CLINIC | Age: 42
End: 2022-03-01

## 2022-03-01 NOTE — TELEPHONE ENCOUNTER
Ms cheney called (mother) about Mr Grey MRI abd results. Notified of results. Voiced understanding. manish

## 2022-03-03 ENCOUNTER — TELEPHONE (OUTPATIENT)
Dept: PHYSICAL THERAPY | Facility: CLINIC | Age: 42
End: 2022-03-03

## 2022-03-03 NOTE — TELEPHONE ENCOUNTER
GOT PT SCHEDULED FOR EVAL - NEEDS EARLY MORNING APPTS - PLEASE GIVE MOTHER A CALL IF NEED TO MOVE APPT SOONER/AT ALL

## 2022-03-04 ENCOUNTER — TELEPHONE (OUTPATIENT)
Dept: UROLOGY | Facility: CLINIC | Age: 42
End: 2022-03-04

## 2022-03-04 NOTE — TELEPHONE ENCOUNTER
----- Message from Alisson Allen sent at 3/4/2022  9:52 AM EST -----  Regarding: FW: APPT  Work in sometime within a month  ----- Message -----  From: Severo Otero RegSched Rep  Sent: 3/4/2022   9:41 AM EST  To: Alisson Allen  Subject: APPT                                             KEVIN JACKSON REF PT TO DR WHEATLEY FOR DISTENDED BLADDER, RECORDS IN [facility], PLEASE ADVISE FOR AN APPT.

## 2022-03-07 DIAGNOSIS — F51.01 PRIMARY INSOMNIA: ICD-10-CM

## 2022-03-07 RX ORDER — PRAVASTATIN SODIUM 20 MG
TABLET ORAL
Qty: 90 TABLET | Refills: 0 | Status: SHIPPED | OUTPATIENT
Start: 2022-03-07 | End: 2022-06-02

## 2022-03-07 RX ORDER — PHENOL 1.4 %
AEROSOL, SPRAY (ML) MUCOUS MEMBRANE
Qty: 30 TABLET | Refills: 1 | OUTPATIENT
Start: 2022-03-07

## 2022-03-18 ENCOUNTER — OFFICE VISIT (OUTPATIENT)
Dept: PODIATRY | Facility: CLINIC | Age: 42
End: 2022-03-18

## 2022-03-18 VITALS
OXYGEN SATURATION: 98 % | WEIGHT: 188 LBS | BODY MASS INDEX: 28.49 KG/M2 | HEIGHT: 68 IN | TEMPERATURE: 97.5 F | SYSTOLIC BLOOD PRESSURE: 129 MMHG | HEART RATE: 103 BPM | DIASTOLIC BLOOD PRESSURE: 85 MMHG

## 2022-03-18 DIAGNOSIS — M79.672 FOOT PAIN, BILATERAL: Primary | ICD-10-CM

## 2022-03-18 DIAGNOSIS — R26.2 DIFFICULTY WALKING: ICD-10-CM

## 2022-03-18 DIAGNOSIS — M21.372 FOOT DROP, BILATERAL: ICD-10-CM

## 2022-03-18 DIAGNOSIS — L60.0 ONYCHOCRYPTOSIS: ICD-10-CM

## 2022-03-18 DIAGNOSIS — M79.671 FOOT PAIN, BILATERAL: Primary | ICD-10-CM

## 2022-03-18 DIAGNOSIS — G62.9 NEUROPATHY: ICD-10-CM

## 2022-03-18 DIAGNOSIS — M21.371 FOOT DROP, BILATERAL: ICD-10-CM

## 2022-03-18 DIAGNOSIS — B35.1 ONYCHOMYCOSIS: ICD-10-CM

## 2022-03-18 PROCEDURE — 11721 DEBRIDE NAIL 6 OR MORE: CPT | Performed by: PODIATRIST

## 2022-03-18 NOTE — PROGRESS NOTES
[facility] - PODIATRY    Today's Date: 03/18/22    Patient Name: Kang Grey  MRN: [id number]  CSN: [id number]  PCP: Hien Parham MD, Last PCP Visit: 3/7/22  Referring Provider: No ref. provider found    SUBJECTIVE     Chief Complaint   Patient presents with   • Left Foot - Nail Problem   • Right Foot - Nail Problem     HPI: Kang Grey, a 41 y.o.male, comes to clinic with his mother.    New, Established, New Problem:  established   Location:  Toenails  Duration:   Greater than five years  Onset:  Gradual  Nature:  sore with palpation.  Stable, worsening, improving:   Stable  Aggravating factors:  Pain with shoe gear and ambulation.  Previous Treatment:  debridement    Medical changes: Fall resulting in L1 compression fracture    Patient denies any fevers, chills, nausea, vomiting, shortness of breathe, nor any other constitutional signs nor symptoms.         Past Medical History:   Diagnosis Date   • Acute upper respiratory infection    • Age-related osteoporosis without current pathological fracture    • Cerebral palsy ([facility])     diagnosed as infant   • Compression fracture of first lumbar vertebra ([facility])    • Constipation    • Disease of pancreas    • Hydrocephalus in diseases classified elsewhere ([facility])    • Injury of back 02/2020   • Low back pain    • Major depressive disorder     single episode, moderate   • Other abnormal glucose    • Other cerebral palsy ([facility])    • Other fatigue    • Other generalized epilepsy and epileptic syndromes, not intractable, without status epilepticus ([facility])    • Other hypertrophic disorders of the skin    • Pain in left ankle and joints of left foot    • Pain in right toe(s)    • Pain in thoracic spine    • Paranoid schizophrenia ([facility])    • Pelvic and perineal pain    • Primary insomnia    • Pure hypercholesterolemia    • Repeated falls    • Schizoaffective disorder, unspecified ([facility])    • Seizures ([facility])     last seizure was 5 years ago; medication  controlled   • Somnolence    • Vitamin D deficiency    • Wedge compression fracture of fourth thoracic vertebra, subsequent encounter for fracture with routine healing      Past Surgical History:   Procedure Laterality Date   • HAMSTRING REPAIR     • OTHER SURGICAL HISTORY      hyposadius repair     Family History   Problem Relation Age of Onset   • Hypothyroidism Mother    • No Known Problems Father    • Ulcers Brother      Social History     Socioeconomic History   • Marital status: Single   Tobacco Use   • Smoking status: Never Smoker   • Smokeless tobacco: Never Used   Vaping Use   • Vaping Use: Never used   Substance and Sexual Activity   • Alcohol use: Never   • Drug use: Never   • Sexual activity: Defer     Allergies   Allergen Reactions   • Valproic Acid Unknown - Low Severity and Irritability     DIZZY    Other reaction(s): Irritability, Unknown - Low Severity  DIZZY     Current Outpatient Medications   Medication Sig Dispense Refill   • albuterol sulfate  (90 Base) MCG/ACT inhaler Inhale 2 puffs Every 4 (Four) Hours As Needed for Wheezing or Shortness of Air (cough). 18 g 1   • ascorbic acid (VITAMIN C) 1000 MG tablet Take 1 tablet by mouth Daily.     • busPIRone (BUSPAR) 10 MG tablet Take 1 tablet by mouth 3 (Three) Times a Day As Needed.     • Calcium Carbonate 1500 (600 Ca) MG tablet Take 1 tablet by mouth 2 (two) times a day.     • cholecalciferol (VITAMIN D3) 25 MCG (1000 UT) tablet Take 2 tablets by mouth Daily. 90 tablet 1   • Diclofenac Sodium (VOLTAREN) 1 % gel gel Apply 1 g topically to the appropriate area as directed As Needed.     • doxycycline (VIBRAMYCIN) 100 MG capsule Take 1 capsule by mouth 2 (Two) Times a Day. 20 capsule 0   • folic acid (FOLVITE) 800 MCG tablet Take 1 tablet by mouth Daily.     • HM TRUEplus Fiber 2 g chewable tablet Chew 1 tablet Daily.     • ibuprofen (ADVIL,MOTRIN) 800 MG tablet TAKE ONE TABLET BY MOUTH 3 TIMES DAILY WITH FOOD 60 tablet 2   • Invega Sustenna  234 MG/1.5ML suspension prefilled syringe IM injection Inject 1.5 mL into the appropriate muscle as directed by prescriber Every 30 (Thirty) Days.     • Keppra 500 MG tablet Take 3 tablets by mouth Every 12 (Twelve) Hours As Needed.     • lamoTRIgine (LaMICtal) 200 MG tablet Take 1 tablet by mouth Daily.     • Melatonin 10 MG tablet Take 1 tablet by mouth every night at bedtime. 90 tablet 1   • pravastatin (PRAVACHOL) 20 MG tablet TAKE ONE TABLET BY MOUTH ONCE DAILY AT BEDTIME FOR CHOLESTEROL. 90 tablet 0   • cyclobenzaprine (FLEXERIL) 5 MG tablet Take 1 tablet by mouth Every 8 (Eight) Hours As Needed.     • methylPREDNISolone (MEDROL) 4 MG dose pack Take as directed on package instructions. 1 each 0   • tiZANidine (ZANAFLEX) 2 MG tablet Take 2 tablets by mouth Daily.       No current facility-administered medications for this visit.     Review of Systems   Constitutional: Negative.    Musculoskeletal:        Requires bilateral Go braces for ambulation.   Skin:        Painful toenails bilaterally   All other systems reviewed and are negative.      OBJECTIVE     Vitals:    03/18/22 0730   BP: 129/85   Pulse: 103   Temp: 97.5 °F (36.4 °C)   SpO2: 98%       Patient seen in no apparent distress.      PHYSICAL EXAM:     Foot/Ankle Exam:       General:   Orientation: unable to assess    Affect: inappropriate    Gait: antalgic    Shoe Gear:  Casual shoes (With bilateral Go braces.  Braces are well worn.)    VASCULAR      Right Foot Vascularity   Normal vascular exam    Dorsalis pedis:  2+  Posterior tibial:  2+  Skin Temperature: warm    Edema Grading:  None  CFT:  < 3 seconds  Pedal Hair Growth:  Present  Varicosities: none       Left Foot Vascularity   Normal vascular exam    Dorsalis pedis:  2+  Posterior tibial:  2+  Skin Temperature: warm    Edema Grading:  None  CFT:  < 3 seconds  Pedal Hair Growth:  Present  Varicosities: none        NEUROLOGIC     Right Foot Neurologic   Light touch sensation:   Diminished  Vibratory sensation:  Diminished  Hot/Cold sensation: diminished       Left Foot Neurologic   Light touch sensation:  Diminished  Hot/cold sensation: diminished       MUSCULOSKELETAL      Right Foot Musculoskeletal   Hammertoe:  First toe     Left Foot Musculoskeletal   Hamm[place]oe:  First toe     MUSCLE STRENGTH     Right Foot Muscle Strength   Foot dorsiflexion:  1  Foot plantar flexion:  4-  Foot inversion:  2  Foot eversion:  2     Left Foot Muscle Strength   Foot dorsiflexion:  1  Foot plantar flexion:  4-  Foot inversion:  2  Foot eversion:  2     DERMATOLOGIC     Right Foot Dermatologic   Skin: skin intact    Nails: onychomycosis, abnormally thick, subungual debris, dystrophic nails and ingrown toenail    Nails comment:  Toenails 1 through 5     Left Foot Dermatologic   Skin: skin intact    Nails: onychomycosis, abnormally thick, subungual debris, dystrophic nails and ingrown toenail    Nails comment:  Toenails 1 through 5      ASSESSMENT/PLAN     Diagnoses and all orders for this visit:    1. Foot pain, bilateral (Primary)    2. Onychomycosis    3. Onychocryptosis    4. Foot drop, bilateral    5. Difficulty walking    6. Neuropathy        Comprehensive lower extremity examination and evaluation was performed.    Discussed findings and treatment plan including risks, benefits, and treatment options with patient in detail. Patient agreed with treatment plan.    Toenails 1 through 5 bilaterally were debrided in thickness and length and then smoothed with a Dremel Tool.  Tolerated the procedure well without complications.    A prescription was written for new braces bilaterally.    An After Visit Summary was printed and given to the patient at discharge, including (if requested) any available informative/educational handouts regarding diagnosis, treatment, or medications. All questions were answered to patient/family satisfaction. Should symptoms fail to improve or worsen they agree to call or return  to clinic or to go to the Emergency Department. Discussed the importance of following up with any needed screening tests/labs/specialist appointments and any requested follow-up recommended by me today. Importance of maintaining follow-up discussed and patient accepts that missed appointments can delay diagnosis and potentially lead to worsening of conditions.    Return in about 9 weeks (around 5/20/2022) for Toenail Care., or sooner if acute issues arise.    This document has been electronically signed by Roni Osullivan DPM on March 18, 2022 07:43 EDT

## 2022-03-22 ENCOUNTER — TREATMENT (OUTPATIENT)
Dept: PHYSICAL THERAPY | Facility: CLINIC | Age: 42
End: 2022-03-22

## 2022-03-22 DIAGNOSIS — R29.898 WEAKNESS OF BOTH LOWER EXTREMITIES: ICD-10-CM

## 2022-03-22 DIAGNOSIS — R26.9 GAIT DISTURBANCE: ICD-10-CM

## 2022-03-22 DIAGNOSIS — G89.29 CHRONIC MIDLINE LOW BACK PAIN, UNSPECIFIED WHETHER SCIATICA PRESENT: ICD-10-CM

## 2022-03-22 DIAGNOSIS — M54.50 CHRONIC MIDLINE LOW BACK PAIN, UNSPECIFIED WHETHER SCIATICA PRESENT: ICD-10-CM

## 2022-03-22 DIAGNOSIS — M54.50 MIDLINE LOW BACK PAIN, UNSPECIFIED CHRONICITY, UNSPECIFIED WHETHER SCIATICA PRESENT: Primary | ICD-10-CM

## 2022-03-22 PROCEDURE — 97162 PT EVAL MOD COMPLEX 30 MIN: CPT | Performed by: PHYSICAL THERAPIST

## 2022-03-22 NOTE — PROGRESS NOTES
"Physical Therapy Initial Evaluation and Plan of Care      Patient: Kang Grey   : 1980  Diagnosis/ICD-10 Code:  Midline low back pain, unspecified chronicity, unspecified whether sciatica present [M54.50]  Referring practitioner: Hien Parham MD  Date of Initial Visit: 3/22/2022  Today's Date: 3/22/2022  Patient seen for 1 sessions         Visit Diagnoses:    ICD-10-CM ICD-9-CM   1. Midline low back pain, unspecified chronicity, unspecified whether sciatica present  M54.50 724.2   2. Chronic midline low back pain, unspecified whether sciatica present  M54.50 724.2    G89.29 338.29   3. Gait disturbance  R26.9 781.2   4. Weakness of both lower extremities  R29.898 729.89       Subjective Evaluation    History of Present Illness  Mechanism of injury: Kang states that his low back is bothering him, right in the center.  He went to the doctor and got an MRI - found a compression fracture.  He was referred back to therapy.  He has been to PT previously, he had a little bit of improvements for the back.  He hasn't been doing his HEP, says he doesn't have his paper.    He has been going to [address] do, but is limited with what he can do because it hurts to move.     Falls risk  PMH: compressions fractures, seizures, cerebral palsy, falls risk      Precautions and Work Restrictions: gait belt - falls riskPain  Current pain ratin  Quality: \"just there\"  Aggravating factors: movement    Social Support  Lives with: parents (friend)    Diagnostic Tests  X-ray: abnormal  MRI studies: abnormal    Treatments  Previous treatment: physical therapy  Patient Goals  Patient goals for therapy: decreased pain, increased strength, increased motion, independence with ADLs/IADLs, improved balance and return to sport/leisure activities             Objective          Palpation   Left   Tenderness of the erector spinae, lumbar paraspinals, quadratus lumborum and transverse abdominus.     Right Tenderness of the " erector spinae and lumbar paraspinals.     Active Range of Motion     Additional Active Range of Motion Details  LUMBAR  Flexion: to mid thighs, lumbar pain  Extension: to neutral, lumbar pain  R lateral flexion:  to 20 degrees, lumbar pain  L lateral flexion:  to 25 degrees, lumbar L pain  R rotation:  to 30 degrees, lumbar L pain  L rotation:  to 15 degrees, lumbar L pain    Strength/Myotome Testing     Left Hip   Planes of Motion   Flexion: 3+  Extension: 3+  Abduction: 4-  Adduction: 4-    Right Hip   Planes of Motion   Flexion: 3+  Extension: 3+  Abduction: 3+  Adduction: 4-    Ambulation     Ambulation: Level Surfaces     Additional Level Surfaces Ambulation Details  No AD used, pt has been diagnosed with CP previously - he has not been wearing his leg braces (says he doesn't know which foot goes with what brace).     Forward flexed gait, B external rotation LE          Assessment & Plan     Assessment  Impairments: abnormal gait, abnormal or restricted ROM, activity intolerance, impaired balance, impaired physical strength, lacks appropriate home exercise program and pain with function  Functional Limitations: carrying objects, lifting, walking, uncomfortable because of pain, moving in bed, sitting and standing  Assessment details: Pt presents with limitations, noted below, that impede his ability to lift items, personal care duties, sitting or standing longer than 10 minutes, hobbies, and social activities. The skills of a therapist will be required to safely and effectively implement the following treatment plan to restore maximal level of function.        Goals  Plan Goals: LOW BACK PROBLEMS:    1. The patient complains of low back pain.  LTG 1: 12 weeks:  The patient will report a pain rating of 2/10 or better in order to improve tolerance to activities of daily living and improve sleep quality.  STATUS:  New  STG 1a: 4 weeks:  The patient will report a pain rating of 5/10 or better.  STATUS:   New  TREATMENT:  Therapeutic exercises, manual therapy,  home exercise instruction, and modalities as needed for pain to include:  electrical stimulation, moist heat, ice, ultrasound, and diathermy.      2. The patient demonstrates weakness of the bilatearl hip.  LTG 2: 12 weeks:  The patient will demonstrate 4+ /5 strength for bilateral hip flexion, abduction, and extension in order to improve hip stability.  STATUS:  New  STG 2a: 4 weeks:  The patient will demonstrate 4- /5 strength for bilateral hip flexion, abduction, and extension.  STATUS:  New  STG2b:  4 weeks:  The patient will be independent with home exercises.     STATUS:  New  TREATMENT: Therapeutic exercises, manual therapy, aquatic therapy, home exercise instruction, and modalities as needed for pain to include:  electrical stimulation, moist heat, ice, ultrasound, and diathermy.      3. The patient has gait dysfunction.  LTG 3: 12 weeks:  The patient will ambulate without assistive device, independently, for community distances with minimal limp to the  lower extremity in order to improve mobility and allow patient to perform activities such as grocery shopping with greater ease.  STATUS:  New  TREATMENT: Gait training, aquatic therapy, therapeutic exercise, and home exercise instruction.      4. Mobility: Walking/Moving Around Functional Limitation    LTG 4: 12 weeks:  The patient will demonstrate limitation by achieving a score of 13/45 on the LAURO.  STATUS:  New  STG 4 a: 4 weeks:  The patient will demonstrate limitation by achieving a score of 19/45 on the LAURO.    STATUS:  New  TREATMENT:  Manual therapy, therapeutic exercise, home exercise instruction, and modalities as needed.      Plan  Therapy options: will be seen for skilled therapy services  Planned modality interventions: cryotherapy, thermotherapy (hydrocollator packs) and dry needling  Planned therapy interventions: abdominal trunk stabilization, manual therapy, flexibility, functional  ROM exercises, gait training, home exercise program, therapeutic activities, stretching, strengthening, spinal/joint mobilization, soft tissue mobilization, postural training, neuromuscular re-education, balance/weight-bearing training, ADL retraining and body mechanics training  Frequency: 2x week  Duration in weeks: 12  Treatment plan discussed with: patient  Plan details: Create an HEP, progress with lumbar/trnk stabilization/strengthening, lumbar and BLE flexibility, gait training, balance training, body mechanics, transfers, postural awareness/stability.           History # of Personal Factors and/or Comorbidities: HIGH (3+)  Examination of Body System(s): # of elements: MODERATE (3)  Clinical Presentation: STABLE   Clinical Decision Making: MODERATE      Timed:  Manual Therapy:         mins  02252;  Therapeutic Exercise:         mins  30897;     Neuromuscular Citlaly:        mins  58496;    Therapeutic Activity:          mins  62845;     Gait Training:           mins  80711;     Ultrasound:          mins  86879;    Canalith Repos           ___  mins  45698      Untimed:  Electrical Stimulation:         mins  75372 (MC );  Mechanical Traction:         mins  09568;   Dry Needling:                     mins self pay  Low Eval:                           mins  84517;  Medium Eval:                34     mins  05581;   High Eval:                          mins  89563       Timed Treatment:      mins   Total Treatment:     34   mins    PT SIGNATURE: Nikky Sandoval PT, DPT  [place] License: [id number]  Electronically signed by Nikky Sandoval PT, 03/22/22, 12:57 PM EDT        Initial Certification    Certification Period: 3/22/2022 thru 6/19/2022  I certify that the therapy services are furnished while this patient is under my care.  The services outlined above are required by this patient, and will be reviewed every 90 days.     PHYSICIAN: Hien Parham MD  NPI: [id number]            PHYSICIAN PRINT NAME:  ______________________________________________      PHYSICIAN SIGNATURE: ______________________________________________         DATE:________________________________        Please sign and return via fax to [phone].  Thank you, [facility].

## 2022-03-23 PROBLEM — R39.14 BENIGN PROSTATIC HYPERPLASIA WITH INCOMPLETE BLADDER EMPTYING: Status: ACTIVE | Noted: 2022-03-23

## 2022-03-23 PROBLEM — N40.1 BENIGN PROSTATIC HYPERPLASIA WITH INCOMPLETE BLADDER EMPTYING: Status: ACTIVE | Noted: 2022-03-23

## 2022-03-23 NOTE — PROGRESS NOTES
Chief Complaint: Urologic complaint    Subjective         History of Present Illness  Kang Grey is a 41 y.o. male     BPH  Distention of urinary bladder on MRI  History of hypospadias repair  Possible neurogenic bladder    Patient voids twice per 24 hrs, it is hard for him to go.  Is having okay stream per patient but cannot void on demand.  Has been like this as long as he can remember.   Does not strain but takes him a long time to go a long time to get started  No GH/  has had a few UTIs in the past.    Does have a history of retention X1 and had to be catheterized in the past.    PVR    3/22   > 999      1/22 0.9, GFR 93    1/22 MRI abdomen with and without - stable 28 cm cyst in the pancreatic head.  Unchanged for 2 years.  Stable Marked distention of the urinary bladder, stable simple cyst left kidney 6 cm      3/20 CT abdomen with and without - 2.3 cm cyst head of pancreas.  Distention of the urinary bladder.  Similar to previous in 3/20.     No history of kidney  stone.    No urologic family history    Patient does have a history of hypospadias.  Status post repair    No cardiopulmonary history.  Patient does not smoke.  Patient does not use blood thinner.    Results for orders placed or performed in visit on 03/25/22   Bladder Scan   Result Value Ref Range    Urine Volume 999ml    POC Urinalysis Dipstick, Automated    Specimen: Urine   Result Value Ref Range    Color Yellow Yellow, Straw, Dark Yellow, Yanni    Clarity, UA Clear Clear    Specific Gravity  1.020 1.005 - 1.030    pH, Urine 6.5 5.0 - 8.0    Leukocytes Trace (A) Negative    Nitrite, UA Negative Negative    Protein, POC Negative Negative mg/dL    Glucose, UA Negative Negative, 1000 mg/dL (3+) mg/dL    Ketones, UA Negative Negative    Urobilinogen, UA Normal Normal    Bilirubin Negative Negative    Blood, UA Negative Negative    Lot Number [id number]     Expiration Date 2/2,023          Objective     Past Medical History:   Diagnosis Date   •  Acute upper respiratory infection    • Age-related osteoporosis without current pathological fracture    • Cerebral palsy ([facility])     diagnosed as infant   • Compression fracture of first lumbar vertebra ([facility])    • Constipation    • Disease of pancreas    • Hydrocephalus in diseases classified elsewhere ([facility])    • Injury of back 02/2020   • Low back pain    • Major depressive disorder     single episode, moderate   • Other abnormal glucose    • Other cerebral palsy ([facility])    • Other fatigue    • Other generalized epilepsy and epileptic syndromes, not intractable, without status epilepticus ([facility])    • Other hypertrophic disorders of the skin    • Pain in left ankle and joints of left foot    • Pain in right toe(s)    • Pain in thoracic spine    • Paranoid schizophrenia ([facility])    • Pelvic and perineal pain    • Primary insomnia    • Pure hypercholesterolemia    • Repeated falls    • Schizoaffective disorder, unspecified ([facility])    • Seizures ([facility])     last seizure was 5 years ago; medication controlled   • Somnolence    • Vitamin D deficiency    • Wedge compression fracture of fourth thoracic vertebra, subsequent encounter for fracture with routine healing        Past Surgical History:   Procedure Laterality Date   • HAMSTRING REPAIR     • OTHER SURGICAL HISTORY      hyposadius repair         Current Outpatient Medications:   •  albuterol sulfate  (90 Base) MCG/ACT inhaler, Inhale 2 puffs Every 4 (Four) Hours As Needed for Wheezing or Shortness of Air (cough)., Disp: 18 g, Rfl: 1  •  ascorbic acid (VITAMIN C) 1000 MG tablet, Take 1 tablet by mouth Daily., Disp: , Rfl:   •  busPIRone (BUSPAR) 10 MG tablet, Take 1 tablet by mouth 3 (Three) Times a Day As Needed., Disp: , Rfl:   •  Calcium Carbonate 1500 (600 Ca) MG tablet, Take 1 tablet by mouth 2 (two) times a day., Disp: , Rfl:   •  cholecalciferol (VITAMIN D3) 25 MCG (1000 UT) tablet, Take 2 tablets by mouth Daily., Disp: 90 tablet, Rfl: 1  •  cyclobenzaprine  (FLEXERIL) 5 MG tablet, Take 1 tablet by mouth Every 8 (Eight) Hours As Needed., Disp: , Rfl:   •  Diclofenac Sodium (VOLTAREN) 1 % gel gel, Apply 1 g topically to the appropriate area as directed As Needed., Disp: , Rfl:   •  doxycycline (VIBRAMYCIN) 100 MG capsule, Take 1 capsule by mouth 2 (Two) Times a Day., Disp: 20 capsule, Rfl: 0  •  folic acid (FOLVITE) 800 MCG tablet, Take 1 tablet by mouth Daily., Disp: , Rfl:   •  HM TRUEplus Fiber 2 g chewable tablet, Chew 1 tablet Daily., Disp: , Rfl:   •  ibuprofen (ADVIL,MOTRIN) 800 MG tablet, TAKE ONE TABLET BY MOUTH 3 TIMES DAILY WITH FOOD, Disp: 60 tablet, Rfl: 2  •  Invega Sustenna 234 MG/1.5ML suspension prefilled syringe IM injection, Inject 1.5 mL into the appropriate muscle as directed by prescriber Every 30 (Thirty) Days., Disp: , Rfl:   •  Keppra 500 MG tablet, Take 3 tablets by mouth Every 12 (Twelve) Hours As Needed., Disp: , Rfl:   •  lamoTRIgine (LaMICtal) 200 MG tablet, Take 1 tablet by mouth Daily., Disp: , Rfl:   •  Melatonin 10 MG tablet, Take 1 tablet by mouth every night at bedtime., Disp: 90 tablet, Rfl: 1  •  methylPREDNISolone (MEDROL) 4 MG dose pack, Take as directed on package instructions., Disp: 1 each, Rfl: 0  •  pravastatin (PRAVACHOL) 20 MG tablet, TAKE ONE TABLET BY MOUTH ONCE DAILY AT BEDTIME FOR CHOLESTEROL., Disp: 90 tablet, Rfl: 0  •  tiZANidine (ZANAFLEX) 2 MG tablet, Take 2 tablets by mouth Daily., Disp: , Rfl:     Allergies   Allergen Reactions   • Valproic Acid Unknown - Low Severity and Irritability     DIZZY    Other reaction(s): Irritability, Unknown - Low Severity  DIZZY        Family History   Problem Relation Age of Onset   • Hypothyroidism Mother    • No Known Problems Father    • Ulcers Brother        Social History     Socioeconomic History   • Marital status: Single   Tobacco Use   • Smoking status: Never Smoker   • Smokeless tobacco: Never Used   Vaping Use   • Vaping Use: Never used   Substance and Sexual Activity   •  Alcohol use: Never   • Drug use: Never   • Sexual activity: Defer       Vital Signs:   There were no vitals taken for this visit.     Physical exam    Alert and orient x3  Well appearing, well developed, in no acute distress   Unlabored respirations  Nontender/nondistended    Normal circumcised phallus.  Minor hypospadiac meatus but open as far as I can tell    Normal bilateral descended testicles without mass or tenderness.    Grossly oriented to person, place and time, judgment is intact, normal mood and affect    Results for orders placed or performed during the hospital encounter of 01/25/22   POC Creatinine    Specimen: Blood   Result Value Ref Range    Creatinine 0.90 mg/dL    GFR MDRD       GFR MDRD Non African American 93 mL/min/1.73 sq.M             Assessment and Plan    Diagnoses and all orders for this visit:    1. Benign prostatic hyperplasia with incomplete bladder emptying (Primary)          Possible neurogenic bladder      Records reviewed and summarized in chart    Patient with likely neurogenic bladder after discussion risk and benefits we will have him see our nurse practitioner to learn CIC.  I am hoping he can learn to do this.  His mom said she could possibly do it, but I did discuss this very hard for another person to consistently be able to do CIC for someone.    He will get urodynamics to prove or disprove he has neurogenic bladder versus bladder outlet obstruction follow-up with me afterwards for office cystoscopy.     Risk and benefits discussed.        We discussed failure to do this work-up could risk renal failure/renal deterioration or urinary retention      Patient will learn CIC first, I will give him two prescription for ciprofloxacin 500 mg p.o. twice daily x3 days.  Risk and benefits discussed he will start these 3 days before his urodynamics  And 3 days before his office cystoscopy.  Discussed with patient and his mother today.

## 2022-03-25 ENCOUNTER — OFFICE VISIT (OUTPATIENT)
Dept: UROLOGY | Facility: CLINIC | Age: 42
End: 2022-03-25

## 2022-03-25 VITALS — RESPIRATION RATE: 16 BRPM | WEIGHT: 185.6 LBS | HEIGHT: 68 IN | BODY MASS INDEX: 28.13 KG/M2

## 2022-03-25 DIAGNOSIS — R33.9 URINARY RETENTION: ICD-10-CM

## 2022-03-25 DIAGNOSIS — N31.9 NEUROGENIC BLADDER: ICD-10-CM

## 2022-03-25 DIAGNOSIS — N40.1 BENIGN PROSTATIC HYPERPLASIA WITH INCOMPLETE BLADDER EMPTYING: Primary | ICD-10-CM

## 2022-03-25 DIAGNOSIS — R39.14 BENIGN PROSTATIC HYPERPLASIA WITH INCOMPLETE BLADDER EMPTYING: Primary | ICD-10-CM

## 2022-03-25 LAB
BILIRUB BLD-MCNC: NEGATIVE MG/DL
CLARITY, POC: CLEAR
COLOR UR: YELLOW
EXPIRATION DATE: ABNORMAL
GLUCOSE UR STRIP-MCNC: NEGATIVE MG/DL
KETONES UR QL: NEGATIVE
LEUKOCYTE EST, POC: ABNORMAL
Lab: ABNORMAL
NITRITE UR-MCNC: NEGATIVE MG/ML
PH UR: 6.5 [PH] (ref 5–8)
PROT UR STRIP-MCNC: NEGATIVE MG/DL
RBC # UR STRIP: NEGATIVE /UL
SP GR UR: 1.02 (ref 1–1.03)
URINE VOLUME: NORMAL
UROBILINOGEN UR QL: NORMAL

## 2022-03-25 PROCEDURE — 51798 US URINE CAPACITY MEASURE: CPT | Performed by: UROLOGY

## 2022-03-25 PROCEDURE — 99204 OFFICE O/P NEW MOD 45 MIN: CPT | Performed by: UROLOGY

## 2022-03-25 PROCEDURE — 81003 URINALYSIS AUTO W/O SCOPE: CPT | Performed by: UROLOGY

## 2022-03-25 RX ORDER — CIPROFLOXACIN 500 MG/1
500 TABLET, FILM COATED ORAL 2 TIMES DAILY
Qty: 6 TABLET | Refills: 1 | Status: SHIPPED | OUTPATIENT
Start: 2022-03-25 | End: 2022-03-29

## 2022-03-29 ENCOUNTER — HOSPITAL ENCOUNTER (OUTPATIENT)
Dept: CT IMAGING | Facility: HOSPITAL | Age: 42
Discharge: HOME | End: 2022-03-29

## 2022-03-29 ENCOUNTER — OFFICE VISIT (OUTPATIENT)
Dept: FAMILY MEDICINE CLINIC | Age: 42
End: 2022-03-29

## 2022-03-29 ENCOUNTER — LAB (OUTPATIENT)
Dept: LAB | Facility: HOSPITAL | Age: 42
End: 2022-03-29

## 2022-03-29 VITALS
BODY MASS INDEX: 28.92 KG/M2 | DIASTOLIC BLOOD PRESSURE: 71 MMHG | SYSTOLIC BLOOD PRESSURE: 127 MMHG | OXYGEN SATURATION: 98 % | HEIGHT: 68 IN | HEART RATE: 115 BPM | WEIGHT: 190.8 LBS

## 2022-03-29 DIAGNOSIS — Z11.59 ENCOUNTER FOR SCREENING FOR OTHER VIRAL DISEASES: ICD-10-CM

## 2022-03-29 DIAGNOSIS — G89.29 CHRONIC MIDLINE LOW BACK PAIN WITHOUT SCIATICA: ICD-10-CM

## 2022-03-29 DIAGNOSIS — G45.9 TRANSIENT ISCHEMIC ATTACK (TIA): ICD-10-CM

## 2022-03-29 DIAGNOSIS — R41.0 CONFUSION: ICD-10-CM

## 2022-03-29 DIAGNOSIS — M54.50 CHRONIC MIDLINE LOW BACK PAIN WITHOUT SCIATICA: ICD-10-CM

## 2022-03-29 DIAGNOSIS — M85.80 OSTEOPENIA, UNSPECIFIED LOCATION: ICD-10-CM

## 2022-03-29 DIAGNOSIS — R56.9 SEIZURES: ICD-10-CM

## 2022-03-29 DIAGNOSIS — G80.9 CEREBRAL PALSY, UNSPECIFIED TYPE: Primary | ICD-10-CM

## 2022-03-29 LAB
ALBUMIN SERPL-MCNC: 4.7 G/DL (ref 3.5–5.2)
ALBUMIN/GLOB SERPL: 1.6 G/DL
ALP SERPL-CCNC: 77 U/L (ref 39–117)
ALT SERPL W P-5'-P-CCNC: 21 U/L (ref 1–41)
ANION GAP SERPL CALCULATED.3IONS-SCNC: 10.7 MMOL/L (ref 5–15)
AST SERPL-CCNC: 15 U/L (ref 1–40)
BILIRUB SERPL-MCNC: 0.3 MG/DL (ref 0–1.2)
BUN SERPL-MCNC: 12 MG/DL (ref 6–20)
BUN/CREAT SERPL: 13.8 (ref 7–25)
CALCIUM SPEC-SCNC: 10.3 MG/DL (ref 8.6–10.5)
CHLORIDE SERPL-SCNC: 103 MMOL/L (ref 98–107)
CO2 SERPL-SCNC: 27.3 MMOL/L (ref 22–29)
CREAT SERPL-MCNC: 0.87 MG/DL (ref 0.76–1.27)
DEPRECATED RDW RBC AUTO: 43 FL (ref 37–54)
EGFRCR SERPLBLD CKD-EPI 2021: 111.2 ML/MIN/1.73
ERYTHROCYTE [DISTWIDTH] IN BLOOD BY AUTOMATED COUNT: 13.3 % (ref 12.3–15.4)
GLOBULIN UR ELPH-MCNC: 2.9 GM/DL
GLUCOSE SERPL-MCNC: 87 MG/DL (ref 65–99)
HCT VFR BLD AUTO: 48 % (ref 37.5–51)
HCV AB SER DONR QL: NORMAL
HGB BLD-MCNC: 16 G/DL (ref 13–17.7)
MCH RBC QN AUTO: 29.3 PG (ref 26.6–33)
MCHC RBC AUTO-ENTMCNC: 33.3 G/DL (ref 31.5–35.7)
MCV RBC AUTO: 87.9 FL (ref 79–97)
PLATELET # BLD AUTO: 188 10*3/MM3 (ref 140–450)
PMV BLD AUTO: 10.9 FL (ref 6–12)
POTASSIUM SERPL-SCNC: 4.3 MMOL/L (ref 3.5–5.2)
PROT SERPL-MCNC: 7.6 G/DL (ref 6–8.5)
RBC # BLD AUTO: 5.46 10*6/MM3 (ref 4.14–5.8)
SODIUM SERPL-SCNC: 141 MMOL/L (ref 136–145)
WBC NRBC COR # BLD: 6.05 10*3/MM3 (ref 3.4–10.8)

## 2022-03-29 PROCEDURE — 85027 COMPLETE CBC AUTOMATED: CPT

## 2022-03-29 PROCEDURE — 70450 CT HEAD/BRAIN W/O DYE: CPT

## 2022-03-29 PROCEDURE — 80053 COMPREHEN METABOLIC PANEL: CPT

## 2022-03-29 PROCEDURE — 99214 OFFICE O/P EST MOD 30 MIN: CPT | Performed by: FAMILY MEDICINE

## 2022-03-29 PROCEDURE — 36415 COLL VENOUS BLD VENIPUNCTURE: CPT

## 2022-03-29 PROCEDURE — 86803 HEPATITIS C AB TEST: CPT

## 2022-03-29 RX ORDER — IBUPROFEN 800 MG/1
800 TABLET ORAL EVERY 8 HOURS PRN
Qty: 60 TABLET | Refills: 2 | Status: SHIPPED | OUTPATIENT
Start: 2022-03-29 | End: 2022-05-12

## 2022-03-29 NOTE — PROGRESS NOTES
Kang Grey presents to [facility].    Chief Complaint:  Slurred speech and confusion    Subjective       History of Present Illness:  HPI   Kang was at home Saturday and in the evening he was talking with family and his speech became slurred, c/o HA, and he was confused with his conversation, he got up to walk and was weak.   EMS came to the house and told Kang he was fine, no VS were taken, no BS checked At 4 am his symptoms had resolved.    He is on ibuprofen.  No alcohol or drug use.      Kang states he has low energy and family complains that he stays in be all morning and family is frustrated.  On further conversation it sounds like Kang has bad sleep habits    Review of Systems:  Review of Systems   Constitutional: Positive for fatigue. Negative for chills and fever.   HENT: Negative for congestion, ear discharge and sore throat.    Respiratory: Negative for shortness of breath.    Cardiovascular: Negative for chest pain.   Gastrointestinal: Negative for abdominal pain, constipation, diarrhea, nausea, vomiting and GERD.   Genitourinary: Negative for flank pain.   Neurological: Negative for dizziness and headache.   Psychiatric/Behavioral: Positive for sleep disturbance.        Objective   Medical History:  Past Medical History:   • Acute upper respiratory infection   • Age-related osteoporosis without current pathological fracture   • Cerebral palsy (HCC)    diagnosed as infant   • Compression fracture of first lumbar vertebra ([facility])   • Constipation   • Disease of pancreas   • Hydrocephalus in diseases classified elsewhere ([facility])   • Injury of back   • Low back pain   • Major depressive disorder    single episode, moderate   • Other abnormal glucose   • Other cerebral palsy (HCC)   • Other fatigue   • Other generalized epilepsy and epileptic syndromes, not intractable, without status epilepticus ([facility])   • Other hypertrophic disorders of the skin   • Pain in left  ankle and joints of left foot   • Pain in right toe(s)   • Pain in thoracic spine   • Paranoid schizophrenia (HCC)   • Pelvic and perineal pain   • Primary insomnia   • Pure hypercholesterolemia   • Repeated falls   • Schizoaffective disorder, unspecified (HCC)   • Seizures (HCC)    last seizure was 5 years ago; medication controlled   • Somnolence   • Vitamin D deficiency   • Wedge compression fracture of fourth thoracic vertebra, subsequent encounter for fracture with routine healing     Past Surgical History:   • HAMSTRING REPAIR   • OTHER SURGICAL HISTORY    hyposadius repair      Family History   Problem Relation Age of Onset   • Hypothyroidism Mother    • No Known Problems Father    • Ulcers Brother      Social History     Tobacco Use   • Smoking status: Never Smoker   • Smokeless tobacco: Never Used   Substance Use Topics   • Alcohol use: Never       Health Maintenance Due   Topic Date Due   • HEPATITIS C SCREENING  Never done   • DXA SCAN  01/17/2022        Immunization History   Administered Date(s) Administered   • COVID-19 (MODERNA) 1st, 2nd, 3rd Dose Only 03/25/2021, 04/22/2021, 11/19/2021   • FluLaval/Fluarix/Fluzone >6 12/07/2021   • Influenza Quad Vaccine (Inpatient) 10/01/2013   • Influenza, Unspecified 10/07/2016   • Tdap 05/05/2016       Allergies   Allergen Reactions   • Valproic Acid Unknown - Low Severity and Irritability     DIZZY    Other reaction(s): Irritability, Unknown - Low Severity  DIZZY        Medications:  Current Outpatient Medications on File Prior to Visit   Medication Sig   • ascorbic acid (VITAMIN C) 1000 MG tablet Take 1 tablet by mouth Daily.   • busPIRone (BUSPAR) 10 MG tablet Take 1 tablet by mouth 3 (Three) Times a Day As Needed.   • Calcium Carbonate 1500 (600 Ca) MG tablet Take 1 tablet by mouth 2 (two) times a day.   • cholecalciferol (VITAMIN D3) 25 MCG (1000 UT) tablet Take 2 tablets by mouth Daily.   • Diclofenac Sodium (VOLTAREN) 1 % gel gel Apply 1 g topically to the  "appropriate area as directed As Needed.   • doxycycline (VIBRAMYCIN) 100 MG capsule Take 1 capsule by mouth 2 (Two) Times a Day.   • folic acid (FOLVITE) 800 MCG tablet Take 1 tablet by mouth Daily.   • HM TRUEplus Fiber 2 g chewable tablet Chew 1 tablet Daily.   • Invega Sustenna 234 MG/1.5ML suspension prefilled syringe IM injection Inject 1.5 mL into the appropriate muscle as directed by prescriber Every 30 (Thirty) Days.   • Keppra 500 MG tablet Take 3 tablets by mouth Every 12 (Twelve) Hours As Needed.   • lamoTRIgine (LaMICtal) 200 MG tablet Take 1 tablet by mouth Daily.   • Melatonin 10 MG tablet Take 1 tablet by mouth every night at bedtime.   • pravastatin (PRAVACHOL) 20 MG tablet TAKE ONE TABLET BY MOUTH ONCE DAILY AT BEDTIME FOR CHOLESTEROL.   • [DISCONTINUED] ibuprofen (ADVIL,MOTRIN) 800 MG tablet TAKE ONE TABLET BY MOUTH 3 TIMES DAILY WITH FOOD   • [DISCONTINUED] methylPREDNISolone (MEDROL) 4 MG dose pack Take as directed on package instructions.   • [DISCONTINUED] tiZANidine (ZANAFLEX) 2 MG tablet Take 2 tablets by mouth Daily.   • albuterol sulfate  (90 Base) MCG/ACT inhaler Inhale 2 puffs Every 4 (Four) Hours As Needed for Wheezing or Shortness of Air (cough).   • cyclobenzaprine (FLEXERIL) 5 MG tablet Take 1 tablet by mouth Every 8 (Eight) Hours As Needed.   • [DISCONTINUED] ciprofloxacin (CIPRO) 500 MG tablet Take 1 tablet by mouth 2 (Two) Times a Day for 3 days. Start 3 days prior to procedure     No current facility-administered medications on file prior to visit.       Vital Signs:   /71 (BP Location: Left arm, Patient Position: Sitting, Cuff Size: Adult)   Pulse 115   Ht 172.7 cm (68\")   Wt 86.5 kg (190 lb 12.8 oz)   SpO2 98%   BMI 29.01 kg/m²       Physical Exam:  Physical Exam  Vitals and nursing note reviewed.   Constitutional:       General: He is not in acute distress.     Appearance: Normal appearance. He is not ill-appearing, toxic-appearing or diaphoretic.   HENT:      " Head: Normocephalic and atraumatic.      Right Ear: Tympanic membrane, ear canal and external ear normal.      Left Ear: Tympanic membrane, ear canal and external ear normal.      Nose: No congestion or rhinorrhea.      Mouth/Throat:      Mouth: Mucous membranes are moist.      Pharynx: Oropharynx is clear. No oropharyngeal exudate or posterior oropharyngeal erythema.   Eyes:      Extraocular Movements: Extraocular movements intact.      Conjunctiva/sclera: Conjunctivae normal.      Pupils: Pupils are equal, round, and reactive to light.   Cardiovascular:      Rate and Rhythm: Normal rate and regular rhythm.      Heart sounds: Normal heart sounds.   Pulmonary:      Effort: Pulmonary effort is normal.      Breath sounds: Normal breath sounds. No wheezing, rhonchi or rales.   Abdominal:      General: Abdomen is flat.      Palpations: Abdomen is soft.   Musculoskeletal:      Cervical back: Neck supple. No rigidity.   Lymphadenopathy:      Cervical: No cervical adenopathy.   Skin:     General: Skin is warm and dry.   Neurological:      Mental Status: He is alert and oriented to person, place, and time.   Psychiatric:         Mood and Affect: Mood normal.         Behavior: Behavior normal.         Result Review      The following data was reviewed by Hien Parham MD on 03/29/2022.  Lab Results   Component Value Date    WBC 4.34 10/21/2021    HGB 14.1 10/21/2021    HCT 40.3 10/21/2021    MCV 85.6 10/21/2021     10/21/2021     Lab Results   Component Value Date    GLUCOSE 88 12/07/2021    BUN 16 12/07/2021    CREATININE 0.90 01/25/2022    EGFRIFNONA 80 12/07/2021    BCR 15.5 12/07/2021    K 4.1 12/07/2021    CO2 26.8 12/07/2021    CALCIUM 10.3 12/07/2021    ALBUMIN 4.60 12/07/2021    LABIL2 1.4 05/06/2021    AST 14 12/07/2021    ALT 23 12/07/2021     Lab Results   Component Value Date    CHOL 142 10/21/2021    CHLPL 174 05/06/2021    TRIG 60 10/21/2021    HDL 36 (L) 10/21/2021    LDL 94 10/21/2021     Lab  Results   Component Value Date    TSH 1.590 12/07/2021     Lab Results   Component Value Date    HGBA1C 5.20 10/21/2021     No results found for: PSA                    Assessment and Plan:          Diagnoses and all orders for this visit:    1. Cerebral palsy, unspecified type (HCC) (Primary)    2. Seizures (HCC)  Comments:  no known seizures in past 6 months, sees neurology Dr Solomon Mccoy    3. Transient ischemic attack (TIA)  Comments:  will CT head, recommend start baby aspirin daily-referral to Dr Mccoy-matthew in April, make sure medi planner is accurate, r/o UTI,aspirin 81 mg  Orders:  -     Urinalysis With Culture If Indicated -; Future  -     CT Head Without Contrast; Future  -     CBC (No Diff); Future  -     Comprehensive metabolic panel; Future    4. Osteopenia, unspecified location  Comments:  dexa due in 2023-mild osteopenia in 2020    5. Encounter for screening for other viral diseases  -     Hepatitis C antibody; Future    6. Confusion  -     CT Head Without Contrast; Future    7. Chronic midline low back pain without sciatica  -     ibuprofen (ADVIL,MOTRIN) 800 MG tablet; Take 1 tablet by mouth Every 8 (Eight) Hours As Needed for Mild Pain .  Dispense: 60 tablet; Refill: 2          Follow Up   Return if symptoms worsen or fail to improve.

## 2022-03-31 ENCOUNTER — LAB (OUTPATIENT)
Dept: LAB | Facility: HOSPITAL | Age: 42
End: 2022-03-31

## 2022-03-31 ENCOUNTER — TREATMENT (OUTPATIENT)
Dept: PHYSICAL THERAPY | Facility: CLINIC | Age: 42
End: 2022-03-31

## 2022-03-31 DIAGNOSIS — R29.898 WEAKNESS OF BOTH LOWER EXTREMITIES: ICD-10-CM

## 2022-03-31 DIAGNOSIS — M54.50 MIDLINE LOW BACK PAIN, UNSPECIFIED CHRONICITY, UNSPECIFIED WHETHER SCIATICA PRESENT: Primary | ICD-10-CM

## 2022-03-31 DIAGNOSIS — M54.50 CHRONIC MIDLINE LOW BACK PAIN, UNSPECIFIED WHETHER SCIATICA PRESENT: ICD-10-CM

## 2022-03-31 DIAGNOSIS — G89.29 CHRONIC MIDLINE LOW BACK PAIN, UNSPECIFIED WHETHER SCIATICA PRESENT: ICD-10-CM

## 2022-03-31 DIAGNOSIS — R26.9 GAIT DISTURBANCE: ICD-10-CM

## 2022-03-31 LAB
BACTERIA UR QL AUTO: NORMAL /HPF
BILIRUB UR QL STRIP: NEGATIVE
CLARITY UR: CLEAR
COD CRY URNS QL: NORMAL /HPF
COLOR UR: YELLOW
GLUCOSE UR STRIP-MCNC: NEGATIVE MG/DL
HGB UR QL STRIP.AUTO: ABNORMAL
KETONES UR QL STRIP: NEGATIVE
LEUKOCYTE ESTERASE UR QL STRIP.AUTO: NEGATIVE
NITRITE UR QL STRIP: NEGATIVE
PH UR STRIP.AUTO: 7 [PH] (ref 5–8)
PROT UR QL STRIP: NEGATIVE
RBC # UR STRIP: NORMAL /HPF
REF LAB TEST METHOD: NORMAL
SP GR UR STRIP: 1.02 (ref 1–1.03)
SQUAMOUS #/AREA URNS HPF: NORMAL /HPF
UROBILINOGEN UR QL STRIP: ABNORMAL
WBC # UR STRIP: NORMAL /HPF

## 2022-03-31 PROCEDURE — 97112 NEUROMUSCULAR REEDUCATION: CPT | Performed by: PHYSICAL THERAPIST

## 2022-03-31 PROCEDURE — 81001 URINALYSIS AUTO W/SCOPE: CPT

## 2022-03-31 NOTE — PROGRESS NOTES
Physical Therapy Daily Treatment Note      Patient: Kang Grey   : 1980  Referring practitioner: Hien Parham MD  Date of Initial Visit: Type: THERAPY  Noted: 3/22/2022  Today's Date: 3/31/2022  Patient seen for 2 sessions           Subjective Evaluation    History of Present Illness    Subjective comment: 4/10 at tx time       Objective   See Exercise, Manual, and Modality Logs for complete treatment.       Assessment & Plan     Assessment  Impairments: abnormal gait, abnormal or restricted ROM, activity intolerance, impaired balance, impaired physical strength, lacks appropriate home exercise program and pain with function  Functional Limitations: carrying objects, lifting, walking, uncomfortable because of pain, moving in bed, sitting and standing  Assessment details: Pt reports that he had a TIA on Saturday and almost lost consciousness. Pt shows the ability to perform a proper PPT and to hold it while performing a LE exercise.  Pt appt was shortened due to unforseen issues.    Goals  Plan Goals: LOW BACK PROBLEMS:    1. The patient complains of low back pain.  LTG 1: 12 weeks:  The patient will report a pain rating of 2/10 or better in order to improve tolerance to activities of daily living and improve sleep quality.  STATUS:  New  STG 1a: 4 weeks:  The patient will report a pain rating of 5/10 or better.  STATUS:  New  TREATMENT:  Therapeutic exercises, manual therapy,  home exercise instruction, and modalities as needed for pain to include:  electrical stimulation, moist heat, ice, ultrasound, and diathermy.      2. The patient demonstrates weakness of the bilatearl hip.  LTG 2: 12 weeks:  The patient will demonstrate 4+ /5 strength for bilateral hip flexion, abduction, and extension in order to improve hip stability.  STATUS:  New  STG 2a: 4 weeks:  The patient will demonstrate 4- /5 strength for bilateral hip flexion, abduction, and extension.  STATUS:  New  STG2b:  4 weeks:  The patient  will be independent with home exercises.     STATUS:  New  TREATMENT: Therapeutic exercises, manual therapy, aquatic therapy, home exercise instruction, and modalities as needed for pain to include:  electrical stimulation, moist heat, ice, ultrasound, and diathermy.      3. The patient has gait dysfunction.  LTG 3: 12 weeks:  The patient will ambulate without assistive device, independently, for community distances with minimal limp to the  lower extremity in order to improve mobility and allow patient to perform activities such as grocery shopping with greater ease.  STATUS:  New  TREATMENT: Gait training, aquatic therapy, therapeutic exercise, and home exercise instruction.      4. Mobility: Walking/Moving Around Functional Limitation    LTG 4: 12 weeks:  The patient will demonstrate limitation by achieving a score of 13/45 on the LAURO.  STATUS:  New  STG 4 a: 4 weeks:  The patient will demonstrate limitation by achieving a score of 19/45 on the LAURO.    STATUS:  New  TREATMENT:  Manual therapy, therapeutic exercise, home exercise instruction, and modalities as needed.      Plan  Therapy options: will be seen for skilled therapy services  Planned modality interventions: cryotherapy, thermotherapy (hydrocollator packs) and dry needling  Planned therapy interventions: abdominal trunk stabilization, manual therapy, flexibility, functional ROM exercises, gait training, home exercise program, therapeutic activities, stretching, strengthening, spinal/joint mobilization, soft tissue mobilization, postural training, neuromuscular re-education, balance/weight-bearing training, ADL retraining and body mechanics training  Frequency: 2x week  Duration in weeks: 12  Treatment plan discussed with: patient  Plan details: Awaiting to here from family about the restart of therapy.          Visit Diagnoses:    ICD-10-CM ICD-9-CM   1. Midline low back pain, unspecified chronicity, unspecified whether sciatica present  M54.50 724.2    2. Chronic midline low back pain, unspecified whether sciatica present  M54.50 724.2    G89.29 338.29   3. Gait disturbance  R26.9 781.2   4. Weakness of both lower extremities  R29.898 729.89       Progress per Plan of Care    Timed:    Therapeutic Exercise:         mins  28342;  Manual Therapy:         mins  21748;     Neuromuscular Citlaly:   12    mins  75270;    Therapeutic Activity:          mins  12463;     Gait Training:           mins  44593;     Ultrasound:          mins  10742;      Untimed:  Electrical Stimulation:         mins  36983 ( );  Mechanical Traction:         mins  37479;     Timed Treatment:   12   mins   Total Treatment:     20   mins      Landy Soler PTA  Physical Therapist Assistant

## 2022-04-19 ENCOUNTER — OFFICE VISIT (OUTPATIENT)
Dept: UROLOGY | Facility: CLINIC | Age: 42
End: 2022-04-19

## 2022-04-19 VITALS — WEIGHT: 185.4 LBS | RESPIRATION RATE: 16 BRPM | HEIGHT: 68 IN | BODY MASS INDEX: 28.1 KG/M2

## 2022-04-19 DIAGNOSIS — R33.9 URINARY RETENTION: Primary | ICD-10-CM

## 2022-04-19 DIAGNOSIS — E55.9 VITAMIN D DEFICIENCY: ICD-10-CM

## 2022-04-19 PROBLEM — M54.50 CHRONIC BILATERAL LOW BACK PAIN WITHOUT SCIATICA: Status: ACTIVE | Noted: 2022-01-31

## 2022-04-19 PROBLEM — F25.0 SCHIZOAFFECTIVE DISORDER, BIPOLAR TYPE: Status: ACTIVE | Noted: 2021-10-20

## 2022-04-19 PROBLEM — G47.00 INSOMNIA: Status: ACTIVE | Noted: 2021-10-20

## 2022-04-19 PROBLEM — Z00.01 ENCOUNTER FOR GENERAL ADULT MEDICAL EXAMINATION WITH ABNORMAL FINDINGS: Status: ACTIVE | Noted: 2022-01-31

## 2022-04-19 PROBLEM — G89.29 CHRONIC BILATERAL LOW BACK PAIN WITHOUT SCIATICA: Status: ACTIVE | Noted: 2022-01-31

## 2022-04-19 PROBLEM — F33.9 RECURRENT MAJOR DEPRESSIVE DISORDER: Status: ACTIVE | Noted: 2021-10-20

## 2022-04-19 PROCEDURE — 51701 INSERT BLADDER CATHETER: CPT | Performed by: NURSE PRACTITIONER

## 2022-04-19 RX ORDER — VITAMIN B COMPLEX
TABLET ORAL
Qty: 180 TABLET | Refills: 1 | Status: SHIPPED | OUTPATIENT
Start: 2022-04-19 | End: 2022-07-26 | Stop reason: SDUPTHER

## 2022-04-19 NOTE — PROGRESS NOTES
Chief Complaint: Urinary Retention (Cic teaching)    Subjective         History of Present Illness  Kang Grey is a 41 y.o. male presents to [facility] to be seen for CIC teaching.    Objective     Past Medical History:   Diagnosis Date   • Acute upper respiratory infection    • Age-related osteoporosis without current pathological fracture    • Cerebral palsy (HCC)     diagnosed as infant   • Compression fracture of first lumbar vertebra ([facility])    • Constipation    • Disease of pancreas    • Hydrocephalus in diseases classified elsewhere ([facility])    • Injury of back 02/2020   • Low back pain    • Major depressive disorder     single episode, moderate   • Other abnormal glucose    • Other cerebral palsy ([facility])    • Other fatigue    • Other generalized epilepsy and epileptic syndromes, not intractable, without status epilepticus ([facility])    • Other hypertrophic disorders of the skin    • Pain in left ankle and joints of left foot    • Pain in right toe(s)    • Pain in thoracic spine    • Paranoid schizophrenia ([facility])    • Pelvic and perineal pain    • Primary insomnia    • Pure hypercholesterolemia    • Repeated falls    • Schizoaffective disorder, unspecified ([facility])    • Seizures ([facility])     last seizure was 5 years ago; medication controlled   • Somnolence    • Vitamin D deficiency    • Wedge compression fracture of fourth thoracic vertebra, subsequent encounter for fracture with routine healing        Past Surgical History:   Procedure Laterality Date   • HAMSTRING REPAIR     • OTHER SURGICAL HISTORY      hyposadius repair         Current Outpatient Medications:   •  albuterol sulfate  (90 Base) MCG/ACT inhaler, Inhale 2 puffs Every 4 (Four) Hours As Needed for Wheezing or Shortness of Air (cough)., Disp: 18 g, Rfl: 1  •  ascorbic acid (VITAMIN C) 1000 MG tablet, Take 1 tablet by mouth Daily., Disp: , Rfl:   •  busPIRone (BUSPAR) 10 MG tablet, Take 1 tablet by mouth 3 (Three) Times a Day  As Needed., Disp: , Rfl:   •  Calcium Carbonate 1500 (600 Ca) MG tablet, Take 1 tablet by mouth 2 (two) times a day., Disp: , Rfl:   •  cholecalciferol (VITAMIN D3) 25 MCG (1000 UT) tablet, Take 2 tablets by mouth Daily., Disp: 90 tablet, Rfl: 1  •  cyclobenzaprine (FLEXERIL) 5 MG tablet, Take 1 tablet by mouth Every 8 (Eight) Hours As Needed., Disp: , Rfl:   •  Diclofenac Sodium (VOLTAREN) 1 % gel gel, Apply 1 g topically to the appropriate area as directed As Needed., Disp: , Rfl:   •  doxycycline (VIBRAMYCIN) 100 MG capsule, Take 1 capsule by mouth 2 (Two) Times a Day., Disp: 20 capsule, Rfl: 0  •  folic acid (FOLVITE) 800 MCG tablet, Take 1 tablet by mouth Daily., Disp: , Rfl:   •  HM TRUEplus Fiber 2 g chewable tablet, Chew 1 tablet Daily., Disp: , Rfl:   •  ibuprofen (ADVIL,MOTRIN) 800 MG tablet, Take 1 tablet by mouth Every 8 (Eight) Hours As Needed for Mild Pain ., Disp: 60 tablet, Rfl: 2  •  Invega Sustenna 234 MG/1.5ML suspension prefilled syringe IM injection, Inject 1.5 mL into the appropriate muscle as directed by prescriber Every 30 (Thirty) Days., Disp: , Rfl:   •  Keppra 500 MG tablet, Take 3 tablets by mouth Every 12 (Twelve) Hours As Needed., Disp: , Rfl:   •  lamoTRIgine (LaMICtal) 200 MG tablet, Take 1 tablet by mouth Daily., Disp: , Rfl:   •  Melatonin 10 MG tablet, Take 1 tablet by mouth every night at bedtime., Disp: 90 tablet, Rfl: 1  •  pravastatin (PRAVACHOL) 20 MG tablet, TAKE ONE TABLET BY MOUTH ONCE DAILY AT BEDTIME FOR CHOLESTEROL., Disp: 90 tablet, Rfl: 0    Allergies   Allergen Reactions   • Valproic Acid Unknown - Low Severity and Irritability     DIZZY    Other reaction(s): Irritability, Unknown - Low Severity  DIZZY        Family History   Problem Relation Age of Onset   • Hypothyroidism Mother    • No Known Problems Father    • Ulcers Brother        Social History     Socioeconomic History   • Marital status: Single   Tobacco Use   • Smoking status: Never Smoker   • Smokeless  "tobacco: Never Used   Vaping Use   • Vaping Use: Never used   Substance and Sexual Activity   • Alcohol use: Never   • Drug use: Never   • Sexual activity: Defer       Vital Signs:   Resp 16   Ht 172.7 cm (68\")   Wt 84.1 kg (185 lb 6.4 oz)   BMI 28.19 kg/m²      Physical Exam     Result Review :   The following data was reviewed by: ÁNGELA Quintana on 04/19/2022:  Results for orders placed or performed in visit on 03/29/22   Hepatitis C antibody    Specimen: Blood   Result Value Ref Range    Hepatitis C Ab Non-Reactive Non-Reactive   Urinalysis With Culture If Indicated - Urine, Clean Catch    Specimen: Urine, Clean Catch   Result Value Ref Range    Color, UA Yellow Yellow, Straw    Appearance, UA Clear Clear    pH, UA 7.0 5.0 - 8.0    Specific Gravity, UA 1.020 1.005 - 1.030    Glucose, UA Negative Negative    Ketones, UA Negative Negative    Bilirubin, UA Negative Negative    Blood, UA Trace (A) Negative    Protein, UA Negative Negative    Leuk Esterase, UA Negative Negative    Nitrite, UA Negative Negative    Urobilinogen, UA 0.2 E.U./dL 0.2 - 1.0 E.U./dL   CBC (No Diff)    Specimen: Blood   Result Value Ref Range    WBC 6.05 3.40 - 10.80 10*3/mm3    RBC 5.46 4.14 - 5.80 10*6/mm3    Hemoglobin 16.0 13.0 - 17.7 g/dL    Hematocrit 48.0 37.5 - 51.0 %    MCV 87.9 79.0 - 97.0 fL    MCH 29.3 26.6 - 33.0 pg    MCHC 33.3 31.5 - 35.7 g/dL    RDW 13.3 12.3 - 15.4 %    RDW-SD 43.0 37.0 - 54.0 fl    MPV 10.9 6.0 - 12.0 fL    Platelets 188 140 - 450 10*3/mm3   Comprehensive metabolic panel    Specimen: Blood   Result Value Ref Range    Glucose 87 65 - 99 mg/dL    BUN 12 6 - 20 mg/dL    Creatinine 0.87 0.76 - 1.27 mg/dL    Sodium 141 136 - 145 mmol/L    Potassium 4.3 3.5 - 5.2 mmol/L    Chloride 103 98 - 107 mmol/L    CO2 27.3 22.0 - 29.0 mmol/L    Calcium 10.3 8.6 - 10.5 mg/dL    Total Protein 7.6 6.0 - 8.5 g/dL    Albumin 4.70 3.50 - 5.20 g/dL    ALT (SGPT) 21 1 - 41 U/L    AST (SGOT) 15 1 - 40 U/L    Alkaline " Phosphatase 77 39 - 117 U/L    Total Bilirubin 0.3 0.0 - 1.2 mg/dL    Globulin 2.9 gm/dL    A/G Ratio 1.6 g/dL    BUN/Creatinine Ratio 13.8 7.0 - 25.0    Anion Gap 10.7 5.0 - 15.0 mmol/L    eGFR 111.2 >60.0 mL/min/1.73   Urinalysis, Microscopic Only - Urine, Clean Catch    Specimen: Urine, Clean Catch   Result Value Ref Range    RBC, UA None Seen None Seen /HPF    WBC, UA None Seen None Seen /HPF    Bacteria, UA None Seen None Seen /HPF    Squamous Epithelial Cells, UA 0-2 None Seen, 0-2 /HPF    Calcium Oxalate Crystals, UA Moderate/2+ None Seen /HPF    Methodology Manual Light Microscopy             Insert Non-indwelling Bladder Catheter    Date/Time: 4/19/2022 9:54 AM  Performed by: Monica Parrish APRN  Authorized by: Monica Parrish APRN   Preparation: Patient was prepped and draped in the usual sterile fashion.  Local anesthesia used: no    Anesthesia:  Local anesthesia used: no    Sedation:  Patient sedated: no    Patient tolerance: patient tolerated the procedure well with no immediate complications  Comments: Patient was shown how to catheterize himself.  Hand hygiene was discussed as well as technique.  Patient and patient's mother were able to return demonstration.  The patient returned 1300 cc of urine with catheterization this morning.              Assessment and Plan    Diagnoses and all orders for this visit:    1. Urinary retention (Primary)  -     Insert Non-indwelling Bladder Catheter    Patient will follow up with urodynamics testing and subsequent follow-up appointments to discuss further plan of care  I spent 15 minutes caring for Kang on this date of service. This time includes time spent by me in the following activities:reviewing tests, obtaining and/or reviewing a separately obtained history, performing a medically appropriate examination and/or evaluation , counseling and educating the patient/family/caregiver, ordering medications, tests, or procedures, and documenting information  in the medical record  Follow Up   Return for Next scheduled follow up.  Patient was given instructions and counseling regarding his condition or for health maintenance advice. Please see specific information pulled into the AVS if appropriate.         This document has been electronically signed by ÁNGELA Quintana  April 19, 2022 09:55 EDT

## 2022-04-26 RX ORDER — PRAVASTATIN SODIUM 20 MG
TABLET ORAL
Qty: 90 TABLET | Refills: 0 | OUTPATIENT
Start: 2022-04-26

## 2022-04-27 ENCOUNTER — TELEPHONE (OUTPATIENT)
Dept: UROLOGY | Facility: CLINIC | Age: 42
End: 2022-04-27

## 2022-04-27 ENCOUNTER — DOCUMENTATION (OUTPATIENT)
Dept: UROLOGY | Facility: CLINIC | Age: 42
End: 2022-04-27

## 2022-04-27 NOTE — TELEPHONE ENCOUNTER
Mother called to report that they needed more sample catheters and that they had not been set up with home catheter supply company. Melissa talked with them and is gathering her samples. Please talk to her as she is going to try to send in an order for catheters.

## 2022-04-27 NOTE — PROGRESS NOTES
Patient is to self catheterize TID due to permanent urinary retention, patient unable to pass straight catheter due to pain and bleeding.

## 2022-05-12 ENCOUNTER — OFFICE VISIT (OUTPATIENT)
Dept: FAMILY MEDICINE CLINIC | Age: 42
End: 2022-05-12

## 2022-05-12 VITALS
HEIGHT: 68 IN | WEIGHT: 184 LBS | DIASTOLIC BLOOD PRESSURE: 73 MMHG | OXYGEN SATURATION: 95 % | HEART RATE: 89 BPM | SYSTOLIC BLOOD PRESSURE: 103 MMHG | BODY MASS INDEX: 27.89 KG/M2

## 2022-05-12 DIAGNOSIS — E78.2 MIXED HYPERLIPIDEMIA: ICD-10-CM

## 2022-05-12 DIAGNOSIS — Z13.6 ENCOUNTER FOR SCREENING FOR CARDIOVASCULAR DISORDERS: ICD-10-CM

## 2022-05-12 DIAGNOSIS — G80.9 CEREBRAL PALSY, UNSPECIFIED TYPE: ICD-10-CM

## 2022-05-12 DIAGNOSIS — E55.9 VITAMIN D DEFICIENCY: ICD-10-CM

## 2022-05-12 DIAGNOSIS — G89.29 CHRONIC MIDLINE LOW BACK PAIN WITHOUT SCIATICA: ICD-10-CM

## 2022-05-12 DIAGNOSIS — G45.9 TRANSIENT ISCHEMIC ATTACK (TIA): ICD-10-CM

## 2022-05-12 DIAGNOSIS — F20.0 PARANOID SCHIZOPHRENIA: ICD-10-CM

## 2022-05-12 DIAGNOSIS — F51.01 PRIMARY INSOMNIA: ICD-10-CM

## 2022-05-12 DIAGNOSIS — R53.83 FATIGUE, UNSPECIFIED TYPE: ICD-10-CM

## 2022-05-12 DIAGNOSIS — M85.80 OSTEOPENIA, UNSPECIFIED LOCATION: ICD-10-CM

## 2022-05-12 DIAGNOSIS — R82.90 ABNORMAL URINE SEDIMENT: ICD-10-CM

## 2022-05-12 DIAGNOSIS — R56.9 SEIZURES: ICD-10-CM

## 2022-05-12 DIAGNOSIS — M54.50 CHRONIC MIDLINE LOW BACK PAIN WITHOUT SCIATICA: ICD-10-CM

## 2022-05-12 DIAGNOSIS — Z78.0 POSTMENOPAUSAL STATE: ICD-10-CM

## 2022-05-12 DIAGNOSIS — Z79.82 ASPIRIN LONG-TERM USE: ICD-10-CM

## 2022-05-12 DIAGNOSIS — G47.31 COMPLEX SLEEP APNEA SYNDROME: ICD-10-CM

## 2022-05-12 DIAGNOSIS — H91.90 HEARING LOSS, UNSPECIFIED HEARING LOSS TYPE, UNSPECIFIED LATERALITY: ICD-10-CM

## 2022-05-12 DIAGNOSIS — Z00.00 ENCOUNTER FOR ANNUAL WELLNESS EXAM IN MEDICARE PATIENT: Primary | ICD-10-CM

## 2022-05-12 PROCEDURE — 1170F FXNL STATUS ASSESSED: CPT | Performed by: FAMILY MEDICINE

## 2022-05-12 PROCEDURE — 81001 URINALYSIS AUTO W/SCOPE: CPT

## 2022-05-12 PROCEDURE — 87186 SC STD MICRODIL/AGAR DIL: CPT

## 2022-05-12 PROCEDURE — 87086 URINE CULTURE/COLONY COUNT: CPT

## 2022-05-12 PROCEDURE — G0439 PPPS, SUBSEQ VISIT: HCPCS | Performed by: FAMILY MEDICINE

## 2022-05-12 PROCEDURE — 1159F MED LIST DOCD IN RCRD: CPT | Performed by: FAMILY MEDICINE

## 2022-05-12 PROCEDURE — 87077 CULTURE AEROBIC IDENTIFY: CPT

## 2022-05-12 PROCEDURE — 99214 OFFICE O/P EST MOD 30 MIN: CPT | Performed by: FAMILY MEDICINE

## 2022-05-12 RX ORDER — VENLAFAXINE HYDROCHLORIDE 150 MG/1
CAPSULE, EXTENDED RELEASE ORAL
COMMUNITY
Start: 2022-04-25 | End: 2022-05-12 | Stop reason: ALTCHOICE

## 2022-05-12 RX ORDER — FAMOTIDINE 20 MG/1
20 TABLET, FILM COATED ORAL DAILY
Qty: 90 TABLET | Refills: 1 | Status: SHIPPED | OUTPATIENT
Start: 2022-05-12 | End: 2022-07-26 | Stop reason: SDUPTHER

## 2022-05-12 NOTE — PROGRESS NOTES
The ABCs of the Annual Wellness Visit  Subsequent Medicare Wellness Visit    Chief Complaint   Patient presents with   • Annual Exam     Medicare annual wellness exam    • Hyperlipidemia   • Depression   • Fatigue     Pt complaint of being very fatigued   • OTHER     Pt mother is requesting Urinalysis because pt urine looks abnormal when changing cath      Subjective    History of Present Illness:  Kang Grey is a 41 y.o. male who presents for a Subsequent Medicare Wellness Visit.    The following portions of the patient's history were reviewed and   updated as appropriate: allergies, current medications, past family history, past medical history, past social history, past surgical history and problem list.    Compared to one year ago, the patient feels his physical   health is worse.-he is doing better than I have seen in a long time    Compared to one year ago, the patient feels his mental   health is worse.  He is also doing better than I have seen in a long time    Recent Hospitalizations:  He was not admitted to the hospital during the last year.     Extreme fatigue, he cant describe it but he feels worn out all the time.  He is sleeping too much.  He does not exercise.   He is still not very compliant with showering.  He has cpap and isnt compliant with it.  He also takes melatonin    His urine smells, he is cathing routinely and the initial urine is thick.  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  He is on lamictal, keppra, effexor and buspar as well as folic acid.                   Concerning pure hypercholesterolemia, unspecified, current treatment includes a lipid lowering agent.  Compliance with treatment has been good.  He denies experiencing any  hypercholesterolemia related symptoms.      Patient presents with pure hypercholesterolemia, unspecified.  Current treatment includes a lipid lowering agent pravastatin.  Compliance with treatment has been good.  He denies experiencing any hypercholesterolemia related symptoms.             chronic ongoing constipation, but he is improved and less incontinence accidents.  He refuses to use the prunes    Current Medical Providers:  Patient Care Team:  Hien Parham MD as PCP - General (Family Medicine)  Adalid Morris MD as Consulting Physician (Urology)  Roni Osullivan DPM as Consulting Physician (Podiatry)  Jaylyn Adhikari APRN (Nurse Practitioner)  Solomon Mccoy MD as Consulting Physician (Neurology)  Irving Sams MD as Surgeon (Neurosurgery)  Jimbo Paz MD as Consulting Physician (Sleep Medicine)    Outpatient Medications Prior to Visit   Medication Sig Dispense Refill   • ascorbic acid (VITAMIN C) 1000 MG tablet Take 1 tablet by mouth Daily.     • busPIRone (BUSPAR) 10 MG tablet Take 1 tablet by mouth 3 (Three) Times a Day As Needed.     • Calcium Carbonate 1500 (600 Ca) MG tablet Take 1 tablet by mouth 2 (two) times a day.     • cyclobenzaprine (FLEXERIL) 5 MG tablet Take 1 tablet by mouth Every 8 (Eight) Hours As Needed.     • Diclofenac Sodium (VOLTAREN) 1 % gel gel Apply 1 g topically to the appropriate area as directed As Needed.     • folic acid (FOLVITE) 800 MCG tablet Take 1 tablet by mouth Daily.     • HM TRUEplus Fiber 2 g chewable tablet Chew 1 tablet Daily.     • Invega Sustenna 234 MG/1.5ML suspension prefilled syringe IM injection Inject 1.5 mL into the appropriate muscle as directed by prescriber Every 30 (Thirty) Days.     • Keppra 500 MG tablet Take 3 tablets by mouth Every 12 (Twelve) Hours As Needed.     • lamoTRIgine (LaMICtal) 200 MG tablet Take 1 tablet by mouth Daily.     • Melatonin 10 MG tablet Take 1 tablet by mouth every night at bedtime. 90  tablet 1   • pravastatin (PRAVACHOL) 20 MG tablet TAKE ONE TABLET BY MOUTH ONCE DAILY AT BEDTIME FOR CHOLESTEROL. 90 tablet 0   • Vitamin D 25 MCG (1000 UT) tablet TAKE 2 TABLETS BY MOUTH DAILY. 180 tablet 1   • albuterol sulfate  (90 Base) MCG/ACT inhaler Inhale 2 puffs Every 4 (Four) Hours As Needed for Wheezing or Shortness of Air (cough). 18 g 1   • ibuprofen (ADVIL,MOTRIN) 800 MG tablet Take 1 tablet by mouth Every 8 (Eight) Hours As Needed for Mild Pain . 60 tablet 2   • doxycycline (VIBRAMYCIN) 100 MG capsule Take 1 capsule by mouth 2 (Two) Times a Day. 20 capsule 0   • venlafaxine XR (EFFEXOR-XR) 150 MG 24 hr capsule        No facility-administered medications prior to visit.       No opioid medication identified on active medication list. I have reviewed chart for other potential  high risk medication/s and harmful drug interactions in the elderly.          Aspirin is not on active medication list.  Aspirin use is indicated based on review of current medical condition/s. Pros and cons of this therapy have been discussed with this patient. Benefits of this medication outweigh potential harm.  Patient has been instructed to start taking this medication.  Pt had a TIA.    Patient Active Problem List   Diagnosis   • Ankle pain, left   • Cerebral palsy (HCC)   • Closed fracture of bone   • Complex partial seizure evolving to generalized seizure (HCC)   • Epilepsy ([facility])   • Foot pain, right   • Hammertoe   • Ingrown toenail   • Headache   • Hyperlipemia   • Maltracking of right patella   • Meningitis   • Seizures ([facility])   • Complex sleep apnea syndrome   • Benign prostatic hyperplasia with incomplete bladder emptying   • Neurogenic bladder   • Urinary retention   • Body mass index (BMI) of 27.0-27.9 in adult   • Chronic bilateral low back pain without sciatica   • Encounter for general adult medical examination with abnormal findings   • Insomnia   • Recurrent major depressive disorder (HCC)   •  "Schizoaffective disorder, bipolar type (HCC)   • Vitamin D deficiency   • Encounter for annual wellness exam in Medicare patient   • Aspirin long-term use   • Osteopenia   • Transient ischemic attack (TIA)   • Paranoid schizophrenia (HCC)     Advance Care Planning  Advance Directive is not on file.  ACP discussion was held with the patient during this visit. Patient has an advance directive (not in EMR), copy requested.    Review of Systems   Constitutional: Positive for fatigue. Negative for chills and fever.   HENT: Negative for ear pain, rhinorrhea and sore throat.    Eyes: Negative for pain.   Respiratory: Negative for cough, shortness of breath and wheezing.    Cardiovascular: Negative for chest pain, palpitations and leg swelling.   Gastrointestinal: Negative for abdominal pain, blood in stool, constipation, diarrhea, nausea and vomiting.   Genitourinary: Negative for dysuria.   Skin: Negative for rash.   Neurological: Negative for dizziness.   Psychiatric/Behavioral: Negative for sleep disturbance and suicidal ideas. The patient is not nervous/anxious.         Objective    Vitals:    05/12/22 0843   BP: 103/73   BP Location: Right arm   Patient Position: Sitting   Cuff Size: Adult   Pulse: 89   SpO2: 95%   Weight: 83.5 kg (184 lb)   Height: 172.7 cm (68\")     BMI Readings from Last 1 Encounters:   05/12/22 27.98 kg/m²   BMI is above normal parameters. Recommendations include: exercise counseling and nutrition counseling    Does the patient have evidence of cognitive impairment? Yes    Physical Exam  Vitals and nursing note reviewed. Exam conducted with a chaperone present.   Constitutional:       General: He is not in acute distress.     Appearance: Normal appearance. He is not ill-appearing, toxic-appearing or diaphoretic.   HENT:      Head: Normocephalic and atraumatic.      Right Ear: Tympanic membrane, ear canal and external ear normal.      Left Ear: Tympanic membrane, ear canal and external ear normal. "      Nose: No congestion or rhinorrhea.      Mouth/Throat:      Mouth: Mucous membranes are moist.      Pharynx: Oropharynx is clear. No oropharyngeal exudate or posterior oropharyngeal erythema.   Eyes:      Extraocular Movements: Extraocular movements intact.      Conjunctiva/sclera: Conjunctivae normal.      Pupils: Pupils are equal, round, and reactive to light.   Cardiovascular:      Rate and Rhythm: Normal rate and regular rhythm.      Heart sounds: Normal heart sounds.   Pulmonary:      Effort: Pulmonary effort is normal.      Breath sounds: Normal breath sounds. No wheezing, rhonchi or rales.   Abdominal:      General: Abdomen is flat.      Palpations: Abdomen is soft.      Hernia: There is no hernia in the left inguinal area or right inguinal area.   Genitourinary:     Penis: Normal and circumcised.       Testes: Normal.      Epididymis:      Right: Normal.      Left: Normal.   Musculoskeletal:      Cervical back: Neck supple. No rigidity.   Lymphadenopathy:      Cervical: No cervical adenopathy.   Skin:     General: Skin is warm and dry.   Neurological:      Mental Status: He is alert and oriented to person, place, and time.   Psychiatric:         Mood and Affect: Mood normal.         Behavior: Behavior normal.                 HEALTH RISK ASSESSMENT    Smoking Status:  Social History     Tobacco Use   Smoking Status Never Smoker   Smokeless Tobacco Never Used     Alcohol Consumption:  Social History     Substance and Sexual Activity   Alcohol Use Never     Fall Risk Screen:    [facility] Fall Risk Assessment has not been completed.    Depression Screening:  PHQ-2/PHQ-9 Depression Screening 5/12/2022   Retired PHQ-9 Total Score -   Retired Total Score -   Little Interest or Pleasure in Doing Things 3-->nearly every day   Feeling Down, Depressed or Hopeless 3-->nearly every day   Trouble Falling or Staying Asleep, or Sleeping Too Much 3-->nearly every day   Feeling Tired or Having Little Energy 3-->nearly every  day   Poor Appetite or Overeating 3-->nearly every day   Feeling Bad about Yourself - or that You are a Failure or Have Let Yourself or Your Family Down 0-->not at all   Trouble Concentrating on Things, Such as Reading the Newspaper or Watching Television 2-->more than half the days   Moving or Speaking So Slowly that Other People Could Have Noticed? Or the Opposite - Being So Fidgety 0-->not at all   Thoughts that You Would be Better Off Dead or of Hurting Yourself in Some Way 0-->not at all   PHQ-9: Brief Depression Severity Measure Score 17   If You Checked Off Any Problems, How Difficult Have These Problems Made It For You to Do Your Work, Take Care of Things at Home, or Get Along with Other People? very difficult       Health Habits and Functional and Cognitive Screening:  Functional & Cognitive Status 5/12/2022   Do you have difficulty preparing food and eating? No   Do you have difficulty bathing yourself, getting dressed or grooming yourself? Yes   Do you have difficulty using the toilet? (No Data)   Do you have difficulty moving around from place to place? Yes   Do you have trouble with steps or getting out of a bed or a chair? Yes   Current Diet Unhealthy Diet   Dental Exam Up to date   Eye Exam Up to date   Exercise (times per week) 0 times per week   Current Exercises Include No Regular Exercise   Do you need help using the phone?  No   Are you deaf or do you have serious difficulty hearing?  No   Do you need help with transportation? Yes   Do you need help shopping? Yes   Do you need help preparing meals?  Yes   Do you need help with housework?  Yes   Do you need help taking your medications? Yes   Do you need help managing money? Yes   Do you ever drive or ride in a car without wearing a seat belt? Yes   Have you felt unusual stress, anger or loneliness in the last month? No   Who do you live with? Other   If you need help, do you have trouble finding someone available to you? Yes   Do you have  difficulty concentrating, remembering or making decisions? Yes       Age-appropriate Screening Schedule:  Refer to the list below for future screening recommendations based on patient's age, sex and/or medical conditions. Orders for these recommended tests are listed in the plan section. The patient has been provided with a written plan.    Health Maintenance   Topic Date Due   • DXA SCAN  01/17/2022   • INFLUENZA VACCINE  08/01/2022   • LIPID PANEL  10/21/2022   • TDAP/TD VACCINES (2 - Td or Tdap) 05/05/2026                The following data was reviewed by: Hien Parham MD on 05/12/2022:  CMP    CMP 12/7/21 1/25/22 3/29/22   Glucose 88  87   BUN 16  12   Creatinine 1.03 0.90 0.87   eGFR Non African Am 80     Sodium 141  141   Potassium 4.1  4.3   Chloride 103  103   Calcium 10.3  10.3   Albumin 4.60  4.70   Total Bilirubin 0.3  0.3   Alkaline Phosphatase 75  77   AST (SGOT) 14  15   ALT (SGPT) 23  21      Comments are available for some flowsheets but are not being displayed.           CBC    CBC 10/21/21 3/29/22   WBC 4.34 6.05   RBC 4.71 5.46   Hemoglobin 14.1 16.0   Hematocrit 40.3 48.0   MCV 85.6 87.9   MCH 29.9 29.3   MCHC 35.0 33.3   RDW 12.6 13.3   Platelets 183 188           Lipid Panel    Lipid Panel 10/21/21   Total Cholesterol 142   Triglycerides 60   HDL Cholesterol 36 (A)   VLDL Cholesterol 12   LDL Cholesterol  94   LDL/HDL Ratio 2.61   (A) Abnormal value            TSH    TSH 12/7/21   TSH 1.590             HgB    HGB 10/21/21 3/29/22   Hemoglobin 14.1 16.0           UA    Urinalysis 3/25/22 3/30/22 3/30/22     1800 1800   Squamous Epithelial Cells, UA   0-2   Specific Gravity, UA  1.020    Ketones, UA Negative Negative    Blood, UA  Trace (A)    Leukocytes, UA Trace (A) Negative    Nitrite, UA  Negative    RBC, UA   None Seen   WBC, UA   None Seen   Bacteria, UA   None Seen   (A) Abnormal value                       Assessment & Plan   CMS Preventative Services Quick Reference  Risk Factors  Identified During Encounter  Cardiovascular Disease  Chronic Pain   Dementia/Memory   Depression/Dysphoria  Fall Risk-High or Moderate  Glaucoma or Family History of Glaucoma  Hearing Problem  Immunizations Discussed/Encouraged (specific Immunizations; Influenza  Inactivity/Sedentary  Urinary Incontinence  The above risks/problems have been discussed with the patient.  Follow up actions/plans if indicated are seen below in the Assessment/Plan Section.  Pertinent information has been shared with the patient in the After Visit Summary.    Diagnoses and all orders for this visit:    1. Encounter for annual wellness exam in Medicare patient (Primary)  Assessment & Plan:  MEDICARE WELLNESS EXAM-HE DOES NOT NEED A COLONOSCOPY AT HIS AGE,  UTD ON FLU/COVID VACCINE, INCREASED FALL RISK-NEEDS TO USE HIS CANE, ONGOING STABLE MEMORY ISSUES-MR/CEREBRAL PALSY,  DEPRESSION IS UNDER TREATMENT-SEES PSYCHIATRY, HE LIVES WITH HIS PARENTS. HE DOES NOT DRIVE DUE TO POOR EYESIGHT, HE IS ABLE PERFORM ADL'S-GETS ASSISTANCE WITH MEDICATION AND FINANCES, HE HAS A LIVING WILL,  HEARING IS OK AND HE WANTS TESTING TODAY     ADVANCE DIRECTIVES (Please update in PMH): Living will on file and Durable Power of  on file             2. Aspirin long-term use  -     famotidine (Pepcid) 20 MG tablet; Take 1 tablet by mouth Daily.  Dispense: 90 tablet; Refill: 1    3. Vitamin D deficiency  Comments:  stable on vitamin d supplementation    4. Cerebral palsy, unspecified type (HCC)  Comments:  unchanged, lives with family, mild memory impairment    5. Seizures (HCC)    6. Osteopenia, unspecified location  -     DEXA Bone Density Axial; Future    7. Transient ischemic attack (TIA)  Comments:  cont asa daily and start pepcid 20 mg to decrease risk of PUD    8. Primary insomnia  Comments:  cont melatonin, needs to be compliant with cpap    9. Chronic midline low back pain without sciatica  Comments:  with compression fx, he has a f/u appt with   Latrell    10. Complex sleep apnea syndrome    11. Paranoid schizophrenia (HCC)  Comments:  stable overall, sees psychiatry, takes meds as directed    12. Mixed hyperlipidemia    13. Fatigue, unspecified type    14. Encounter for screening for cardiovascular disorders    15. Postmenopausal state  Comments:  he needs dexa due to compression  Orders:  -     DEXA Bone Density Axial; Future    16. Hearing loss, unspecified hearing loss type, unspecified laterality  -     Screening Test Pure Tone, Air Only; Future    17. Abnormal urine sediment  -     Urinalysis With Culture If Indicated -; Future      Follow Up:   Return in about 3 months (around 8/12/2022) for Recheck.     An After Visit Summary and PPPS were made available to the patient.

## 2022-05-12 NOTE — ASSESSMENT & PLAN NOTE
MEDICARE WELLNESS EXAM-HE DOES NOT NEED A COLONOSCOPY AT HIS AGE,  UTD ON FLU/COVID VACCINE, INCREASED FALL RISK-NEEDS TO USE HIS CANE, ONGOING STABLE MEMORY ISSUES-MR/CEREBRAL PALSY,  DEPRESSION IS UNDER TREATMENT-SEES PSYCHIATRY, HE LIVES WITH HIS PARENTS. HE DOES NOT DRIVE DUE TO POOR EYESIGHT, HE IS ABLE PERFORM ADL'S-GETS ASSISTANCE WITH MEDICATION AND FINANCES, HE HAS A LIVING WILL,  HEARING IS OK AND HE WANTS TESTING TODAY     ADVANCE DIRECTIVES (Please update in PMH): Living will on file and Durable Power of  on file

## 2022-05-13 ENCOUNTER — TELEPHONE (OUTPATIENT)
Dept: UROLOGY | Facility: CLINIC | Age: 42
End: 2022-05-13

## 2022-05-13 ENCOUNTER — TELEPHONE (OUTPATIENT)
Dept: FAMILY MEDICINE CLINIC | Age: 42
End: 2022-05-13

## 2022-05-13 ENCOUNTER — LAB (OUTPATIENT)
Dept: LAB | Facility: HOSPITAL | Age: 42
End: 2022-05-13

## 2022-05-13 DIAGNOSIS — N30.00 ACUTE CYSTITIS WITHOUT HEMATURIA: Primary | ICD-10-CM

## 2022-05-13 DIAGNOSIS — R82.90 ABNORMAL URINE SEDIMENT: ICD-10-CM

## 2022-05-13 LAB
BACTERIA UR QL AUTO: ABNORMAL /HPF
BILIRUB UR QL STRIP: NEGATIVE
CLARITY UR: ABNORMAL
COLOR UR: YELLOW
GLUCOSE UR STRIP-MCNC: NEGATIVE MG/DL
HGB UR QL STRIP.AUTO: ABNORMAL
KETONES UR QL STRIP: NEGATIVE
LEUKOCYTE ESTERASE UR QL STRIP.AUTO: ABNORMAL
NITRITE UR QL STRIP: NEGATIVE
PH UR STRIP.AUTO: 6.5 [PH] (ref 5–8)
PROT UR QL STRIP: ABNORMAL
RBC # UR STRIP: ABNORMAL /HPF
REF LAB TEST METHOD: ABNORMAL
SP GR UR STRIP: 1.02 (ref 1–1.03)
SQUAMOUS #/AREA URNS HPF: ABNORMAL /HPF
UROBILINOGEN UR QL STRIP: ABNORMAL
WBC # UR STRIP: ABNORMAL /HPF

## 2022-05-13 RX ORDER — SULFAMETHOXAZOLE AND TRIMETHOPRIM 800; 160 MG/1; MG/1
1 TABLET ORAL 2 TIMES DAILY
Qty: 10 TABLET | Refills: 0 | Status: SHIPPED | OUTPATIENT
Start: 2022-05-13 | End: 2022-07-26

## 2022-05-13 NOTE — TELEPHONE ENCOUNTER
Thank you for reviewing DR Rothman did get on the computer today and address even though she was out of the office thank you for your quick response mom was inf of bactrim and also to follow up for diagnostic studies

## 2022-05-13 NOTE — TELEPHONE ENCOUNTER
Lidia Max from Dr. Hien Parham's office called and wanted to let Monica know that this mutual patient's urinalysis came back abnormal. They want to know what Monica wants to do. Urine culture pending. Looks like Dr. Parham sent in Bactrim for this patient today. She said to reach her back at ext [phone] or message her on Epic.

## 2022-05-13 NOTE — TELEPHONE ENCOUNTER
I called yamilex Parrish's office and left a message with CJ regarding urine results for this pt .

## 2022-05-13 NOTE — TELEPHONE ENCOUNTER
It appears as if Dr. Parham is already treating the patient what more should I need to do for the patient?  He has diagnostic studies scheduled please ensure that the patient follows up for these.

## 2022-05-15 LAB — BACTERIA SPEC AEROBE CULT: ABNORMAL

## 2022-05-16 DIAGNOSIS — N30.00 ACUTE CYSTITIS WITHOUT HEMATURIA: Primary | ICD-10-CM

## 2022-05-16 RX ORDER — NITROFURANTOIN 25; 75 MG/1; MG/1
100 CAPSULE ORAL 2 TIMES DAILY
Qty: 10 CAPSULE | Refills: 0 | Status: SHIPPED | OUTPATIENT
Start: 2022-05-16 | End: 2022-05-21

## 2022-05-23 ENCOUNTER — APPOINTMENT (OUTPATIENT)
Dept: BONE DENSITY | Facility: HOSPITAL | Age: 42
End: 2022-05-23

## 2022-05-24 ENCOUNTER — OFFICE VISIT (OUTPATIENT)
Dept: NEUROSURGERY | Facility: CLINIC | Age: 42
End: 2022-05-24

## 2022-05-24 VITALS
WEIGHT: 183.5 LBS | SYSTOLIC BLOOD PRESSURE: 120 MMHG | HEIGHT: 68 IN | BODY MASS INDEX: 27.81 KG/M2 | DIASTOLIC BLOOD PRESSURE: 77 MMHG

## 2022-05-24 DIAGNOSIS — M54.50 CHRONIC MIDLINE LOW BACK PAIN WITHOUT SCIATICA: Primary | ICD-10-CM

## 2022-05-24 DIAGNOSIS — G89.29 CHRONIC MIDLINE LOW BACK PAIN WITHOUT SCIATICA: Primary | ICD-10-CM

## 2022-05-24 PROCEDURE — 99203 OFFICE O/P NEW LOW 30 MIN: CPT | Performed by: NEUROLOGICAL SURGERY

## 2022-05-24 NOTE — PROGRESS NOTES
"Chief Complaint  Back Pain    Subjective          Kang Grey who is a 41 y.o. year old male who presents to [facility] for Evaluation of the Spine.     The patient complains of pain located in the lumbar spine.  Patients states the pain has been present for 2 years.  The pain came on acutely after a fall.  The pain scale level is moderate.  The pain does not radiate.  The pain is constant and described as aching.  The pain is worse at no particular time of day. Patient states moving after being still for a while makes the pain worse.  Patient states not much makes the pain better.    Associated Symptoms Include: NA    Conservative Interventions Include: Heating Pad, Flexeril and Diclofenac Gel    Was this the result of an injury or accident?: Yes, a series of falls    History of Previous Spinal Surgery?: No    This patient  reports that he has never smoked. He has never used smokeless tobacco.    Review of Systems   Musculoskeletal: Positive for back pain.        Objective   Vital Signs:   /77 (BP Location: Left arm, Patient Position: Sitting)   Ht 172.7 cm (68\")   Wt 83.2 kg (183 lb 8 oz)   BMI 27.90 kg/m²       Physical Exam   Neurologic Exam     Result Review :   I personally reviewed the patient's MRI scan which shows healed fractures at L1 and L4. He has moderate stenosis at multiple levels. The fractures date back to at least 2020.     Assessment and Plan    Diagnoses and all orders for this visit:    1. Chronic midline low back pain without sciatica (Primary)    He will call with any worsened pain into the legs in the future. He could benefit from TENS unit and other modalities.     I would recommend he work on core strengthening.    Follow Up   Return if symptoms worsen or fail to improve.  Patient was given instructions and counseling regarding his condition or for health maintenance advice. Please see specific information pulled into the AVS if " appropriate.

## 2022-06-01 ENCOUNTER — TRANSCRIBE ORDERS (OUTPATIENT)
Dept: ADMINISTRATIVE | Facility: HOSPITAL | Age: 42
End: 2022-06-01

## 2022-06-01 ENCOUNTER — OFFICE VISIT (OUTPATIENT)
Dept: FAMILY MEDICINE CLINIC | Age: 42
End: 2022-06-01

## 2022-06-01 VITALS
HEART RATE: 95 BPM | DIASTOLIC BLOOD PRESSURE: 79 MMHG | HEIGHT: 68 IN | SYSTOLIC BLOOD PRESSURE: 109 MMHG | BODY MASS INDEX: 27.48 KG/M2 | WEIGHT: 181.3 LBS

## 2022-06-01 DIAGNOSIS — M51.36 DDD (DEGENERATIVE DISC DISEASE), LUMBAR: ICD-10-CM

## 2022-06-01 DIAGNOSIS — R22.9 LOCALIZED SKIN MASS, LUMP, OR SWELLING: ICD-10-CM

## 2022-06-01 DIAGNOSIS — T14.8XXA CLOSED FRACTURE OF BONE: ICD-10-CM

## 2022-06-01 DIAGNOSIS — L98.9 SKIN LESIONS, GENERALIZED: Primary | ICD-10-CM

## 2022-06-01 DIAGNOSIS — M85.80 OSTEOPENIA, UNSPECIFIED LOCATION: Primary | ICD-10-CM

## 2022-06-01 DIAGNOSIS — R22.31 ELBOW MASS, RIGHT: ICD-10-CM

## 2022-06-01 PROCEDURE — 99213 OFFICE O/P EST LOW 20 MIN: CPT | Performed by: NURSE PRACTITIONER

## 2022-06-01 RX ORDER — VENLAFAXINE HYDROCHLORIDE 150 MG/1
CAPSULE, EXTENDED RELEASE ORAL
COMMUNITY
Start: 2022-05-24 | End: 2022-09-26

## 2022-06-01 NOTE — PROGRESS NOTES
"Chief Complaint  Mass (Pt c/o growths on his elbow, glutes, and under (R) eye./) and Hand Injury (Pt states that he injured his (R) hand somehow as well./)    Subjective          Kang Grey presents to [facility]  He is here today w/ his sister. He reports that he \"has some lumps on his body.\"  He reports that he has this lumps on his eyes that bother him. He also has a mass on his right lateral arm near elbow and right gluteal fold that is painful. He is unsure of how long these masses on his arm and gluteal fold have been there. He denies N/V, fever, and chills.       Objective   Vital Signs:   /79 (BP Location: Right arm, Patient Position: Sitting)   Pulse 95   Ht 172.7 cm (68\")   Wt 82.2 kg (181 lb 4.8 oz)   BMI 27.57 kg/m²     Physical Exam  Constitutional:       General: He is not in acute distress.     Appearance: Normal appearance. He is normal weight.   HENT:      Head: Normocephalic.   Eyes:      Pupils: Pupils are equal, round, and reactive to light.      Visual Fields: Right eye visual fields normal and left eye visual fields normal.   Neck:      Trachea: Trachea normal.   Cardiovascular:      Rate and Rhythm: Normal rate and regular rhythm.      Heart sounds: Normal heart sounds.   Pulmonary:      Effort: Pulmonary effort is normal.      Breath sounds: Normal breath sounds and air entry.   Musculoskeletal:      Right lower leg: No edema.      Left lower leg: No edema.   Skin:     General: Skin is warm and dry.      Findings: Lesion (flesh colored lesions on right eyelid and right upper face below right eye) present.      Comments: There is a fluid like mass on his right epicondyle that's mobile; there is a fluid like mass on his right gluteal fluid   Neurological:      Mental Status: He is alert and oriented to person, place, and time.   Psychiatric:         Mood and Affect: Mood and affect normal.         Behavior: Behavior normal.         Thought " Content: Thought content normal.        Result Review :   The following data was reviewed by: ÁNGELA Teresa on 06/01/2022:                  Assessment and Plan    Diagnoses and all orders for this visit:    1. Skin lesions, generalized (Primary)  -     Ambulatory Referral to Dermatology    2. Elbow mass, right  -     US Soft Tissue; Future  -     US Venous Doppler Upper Extremity Right (duplex); Future    3. Localized skin mass, lump, or swelling  -     US Soft Tissue; Future    Skin lesions on his face look like possible skin tags or warts.  He also has them on his eyelid.  I am going to refer him to a dermatologist for his skin lesions.  Unsure of what his right elbow mass and gluteal fold mass are.  Will order ultrasound venous Doppler of his right upper extremity and ultrasound soft tissue of his right upper arm and right gluteal fold for further investigation.      Follow Up   Return for Next scheduled follow up.  Patient was given instructions and counseling regarding his condition or for health maintenance advice. Please see specific information pulled into the AVS if appropriate.

## 2022-06-02 DIAGNOSIS — R22.31 ELBOW MASS, RIGHT: Primary | ICD-10-CM

## 2022-06-02 RX ORDER — PRAVASTATIN SODIUM 20 MG
TABLET ORAL
Qty: 90 TABLET | Refills: 0 | Status: SHIPPED | OUTPATIENT
Start: 2022-06-02 | End: 2022-07-26 | Stop reason: SDUPTHER

## 2022-06-09 ENCOUNTER — PROCEDURE VISIT (OUTPATIENT)
Dept: UROLOGY | Facility: CLINIC | Age: 42
End: 2022-06-09

## 2022-06-09 DIAGNOSIS — R33.9 URINARY RETENTION: Primary | ICD-10-CM

## 2022-06-09 PROCEDURE — 51741 ELECTRO-UROFLOWMETRY FIRST: CPT | Performed by: UROLOGY

## 2022-06-09 PROCEDURE — 51784 ANAL/URINARY MUSCLE STUDY: CPT | Performed by: UROLOGY

## 2022-06-09 PROCEDURE — 51729 CYSTOMETROGRAM W/VP&UP: CPT | Performed by: UROLOGY

## 2022-06-09 PROCEDURE — 51797 INTRAABDOMINAL PRESSURE TEST: CPT | Performed by: UROLOGY

## 2022-06-17 ENCOUNTER — OFFICE VISIT (OUTPATIENT)
Dept: PODIATRY | Facility: CLINIC | Age: 42
End: 2022-06-17

## 2022-06-17 VITALS
OXYGEN SATURATION: 97 % | WEIGHT: 177 LBS | HEIGHT: 68 IN | SYSTOLIC BLOOD PRESSURE: 120 MMHG | HEART RATE: 96 BPM | DIASTOLIC BLOOD PRESSURE: 78 MMHG | BODY MASS INDEX: 26.83 KG/M2 | TEMPERATURE: 97.5 F

## 2022-06-17 DIAGNOSIS — B35.1 ONYCHOMYCOSIS: ICD-10-CM

## 2022-06-17 DIAGNOSIS — M79.672 FOOT PAIN, BILATERAL: Primary | ICD-10-CM

## 2022-06-17 DIAGNOSIS — M79.671 FOOT PAIN, BILATERAL: Primary | ICD-10-CM

## 2022-06-17 DIAGNOSIS — R26.2 DIFFICULTY WALKING: ICD-10-CM

## 2022-06-17 DIAGNOSIS — G62.9 NEUROPATHY: ICD-10-CM

## 2022-06-17 DIAGNOSIS — M21.371 FOOT DROP, BILATERAL: ICD-10-CM

## 2022-06-17 DIAGNOSIS — M21.372 FOOT DROP, BILATERAL: ICD-10-CM

## 2022-06-17 DIAGNOSIS — L60.0 ONYCHOCRYPTOSIS: ICD-10-CM

## 2022-06-17 PROCEDURE — 11721 DEBRIDE NAIL 6 OR MORE: CPT | Performed by: PODIATRIST

## 2022-06-17 NOTE — PROGRESS NOTES
[facility] - PODIATRY    Today's Date: 06/17/22    Patient Name: Kang Grey  MRN: [id number]  CSN: [id number]  PCP: Hien Parham MD, Last PCP Visit: 6/2/2022  Referring Provider: No ref. provider found    SUBJECTIVE     Chief Complaint   Patient presents with   • Left Foot - Follow-up, Nail Problem   • Right Foot - Follow-up, Nail Problem     HPI: Kang Grey, a 41 y.o.male, comes to clinic with his mother.    New, Established, New Problem:  established   Location:  Toenails  Duration:   Greater than five years  Onset:  Gradual  Nature:  sore with palpation.  Stable, worsening, improving:   Stable  Aggravating factors:  Pain with shoe gear and ambulation.  Previous Treatment:  debridement    Medical changes: none    Patient denies any fevers, chills, nausea, vomiting, shortness of breathe, nor any other constitutional signs nor symptoms.         Past Medical History:   Diagnosis Date   • Acute upper respiratory infection    • Age-related osteoporosis without current pathological fracture    • Cerebral palsy ([facility])     diagnosed as infant   • Compression fracture of first lumbar vertebra ([facility])    • Constipation    • Disease of pancreas    • Hydrocephalus in diseases classified elsewhere ([facility])    • Injury of back 02/2020   • Low back pain    • Major depressive disorder     single episode, moderate   • Other abnormal glucose    • Other cerebral palsy ([facility])    • Other fatigue    • Other generalized epilepsy and epileptic syndromes, not intractable, without status epilepticus ([facility])    • Other hypertrophic disorders of the skin    • Pain in left ankle and joints of left foot    • Pain in right toe(s)    • Pain in thoracic spine    • Paranoid schizophrenia ([facility])    • Pelvic and perineal pain    • Primary insomnia    • Pure hypercholesterolemia    • Repeated falls    • Schizoaffective disorder, unspecified ([facility])    • Seizures ([facility])     last seizure was 5 years ago; medication controlled   •  Somnolence    • TIA (transient ischemic attack)    • Vitamin D deficiency    • Wedge compression fracture of fourth thoracic vertebra, subsequent encounter for fracture with routine healing      Past Surgical History:   Procedure Laterality Date   • HAMSTRING REPAIR     • OTHER SURGICAL HISTORY      hyposadius repair     Family History   Problem Relation Age of Onset   • Hypothyroidism Mother    • No Known Problems Father    • Ulcers Brother      Social History     Socioeconomic History   • Marital status: Single   Tobacco Use   • Smoking status: Never Smoker   • Smokeless tobacco: Never Used   Vaping Use   • Vaping Use: Never used   Substance and Sexual Activity   • Alcohol use: Never   • Drug use: Never   • Sexual activity: Defer     Allergies   Allergen Reactions   • Valproic Acid Unknown - Low Severity and Irritability     DIZZY    Other reaction(s): Irritability, Unknown - Low Severity  DIZZY     Current Outpatient Medications   Medication Sig Dispense Refill   • ascorbic acid (VITAMIN C) 1000 MG tablet Take 1 tablet by mouth Daily.     • busPIRone (BUSPAR) 10 MG tablet Take 1 tablet by mouth 3 (Three) Times a Day As Needed.     • Calcium Carbonate 1500 (600 Ca) MG tablet Take 1 tablet by mouth 2 (two) times a day.     • cyclobenzaprine (FLEXERIL) 5 MG tablet Take 1 tablet by mouth Every 8 (Eight) Hours As Needed.     • Diclofenac Sodium (VOLTAREN) 1 % gel gel Apply 1 g topically to the appropriate area as directed As Needed.     • famotidine (Pepcid) 20 MG tablet Take 1 tablet by mouth Daily. 90 tablet 1   • folic acid (FOLVITE) 800 MCG tablet Take 1 tablet by mouth Daily.     • HM TRUEplus Fiber 2 g chewable tablet Chew 1 tablet Daily.     • Invega Sustenna 234 MG/1.5ML suspension prefilled syringe IM injection Inject 1.5 mL into the appropriate muscle as directed by prescriber Every 30 (Thirty) Days.     • Keppra 500 MG tablet Take 3 tablets by mouth Every 12 (Twelve) Hours As Needed.     • lamoTRIgine  (LaMICtal) 200 MG tablet Take 1 tablet by mouth Daily.     • Melatonin 10 MG tablet Take 1 tablet by mouth every night at bedtime. 90 tablet 1   • pravastatin (PRAVACHOL) 20 MG tablet TAKE ONE TABLET BY MOUTH ONCE DAILY AT BEDTIME FOR CHOLESTEROL. 90 tablet 0   • sulfamethoxazole-trimethoprim (Bactrim DS) 800-160 MG per tablet Take 1 tablet by mouth 2 (Two) Times a Day. 10 tablet 0   • venlafaxine XR (EFFEXOR-XR) 150 MG 24 hr capsule      • Vitamin D 25 MCG (1000 UT) tablet TAKE 2 TABLETS BY MOUTH DAILY. 180 tablet 1     No current facility-administered medications for this visit.     Review of Systems   Constitutional: Negative.    Musculoskeletal:        Requires bilateral Go braces for ambulation.   Skin:        Painful toenails bilaterally   All other systems reviewed and are negative.      OBJECTIVE     Vitals:    06/17/22 0755   BP: 120/78   Pulse: 96   Temp: 97.5 °F (36.4 °C)   SpO2: 97%       Patient seen in no apparent distress.      PHYSICAL EXAM:     Foot/Ankle Exam:       General:   Orientation: unable to assess    Affect: inappropriate    Gait: antalgic    Shoe Gear:  Casual shoes (With bilateral Go braces.  Braces are well worn.)    VASCULAR      Right Foot Vascularity   Normal vascular exam    Dorsalis pedis:  2+  Posterior tibial:  2+  Skin Temperature: warm    Edema Grading:  None  CFT:  < 3 seconds  Pedal Hair Growth:  Present  Varicosities: none       Left Foot Vascularity   Normal vascular exam    Dorsalis pedis:  2+  Posterior tibial:  2+  Skin Temperature: warm    Edema Grading:  None  CFT:  < 3 seconds  Pedal Hair Growth:  Present  Varicosities: none        NEUROLOGIC     Right Foot Neurologic   Light touch sensation:  Diminished  Vibratory sensation:  Diminished  Hot/Cold sensation: diminished       Left Foot Neurologic   Light touch sensation:  Diminished  Hot/cold sensation: diminished       MUSCULOSKELETAL      Right Foot Musculoskeletal   Hammertoe:  First toe     Left Foot  Musculoskeletal   Hammertoe:  First toe     MUSCLE STRENGTH     Right Foot Muscle Strength   Foot dorsiflexion:  1  Foot plantar flexion:  4-  Foot inversion:  2  Foot eversion:  2     Left Foot Muscle Strength   Foot dorsiflexion:  1  Foot plantar flexion:  4-  Foot inversion:  2  Foot eversion:  2     DERMATOLOGIC     Right Foot Dermatologic   Skin: skin intact    Nails: onychomycosis, abnormally thick, subungual debris, dystrophic nails and ingrown toenail    Nails comment:  Toenails 1 through 5     Left Foot Dermatologic   Skin: skin intact    Nails: onychomycosis, abnormally thick, subungual debris, dystrophic nails and ingrown toenail    Nails comment:  Toenails 1 through 5      ASSESSMENT/PLAN     Diagnoses and all orders for this visit:    1. Foot pain, bilateral (Primary)    2. Onychomycosis    3. Onychocryptosis    4. Foot drop, bilateral    5. Difficulty walking    6. Neuropathy        Comprehensive lower extremity examination and evaluation was performed.    Discussed findings and treatment plan including risks, benefits, and treatment options with patient in detail. Patient agreed with treatment plan.    Toenails 1 through 5 bilaterally were debrided in thickness and length and then smoothed with a Dremel Tool.  Tolerated the procedure well without complications.    An After Visit Summary was printed and given to the patient at discharge, including (if requested) any available informative/educational handouts regarding diagnosis, treatment, or medications. All questions were answered to patient/family satisfaction. Should symptoms fail to improve or worsen they agree to call or return to clinic or to go to the Emergency Department. Discussed the importance of following up with any needed screening tests/labs/specialist appointments and any requested follow-up recommended by me today. Importance of maintaining follow-up discussed and patient accepts that missed appointments can delay diagnosis and  potentially lead to worsening of conditions.    Return in about 9 weeks (around 8/19/2022) for Toenail Care., or sooner if acute issues arise.    This document has been electronically signed by Roni Osullivan DPM on June 17, 2022 08:33 EDT

## 2022-06-20 ENCOUNTER — TELEPHONE (OUTPATIENT)
Dept: FAMILY MEDICINE CLINIC | Age: 42
End: 2022-06-20

## 2022-06-20 ENCOUNTER — APPOINTMENT (OUTPATIENT)
Dept: BONE DENSITY | Facility: HOSPITAL | Age: 42
End: 2022-06-20

## 2022-06-20 ENCOUNTER — TELEPHONE (OUTPATIENT)
Dept: UROLOGY | Facility: CLINIC | Age: 42
End: 2022-06-20

## 2022-06-20 ENCOUNTER — IMMUNIZATION (OUTPATIENT)
Dept: FAMILY MEDICINE CLINIC | Age: 42
End: 2022-06-20

## 2022-06-20 ENCOUNTER — HOSPITAL ENCOUNTER (OUTPATIENT)
Dept: BONE DENSITY | Facility: HOSPITAL | Age: 42
Discharge: HOME | End: 2022-06-20
Admitting: FAMILY MEDICINE

## 2022-06-20 DIAGNOSIS — Z23 NEED FOR VACCINATION: Primary | ICD-10-CM

## 2022-06-20 DIAGNOSIS — M81.0 OSTEOPOROSIS, UNSPECIFIED OSTEOPOROSIS TYPE, UNSPECIFIED PATHOLOGICAL FRACTURE PRESENCE: ICD-10-CM

## 2022-06-20 DIAGNOSIS — M85.80 OSTEOPENIA, UNSPECIFIED LOCATION: Primary | ICD-10-CM

## 2022-06-20 PROCEDURE — 91305 COVID-19 (PFIZER) 12+ YRS: CPT | Performed by: FAMILY MEDICINE

## 2022-06-20 PROCEDURE — 77080 DXA BONE DENSITY AXIAL: CPT

## 2022-06-20 PROCEDURE — 0054A COVID-19 (PFIZER) 12+ YRS: CPT | Performed by: FAMILY MEDICINE

## 2022-06-20 NOTE — TELEPHONE ENCOUNTER
LVM for patient to call back regarding appt time. Would like to move appt to 1330 due to number of procedures this day.

## 2022-06-20 NOTE — PROGRESS NOTES
I have reviewed the notes, assessments, and/or procedures performed by Sylwia Lopez MA, and I concur with her documentation of Kang Grey.

## 2022-06-26 PROBLEM — R35.0 BENIGN PROSTATIC HYPERPLASIA WITH URINARY FREQUENCY: Status: ACTIVE | Noted: 2022-03-23

## 2022-06-28 ENCOUNTER — APPOINTMENT (OUTPATIENT)
Dept: ULTRASOUND IMAGING | Facility: HOSPITAL | Age: 42
End: 2022-06-28

## 2022-06-28 ENCOUNTER — APPOINTMENT (OUTPATIENT)
Dept: CARDIOLOGY | Facility: HOSPITAL | Age: 42
End: 2022-06-28

## 2022-07-07 NOTE — PROGRESS NOTES
Procedures       Urinalysis was checked today and was negative for signs of infection      Cytoscopy Procedure:     Procedure: Flexible cytoscope was passed per urethra into the bladder without difficulty after proper consent. The bladder was inspected in a systematic meridian fashion.     2 cm prostate, no pathology large bladder with no trabeculations    There were no tumors, lesions, stones, or other abnormalities noted within the bladder. Of note, there was no increased vascularity as well. Both ureteral orifices were identified and were normal in appearance. The flexible cytoscope was removed. The patient tolerated the procedure well.         6/9/2022 UDS-bladder capacity 2300, no sensation, large bladder capacity.  No involuntary contractions.  No incontinence.  No detrusor contraction.  Could not void without abdominal straining.  PVR 1049      Neurogenic bladder      Discussed UDS with the patient.  He has a neurogenic bladder and we discussed his bladder has no detrusor function.  He has not been wanting to cath, his family's been cathing him for him.  We discussed he is going to have to learn to catheterize as he will need to CIC for the rest of his life to prevent renal damage/renal failure prematurely.  We did discuss not learning to do this could take years off his life.  Patient voiced understanding    I did recommend he start doing CIC 3 times daily.  Patient voiced understanding      Follow-up in 3 months with void diary      This document has been electronically signed by Adalid Morris MD  July 7, 2022 17:01 EDT

## 2022-07-08 ENCOUNTER — PROCEDURE VISIT (OUTPATIENT)
Dept: UROLOGY | Facility: CLINIC | Age: 42
End: 2022-07-08

## 2022-07-08 VITALS — HEIGHT: 68 IN | WEIGHT: 177 LBS | BODY MASS INDEX: 26.83 KG/M2

## 2022-07-08 DIAGNOSIS — R39.14 BENIGN PROSTATIC HYPERPLASIA WITH INCOMPLETE BLADDER EMPTYING: ICD-10-CM

## 2022-07-08 DIAGNOSIS — N31.9 NEUROGENIC BLADDER: Primary | ICD-10-CM

## 2022-07-08 DIAGNOSIS — N40.1 BENIGN PROSTATIC HYPERPLASIA WITH INCOMPLETE BLADDER EMPTYING: ICD-10-CM

## 2022-07-08 PROCEDURE — 52000 CYSTOURETHROSCOPY: CPT | Performed by: UROLOGY

## 2022-07-20 ENCOUNTER — HOSPITAL ENCOUNTER (OUTPATIENT)
Dept: CARDIOLOGY | Facility: HOSPITAL | Age: 42
Discharge: HOME | End: 2022-07-20

## 2022-07-20 ENCOUNTER — HOSPITAL ENCOUNTER (OUTPATIENT)
Dept: ULTRASOUND IMAGING | Facility: HOSPITAL | Age: 42
Discharge: HOME | End: 2022-07-20

## 2022-07-20 DIAGNOSIS — R22.31 ELBOW MASS, RIGHT: ICD-10-CM

## 2022-07-20 DIAGNOSIS — R22.9 LOCALIZED SKIN MASS, LUMP, OR SWELLING: ICD-10-CM

## 2022-07-20 LAB
BH CV UPPER VENOUS LEFT INTERNAL JUGULAR AUGMENT: NORMAL
BH CV UPPER VENOUS LEFT INTERNAL JUGULAR COMPRESS: NORMAL
BH CV UPPER VENOUS LEFT INTERNAL JUGULAR PHASIC: NORMAL
BH CV UPPER VENOUS LEFT INTERNAL JUGULAR SPONT: NORMAL
BH CV UPPER VENOUS LEFT SUBCLAVIAN AUGMENT: NORMAL
BH CV UPPER VENOUS LEFT SUBCLAVIAN COMPRESS: NORMAL
BH CV UPPER VENOUS LEFT SUBCLAVIAN PHASIC: NORMAL
BH CV UPPER VENOUS LEFT SUBCLAVIAN SPONT: NORMAL
BH CV UPPER VENOUS RIGHT AXILLARY AUGMENT: NORMAL
BH CV UPPER VENOUS RIGHT AXILLARY COMPRESS: NORMAL
BH CV UPPER VENOUS RIGHT AXILLARY PHASIC: NORMAL
BH CV UPPER VENOUS RIGHT AXILLARY SPONT: NORMAL
BH CV UPPER VENOUS RIGHT BASILIC FOREARM COMPRESS: NORMAL
BH CV UPPER VENOUS RIGHT BASILIC UPPER COMPRESS: NORMAL
BH CV UPPER VENOUS RIGHT BRACHIAL COMPRESS: NORMAL
BH CV UPPER VENOUS RIGHT CEPHALIC FOREARM COMPRESS: NORMAL
BH CV UPPER VENOUS RIGHT CEPHALIC UPPER COMPRESS: NORMAL
BH CV UPPER VENOUS RIGHT INTERNAL JUGULAR AUGMENT: NORMAL
BH CV UPPER VENOUS RIGHT INTERNAL JUGULAR COMPRESS: NORMAL
BH CV UPPER VENOUS RIGHT INTERNAL JUGULAR PHASIC: NORMAL
BH CV UPPER VENOUS RIGHT INTERNAL JUGULAR SPONT: NORMAL
BH CV UPPER VENOUS RIGHT MED CUBITAL COMPRESS: NORMAL
BH CV UPPER VENOUS RIGHT RADIAL COMPRESS: NORMAL
BH CV UPPER VENOUS RIGHT SUBCLAVIAN AUGMENT: NORMAL
BH CV UPPER VENOUS RIGHT SUBCLAVIAN COMPRESS: NORMAL
BH CV UPPER VENOUS RIGHT SUBCLAVIAN PHASIC: NORMAL
BH CV UPPER VENOUS RIGHT SUBCLAVIAN SPONT: NORMAL
BH CV UPPER VENOUS RIGHT ULNAR COMPRESS: NORMAL
MAXIMAL PREDICTED HEART RATE: 178 BPM
STRESS TARGET HR: 151 BPM

## 2022-07-20 PROCEDURE — 76999 ECHO EXAMINATION PROCEDURE: CPT

## 2022-07-20 PROCEDURE — 93971 EXTREMITY STUDY: CPT

## 2022-07-20 PROCEDURE — 93971 EXTREMITY STUDY: CPT | Performed by: SURGERY

## 2022-07-25 ENCOUNTER — TELEPHONE (OUTPATIENT)
Dept: FAMILY MEDICINE CLINIC | Age: 42
End: 2022-07-25

## 2022-07-25 NOTE — TELEPHONE ENCOUNTER
No calls from us today . Jenni called and reported that she thinks pt has a UTI I advised she call DR Arturo CRENSHAW and see what they advise ?

## 2022-07-25 NOTE — TELEPHONE ENCOUNTER
Caller: EMIR CROCKETT NOT ON VERBAL    Relationship: WIFE    Best call back number: [phone]    What is the best time to reach you: ANY    Who are you requesting to speak with (clinical staff, provider,  specific staff member): CLINICAL STAFF    What was the call regarding: PATIENTS WIFE CALLED STATING THAT SHE MISSED A CALL FROM THE OFFICE REGARDING HER . SHE WOULD LIKE TO SPEAK TO A NURSE ABOUT THE CALL.    Do you require a callback: YES

## 2022-07-26 ENCOUNTER — OFFICE VISIT (OUTPATIENT)
Dept: FAMILY MEDICINE CLINIC | Age: 42
End: 2022-07-26

## 2022-07-26 ENCOUNTER — LAB (OUTPATIENT)
Dept: LAB | Facility: HOSPITAL | Age: 42
End: 2022-07-26

## 2022-07-26 VITALS
HEIGHT: 68 IN | BODY MASS INDEX: 27.43 KG/M2 | OXYGEN SATURATION: 97 % | WEIGHT: 181 LBS | DIASTOLIC BLOOD PRESSURE: 87 MMHG | HEART RATE: 106 BPM | SYSTOLIC BLOOD PRESSURE: 133 MMHG

## 2022-07-26 DIAGNOSIS — R82.90 MALODOROUS URINE: ICD-10-CM

## 2022-07-26 DIAGNOSIS — E78.2 MIXED HYPERLIPIDEMIA: ICD-10-CM

## 2022-07-26 DIAGNOSIS — E55.9 VITAMIN D DEFICIENCY: ICD-10-CM

## 2022-07-26 DIAGNOSIS — R56.9 SEIZURES: ICD-10-CM

## 2022-07-26 DIAGNOSIS — Z79.82 ASPIRIN LONG-TERM USE: ICD-10-CM

## 2022-07-26 DIAGNOSIS — M79.89 SOFT TISSUE MASS: Primary | ICD-10-CM

## 2022-07-26 DIAGNOSIS — K59.09 OTHER CONSTIPATION: ICD-10-CM

## 2022-07-26 DIAGNOSIS — R22.31 ARM MASS, RIGHT: Primary | ICD-10-CM

## 2022-07-26 DIAGNOSIS — L91.8 INFLAMED SKIN TAG: ICD-10-CM

## 2022-07-26 LAB
CHOLEST SERPL-MCNC: 147 MG/DL (ref 0–200)
DEPRECATED RDW RBC AUTO: 40.4 FL (ref 37–54)
ERYTHROCYTE [DISTWIDTH] IN BLOOD BY AUTOMATED COUNT: 12.8 % (ref 12.3–15.4)
HCT VFR BLD AUTO: 44.3 % (ref 37.5–51)
HDLC SERPL-MCNC: 41 MG/DL (ref 40–60)
HGB BLD-MCNC: 14.8 G/DL (ref 13–17.7)
LDLC SERPL CALC-MCNC: 90 MG/DL (ref 0–100)
LDLC/HDLC SERPL: 2.17 {RATIO}
MCH RBC QN AUTO: 28.7 PG (ref 26.6–33)
MCHC RBC AUTO-ENTMCNC: 33.4 G/DL (ref 31.5–35.7)
MCV RBC AUTO: 86 FL (ref 79–97)
PLATELET # BLD AUTO: 193 10*3/MM3 (ref 140–450)
PMV BLD AUTO: 11.1 FL (ref 6–12)
RBC # BLD AUTO: 5.15 10*6/MM3 (ref 4.14–5.8)
TRIGL SERPL-MCNC: 86 MG/DL (ref 0–150)
VLDLC SERPL-MCNC: 16 MG/DL (ref 5–40)
WBC NRBC COR # BLD: 6.62 10*3/MM3 (ref 3.4–10.8)

## 2022-07-26 PROCEDURE — 85027 COMPLETE CBC AUTOMATED: CPT

## 2022-07-26 PROCEDURE — 36415 COLL VENOUS BLD VENIPUNCTURE: CPT

## 2022-07-26 PROCEDURE — 99214 OFFICE O/P EST MOD 30 MIN: CPT | Performed by: FAMILY MEDICINE

## 2022-07-26 PROCEDURE — 80061 LIPID PANEL: CPT

## 2022-07-26 RX ORDER — DOCUSATE SODIUM 100 MG/1
100 CAPSULE, LIQUID FILLED ORAL DAILY
Qty: 90 CAPSULE | Refills: 1 | Status: SHIPPED | OUTPATIENT
Start: 2022-07-26 | End: 2023-01-30

## 2022-07-26 RX ORDER — FAMOTIDINE 20 MG/1
20 TABLET, FILM COATED ORAL DAILY
Qty: 90 TABLET | Refills: 1 | Status: SHIPPED | OUTPATIENT
Start: 2022-07-26 | End: 2023-01-05

## 2022-07-26 RX ORDER — CYCLOBENZAPRINE HCL 5 MG
5 TABLET ORAL 3 TIMES DAILY PRN
Qty: 40 TABLET | Refills: 2 | Status: SHIPPED | OUTPATIENT
Start: 2022-07-26 | End: 2023-07-26

## 2022-07-26 RX ORDER — PRAVASTATIN SODIUM 20 MG
20 TABLET ORAL NIGHTLY
Qty: 90 TABLET | Refills: 1 | Status: SHIPPED | OUTPATIENT
Start: 2022-07-26 | End: 2023-03-01

## 2022-07-26 RX ORDER — MELATONIN
2000 DAILY
Qty: 180 TABLET | Refills: 1 | Status: SHIPPED | OUTPATIENT
Start: 2022-07-26 | End: 2023-03-27

## 2022-07-26 NOTE — PROGRESS NOTES
"Kang Grey presents to [facility].    Chief Complaint:  stye on R eye    Subjective       History of Present Illness:  HPI  2 concerns today, stye on R eye, lower eyelid, it is \"irritating\" red and swollen.  He also has a growth on his bottom he is concerned about it.  He had a growth on his R arm that was US and inconclusive, needs an MRI and this referral is pending. No known injury to buttocks or arm.      Constant fatigue, he has sleep apnea and isnt always faithful with using his cpap all night long.  When he takes his dog out at night he tends to not use the cpap after that    Intermittent constipation, he does not like prunes and is currently on fiber chewable daily    Chronic underlying history of seizure disorder and he is stable on Keppra.  He sees Dr. Mccoy routinely.  No acute issues at this time    He has paranoid schizophrenia and is managed by psychiatry for this,  He is chronic ongoing issues but is somewhat functional on Invega shots, depression is stable at this time.  He denies anxiety..  He is on lamictal, keppra, effexor and buspar as well as folic acid.       Concerning pure hypercholesterolemia, unspecified, current treatment includes a lipid lowering agent, pravastatin.  Tolerates medication well.  Compliance with treatment has been good.  He denies experiencing any hypercholesterolemia related symptoms.  He needs med refills today and is due for labs     Review of Systems:  Review of Systems   Constitutional: Positive for fatigue. Negative for chills and fever.   HENT: Negative for congestion, ear discharge and sore throat.    Respiratory: Negative for shortness of breath and wheezing.    Cardiovascular: Negative for chest pain and palpitations.   Gastrointestinal: Positive for constipation. Negative for abdominal pain, diarrhea, nausea, vomiting and GERD.   Genitourinary: Negative for flank pain.   Neurological: Negative for dizziness and headache. "   Psychiatric/Behavioral: Negative for depressed mood.        Objective   Medical History:  Past Medical History:   • Acute upper respiratory infection   • Age-related osteoporosis without current pathological fracture   • Cerebral palsy ([facility])    diagnosed as infant   • Compression fracture of first lumbar vertebra ([facility])   • Constipation   • Disease of pancreas   • Hydrocephalus in diseases classified elsewhere ([facility])   • Injury of back   • Low back pain   • Major depressive disorder    single episode, moderate   • Other abnormal glucose   • Other cerebral palsy ([facility])   • Other fatigue   • Other generalized epilepsy and epileptic syndromes, not intractable, without status epilepticus ([facility])   • Other hypertrophic disorders of the skin   • Pain in left ankle and joints of left foot   • Pain in right toe(s)   • Pain in thoracic spine   • Paranoid schizophrenia ([facility])   • Pelvic and perineal pain   • Primary insomnia   • Pure hypercholesterolemia   • Repeated falls   • Schizoaffective disorder, unspecified ([facility])   • Seizures ([facility])    last seizure was 5 years ago; medication controlled   • Somnolence   • TIA (transient ischemic attack)   • Vitamin D deficiency   • Wedge compression fracture of fourth thoracic vertebra, subsequent encounter for fracture with routine healing     Past Surgical History:   • HAMSTRING REPAIR   • OTHER SURGICAL HISTORY    hyposadius repair      Family History   Problem Relation Age of Onset   • Hypothyroidism Mother    • No Known Problems Father    • Ulcers Brother      Social History     Tobacco Use   • Smoking status: Never Smoker   • Smokeless tobacco: Never Used   Substance Use Topics   • Alcohol use: Never       There are no preventive care reminders to display for this patient.     Immunization History   Administered Date(s) Administered   • COVID-19 (MODERNA) 1st, 2nd, 3rd Dose Only 03/25/2021, 04/22/2021, 11/19/2021   • Covid-19 (Pfizer) Gray Cap 06/20/2022   • FluLaval/Fluarix/Fluzone  >6 12/07/2021   • Influenza Quad Vaccine (Inpatient) 10/01/2013   • Influenza, Unspecified 10/07/2016   • Tdap 05/05/2016       Allergies   Allergen Reactions   • Valproic Acid Unknown - Low Severity and Irritability     DIZZY    Other reaction(s): Irritability, Unknown - Low Severity  DIZZY        Medications:  Current Outpatient Medications on File Prior to Visit   Medication Sig   • ascorbic acid (VITAMIN C) 1000 MG tablet Take 1 tablet by mouth Daily.   • busPIRone (BUSPAR) 10 MG tablet Take 1 tablet by mouth 3 (Three) Times a Day As Needed.   • Calcium Carbonate 1500 (600 Ca) MG tablet Take 1 tablet by mouth 2 (two) times a day.   • Diclofenac Sodium (VOLTAREN) 1 % gel gel Apply 1 g topically to the appropriate area as directed As Needed.   • folic acid (FOLVITE) 800 MCG tablet Take 1 tablet by mouth Daily.   • Invega Sustenna 234 MG/1.5ML suspension prefilled syringe IM injection Inject 1.5 mL into the appropriate muscle as directed by prescriber Every 30 (Thirty) Days.   • Keppra 500 MG tablet Take 3 tablets by mouth Every 12 (Twelve) Hours As Needed.   • lamoTRIgine (LaMICtal) 200 MG tablet Take 1 tablet by mouth Daily.   • Melatonin 10 MG tablet Take 1 tablet by mouth every night at bedtime.   • venlafaxine XR (EFFEXOR-XR) 150 MG 24 hr capsule    • [DISCONTINUED] cyclobenzaprine (FLEXERIL) 5 MG tablet Take 1 tablet by mouth Every 8 (Eight) Hours As Needed.   • [DISCONTINUED] famotidine (Pepcid) 20 MG tablet Take 1 tablet by mouth Daily.   • [DISCONTINUED] HM TRUEplus Fiber 2 g chewable tablet CHEW AND SWALLOW 1 TABLET ONCE DAILY   • [DISCONTINUED] pravastatin (PRAVACHOL) 20 MG tablet TAKE ONE TABLET BY MOUTH ONCE DAILY AT BEDTIME FOR CHOLESTEROL.   • [DISCONTINUED] sulfamethoxazole-trimethoprim (Bactrim DS) 800-160 MG per tablet Take 1 tablet by mouth 2 (Two) Times a Day.   • [DISCONTINUED] Vitamin D 25 MCG (1000 UT) tablet TAKE 2 TABLETS BY MOUTH DAILY.     No current facility-administered medications on  "file prior to visit.       Vital Signs:   /87   Pulse 106   Ht 172.7 cm (68\")   Wt 82.1 kg (181 lb)   SpO2 97%   BMI 27.52 kg/m²       Physical Exam:  Physical Exam  Vitals and nursing note reviewed.   Constitutional:       General: He is not in acute distress.     Appearance: Normal appearance. He is not ill-appearing, toxic-appearing or diaphoretic.   HENT:      Head: Normocephalic and atraumatic.      Right Ear: Tympanic membrane, ear canal and external ear normal.      Left Ear: Tympanic membrane, ear canal and external ear normal.      Nose: No congestion or rhinorrhea.      Mouth/Throat:      Mouth: Mucous membranes are moist.      Pharynx: Oropharynx is clear. No oropharyngeal exudate or posterior oropharyngeal erythema.   Eyes:      Extraocular Movements: Extraocular movements intact.      Conjunctiva/sclera: Conjunctivae normal.      Pupils: Pupils are equal, round, and reactive to light.   Cardiovascular:      Rate and Rhythm: Normal rate and regular rhythm.      Heart sounds: Normal heart sounds.   Pulmonary:      Effort: Pulmonary effort is normal.      Breath sounds: Normal breath sounds. No wheezing, rhonchi or rales.   Abdominal:      General: Abdomen is flat.      Palpations: Abdomen is soft.   Musculoskeletal:      Cervical back: Neck supple. No rigidity.   Lymphadenopathy:      Cervical: No cervical adenopathy.   Skin:     General: Skin is warm and dry.          Neurological:      Mental Status: He is alert and oriented to person, place, and time.   Psychiatric:         Mood and Affect: Mood normal.         Behavior: Behavior normal.         Result Review      The following data was reviewed by Hien Parham MD on 07/26/2022.  Lab Results   Component Value Date    WBC 6.62 07/26/2022    HGB 14.8 07/26/2022    HCT 44.3 07/26/2022    MCV 86.0 07/26/2022     07/26/2022     Lab Results   Component Value Date    GLUCOSE 87 03/29/2022    BUN 12 03/29/2022    CREATININE 0.87 " 03/29/2022    EGFRIFNONA 80 12/07/2021    BCR 13.8 03/29/2022    K 4.3 03/29/2022    CO2 27.3 03/29/2022    CALCIUM 10.3 03/29/2022    ALBUMIN 4.70 03/29/2022    LABIL2 1.4 05/06/2021    AST 15 03/29/2022    ALT 21 03/29/2022     Lab Results   Component Value Date    CHOL 142 10/21/2021    CHLPL 174 05/06/2021    TRIG 60 10/21/2021    HDL 36 (L) 10/21/2021    LDL 94 10/21/2021     Lab Results   Component Value Date    TSH 1.590 12/07/2021     Lab Results   Component Value Date    HGBA1C 5.20 10/21/2021     No results found for: PSA                    Assessment and Plan:          Diagnoses and all orders for this visit:    1. Soft tissue mass (Primary)  Comments:  R upper arm-managed by another provider-MRI pending, L buttock is a benign pimple-healing. No infection    2. Inflamed skin tag  Comments:  on R upper cheek-he wants removed by derm since it is on his face-referral in place  Orders:  -     Ambulatory Referral to Dermatology    3. Other constipation  Comments:  We will add Colace daily and continue fiber chew  Orders:  -     HM TRUEplus Fiber 2 g chewable tablet; Chew 1 tablet Daily.  Dispense: 90 tablet; Refill: 1  -     docusate sodium (Colace) 100 MG capsule; Take 1 capsule by mouth Daily.  Dispense: 90 capsule; Refill: 1    4. Aspirin long-term use  -     famotidine (Pepcid) 20 MG tablet; Take 1 tablet by mouth Daily.  Dispense: 90 tablet; Refill: 1    5. Vitamin D deficiency  Comments:  Stable on vitamin D supplementation.  Orders:  -     cholecalciferol (Vitamin D) 25 MCG (1000 UT) tablet; Take 2 tablets by mouth Daily.  Dispense: 180 tablet; Refill: 1    6. Malodorous urine  Comments:  We will check a urinalysis today to rule out UTI  Orders:  -     Urinalysis With Culture If Indicated -; Future    7. Seizures (HCC)  Comments:  Continue close follow-up with Dr. Mccoy and continue current medications.  He is stable at this time and tolerates medications well  Orders:  -     CBC (No Diff); Future    8.  Mixed hyperlipidemia  Assessment & Plan:  Lipid abnormalities are unchanged.  Pharmacotherapy as ordered.  Lipids will be reassessed in 6 months.    Orders:  -     pravastatin (PRAVACHOL) 20 MG tablet; Take 1 tablet by mouth Every Night.  Dispense: 90 tablet; Refill: 1  -     Comprehensive Metabolic Panel; Future  -     Lipid Panel; Future    Other orders  -     cyclobenzaprine (FLEXERIL) 5 MG tablet; Take 1 tablet by mouth 3 (Three) Times a Day As Needed for Muscle Spasms.  Dispense: 40 tablet; Refill: 2        Follow Up   Return in about 6 months (around 1/26/2023).

## 2022-07-26 NOTE — PROGRESS NOTES
Good morning. I saw that Mr. Grey has an appointment with you this morning. I had placed an order for an US for the place on his right gluteal fold, but it looks like they only did the US on this right arm, which I had also ordered a doppler. Can you assess the place on his gluteal fold? Thanks.

## 2022-07-28 ENCOUNTER — LAB (OUTPATIENT)
Dept: LAB | Facility: HOSPITAL | Age: 42
End: 2022-07-28

## 2022-07-28 DIAGNOSIS — R82.90 MALODOROUS URINE: ICD-10-CM

## 2022-07-28 DIAGNOSIS — R56.9 SEIZURES: ICD-10-CM

## 2022-07-28 DIAGNOSIS — E78.2 MIXED HYPERLIPIDEMIA: ICD-10-CM

## 2022-07-28 LAB
BACTERIA UR QL AUTO: ABNORMAL /HPF
BILIRUB UR QL STRIP: NEGATIVE
CLARITY UR: ABNORMAL
COD CRY URNS QL: ABNORMAL /HPF
COLOR UR: YELLOW
GLUCOSE UR STRIP-MCNC: NEGATIVE MG/DL
HGB UR QL STRIP.AUTO: ABNORMAL
KETONES UR QL STRIP: NEGATIVE
LEUKOCYTE ESTERASE UR QL STRIP.AUTO: ABNORMAL
MUCOUS THREADS URNS QL MICRO: ABNORMAL /HPF
NITRITE UR QL STRIP: NEGATIVE
PH UR STRIP.AUTO: 6 [PH] (ref 5–8)
PROT UR QL STRIP: ABNORMAL
RBC # UR STRIP: ABNORMAL /HPF
REF LAB TEST METHOD: ABNORMAL
SP GR UR STRIP: 1.02 (ref 1–1.03)
SPERM URNS QL MICRO: ABNORMAL /HPF
SQUAMOUS #/AREA URNS HPF: ABNORMAL /HPF
UROBILINOGEN UR QL STRIP: ABNORMAL
WBC # UR STRIP: ABNORMAL /HPF

## 2022-07-28 PROCEDURE — 81001 URINALYSIS AUTO W/SCOPE: CPT

## 2022-07-28 PROCEDURE — 87186 SC STD MICRODIL/AGAR DIL: CPT

## 2022-07-28 PROCEDURE — 87086 URINE CULTURE/COLONY COUNT: CPT

## 2022-07-29 RX ORDER — AMOXICILLIN 875 MG/1
875 TABLET, COATED ORAL 2 TIMES DAILY
Qty: 14 TABLET | Refills: 0 | Status: SHIPPED | OUTPATIENT
Start: 2022-07-29 | End: 2022-08-05

## 2022-07-30 LAB — BACTERIA SPEC AEROBE CULT: ABNORMAL

## 2022-08-03 ENCOUNTER — LAB (OUTPATIENT)
Dept: LAB | Facility: HOSPITAL | Age: 42
End: 2022-08-03

## 2022-08-03 DIAGNOSIS — R56.9 SEIZURES: ICD-10-CM

## 2022-08-03 DIAGNOSIS — E78.2 MIXED HYPERLIPIDEMIA: ICD-10-CM

## 2022-08-03 DIAGNOSIS — R82.90 MALODOROUS URINE: ICD-10-CM

## 2022-08-03 LAB
ALBUMIN SERPL-MCNC: 4.6 G/DL (ref 3.5–5.2)
ALBUMIN/GLOB SERPL: 1.9 G/DL
ALP SERPL-CCNC: 75 U/L (ref 39–117)
ALT SERPL W P-5'-P-CCNC: 15 U/L (ref 1–41)
ANION GAP SERPL CALCULATED.3IONS-SCNC: 9.8 MMOL/L (ref 5–15)
AST SERPL-CCNC: 13 U/L (ref 1–40)
BILIRUB SERPL-MCNC: 0.3 MG/DL (ref 0–1.2)
BUN SERPL-MCNC: 17 MG/DL (ref 6–20)
BUN/CREAT SERPL: 18.9 (ref 7–25)
CALCIUM SPEC-SCNC: 9.8 MG/DL (ref 8.6–10.5)
CHLORIDE SERPL-SCNC: 104 MMOL/L (ref 98–107)
CO2 SERPL-SCNC: 28.2 MMOL/L (ref 22–29)
CREAT SERPL-MCNC: 0.9 MG/DL (ref 0.76–1.27)
EGFRCR SERPLBLD CKD-EPI 2021: 109.4 ML/MIN/1.73
GLOBULIN UR ELPH-MCNC: 2.4 GM/DL
GLUCOSE SERPL-MCNC: 97 MG/DL (ref 65–99)
POTASSIUM SERPL-SCNC: 4.3 MMOL/L (ref 3.5–5.2)
PROT SERPL-MCNC: 7 G/DL (ref 6–8.5)
SODIUM SERPL-SCNC: 142 MMOL/L (ref 136–145)

## 2022-08-03 PROCEDURE — 80053 COMPREHEN METABOLIC PANEL: CPT

## 2022-08-03 PROCEDURE — 36415 COLL VENOUS BLD VENIPUNCTURE: CPT

## 2022-08-22 ENCOUNTER — APPOINTMENT (OUTPATIENT)
Dept: MRI IMAGING | Facility: HOSPITAL | Age: 42
End: 2022-08-22

## 2022-08-23 ENCOUNTER — TELEPHONE (OUTPATIENT)
Dept: FAMILY MEDICINE CLINIC | Age: 42
End: 2022-08-23

## 2022-08-24 ENCOUNTER — HOSPITAL ENCOUNTER (OUTPATIENT)
Dept: MRI IMAGING | Facility: HOSPITAL | Age: 42
End: 2022-08-24

## 2022-08-31 ENCOUNTER — OFFICE VISIT (OUTPATIENT)
Dept: FAMILY MEDICINE CLINIC | Age: 42
End: 2022-08-31

## 2022-08-31 ENCOUNTER — LAB (OUTPATIENT)
Dept: LAB | Facility: HOSPITAL | Age: 42
End: 2022-08-31

## 2022-08-31 VITALS
HEART RATE: 91 BPM | WEIGHT: 184 LBS | BODY MASS INDEX: 27.89 KG/M2 | SYSTOLIC BLOOD PRESSURE: 114 MMHG | HEIGHT: 68 IN | OXYGEN SATURATION: 94 % | DIASTOLIC BLOOD PRESSURE: 74 MMHG

## 2022-08-31 DIAGNOSIS — M79.89 SOFT TISSUE MASS: ICD-10-CM

## 2022-08-31 DIAGNOSIS — N39.0 ACUTE UTI: Primary | ICD-10-CM

## 2022-08-31 DIAGNOSIS — R82.81 PYURIA: ICD-10-CM

## 2022-08-31 LAB
BACTERIA UR QL AUTO: ABNORMAL /HPF
BILIRUB UR QL STRIP: NEGATIVE
CLARITY UR: ABNORMAL
COLOR UR: YELLOW
GLUCOSE UR STRIP-MCNC: NEGATIVE MG/DL
HGB UR QL STRIP.AUTO: ABNORMAL
KETONES UR QL STRIP: NEGATIVE
LEUKOCYTE ESTERASE UR QL STRIP.AUTO: ABNORMAL
NITRITE UR QL STRIP: POSITIVE
PH UR STRIP.AUTO: 6.5 [PH] (ref 5–8)
PROT UR QL STRIP: NEGATIVE
RBC # UR STRIP: ABNORMAL /HPF
REF LAB TEST METHOD: ABNORMAL
SP GR UR STRIP: 1.02 (ref 1–1.03)
SQUAMOUS #/AREA URNS HPF: ABNORMAL /HPF
UROBILINOGEN UR QL STRIP: ABNORMAL
WBC # UR STRIP: ABNORMAL /HPF

## 2022-08-31 PROCEDURE — 87086 URINE CULTURE/COLONY COUNT: CPT

## 2022-08-31 PROCEDURE — 87186 SC STD MICRODIL/AGAR DIL: CPT

## 2022-08-31 PROCEDURE — 81001 URINALYSIS AUTO W/SCOPE: CPT

## 2022-08-31 PROCEDURE — 87147 CULTURE TYPE IMMUNOLOGIC: CPT

## 2022-08-31 PROCEDURE — 99213 OFFICE O/P EST LOW 20 MIN: CPT | Performed by: PHYSICIAN ASSISTANT

## 2022-08-31 RX ORDER — SULFAMETHOXAZOLE AND TRIMETHOPRIM 800; 160 MG/1; MG/1
1 TABLET ORAL 2 TIMES DAILY
Qty: 14 TABLET | Refills: 0 | Status: SHIPPED | OUTPATIENT
Start: 2022-08-31 | End: 2022-09-07

## 2022-08-31 NOTE — PROGRESS NOTES
Subjective     CHIEF COMPLAINT    Chief Complaint   Patient presents with   • Skin Problem     Growth on left buttock, appeared a couple month ago. Reported on the right last time he saw Dr. Parham and was mistaken. Would like looked at today. Reports has gotten worse. Reports painful when touched.    • Catheter     Urine in catheter looks like it has pus in it, reports no symptoms of UTI, reports urine looks abnormal.             Kang Grey is a 42-year-old male presenting to the clinic complaining of pus in his catheter since last week.  He is concerned about a urinary tract infection.  He has a history of neurogenic bladder and uses an In-N-Out catheter to void.  His mother is with him today and states since last week they have noticed pus in the catheter and that the urine looks cloudy and different than normal.  He follows with Dr. Morris of urology for management of his bladder.    He also would like to discuss a soft tissue mass on his left buttocks.  It has been there for several months and he has actually been seen twice in this office for it.  A soft tissue ultrasound was ordered, but there was some miscommunication with the order and a different mass was evaluated instead of both masses being evaluated.  He and his mother believes the mass has been getting larger recently and it is now painful to touch.  Denies any redness or drainage from the area.  He has not had any fevers or chills and has been feeling at his baseline.            Review of Systems   Constitutional: Negative for chills and fever.   Gastrointestinal: Negative for nausea and vomiting.   Genitourinary: Negative for decreased urine volume, dysuria, frequency and hematuria.        Pus in catheter   Skin:        Mass left buttocks            Past Medical History:   Diagnosis Date   • Acute upper respiratory infection    • Age-related osteoporosis without current pathological fracture    • Cerebral palsy (HCC)     diagnosed as infant   •  Compression fracture of first lumbar vertebra ([facility])    • Constipation    • Disease of pancreas    • Hydrocephalus in diseases classified elsewhere ([facility])    • Injury of back 02/2020   • Low back pain    • Major depressive disorder     single episode, moderate   • Other abnormal glucose    • Other cerebral palsy ([facility])    • Other fatigue    • Other generalized epilepsy and epileptic syndromes, not intractable, without status epilepticus ([facility])    • Other hypertrophic disorders of the skin    • Pain in left ankle and joints of left foot    • Pain in right toe(s)    • Pain in thoracic spine    • Paranoid schizophrenia ([facility])    • Pelvic and perineal pain    • Primary insomnia    • Pure hypercholesterolemia    • Repeated falls    • Schizoaffective disorder, unspecified ([facility])    • Seizures ([facility])     last seizure was 5 years ago; medication controlled   • Somnolence    • TIA (transient ischemic attack)    • Vitamin D deficiency    • Wedge compression fracture of fourth thoracic vertebra, subsequent encounter for fracture with routine healing             Past Surgical History:   Procedure Laterality Date   • HAMSTRING REPAIR     • OTHER SURGICAL HISTORY      hyposadius repair            Family History   Problem Relation Age of Onset   • Hypothyroidism Mother    • No Known Problems Father    • Ulcers Brother             Social History     Socioeconomic History   • Marital status: Single   Tobacco Use   • Smoking status: Never Smoker   • Smokeless tobacco: Never Used   Vaping Use   • Vaping Use: Never used   Substance and Sexual Activity   • Alcohol use: Never   • Drug use: Never   • Sexual activity: Defer            Allergies   Allergen Reactions   • Valproic Acid Unknown - Low Severity and Irritability     DIZZY    Other reaction(s): Irritability, Unknown - Low Severity  DIZZY            Current Outpatient Medications on File Prior to Visit   Medication Sig Dispense Refill   • ascorbic acid (VITAMIN C) 1000 MG tablet Take 1  "tablet by mouth Daily.     • busPIRone (BUSPAR) 10 MG tablet Take 1 tablet by mouth 3 (Three) Times a Day As Needed.     • Calcium Carbonate 1500 (600 Ca) MG tablet Take 1 tablet by mouth 2 (two) times a day.     • cholecalciferol (Vitamin D) 25 MCG (1000 UT) tablet Take 2 tablets by mouth Daily. 180 tablet 1   • cyclobenzaprine (FLEXERIL) 5 MG tablet Take 1 tablet by mouth 3 (Three) Times a Day As Needed for Muscle Spasms. 40 tablet 2   • Diclofenac Sodium (VOLTAREN) 1 % gel gel Apply 1 g topically to the appropriate area as directed As Needed.     • docusate sodium (Colace) 100 MG capsule Take 1 capsule by mouth Daily. 90 capsule 1   • famotidine (Pepcid) 20 MG tablet Take 1 tablet by mouth Daily. 90 tablet 1   • folic acid (FOLVITE) 800 MCG tablet Take 1 tablet by mouth Daily.     • HM TRUEplus Fiber 2 g chewable tablet Chew 1 tablet Daily. 90 tablet 1   • Invega Sustenna 234 MG/1.5ML suspension prefilled syringe IM injection Inject 1.5 mL into the appropriate muscle as directed by prescriber Every 30 (Thirty) Days.     • Keppra 500 MG tablet Take 3 tablets by mouth Every 12 (Twelve) Hours As Needed.     • lamoTRIgine (LaMICtal) 200 MG tablet Take 1 tablet by mouth Daily.     • Melatonin 10 MG tablet Take 1 tablet by mouth every night at bedtime. 90 tablet 1   • pravastatin (PRAVACHOL) 20 MG tablet Take 1 tablet by mouth Every Night. 90 tablet 1   • venlafaxine XR (EFFEXOR-XR) 150 MG 24 hr capsule        No current facility-administered medications on file prior to visit.            /74 (BP Location: Right arm, Patient Position: Sitting, Cuff Size: Adult)   Pulse 91   Ht 172.7 cm (68\")   Wt 83.5 kg (184 lb)   SpO2 94% Comment: Room air  BMI 27.98 kg/m²          Objective     Physical Exam  Vitals and nursing note reviewed.   Constitutional:       General: He is not in acute distress.     Appearance: Normal appearance.   Cardiovascular:      Rate and Rhythm: Normal rate and regular rhythm.      Heart " sounds: Normal heart sounds.   Pulmonary:      Effort: Pulmonary effort is normal. No respiratory distress.      Breath sounds: Normal breath sounds.   Skin:     General: Skin is warm and dry.      Findings: No bruising or erythema.      Comments: Soft ping-pong ball sized mass left buttocks, gluteal fold area. No erythema, induration, or fluctuance. Minimally tender to palpation.    Neurological:      Mental Status: He is alert and oriented to person, place, and time.   Psychiatric:         Mood and Affect: Mood normal.         Behavior: Behavior normal.              Procedures                     Lab Results (last 24 hours)     Procedure Component Value Units Date/Time    Urinalysis With Culture If Indicated - Urine, Catheter In/Out [[id number]]  (Abnormal) Collected: 08/31/22 0849    Specimen: Urine, Catheter In/Out Updated: 08/31/22 0922     Color, UA Yellow     Appearance, UA Slightly Cloudy     pH, UA 6.5     Specific Gravity, UA 1.025     Glucose, UA Negative     Ketones, UA Negative     Bilirubin, UA Negative     Blood, UA Trace     Protein, UA Negative     Leuk Esterase, UA Small (1+)     Nitrite, UA Positive     Urobilinogen, UA 1.0 E.U./dL    Narrative:      In absence of clinical symptoms, the presence of pyuria, bacteria, and/or nitrites on the urinalysis result does not correlate with infection.    Urinalysis, Microscopic Only - Urine, Catheter In/Out [[id number]]  (Abnormal) Collected: 08/31/22 0849    Specimen: Urine, Catheter In/Out Updated: 08/31/22 0932     RBC, UA 0-2 /HPF      WBC, UA 21-30 /HPF      Bacteria, UA 2+ /HPF      Squamous Epithelial Cells, UA 0-2 /HPF      Methodology Manual Light Microscopy    Urine Culture - Urine, Urine, Catheter In/Out [[id number]] Collected: 08/31/22 0849    Specimen: Urine, Catheter In/Out Updated: 08/31/22 0932                No Radiology Exams Resulted Within Past 24 Hours                    Diagnoses and all orders for this visit:    1. Acute UTI (Primary)  -      sulfamethoxazole-trimethoprim (BACTRIM DS,SEPTRA DS) 800-160 MG per tablet; Take 1 tablet by mouth 2 (Two) Times a Day for 7 days.  Dispense: 14 tablet; Refill: 0    2. Pyuria  -     Urinalysis With Culture If Indicated - Urine, Catheter In/Out; Future    3. Soft tissue mass  Comments:  US pending. No signs of cellulitis or abscess. FU if worse. Also has referral to dermatology pending.   Orders:  -     US Soft Tissue; Future             Additional Instructions for the Follow-ups that You Need to Schedule     US Soft Tissue   Sep 05, 2022      Exam reason: left buttocks mass    Release to patient: Routine Release                         FOR FULL DISCHARGE INSTRUCTIONS/COMMENTS/HANDOUTS please see the AVS

## 2022-09-03 LAB — BACTERIA SPEC AEROBE CULT: ABNORMAL

## 2022-09-14 ENCOUNTER — APPOINTMENT (OUTPATIENT)
Dept: MRI IMAGING | Facility: HOSPITAL | Age: 42
End: 2022-09-14

## 2022-09-22 ENCOUNTER — TELEPHONE (OUTPATIENT)
Dept: PODIATRY | Facility: CLINIC | Age: 42
End: 2022-09-22

## 2022-09-22 NOTE — TELEPHONE ENCOUNTER
Caller: MELISA     Relationship to patient: SELF     Best call back number: [phone]    Chief complaint: N/A     Type of visit:  F.U     Requested date:  N/A      If rescheduling, when is the original appointment:  9/22/22     Additional notes:PATIENT CALLED IN STATING ON HIS WAY AT THIS POINT HE WAS ALREADY 15 MINS LATE PAST APPT AND HAD ABOUT 15 MIN DRIVE ADV WILL NEED TO R/S ONLY 15 MIN RACHNA PERIOD. R/S

## 2022-09-23 ENCOUNTER — HOSPITAL ENCOUNTER (OUTPATIENT)
Dept: MRI IMAGING | Facility: HOSPITAL | Age: 42
Discharge: HOME | End: 2022-09-23
Admitting: NURSE PRACTITIONER

## 2022-09-23 DIAGNOSIS — R22.31 ARM MASS, RIGHT: ICD-10-CM

## 2022-09-23 LAB
CREAT BLDA-MCNC: 0.9 MG/DL
EGFRCR SERPLBLD CKD-EPI 2021: 109.4 ML/MIN/1.73

## 2022-09-23 PROCEDURE — 82565 ASSAY OF CREATININE: CPT

## 2022-09-23 PROCEDURE — 73223 MRI JOINT UPR EXTR W/O&W/DYE: CPT

## 2022-09-23 PROCEDURE — A9577 INJ MULTIHANCE: HCPCS | Performed by: NURSE PRACTITIONER

## 2022-09-23 PROCEDURE — 0 GADOBENATE DIMEGLUMINE 529 MG/ML SOLUTION: Performed by: NURSE PRACTITIONER

## 2022-09-23 RX ADMIN — GADOBENATE DIMEGLUMINE 15 ML: 529 INJECTION, SOLUTION INTRAVENOUS at 15:16

## 2022-09-26 ENCOUNTER — OFFICE VISIT (OUTPATIENT)
Dept: FAMILY MEDICINE CLINIC | Age: 42
End: 2022-09-26

## 2022-09-26 VITALS
DIASTOLIC BLOOD PRESSURE: 75 MMHG | OXYGEN SATURATION: 94 % | HEART RATE: 100 BPM | TEMPERATURE: 98 F | WEIGHT: 187.6 LBS | HEIGHT: 68 IN | BODY MASS INDEX: 28.43 KG/M2 | SYSTOLIC BLOOD PRESSURE: 110 MMHG

## 2022-09-26 DIAGNOSIS — R35.0 FREQUENCY OF URINATION: ICD-10-CM

## 2022-09-26 DIAGNOSIS — N39.0 ACUTE UTI: Primary | ICD-10-CM

## 2022-09-26 DIAGNOSIS — R22.31 ARM MASS, RIGHT: Primary | ICD-10-CM

## 2022-09-26 LAB
BILIRUB BLD-MCNC: NEGATIVE MG/DL
CLARITY, POC: CLEAR
COLOR UR: YELLOW
EXPIRATION DATE: ABNORMAL
GLUCOSE UR STRIP-MCNC: NEGATIVE MG/DL
KETONES UR QL: NEGATIVE
LEUKOCYTE EST, POC: ABNORMAL
Lab: ABNORMAL
NITRITE UR-MCNC: NEGATIVE MG/ML
PH UR: 7 [PH] (ref 5–8)
PROT UR STRIP-MCNC: ABNORMAL MG/DL
RBC # UR STRIP: ABNORMAL /UL
SP GR UR: 1.02 (ref 1–1.03)
UROBILINOGEN UR QL: ABNORMAL

## 2022-09-26 PROCEDURE — 87086 URINE CULTURE/COLONY COUNT: CPT | Performed by: PHYSICIAN ASSISTANT

## 2022-09-26 PROCEDURE — 87077 CULTURE AEROBIC IDENTIFY: CPT | Performed by: PHYSICIAN ASSISTANT

## 2022-09-26 PROCEDURE — 87186 SC STD MICRODIL/AGAR DIL: CPT | Performed by: PHYSICIAN ASSISTANT

## 2022-09-26 PROCEDURE — 99214 OFFICE O/P EST MOD 30 MIN: CPT | Performed by: PHYSICIAN ASSISTANT

## 2022-09-26 PROCEDURE — 81003 URINALYSIS AUTO W/O SCOPE: CPT | Performed by: PHYSICIAN ASSISTANT

## 2022-09-26 RX ORDER — DOXYCYCLINE HYCLATE 100 MG/1
100 CAPSULE ORAL 2 TIMES DAILY
Qty: 14 CAPSULE | Refills: 0 | Status: SHIPPED | OUTPATIENT
Start: 2022-09-26 | End: 2022-10-03

## 2022-09-26 NOTE — PROGRESS NOTES
Subjective     CHIEF COMPLAINT    Chief Complaint   Patient presents with   • Urinary Tract Infection     Ongoing since yesterday. Pt states thick yellow discharge is coming out.             History of Present Illness  This is a 42-year-old male presenting to clinic complaining of pasts and urine since yesterday.  He does in and out caths each time he has to void due to neurogenic bladder.  Denies any fevers, chills, nausea or vomiting.  He does follow with urology and has an appointment with them at the end of next month.            Review of Systems   Constitutional: Positive for fatigue. Negative for chills and fever.   Gastrointestinal: Negative for nausea and vomiting.   Genitourinary: Positive for dysuria (pus in catheter). Negative for penile pain, penile swelling and testicular pain.            Past Medical History:   Diagnosis Date   • Acute upper respiratory infection    • Age-related osteoporosis without current pathological fracture    • Cerebral palsy ([facility])     diagnosed as infant   • Compression fracture of first lumbar vertebra ([facility])    • Constipation    • Disease of pancreas    • Hydrocephalus in diseases classified elsewhere ([facility])    • Injury of back 02/2020   • Low back pain    • Major depressive disorder     single episode, moderate   • Other abnormal glucose    • Other cerebral palsy ([facility])    • Other fatigue    • Other generalized epilepsy and epileptic syndromes, not intractable, without status epilepticus ([facility])    • Other hypertrophic disorders of the skin    • Pain in left ankle and joints of left foot    • Pain in right toe(s)    • Pain in thoracic spine    • Paranoid schizophrenia ([facility])    • Pelvic and perineal pain    • Primary insomnia    • Pure hypercholesterolemia    • Repeated falls    • Schizoaffective disorder, unspecified ([facility])    • Seizures ([facility])     last seizure was 5 years ago; medication controlled   • Somnolence    • TIA (transient ischemic attack)    • Vitamin D deficiency    •  Wedge compression fracture of fourth thoracic vertebra, subsequent encounter for fracture with routine healing             Past Surgical History:   Procedure Laterality Date   • HAMSTRING REPAIR     • OTHER SURGICAL HISTORY      hyposadius repair            Family History   Problem Relation Age of Onset   • Hypothyroidism Mother    • No Known Problems Father    • Ulcers Brother             Social History     Socioeconomic History   • Marital status: Single   Tobacco Use   • Smoking status: Never Smoker   • Smokeless tobacco: Never Used   Vaping Use   • Vaping Use: Never used   Substance and Sexual Activity   • Alcohol use: Never   • Drug use: Never   • Sexual activity: Defer            Allergies   Allergen Reactions   • Valproic Acid Unknown - Low Severity and Irritability     DIZZY    Other reaction(s): Irritability, Unknown - Low Severity  DIZZY            Current Outpatient Medications on File Prior to Visit   Medication Sig Dispense Refill   • ascorbic acid (VITAMIN C) 1000 MG tablet Take 1 tablet by mouth Daily.     • busPIRone (BUSPAR) 10 MG tablet Take 1 tablet by mouth 3 (Three) Times a Day As Needed.     • Calcium Carbonate 1500 (600 Ca) MG tablet Take 1 tablet by mouth 2 (two) times a day.     • cholecalciferol (Vitamin D) 25 MCG (1000 UT) tablet Take 2 tablets by mouth Daily. 180 tablet 1   • cyclobenzaprine (FLEXERIL) 5 MG tablet Take 1 tablet by mouth 3 (Three) Times a Day As Needed for Muscle Spasms. 40 tablet 2   • Diclofenac Sodium (VOLTAREN) 1 % gel gel Apply 1 g topically to the appropriate area as directed As Needed.     • docusate sodium (Colace) 100 MG capsule Take 1 capsule by mouth Daily. 90 capsule 1   • famotidine (Pepcid) 20 MG tablet Take 1 tablet by mouth Daily. 90 tablet 1   • folic acid (FOLVITE) 800 MCG tablet Take 1 tablet by mouth Daily.     • HM TRUEplus Fiber 2 g chewable tablet Chew 1 tablet Daily. 90 tablet 1   • Invega Sustenna 234 MG/1.5ML suspension prefilled syringe IM  "injection Inject 1.5 mL into the appropriate muscle as directed by prescriber Every 30 (Thirty) Days.     • Keppra 500 MG tablet Take 3 tablets by mouth Every 12 (Twelve) Hours As Needed.     • lamoTRIgine (LaMICtal) 200 MG tablet Take 1 tablet by mouth Daily.     • Melatonin 10 MG tablet Take 1 tablet by mouth every night at bedtime. 90 tablet 1   • pravastatin (PRAVACHOL) 20 MG tablet Take 1 tablet by mouth Every Night. 90 tablet 1   • venlafaxine XR (EFFEXOR-XR) 150 MG 24 hr capsule        No current facility-administered medications on file prior to visit.            /75 (BP Location: Right arm, Patient Position: Sitting, Cuff Size: Adult)   Pulse 100   Temp 98 °F (36.7 °C) (Oral)   Ht 172.7 cm (67.99\")   Wt 85.1 kg (187 lb 9.6 oz)   SpO2 94% Comment: room air  BMI 28.53 kg/m²          Objective     Physical Exam  Vitals and nursing note reviewed.   Constitutional:       General: He is not in acute distress.     Appearance: Normal appearance.   Cardiovascular:      Rate and Rhythm: Normal rate and regular rhythm.      Heart sounds: Normal heart sounds.   Pulmonary:      Effort: Pulmonary effort is normal. No respiratory distress.      Breath sounds: Normal breath sounds.   Abdominal:      General: There is no distension.      Palpations: Abdomen is soft.      Tenderness: There is no abdominal tenderness.   Skin:     General: Skin is warm and dry.      Findings: No rash.   Neurological:      Mental Status: He is alert and oriented to person, place, and time.   Psychiatric:         Mood and Affect: Mood normal.         Behavior: Behavior normal.              Procedures                    Lab Results (last 24 hours)     Procedure Component Value Units Date/Time    POCT urinalysis dipstick, automated [[id number]]  (Abnormal) Collected: 09/26/22 1050    Specimen: Urine Updated: 09/26/22 1051     Color Yellow     Clarity, UA Clear     Specific Gravity  1.020     pH, Urine 7.0     Leukocytes Moderate (2+)    "  Nitrite, UA Negative     Protein, POC 30 mg/dL mg/dL      Glucose, UA Negative mg/dL      Ketones, UA Negative     Urobilinogen, UA 0.2 E.U./dL     Bilirubin Negative     Blood, UA Moderate     Lot Number [id number]     Expiration Date 8/1/23    Urine Culture - Urine, Urine, Catheter In/Out [[id number]] Collected: 09/26/22 1102    Specimen: Urine, Catheter In/Out Updated: 09/26/22 1106        Urine Culture - Urine, Urine, Catheter In/Out (08/31/2022 08:49)  Urine Culture - Urine, Urine, Clean Catch (07/28/2022 09:34)        No Radiology Exams Resulted Within Past 24 Hours                     Diagnoses and all orders for this visit:    1. Acute UTI (Primary)  Comments:  Doxycycline chosen based on previous cultures. Culture today pending.   Orders:  -     Urine Culture - Urine, Urine, Catheter In/Out; Future  -     doxycycline (VIBRAMYCIN) 100 MG capsule; Take 1 capsule by mouth 2 (Two) Times a Day for 7 days.  Dispense: 14 capsule; Refill: 0  -     Urine Culture - Urine, Urine, Catheter In/Out    2. Frequency of urination  -     POCT urinalysis dipstick, automated             Additional Instructions for the Follow-ups that You Need to Schedule     Urine Culture - Urine, Urine, Catheter In/Out    Sep 26, 2022 (Approximate)                      FOR FULL DISCHARGE INSTRUCTIONS/COMMENTS/HANDOUTS please see the AVS

## 2022-09-27 ENCOUNTER — TELEPHONE (OUTPATIENT)
Dept: UROLOGY | Facility: CLINIC | Age: 42
End: 2022-09-27

## 2022-09-27 NOTE — TELEPHONE ENCOUNTER
Pt calling to report that he has had 3 UTI'S in the last 8 weeks. He does self catheterization 4 times a day. His PCP has been ordering the cultures and treating him. They would like to be seen or advised on how to handle the recurrent issue.

## 2022-09-28 NOTE — TELEPHONE ENCOUNTER
"LVM for patient to call back to discuss recurrent UTI. Per Dr Morris, \"due to patient self cathing, he will always grow out bacteria in his bladder. Cloudy urine, smelly urine and a little dysuria are not an indication for culture in someone who is catheterizing. Indications would be low-grade fever or severe dysuria. I am glad to see them but it would be several weeks.\"           "

## 2022-09-29 LAB — BACTERIA SPEC AEROBE CULT: ABNORMAL

## 2022-09-29 NOTE — TELEPHONE ENCOUNTER
FAMILY CALLED BACK AND WAS READ THE MESSAGE FROM DR WHEATLEY. HE DOES NOT HAVE A FEVER. SHE STATES THAT PUS IS COMING OUT WHEN HE DOES SELF CATHETERIZATION. SHE WANTS TO GET AN APPT FOR HIM WHEN DR WHEATLEY IS BACK IN THE OFFICE.

## 2022-10-04 ENCOUNTER — PREP FOR SURGERY (OUTPATIENT)
Dept: OTHER | Facility: HOSPITAL | Age: 42
End: 2022-10-04

## 2022-10-04 ENCOUNTER — OFFICE VISIT (OUTPATIENT)
Dept: SURGERY | Facility: CLINIC | Age: 42
End: 2022-10-04

## 2022-10-04 VITALS — WEIGHT: 183.6 LBS | BODY MASS INDEX: 27.83 KG/M2 | RESPIRATION RATE: 16 BRPM | HEIGHT: 68 IN

## 2022-10-04 DIAGNOSIS — R22.31 MASS OF RIGHT FOREARM: Primary | ICD-10-CM

## 2022-10-04 PROCEDURE — 99202 OFFICE O/P NEW SF 15 MIN: CPT | Performed by: SURGERY

## 2022-10-04 NOTE — PROGRESS NOTES
"Chief Complaint:  Mass (On rt arm)    Primary Care Provider: Hien Parham MD    Referring Provider: Hien Parham MD    History of Present Illness  Kang Grey is a 42 y.o. male referred by Hien Parham MD for a subcutaneous mass just distal to the right patient's right elbow.  The patient can feel the mass and he is worried it might be cancer and he wants to have it removed.  MRI was done and showed \"There is an ovoid heterogeneous focus within the subcutaneous soft tissues at the radial aspect of   the elbow in the area of palpable abnormality as indicated by the overlying marker.  This finding measures 2.0 x 1.9 x 0.1 cm in AP by craniocaudal by transverse dimensions.\"  Patient's medical history includes cerebral palsy.  Patient does have some limitations but for the most part can interact independently and seems to be able to make decisions on his own.    Allergies: Valproic acid    Outpatient Medications Marked as Taking for the 10/4/22 encounter (Office Visit) with Oni Greenberg MD   Medication Sig Dispense Refill   • ascorbic acid (VITAMIN C) 1000 MG tablet Take 1 tablet by mouth Daily.     • busPIRone (BUSPAR) 10 MG tablet Take 1 tablet by mouth 3 (Three) Times a Day As Needed.     • Calcium Carbonate 1500 (600 Ca) MG tablet Take 1 tablet by mouth 2 (two) times a day.     • cholecalciferol (Vitamin D) 25 MCG (1000 UT) tablet Take 2 tablets by mouth Daily. 180 tablet 1   • cyclobenzaprine (FLEXERIL) 5 MG tablet Take 1 tablet by mouth 3 (Three) Times a Day As Needed for Muscle Spasms. 40 tablet 2   • Diclofenac Sodium (VOLTAREN) 1 % gel gel Apply 1 g topically to the appropriate area as directed As Needed.     • docusate sodium (Colace) 100 MG capsule Take 1 capsule by mouth Daily. 90 capsule 1   • famotidine (Pepcid) 20 MG tablet Take 1 tablet by mouth Daily. 90 tablet 1   • HM TRUEplus Fiber 2 g chewable tablet Chew 1 tablet Daily. 90 tablet 1   • Invega Sustenna 234 MG/1.5ML " suspension prefilled syringe IM injection Inject 1.5 mL into the appropriate muscle as directed by prescriber Every 30 (Thirty) Days.     • Keppra 500 MG tablet Take 3 tablets by mouth Every 12 (Twelve) Hours As Needed.     • lamoTRIgine (LaMICtal) 200 MG tablet Take 1 tablet by mouth Daily.     • Melatonin 10 MG tablet Take 1 tablet by mouth every night at bedtime. 90 tablet 1   • pravastatin (PRAVACHOL) 20 MG tablet Take 1 tablet by mouth Every Night. 90 tablet 1       Past Medical History:   • Acute upper respiratory infection   • Age-related osteoporosis without current pathological fracture   • Cerebral palsy (HCC)    diagnosed as infant   • Compression fracture of first lumbar vertebra ([facility])   • Constipation   • Disease of pancreas   • Hydrocephalus in diseases classified elsewhere ([facility])   • Injury of back   • Low back pain   • Major depressive disorder    single episode, moderate   • Other abnormal glucose   • Other cerebral palsy ([facility])   • Other fatigue   • Other generalized epilepsy and epileptic syndromes, not intractable, without status epilepticus ([facility])   • Other hypertrophic disorders of the skin   • Pain in left ankle and joints of left foot   • Pain in right toe(s)   • Pain in thoracic spine   • Paranoid schizophrenia ([facility])   • Pelvic and perineal pain   • Primary insomnia   • Pure hypercholesterolemia   • Repeated falls   • Schizoaffective disorder, unspecified ([facility])   • Seizures ([facility])    last seizure was 5 years ago; medication controlled   • Somnolence   • TIA (transient ischemic attack)   • Vitamin D deficiency   • Wedge compression fracture of fourth thoracic vertebra, subsequent encounter for fracture with routine healing        Past Surgical History:   • HAMSTRING REPAIR   • OTHER SURGICAL HISTORY    hyposadius repair       Family History:   Family History   Problem Relation Age of Onset   • Hypothyroidism Mother    • No Known Problems Father    • Ulcers Brother         Social History:  Social  "History     Tobacco Use   • Smoking status: Never Smoker   • Smokeless tobacco: Never Used   Substance Use Topics   • Alcohol use: Never       Objective     Vital Signs:  Resp 16   Ht 172.7 cm (68\")   Wt 83.3 kg (183 lb 9.6 oz)   BMI 27.92 kg/m²   • Constitutional: mother present, alert, no acute distress, reliable historian  • HENT:  NCAT, no visible deformities or lesions  • Eyes:  sclerae clear, conjunctivae clear, EOMI, wearing glasses  • Neck:  normal appearance, no masses, trachea midline  • Respiratory:  breathing not labored, respiratory effort appears normal  • Cardiovascular:  heart regular rate  • Abdomen:  nondistended    • Skin and subcutaneous tissue:  Warm and dry.  At the right forearm, just distal to the right elbow, there is a soft subcutaneous mass about 2 cm in diameter that is mobile and fairly well-circumscribed.  Mass is tender with palpation.  Skin overlying the mass is normal-appearing  • Musculoskeletal: moving all extremities symmetrically and purposefully  • Neurologic:  alert & oriented x 3      Assessment:  Mass of right forearm    Plan:  Excision of subcutaneous mass from right forearm    Discussion: Indications, options, risks, benefits, and expected outcomes of planned surgery were discussed with the patient and with the patient's mother, and both agree to proceed.    Oni Greenberg MD  10/04/2022    Electronically signed by Oni Greenberg MD, 10/04/22, 10:16 AM EDT.  "

## 2022-10-07 ENCOUNTER — TELEPHONE (OUTPATIENT)
Dept: FAMILY MEDICINE CLINIC | Age: 42
End: 2022-10-07

## 2022-10-07 NOTE — TELEPHONE ENCOUNTER
Caller: PASSIVETT    Relationship to patient: Other    Best call back number: [phone]    [facility] FROM [facility] CALLING FOR PATIENT YEARLY REVIEW APPOINTMENT.    [facility] IN INVITING PROVIDER MARIANA JACKSON TO JOIN VISIT IF POSSIBLE     PLEASE CALL [phone]

## 2022-10-19 ENCOUNTER — HOSPITAL ENCOUNTER (OUTPATIENT)
Dept: GENERAL RADIOLOGY | Facility: HOSPITAL | Age: 42
Discharge: HOME | End: 2022-10-19
Admitting: FAMILY MEDICINE

## 2022-10-19 ENCOUNTER — OFFICE VISIT (OUTPATIENT)
Dept: FAMILY MEDICINE CLINIC | Age: 42
End: 2022-10-19

## 2022-10-19 VITALS
HEIGHT: 68 IN | DIASTOLIC BLOOD PRESSURE: 79 MMHG | BODY MASS INDEX: 26.37 KG/M2 | WEIGHT: 174 LBS | HEART RATE: 87 BPM | SYSTOLIC BLOOD PRESSURE: 148 MMHG | TEMPERATURE: 97.9 F | OXYGEN SATURATION: 94 %

## 2022-10-19 DIAGNOSIS — R33.9 URINARY RETENTION: ICD-10-CM

## 2022-10-19 DIAGNOSIS — Z23 FLU VACCINE NEED: Primary | ICD-10-CM

## 2022-10-19 DIAGNOSIS — S99.292A OTHER PHYSEAL FRACTURE OF PHALANX OF LEFT TOE, INITIAL ENCOUNTER FOR CLOSED FRACTURE: ICD-10-CM

## 2022-10-19 DIAGNOSIS — Z92.29 COVID-19 VACCINE SERIES COMPLETED: ICD-10-CM

## 2022-10-19 DIAGNOSIS — D17.1 LIPOMA OF TORSO: ICD-10-CM

## 2022-10-19 DIAGNOSIS — M79.672 FOOT PAIN, LEFT: ICD-10-CM

## 2022-10-19 DIAGNOSIS — R21 RASH: ICD-10-CM

## 2022-10-19 PROCEDURE — 73630 X-RAY EXAM OF FOOT: CPT

## 2022-10-19 PROCEDURE — 99214 OFFICE O/P EST MOD 30 MIN: CPT | Performed by: FAMILY MEDICINE

## 2022-10-19 PROCEDURE — 0124A COVID-19 (PFIZER) BIVALENT BOOSTER 12+YRS: CPT | Performed by: FAMILY MEDICINE

## 2022-10-19 PROCEDURE — G0008 ADMIN INFLUENZA VIRUS VAC: HCPCS | Performed by: FAMILY MEDICINE

## 2022-10-19 PROCEDURE — 91312 COVID-19 (PFIZER) BIVALENT BOOSTER 12+YRS: CPT | Performed by: FAMILY MEDICINE

## 2022-10-19 PROCEDURE — 90686 IIV4 VACC NO PRSV 0.5 ML IM: CPT | Performed by: FAMILY MEDICINE

## 2022-10-19 RX ORDER — VENLAFAXINE HYDROCHLORIDE 150 MG/1
CAPSULE, EXTENDED RELEASE ORAL
COMMUNITY
Start: 2022-10-10

## 2022-10-19 NOTE — PROGRESS NOTES
Kang Grey presents to [facility].    Chief Complaint: fall and L foot injury    Subjective       History of Present Illness:  HPI      Kang thinks he broke his toe, he fell on Saturday, he tripped over a box and now he has pain in his left great toe.     He has surgery scheduled to remove R arm lump Nov 11th with Dr Greenberg.      He still has a lump over the left  gluteal area inferior buttocks and it is getting larger.  It is soft and nontender.    Kang continues not to bathe and is malodorous on exam today.  He has a rash on his back his mom is concerned about that is similar to a previous rash in which we treated with steroids in the past.  In addition he states his scalp is itchy.  In addition he has chronic urinary retention and is unable to void without a catheter and his mom states that he refuses to perform catheterizations due to discomfort and he will not let his mother do the catheterizations and she want me to discuss with him the need for him to do self catheterizations routinely and I did today.  I educated him on the importance of to his health and the risk of infection and bladder disorder despite him not cathing himself every 6 hours.  I also advised that he start to bathe with bacterial soap daily and that is what he needs to get rid of his rash.      Review of Systems:  Review of Systems   Constitutional: Negative for chills and fever.   HENT: Negative for congestion, ear discharge and sore throat.    Respiratory: Negative for shortness of breath and wheezing.    Cardiovascular: Negative for chest pain, palpitations and leg swelling.   Gastrointestinal: Negative for abdominal pain, constipation, diarrhea, nausea, vomiting and GERD.   Skin: Positive for rash.   Neurological: Negative for dizziness and headache.        Objective   Medical History:  Past Medical History:   • Acute upper respiratory infection   • Age-related osteoporosis without current  pathological fracture   • Cerebral palsy ([facility])    diagnosed as infant   • Compression fracture of first lumbar vertebra ([facility])   • Constipation   • Disease of pancreas   • Hydrocephalus in diseases classified elsewhere ([facility])   • Injury of back   • Low back pain   • Major depressive disorder    single episode, moderate   • Other abnormal glucose   • Other cerebral palsy ([facility])   • Other fatigue   • Other generalized epilepsy and epileptic syndromes, not intractable, without status epilepticus ([facility])   • Other hypertrophic disorders of the skin   • Pain in left ankle and joints of left foot   • Pain in right toe(s)   • Pain in thoracic spine   • Paranoid schizophrenia ([facility])   • Pelvic and perineal pain   • Primary insomnia   • Pure hypercholesterolemia   • Repeated falls   • Schizoaffective disorder, unspecified ([facility])   • Seizures ([facility])    last seizure was 5 years ago; medication controlled   • Somnolence   • TIA (transient ischemic attack)   • Vitamin D deficiency   • Wedge compression fracture of fourth thoracic vertebra, subsequent encounter for fracture with routine healing     Past Surgical History:   • HAMSTRING REPAIR   • OTHER SURGICAL HISTORY    hyposadius repair      Family History   Problem Relation Age of Onset   • Hypothyroidism Mother    • No Known Problems Father    • Ulcers Brother      Social History     Tobacco Use   • Smoking status: Never   • Smokeless tobacco: Never   Substance Use Topics   • Alcohol use: Never       There are no preventive care reminders to display for this patient.     Immunization History   Administered Date(s) Administered   • COVID-19 (MODERNA) 1st, 2nd, 3rd Dose Only 03/25/2021, 04/22/2021, 11/19/2021   • COVID-19 (PFIZER) BIVALENT BOOSTER 12+YRS 10/19/2022   • Covid-19 (Pfizer) Gray Cap 06/20/2022   • FluLaval/Fluzone >6mos 12/07/2021, 10/19/2022   • Influenza Quad Vaccine (Inpatient) 10/01/2013   • Influenza, Unspecified 10/07/2016   • Tdap 05/05/2016       Allergies  "  Allergen Reactions   • Valproic Acid Unknown - Low Severity and Irritability     DIZZY    Other reaction(s): Irritability, Unknown - Low Severity  DIZZY        Medications:  Current Outpatient Medications on File Prior to Visit   Medication Sig   • ascorbic acid (VITAMIN C) 1000 MG tablet Take 1 tablet by mouth Daily.   • busPIRone (BUSPAR) 10 MG tablet Take 1 tablet by mouth 3 (Three) Times a Day As Needed.   • Calcium Carbonate 1500 (600 Ca) MG tablet Take 1 tablet by mouth 2 (two) times a day.   • cholecalciferol (Vitamin D) 25 MCG (1000 UT) tablet Take 2 tablets by mouth Daily.   • cyclobenzaprine (FLEXERIL) 5 MG tablet Take 1 tablet by mouth 3 (Three) Times a Day As Needed for Muscle Spasms.   • Diclofenac Sodium (VOLTAREN) 1 % gel gel Apply 1 g topically to the appropriate area as directed As Needed.   • docusate sodium (Colace) 100 MG capsule Take 1 capsule by mouth Daily.   • famotidine (Pepcid) 20 MG tablet Take 1 tablet by mouth Daily.   • folic acid (FOLVITE) 800 MCG tablet Take 1 tablet by mouth Daily.   • HM TRUEplus Fiber 2 g chewable tablet Chew 1 tablet Daily.   • Invega Sustenna 234 MG/1.5ML suspension prefilled syringe IM injection Inject 1.5 mL into the appropriate muscle as directed by prescriber Every 30 (Thirty) Days.   • Keppra 500 MG tablet Take 3 tablets by mouth Every 12 (Twelve) Hours As Needed.   • lamoTRIgine (LaMICtal) 200 MG tablet Take 1 tablet by mouth Daily.   • Melatonin 10 MG tablet Take 1 tablet by mouth every night at bedtime.   • pravastatin (PRAVACHOL) 20 MG tablet Take 1 tablet by mouth Every Night.   • venlafaxine XR (EFFEXOR-XR) 150 MG 24 hr capsule      No current facility-administered medications on file prior to visit.       Vital Signs:   /79 (BP Location: Right arm, Patient Position: Sitting, Cuff Size: Adult)   Pulse 87   Temp 97.9 °F (36.6 °C) (Oral)   Ht 172.7 cm (68\")   Wt 78.9 kg (174 lb)   SpO2 94% Comment: room air  BMI 26.46 kg/m²       Physical " Exam:  Physical Exam  Vitals reviewed.   Constitutional:       General: He is not in acute distress.     Appearance: Normal appearance. He is normal weight. He is not ill-appearing.   HENT:      Head: Normocephalic and atraumatic.   Eyes:      Extraocular Movements: Extraocular movements intact.      Pupils: Pupils are equal, round, and reactive to light.   Pulmonary:      Effort: Pulmonary effort is normal.   Feet:      Comments: L foot -extensive bruising over the distal metatarpals 1-3-tender to palpation and poor ROM toes 1-3 and pain to palpation over 1st and 2nd metatarpal.    Skin:         Neurological:      General: No focal deficit present.      Mental Status: He is alert and oriented to person, place, and time.   Psychiatric:         Mood and Affect: Mood normal.         Behavior: Behavior normal.         Thought Content: Thought content normal.         Judgment: Judgment normal.         Result Review      The following data was reviewed by Hien Parham MD on 10/19/2022.  Lab Results   Component Value Date    WBC 6.62 07/26/2022    HGB 14.8 07/26/2022    HCT 44.3 07/26/2022    MCV 86.0 07/26/2022     07/26/2022     Lab Results   Component Value Date    GLUCOSE 97 08/03/2022    BUN 17 08/03/2022    CREATININE 0.90 09/23/2022    EGFRIFNONA 80 12/07/2021    BCR 18.9 08/03/2022    K 4.3 08/03/2022    CO2 28.2 08/03/2022    CALCIUM 9.8 08/03/2022    ALBUMIN 4.60 08/03/2022    LABIL2 1.4 05/06/2021    AST 13 08/03/2022    ALT 15 08/03/2022     Lab Results   Component Value Date    CHOL 147 07/26/2022    CHLPL 174 05/06/2021    TRIG 86 07/26/2022    HDL 41 07/26/2022    LDL 90 07/26/2022     Lab Results   Component Value Date    TSH 1.590 12/07/2021     Lab Results   Component Value Date    HGBA1C 5.20 10/21/2021     No results found for: PSA                    Assessment and Plan:          Diagnoses and all orders for this visit:    1. Flu vaccine need (Primary)  -     FluLaval/Fluzone >6 mos  ([id number])    2. COVID-19 vaccine series completed  -     COVID-19 Bivalent Booster (Pfizer) 12+yrs    3. Foot pain, left  Comments:  Fracture of the left great toe.  Patient will be placed in a walking boot and follow-up in 4 weeks for reeval  Orders:  -     XR Foot 3+ View Left; Future    4. Rash  Comments:  he needs to bath and use antibacterial soap    5. Urinary retention    6. Other physeal fracture of phalanx of left toe, initial encounter for closed fracture  Comments:  Walking boot x4 weeks.  Follow-up in 1 month  Orders:  -      Walking Boot, Pneumatic & / or Vacuum    7. Lipoma of torso  Comments:  Benign appearing.  Patient reassured.  Advise his surgeon Dr. Greenberg take a look at it to confirm      mom states that he refuses to perform catheterizations due to discomfort and he will not let his mother do the catheterizations and she want me to discuss with him the need for him to do self catheterizations routinely and I did today.  I educated him on the importance of to his health and the risk of infection and bladder disorder despite him not cathing himself every 6 hours.  I also advised that he start to bathe with bacterial soap daily and that is what he needs to get rid of his rash.  Dr. Parham      Follow Up   Return in about 3 months (around 1/19/2023) for Recheck.

## 2022-10-21 NOTE — PROGRESS NOTES
Chief Complaint: Urologic complaint    Subjective         History of Present Illness  Kang Grey is a 42 y.o. male     BPH  Neurogenic bladder  History of hypospadias repair        Patient is cathing 3 times a day, he has been cath by his mother.  Patient is try to cath himself in the middle of the day, cannot always get this done.    Patient does not void on his any longer    Patient is having trouble cathing himself.  Patient has trouble with this secondary to hand coordination and vision    Mom works 10 AM to 8 PM    Patient has been treated for 2 UTIs in the last several months    No gross hematuria, voiding diary today shows they are getting out around 200-400 a.m. cath - 500-12 100 on the evening cath.    7/22 cystoscopy-2 cm prostate.  Large bladder no pathology.  No trabeculation     6/9/2022 UDS-bladder capacity 2300, no sensation, large bladder capacity.  No involuntary contractions.  No incontinence.  No detrusor contraction.  Could not void without abdominal straining.  PVR 1049    No cardiopulmonary history.  Patient does not smoke.  Patient does not use blood thinner.      Previous      Does have a history of retention X1 and had to be catheterized in the past.    PVR    3/22   > 999      1/22 0.9, GFR 93    1/22 MRI abdomen with and without - stable 28 cm cyst in the pancreatic head.  Unchanged for 2 years.  Stable Marked distention of the urinary bladder, stable simple cyst left kidney 6 cm      3/20 CT abdomen with and without - 2.3 cm cyst head of pancreas.  Distention of the urinary bladder.  Similar to previous in 3/20.     No history of kidney  stone.    No urologic family history    Patient does have a history of hypospadias.  Status post repair        Results for orders placed or performed in visit on 09/26/22   Urine Culture - Urine, Urine, Catheter In/Out    Specimen: Urine, Catheter In/Out   Result Value Ref Range    Urine Culture >100,000 CFU/mL Staphylococcus, coagulase negative (A)     POCT urinalysis dipstick, automated    Specimen: Urine   Result Value Ref Range    Color Yellow Yellow, Straw, Dark Yellow, Yanni    Clarity, UA Clear Clear    Specific Gravity  1.020 1.005 - 1.030    pH, Urine 7.0 5.0 - 8.0    Leukocytes Moderate (2+) (A) Negative    Nitrite, UA Negative Negative    Protein, POC 30 mg/dL (A) Negative mg/dL    Glucose, UA Negative Negative mg/dL    Ketones, UA Negative Negative    Urobilinogen, UA 0.2 E.U./dL Normal, 0.2 E.U./dL    Bilirubin Negative Negative    Blood, UA Moderate (A) Negative    Lot Number [id number]     Expiration Date 8/1/23          Objective     Past Medical History:   Diagnosis Date   • Acute upper respiratory infection    • Age-related osteoporosis without current pathological fracture    • Cerebral palsy (HCC)     diagnosed as infant   • Compression fracture of first lumbar vertebra ([facility])    • Constipation    • Disease of pancreas    • Hydrocephalus in diseases classified elsewhere ([facility])    • Injury of back 02/2020   • Low back pain    • Major depressive disorder     single episode, moderate   • Other abnormal glucose    • Other cerebral palsy (HCC)    • Other fatigue    • Other generalized epilepsy and epileptic syndromes, not intractable, without status epilepticus ([facility])    • Other hypertrophic disorders of the skin    • Pain in left ankle and joints of left foot    • Pain in right toe(s)    • Pain in thoracic spine    • Paranoid schizophrenia ([facility])    • Pelvic and perineal pain    • Primary insomnia    • Pure hypercholesterolemia    • Repeated falls    • Schizoaffective disorder, unspecified ([facility])    • Seizures ([facility])     last seizure was 5 years ago; medication controlled   • Somnolence    • TIA (transient ischemic attack)    • Vitamin D deficiency    • Wedge compression fracture of fourth thoracic vertebra, subsequent encounter for fracture with routine healing        Past Surgical History:   Procedure Laterality Date   • HAMSTRING REPAIR     • OTHER  SURGICAL HISTORY      hyposadius repair         Current Outpatient Medications:   •  ascorbic acid (VITAMIN C) 1000 MG tablet, Take 1 tablet by mouth Daily., Disp: , Rfl:   •  busPIRone (BUSPAR) 10 MG tablet, Take 1 tablet by mouth 3 (Three) Times a Day As Needed., Disp: , Rfl:   •  Calcium Carbonate 1500 (600 Ca) MG tablet, Take 1 tablet by mouth 2 (two) times a day., Disp: , Rfl:   •  cholecalciferol (Vitamin D) 25 MCG (1000 UT) tablet, Take 2 tablets by mouth Daily., Disp: 180 tablet, Rfl: 1  •  cyclobenzaprine (FLEXERIL) 5 MG tablet, Take 1 tablet by mouth 3 (Three) Times a Day As Needed for Muscle Spasms., Disp: 40 tablet, Rfl: 2  •  Diclofenac Sodium (VOLTAREN) 1 % gel gel, Apply 1 g topically to the appropriate area as directed As Needed., Disp: , Rfl:   •  docusate sodium (Colace) 100 MG capsule, Take 1 capsule by mouth Daily., Disp: 90 capsule, Rfl: 1  •  famotidine (Pepcid) 20 MG tablet, Take 1 tablet by mouth Daily., Disp: 90 tablet, Rfl: 1  •  folic acid (FOLVITE) 800 MCG tablet, Take 1 tablet by mouth Daily., Disp: , Rfl:   •  HM TRUEplus Fiber 2 g chewable tablet, Chew 1 tablet Daily., Disp: 90 tablet, Rfl: 1  •  Invega Sustenna 234 MG/1.5ML suspension prefilled syringe IM injection, Inject 1.5 mL into the appropriate muscle as directed by prescriber Every 30 (Thirty) Days., Disp: , Rfl:   •  Keppra 500 MG tablet, Take 3 tablets by mouth Every 12 (Twelve) Hours As Needed., Disp: , Rfl:   •  lamoTRIgine (LaMICtal) 200 MG tablet, Take 1 tablet by mouth Daily., Disp: , Rfl:   •  Melatonin 10 MG tablet, Take 1 tablet by mouth every night at bedtime., Disp: 90 tablet, Rfl: 1  •  pravastatin (PRAVACHOL) 20 MG tablet, Take 1 tablet by mouth Every Night., Disp: 90 tablet, Rfl: 1  •  venlafaxine XR (EFFEXOR-XR) 150 MG 24 hr capsule, , Disp: , Rfl:     Allergies   Allergen Reactions   • Valproic Acid Unknown - Low Severity and Irritability     DIZZY    Other reaction(s): Irritability, Unknown - Low  Severity  DIZZY        Family History   Problem Relation Age of Onset   • Hypothyroidism Mother    • No Known Problems Father    • Ulcers Brother        Social History     Socioeconomic History   • Marital status: Single   Tobacco Use   • Smoking status: Never   • Smokeless tobacco: Never   Vaping Use   • Vaping Use: Never used   Substance and Sexual Activity   • Alcohol use: Never   • Drug use: Never   • Sexual activity: Defer       Vital Signs:   There were no vitals taken for this visit.         Results for orders placed or performed in visit on 09/26/22   Urine Culture - Urine, Urine, Catheter In/Out    Specimen: Urine, Catheter In/Out   Result Value Ref Range    Urine Culture >100,000 CFU/mL Staphylococcus, coagulase negative (A)    POCT urinalysis dipstick, automated    Specimen: Urine   Result Value Ref Range    Color Yellow Yellow, Straw, Dark Yellow, Yanni    Clarity, UA Clear Clear    Specific Gravity  1.020 1.005 - 1.030    pH, Urine 7.0 5.0 - 8.0    Leukocytes Moderate (2+) (A) Negative    Nitrite, UA Negative Negative    Protein, POC 30 mg/dL (A) Negative mg/dL    Glucose, UA Negative Negative mg/dL    Ketones, UA Negative Negative    Urobilinogen, UA 0.2 E.U./dL Normal, 0.2 E.U./dL    Bilirubin Negative Negative    Blood, UA Moderate (A) Negative    Lot Number [id number]     Expiration Date 8/1/23              Assessment and Plan    Diagnoses and all orders for this visit:    1. Neurogenic bladder (Primary)          Possible neurogenic bladder      Patient is having a difficult time cathing himself secondary to vision.    Mom  is doing her best cathing him morning and evening.       Patient is doing his best to cath in the middle of the day.  He understands that getting overfull can hurt his bladder/kidneys.  We did discuss his situation and neurogenic bladder again today.  He is doing the best he can as far as cathing is concerned    They do understand I would rather have him cathing 3 times a day.   Patient and mom voiced understanding    Follow-up in 1 year with nurse practitioner

## 2022-10-24 ENCOUNTER — OFFICE VISIT (OUTPATIENT)
Dept: UROLOGY | Facility: CLINIC | Age: 42
End: 2022-10-24

## 2022-10-24 VITALS — WEIGHT: 180 LBS | BODY MASS INDEX: 27.28 KG/M2 | HEIGHT: 68 IN | RESPIRATION RATE: 14 BRPM

## 2022-10-24 DIAGNOSIS — N31.9 NEUROGENIC BLADDER: Primary | ICD-10-CM

## 2022-10-24 PROCEDURE — 99212 OFFICE O/P EST SF 10 MIN: CPT | Performed by: UROLOGY

## 2022-10-24 PROCEDURE — 81003 URINALYSIS AUTO W/O SCOPE: CPT | Performed by: UROLOGY

## 2022-11-07 ENCOUNTER — OFFICE VISIT (OUTPATIENT)
Dept: PODIATRY | Facility: CLINIC | Age: 42
End: 2022-11-07

## 2022-11-07 VITALS
SYSTOLIC BLOOD PRESSURE: 141 MMHG | TEMPERATURE: 96.7 F | HEART RATE: 61 BPM | OXYGEN SATURATION: 96 % | HEIGHT: 68 IN | WEIGHT: 186 LBS | DIASTOLIC BLOOD PRESSURE: 99 MMHG | BODY MASS INDEX: 28.19 KG/M2

## 2022-11-07 DIAGNOSIS — M79.671 FOOT PAIN, BILATERAL: Primary | ICD-10-CM

## 2022-11-07 DIAGNOSIS — M21.372 FOOT DROP, BILATERAL: ICD-10-CM

## 2022-11-07 DIAGNOSIS — B35.1 ONYCHOMYCOSIS: ICD-10-CM

## 2022-11-07 DIAGNOSIS — M21.371 FOOT DROP, BILATERAL: ICD-10-CM

## 2022-11-07 DIAGNOSIS — G62.9 NEUROPATHY: ICD-10-CM

## 2022-11-07 DIAGNOSIS — L60.0 ONYCHOCRYPTOSIS: ICD-10-CM

## 2022-11-07 DIAGNOSIS — R26.2 DIFFICULTY WALKING: ICD-10-CM

## 2022-11-07 DIAGNOSIS — S92.415A CLOSED NONDISPLACED FRACTURE OF PROXIMAL PHALANX OF LEFT GREAT TOE, INITIAL ENCOUNTER: ICD-10-CM

## 2022-11-07 DIAGNOSIS — M79.672 FOOT PAIN, BILATERAL: Primary | ICD-10-CM

## 2022-11-07 PROCEDURE — 11721 DEBRIDE NAIL 6 OR MORE: CPT | Performed by: PODIATRIST

## 2022-11-07 PROCEDURE — 99213 OFFICE O/P EST LOW 20 MIN: CPT | Performed by: PODIATRIST

## 2022-11-07 NOTE — PROGRESS NOTES
[facility] - PODIATRY    Today's Date: 11/07/22    Patient Name: Kang Grey  MRN: [id number]  CSN: [id number]  PCP: Hien Parham MD, Last PCP Visit: 6/2/2022  Referring Provider: No ref. provider found    SUBJECTIVE     Chief Complaint   Patient presents with   • Left Foot - Follow-up, Nail Problem   • Right Foot - Follow-up, Nail Problem     HPI: Kang Grey, a 42 y.o.male, comes to clinic with his mother.    New, Established, New Problem:  established   Location:  Toenails  Duration:   Greater than five years  Onset:  Gradual  Nature:  sore with palpation.  Stable, worsening, improving:   Stable  Aggravating factors:  Pain with shoe gear and ambulation.  Previous Treatment:  debridement    New, Established, New Problem:  New problem  Location:  Left hallux  Duration:  11/4/2022  Onset:  Acute, fell at home  Nature:  sore  Stable, worsening, improving:  stable  Aggravating factors:  Patient relates pain is aggravated by shoe gear and ambulation.    Previous Treatment:  Seen by PCP, X-ray, CAM walker    Patient denies any fevers, chills, nausea, vomiting, shortness of breathe, nor any other constitutional signs nor symptoms.       Past Medical History:   Diagnosis Date   • Acute upper respiratory infection    • Age-related osteoporosis without current pathological fracture    • Cerebral palsy (HCC)     diagnosed as infant   • Compression fracture of first lumbar vertebra ([facility])    • Constipation    • Disease of pancreas    • Hydrocephalus in diseases classified elsewhere ([facility])    • Injury of back 02/2020   • Low back pain    • Major depressive disorder     single episode, moderate   • Other abnormal glucose    • Other cerebral palsy (HCC)    • Other fatigue    • Other generalized epilepsy and epileptic syndromes, not intractable, without status epilepticus ([facility])    • Other hypertrophic disorders of the skin    • Pain in left ankle and joints of left foot    • Pain in right toe(s)    •  Pain in thoracic spine    • Paranoid schizophrenia (HCC)    • Pelvic and perineal pain    • Primary insomnia    • Pure hypercholesterolemia    • Repeated falls    • Schizoaffective disorder, unspecified (HCC)    • Seizures ([facility])     last seizure was 5 years ago; medication controlled   • Somnolence    • TIA (transient ischemic attack)    • Vitamin D deficiency    • Wedge compression fracture of fourth thoracic vertebra, subsequent encounter for fracture with routine healing      Past Surgical History:   Procedure Laterality Date   • HAMSTRING REPAIR     • OTHER SURGICAL HISTORY      hyposadius repair     Family History   Problem Relation Age of Onset   • Hypothyroidism Mother    • No Known Problems Father    • Ulcers Brother      Social History     Socioeconomic History   • Marital status: Single   Tobacco Use   • Smoking status: Never   • Smokeless tobacco: Never   Vaping Use   • Vaping Use: Never used   Substance and Sexual Activity   • Alcohol use: Never   • Drug use: Never   • Sexual activity: Defer     Allergies   Allergen Reactions   • Valproic Acid Unknown - Low Severity and Irritability     DIZZY    Other reaction(s): Irritability, Unknown - Low Severity  DIZZY     Current Outpatient Medications   Medication Sig Dispense Refill   • ascorbic acid (VITAMIN C) 1000 MG tablet Take 1 tablet by mouth Daily.     • busPIRone (BUSPAR) 10 MG tablet Take 1 tablet by mouth 3 (Three) Times a Day As Needed.     • Calcium Carbonate 1500 (600 Ca) MG tablet Take 1 tablet by mouth 2 (two) times a day.     • cholecalciferol (Vitamin D) 25 MCG (1000 UT) tablet Take 2 tablets by mouth Daily. 180 tablet 1   • cyclobenzaprine (FLEXERIL) 5 MG tablet Take 1 tablet by mouth 3 (Three) Times a Day As Needed for Muscle Spasms. 40 tablet 2   • Diclofenac Sodium (VOLTAREN) 1 % gel gel Apply 1 g topically to the appropriate area as directed As Needed.     • docusate sodium (Colace) 100 MG capsule Take 1 capsule by mouth Daily. 90 capsule 1    • famotidine (Pepcid) 20 MG tablet Take 1 tablet by mouth Daily. 90 tablet 1   • folic acid (FOLVITE) 800 MCG tablet Take 1 tablet by mouth Daily.     • HM TRUEplus Fiber 2 g chewable tablet Chew 1 tablet Daily. 90 tablet 1   • Invega Sustenna 234 MG/1.5ML suspension prefilled syringe IM injection Inject 1.5 mL into the appropriate muscle as directed by prescriber Every 30 (Thirty) Days.     • Keppra 500 MG tablet Take 3 tablets by mouth Every 12 (Twelve) Hours As Needed.     • lamoTRIgine (LaMICtal) 200 MG tablet Take 1 tablet by mouth Daily.     • Melatonin 10 MG tablet Take 1 tablet by mouth every night at bedtime. 90 tablet 1   • pravastatin (PRAVACHOL) 20 MG tablet Take 1 tablet by mouth Every Night. 90 tablet 1   • venlafaxine XR (EFFEXOR-XR) 150 MG 24 hr capsule        No current facility-administered medications for this visit.     Review of Systems   Constitutional: Negative.    Musculoskeletal:        Fx Left 1st toe via falling.  Requires bilateral Go braces for ambulation.   Skin:        Painful toenails bilaterally   All other systems reviewed and are negative.      OBJECTIVE     Vitals:    11/07/22 0737   BP: 141/99   Pulse: 61   Temp: 96.7 °F (35.9 °C)   SpO2: 96%       Patient seen in no apparent distress.      PHYSICAL EXAM:     Foot/Ankle Exam:       General:   Appearance: appears stated age and healthy    Orientation: unable to assess    Affect: inappropriate    Gait: antalgic    Assistance: cane    Shoes: CAM walker on Left foot.    VASCULAR      Right Foot Vascularity   Normal vascular exam    Dorsalis pedis:  2+  Posterior tibial:  2+  Skin Temperature: warm    Edema Grading:  None  CFT:  < 3 seconds  Pedal Hair Growth:  Present  Varicosities: none       Left Foot Vascularity   Normal vascular exam    Dorsalis pedis:  2+  Posterior tibial:  2+  Skin Temperature: warm    Edema Grading:  None  CFT:  < 3 seconds  Pedal Hair Growth:  Present  Varicosities: none        NEUROLOGIC     Right  Foot Neurologic   Light touch sensation:  Diminished  Vibratory sensation:  Diminished  Hot/Cold sensation: diminished    Protective Sensation using Cuney-Manpreet Monofilament:  3     Left Foot Neurologic   Light touch sensation:  Diminished  Vibratory sensation:  Diminished  Hot/cold sensation: diminished    Protective Sensation using Cuney-Manpreet Monofilament:  3     MUSCULOSKELETAL      Right Foot Musculoskeletal   Hammertoe:  First toe     Left Foot Musculoskeletal   Ecchymosis:  None  Tenderness: toe 1    Hammertoe:  First toe     MUSCLE STRENGTH     Right Foot Muscle Strength   Foot dorsiflexion:  1  Foot plantar flexion:  4-  Foot inversion:  2  Foot eversion:  2     Left Foot Muscle Strength   Foot dorsiflexion:  1  Foot plantar flexion:  4-  Foot inversion:  2  Foot eversion:  2     DERMATOLOGIC     Right Foot Dermatologic   Skin: skin intact    Nails: onychomycosis, abnormally thick, subungual debris, dystrophic nails and ingrown toenail    Nails comment:  Toenails 1 through 5     Left Foot Dermatologic   Skin: skin intact    Nails: onychomycosis, abnormally thick, subungual debris, dystrophic nails and ingrown toenail    Nails comment:  Toenails 1 through 5    XR Foot 3+ View Left    Result Date: 10/19/2022  Narrative: PROCEDURE: XR FOOT 3+ VW LEFT  COMPARISON: None  INDICATIONS: MEDIAL/DORSAL LEFT FOOT PAIN/BRUISING/SWELLING S/P FALL INJURY X 5 DAYS AGO  FINDINGS:  There is soft tissue swelling of the forefoot.  There is an acute oblique but nondisplaced fracture involving the proximal phalanx of the 1st digit.  Fracture lines do not involve the articular surfaces.  There is no other acute fracture or dislocation.  Tarsal bones are within normal limits.  There is some degenerative spurring along the dorsal aspect of the talus.      Impression:   1. Acute oblique nondisplaced fracture involving the proximal phalanx of the 1st digit.      OMAR MCCARTHY MD       Electronically Signed and Approved  By: OMAR MCCARTHY MD on 10/19/2022 at 11:54                 ASSESSMENT/PLAN     Diagnoses and all orders for this visit:    1. Foot pain, bilateral (Primary)    2. Foot drop, bilateral    3. Onychomycosis    4. Difficulty walking    5. Onychocryptosis    6. Neuropathy    7. Closed nondisplaced fracture of proximal phalanx of left great toe, initial encounter        Comprehensive lower extremity examination and evaluation was performed.    Discussed findings and treatment plan including risks, benefits, and treatment options with patient in detail. Patient agreed with treatment plan.    Toenails 1 through 5 bilaterally were debrided in thickness and length and then smoothed with a Dremel Tool.  Tolerated the procedure well without complications.    Continue CAM walker at all times when ambulating.  The patient states understanding and agreement with this plan.      An After Visit Summary was printed and given to the patient at discharge, including (if requested) any available informative/educational handouts regarding diagnosis, treatment, or medications. All questions were answered to patient/family satisfaction. Should symptoms fail to improve or worsen they agree to call or return to clinic or to go to the Emergency Department. Discussed the importance of following up with any needed screening tests/labs/specialist appointments and any requested follow-up recommended by me today. Importance of maintaining follow-up discussed and patient accepts that missed appointments can delay diagnosis and potentially lead to worsening of conditions.    Return in about 1 month (around 12/7/2022) for Left 1st toe Fx f/u; Toenail Care in 9 weeks., or sooner if acute issues arise.    This document has been electronically signed by Roni Osullivan DPM on November 7, 2022 08:03 EST

## 2022-11-11 ENCOUNTER — ANESTHESIA EVENT (OUTPATIENT)
Dept: PERIOP | Facility: HOSPITAL | Age: 42
End: 2022-11-11

## 2022-11-11 ENCOUNTER — HOSPITAL ENCOUNTER (OUTPATIENT)
Facility: HOSPITAL | Age: 42
Setting detail: HOSPITAL OUTPATIENT SURGERY
Discharge: HOME | End: 2022-11-11
Attending: SURGERY | Admitting: SURGERY

## 2022-11-11 ENCOUNTER — ANESTHESIA (OUTPATIENT)
Dept: PERIOP | Facility: HOSPITAL | Age: 42
End: 2022-11-11

## 2022-11-11 VITALS
RESPIRATION RATE: 12 BRPM | OXYGEN SATURATION: 92 % | WEIGHT: 186.07 LBS | HEART RATE: 60 BPM | TEMPERATURE: 98 F | HEIGHT: 68 IN | SYSTOLIC BLOOD PRESSURE: 118 MMHG | DIASTOLIC BLOOD PRESSURE: 79 MMHG | BODY MASS INDEX: 28.2 KG/M2

## 2022-11-11 DIAGNOSIS — R22.31 MASS OF RIGHT FOREARM: ICD-10-CM

## 2022-11-11 PROCEDURE — 25075 EXC FOREARM LES SC < 3 CM: CPT | Performed by: SURGERY

## 2022-11-11 PROCEDURE — 25010000002 FENTANYL CITRATE (PF) 50 MCG/ML SOLUTION: Performed by: NURSE ANESTHETIST, CERTIFIED REGISTERED

## 2022-11-11 PROCEDURE — 25010000002 PROPOFOL 10 MG/ML EMULSION: Performed by: NURSE ANESTHETIST, CERTIFIED REGISTERED

## 2022-11-11 PROCEDURE — 25075 EXC FOREARM LES SC < 3 CM: CPT | Performed by: SPECIALIST/TECHNOLOGIST, OTHER

## 2022-11-11 PROCEDURE — 25010000002 MIDAZOLAM PER 1 MG: Performed by: ANESTHESIOLOGY

## 2022-11-11 PROCEDURE — 88307 TISSUE EXAM BY PATHOLOGIST: CPT | Performed by: SURGERY

## 2022-11-11 RX ORDER — SODIUM CHLORIDE, SODIUM LACTATE, POTASSIUM CHLORIDE, CALCIUM CHLORIDE 600; 310; 30; 20 MG/100ML; MG/100ML; MG/100ML; MG/100ML
9 INJECTION, SOLUTION INTRAVENOUS CONTINUOUS PRN
Status: DISCONTINUED | OUTPATIENT
Start: 2022-11-11 | End: 2022-11-11 | Stop reason: HOSPADM

## 2022-11-11 RX ORDER — PROMETHAZINE HYDROCHLORIDE 25 MG/1
25 SUPPOSITORY RECTAL ONCE AS NEEDED
Status: DISCONTINUED | OUTPATIENT
Start: 2022-11-11 | End: 2022-11-11 | Stop reason: HOSPADM

## 2022-11-11 RX ORDER — ASPIRIN 81 MG/1
81 TABLET ORAL DAILY
COMMUNITY

## 2022-11-11 RX ORDER — DEXMEDETOMIDINE HYDROCHLORIDE 100 UG/ML
INJECTION, SOLUTION INTRAVENOUS AS NEEDED
Status: DISCONTINUED | OUTPATIENT
Start: 2022-11-11 | End: 2022-11-11 | Stop reason: SURG

## 2022-11-11 RX ORDER — ONDANSETRON 2 MG/ML
4 INJECTION INTRAMUSCULAR; INTRAVENOUS ONCE AS NEEDED
Status: DISCONTINUED | OUTPATIENT
Start: 2022-11-11 | End: 2022-11-11 | Stop reason: HOSPADM

## 2022-11-11 RX ORDER — MAGNESIUM HYDROXIDE 1200 MG/15ML
LIQUID ORAL AS NEEDED
Status: DISCONTINUED | OUTPATIENT
Start: 2022-11-11 | End: 2022-11-11 | Stop reason: HOSPADM

## 2022-11-11 RX ORDER — OXYCODONE HYDROCHLORIDE 5 MG/1
5 TABLET ORAL
Status: DISCONTINUED | OUTPATIENT
Start: 2022-11-11 | End: 2022-11-11 | Stop reason: HOSPADM

## 2022-11-11 RX ORDER — HYDROCODONE BITARTRATE AND ACETAMINOPHEN 5; 325 MG/1; MG/1
1 TABLET ORAL EVERY 6 HOURS PRN
Qty: 5 TABLET | Refills: 0 | Status: SHIPPED | OUTPATIENT
Start: 2022-11-11 | End: 2022-12-19

## 2022-11-11 RX ORDER — FENTANYL CITRATE 50 UG/ML
INJECTION, SOLUTION INTRAMUSCULAR; INTRAVENOUS AS NEEDED
Status: DISCONTINUED | OUTPATIENT
Start: 2022-11-11 | End: 2022-11-11 | Stop reason: SURG

## 2022-11-11 RX ORDER — BUPIVACAINE HYDROCHLORIDE 2.5 MG/ML
INJECTION, SOLUTION EPIDURAL; INFILTRATION; INTRACAUDAL AS NEEDED
Status: DISCONTINUED | OUTPATIENT
Start: 2022-11-11 | End: 2022-11-11 | Stop reason: HOSPADM

## 2022-11-11 RX ORDER — MIDAZOLAM HYDROCHLORIDE 1 MG/ML
2 INJECTION INTRAMUSCULAR; INTRAVENOUS ONCE
Status: COMPLETED | OUTPATIENT
Start: 2022-11-11 | End: 2022-11-11

## 2022-11-11 RX ORDER — MEPERIDINE HYDROCHLORIDE 25 MG/ML
12.5 INJECTION INTRAMUSCULAR; INTRAVENOUS; SUBCUTANEOUS
Status: DISCONTINUED | OUTPATIENT
Start: 2022-11-11 | End: 2022-11-11 | Stop reason: HOSPADM

## 2022-11-11 RX ORDER — ACETAMINOPHEN 500 MG
1000 TABLET ORAL ONCE
Status: COMPLETED | OUTPATIENT
Start: 2022-11-11 | End: 2022-11-11

## 2022-11-11 RX ORDER — LIDOCAINE HYDROCHLORIDE 20 MG/ML
INJECTION, SOLUTION EPIDURAL; INFILTRATION; INTRACAUDAL; PERINEURAL AS NEEDED
Status: DISCONTINUED | OUTPATIENT
Start: 2022-11-11 | End: 2022-11-11 | Stop reason: SURG

## 2022-11-11 RX ORDER — PROMETHAZINE HYDROCHLORIDE 12.5 MG/1
25 TABLET ORAL ONCE AS NEEDED
Status: DISCONTINUED | OUTPATIENT
Start: 2022-11-11 | End: 2022-11-11 | Stop reason: HOSPADM

## 2022-11-11 RX ADMIN — FENTANYL CITRATE 25 MCG: 50 INJECTION, SOLUTION INTRAMUSCULAR; INTRAVENOUS at 10:48

## 2022-11-11 RX ADMIN — PROPOFOL 75 MCG/KG/MIN: 10 INJECTION, EMULSION INTRAVENOUS at 09:49

## 2022-11-11 RX ADMIN — MIDAZOLAM HYDROCHLORIDE 2 MG: 1 INJECTION, SOLUTION INTRAMUSCULAR; INTRAVENOUS at 10:20

## 2022-11-11 RX ADMIN — DEXMEDETOMIDINE HYDROCHLORIDE 20 MCG: 100 INJECTION, SOLUTION, CONCENTRATE INTRAVENOUS at 10:36

## 2022-11-11 RX ADMIN — LIDOCAINE HYDROCHLORIDE 100 MG: 20 INJECTION, SOLUTION EPIDURAL; INFILTRATION; INTRACAUDAL; PERINEURAL at 10:36

## 2022-11-11 RX ADMIN — ACETAMINOPHEN 1000 MG: 500 TABLET ORAL at 09:30

## 2022-11-11 RX ADMIN — SODIUM CHLORIDE, POTASSIUM CHLORIDE, SODIUM LACTATE AND CALCIUM CHLORIDE 9 ML/HR: 600; 310; 30; 20 INJECTION, SOLUTION INTRAVENOUS at 09:30

## 2022-11-11 RX ADMIN — FENTANYL CITRATE 25 MCG: 50 INJECTION, SOLUTION INTRAMUSCULAR; INTRAVENOUS at 10:36

## 2022-11-11 RX ADMIN — PROPOFOL 150 MCG/KG/MIN: 10 INJECTION, EMULSION INTRAVENOUS at 10:36

## 2022-11-11 NOTE — OP NOTE
"Operative Report    Patient Name:  Kang Grey  YOB: 1980    Date of Surgery:  11/11/2022     Pre-op Diagnosis:   Mass of right forearm [R22.31]    Pre-Op Diagnosis Codes:     * Mass of right forearm [R22.31]       Post-op Diagnosis:   Post-Op Diagnosis Codes:     * Mass of right forearm [R22.31]    Procedure(s):  Excision of subcutaneous mass from right forearm    Staff:  Surgeon(s):  Oni Greenberg MD    Assistant: Severo Vences CSA    Anesthesia: Monitored Anesthesia Care    Estimated Blood Loss: 3 mL    Complications:  None    Drains:  None    Packing:  None    Implants:    Nothing was implanted during the procedure    Specimen:          Specimens     ID Source Type Tests Collected By Collected At Frozen?    A Forearm, Right Tissue · TISSUE PATHOLOGY EXAM   Oni Greenberg MD 11/11/22 1050     Description: right forearm subcutaneous mass    This specimen was not marked as sent.           Indications:  42 y.o. male referred who has a subcutaneous mass just distal to the right patient's right elbow.  MRI was done and showed \"an ovoid heterogeneous focus within the subcutaneous soft tissues at the radial aspect of the elbow that measures 2.0 x 1.9 x 0.1 cm in AP by craniocaudal by transverse dimensions.\"      Findings:  Approximately 2 cm diameter cystic structure removed from the subcutaneous tissue of the right proximal forearm.    Description of Procedure: Patient was taken the operating placed supine in procedure.  Timeout was formed.  Anesthesia was administered.  The right elbow area was prepped and draped in usual fashion.  The subcutaneous lesion was palpated.  Lidocaine was injected into the skin and subcutaneous tissue.  An incision was made with a knife about 2 cm long.  The incision was deepened in the subcutaneous tissue and a cystic structure that was fairly well-circumscribed was circumferentially dissected, removed, and sent to pathology.  Adequate hemostasis.  Wound was " closed with buried Agoral suture followed by appropriate dressings.  No complications.  Patient was transported to the recovery area in stable condition.    Oni Greenberg MD     Date: 11/11/2022  Time: 10:59 EST    Electronically signed by Oni Greenberg MD, 11/11/22, 10:59 AM EST.

## 2022-11-11 NOTE — DISCHARGE INSTRUCTIONS
Dr. Greenberg's Instructions          DIET  Resume your regular diet.     ACTIVITY  No specific activity restrictions.     WOUND CARE & SHOWERING/BATHING  Your incision is covered with a plastic type material that will flake off on its own in the next few weeks.  If the plastic type material has not flaked off on its own by 2 weeks after your surgery then use tweezers to pull it off.  You have sutures in your incision but they are placed below the level of the skin and they will slowly dissolve (they do not need to be removed).  The skin around your incision will likely have some bruising.  This is normal.  You can shower beginning tomorrow but wait two weeks after your surgery before taking any tub baths.      HOME MEDICATIONS  Resume all your normal home medications.     PAIN CONTROL  Take Tylenol or Motrin for any pain.      FOLLOW-UP VISIT & QUESTIONS/CONCERNS  Call Dr. Singh office at [phone] and schedule a follow-up appointment for about two to three weeks after your surgery date.  However, if you are doing fine and don´t have any concerns then simply cancel the follow-up appointment.  Should you have any questions or concerns, please call Dr. Singh office.                DISCHARGE INSTRUCTIONS  SURGICAL / AMBULATORY  PROCEDURES      For your surgery you had:  General anesthesia (you may have a sore throat for the first 24 hours)  IV sedation.  Local anesthesia  Monitored anesthesia Care  You received a medicated patch for nausea prevention today (behind your ear). It is recommended that you remove it 24-48 hours post-operatively. It must be removed within 72 hours.   You have received an anesthesia medication today that can cause hormonal forms of birth control to be ineffective. You should use a different form of birth control (to prevent pregnancy) for 7 days.   You may experience dizziness, drowsiness, or light-headedness for several hours following surgery/procedure.  Do not stay alone today or  tonight.  Limit your activity for 24 hours.  Resume your diet slowly.  Follow whatever special dietary instructions you may have been given by your doctor.  You should not drive or operate machinery, drink alcohol, or sign legally binding documents for 24 hours or while you are taking pain medication.  Last dose of pain medication was given at:   .  NOTIFY YOUR DOCTOR IF YOU EXPERIENCE ANY OF THE FOLLOWING:  Temperature greater than 101 degrees Fahrenheit  Shaking Chills  Redness or excessive drainage from incision  Nausea, vomiting and/or pain that is not controlled by prescribed medications  Increase in bleeding or bleeding that is excessive  Unable to urinate in 6 hours after surgery  If unable to reach your doctor, please go to the closest Emergency Room  You may begin dressing changes:     [] in 24 hours   [] in 48 hours   [] Other:    You may remove Band-Aid/dressing tomorrow.  You may shower or bathe   .  Apply an ice pack 24-48 hours.  Medications per physician instructions as indicated on Discharge Medication Information Sheet.  You should see   for follow-up care   on   .  Phone number:       SPECIAL INSTRUCTIONS:

## 2022-11-11 NOTE — ANESTHESIA POSTPROCEDURE EVALUATION
Patient: Kang Grey    Procedure Summary     Date: 11/11/22 Room / Location: [facility] OR  / [facility]    Anesthesia Start: 1029 Anesthesia Stop: 1107    Procedure: Excision of subcutaneous mass from right forearm (Right) Diagnosis:       Mass of right forearm      (Mass of right forearm [R22.31])    Surgeons: Oni Greenberg MD Provider: Dwight Marion MD    Anesthesia Type: general, MAC ASA Status: 3          Anesthesia Type: general, MAC    Vitals  Vitals Value Taken Time   /79 11/11/22 1127   Temp 36.7 °C (98 °F) 11/11/22 1105   Pulse 111 11/11/22 1135   Resp 12 11/11/22 1125   SpO2 95 % 11/11/22 1135           Post Anesthesia Care and Evaluation    Patient location during evaluation: bedside  Patient participation: complete - patient participated  Level of consciousness: awake  Pain management: adequate    Airway patency: patent  Anesthetic complications: No anesthetic complications  PONV Status: none  Cardiovascular status: acceptable and stable  Respiratory status: acceptable  Hydration status: acceptable    Comments: An Anesthesiologist personally participated in the most demanding procedures (including induction and emergence if applicable) in the anesthesia plan, monitored the course of anesthesia administration at frequent intervals and remained physically present and available for immediate diagnosis and treatment of emergencies.

## 2022-11-11 NOTE — H&P
"Chief Complaint:  Mass (On rt arm)    Primary Care Provider: Hien Parham MD    Referring Provider: Hien Parham MD    History of Present Illness  Kang Grey is a 42 y.o. male referred by Hien Parham MD for a subcutaneous mass just distal to the right patient's right elbow.  The patient can feel the mass and he is worried it might be cancer and he wants to have it removed.  MRI was done and showed \"There is an ovoid heterogeneous focus within the subcutaneous soft tissues at the radial aspect of the elbow in the area of palpable abnormality as indicated by the overlying marker.  This finding measures 2.0 x 1.9 x 0.1 cm in AP by craniocaudal by transverse dimensions.\"  Patient's medical history includes cerebral palsy.  Patient does have some limitations but for the most part can interact independently and seems to be able to make decisions on his own.    Allergies: Valproic acid    Outpatient Medications Marked as Taking for the 10/4/22 encounter (Office Visit) with Oni Greenberg MD   Medication Sig Dispense Refill   • ascorbic acid (VITAMIN C) 1000 MG tablet Take 1 tablet by mouth Daily.     • busPIRone (BUSPAR) 10 MG tablet Take 1 tablet by mouth 3 (Three) Times a Day As Needed.     • Calcium Carbonate 1500 (600 Ca) MG tablet Take 1 tablet by mouth 2 (two) times a day.     • cholecalciferol (Vitamin D) 25 MCG (1000 UT) tablet Take 2 tablets by mouth Daily. 180 tablet 1   • cyclobenzaprine (FLEXERIL) 5 MG tablet Take 1 tablet by mouth 3 (Three) Times a Day As Needed for Muscle Spasms. 40 tablet 2   • Diclofenac Sodium (VOLTAREN) 1 % gel gel Apply 1 g topically to the appropriate area as directed As Needed.     • docusate sodium (Colace) 100 MG capsule Take 1 capsule by mouth Daily. 90 capsule 1   • famotidine (Pepcid) 20 MG tablet Take 1 tablet by mouth Daily. 90 tablet 1   • HM TRUEplus Fiber 2 g chewable tablet Chew 1 tablet Daily. 90 tablet 1   • Invega Sustenna 234 MG/1.5ML " suspension prefilled syringe IM injection Inject 1.5 mL into the appropriate muscle as directed by prescriber Every 30 (Thirty) Days.     • Keppra 500 MG tablet Take 3 tablets by mouth Every 12 (Twelve) Hours As Needed.     • lamoTRIgine (LaMICtal) 200 MG tablet Take 1 tablet by mouth Daily.     • Melatonin 10 MG tablet Take 1 tablet by mouth every night at bedtime. 90 tablet 1   • pravastatin (PRAVACHOL) 20 MG tablet Take 1 tablet by mouth Every Night. 90 tablet 1       Past Medical History:   • Acute upper respiratory infection   • Age-related osteoporosis without current pathological fracture   • Cerebral palsy (HCC)    diagnosed as infant   • Compression fracture of first lumbar vertebra ([facility])   • Constipation   • Disease of pancreas   • Hydrocephalus in diseases classified elsewhere ([facility])   • Injury of back   • Low back pain   • Major depressive disorder    single episode, moderate   • Other abnormal glucose   • Other cerebral palsy ([facility])   • Other fatigue   • Other generalized epilepsy and epileptic syndromes, not intractable, without status epilepticus ([facility])   • Other hypertrophic disorders of the skin   • Pain in left ankle and joints of left foot   • Pain in right toe(s)   • Pain in thoracic spine   • Paranoid schizophrenia ([facility])   • Pelvic and perineal pain   • Primary insomnia   • Pure hypercholesterolemia   • Repeated falls   • Schizoaffective disorder, unspecified ([facility])   • Seizures ([facility])    last seizure was 5 years ago; medication controlled   • Somnolence   • TIA (transient ischemic attack)   • Vitamin D deficiency   • Wedge compression fracture of fourth thoracic vertebra, subsequent encounter for fracture with routine healing        Past Surgical History:   • HAMSTRING REPAIR   • OTHER SURGICAL HISTORY    hyposadius repair       Family History:   Family History   Problem Relation Age of Onset   • Hypothyroidism Mother    • No Known Problems Father    • Ulcers Brother         Social History:  Social  "History     Tobacco Use   • Smoking status: Never Smoker   • Smokeless tobacco: Never Used   Substance Use Topics   • Alcohol use: Never       Objective     Vital Signs:  Resp 16   Ht 172.7 cm (68\")   Wt 83.3 kg (183 lb 9.6 oz)   BMI 27.92 kg/m²   • Respiratory:  breathing not labored, respiratory effort appears normal  • Cardiovascular:  heart regular rate  • Abdomen:  nondistended    • Skin and subcutaneous tissue:  At the right forearm, just distal to the right elbow, there is a soft subcutaneous mass about 2 cm in diameter that is mobile and fairly well-circumscribed.  Mass is tender with palpation.  Skin overlying the mass is normal-appearing  • Musculoskeletal: moving all extremities symmetrically and purposefully        Assessment:  Mass of right forearm    Plan:  Excision of subcutaneous mass from right forearm    Discussion: Indications, options, risks, benefits, and expected outcomes of planned surgery were discussed with the patient and with the patient's mother, and both agree to proceed.    Oni Greenberg MD    Electronically signed by Oni Greenberg MD, 11/11/22, 9:22 AM EST.      "

## 2022-11-15 LAB
CYTO UR: NORMAL
LAB AP CASE REPORT: NORMAL
LAB AP CLINICAL INFORMATION: NORMAL
PATH REPORT.FINAL DX SPEC: NORMAL
PATH REPORT.GROSS SPEC: NORMAL

## 2022-11-16 ENCOUNTER — OFFICE VISIT (OUTPATIENT)
Dept: FAMILY MEDICINE CLINIC | Age: 42
End: 2022-11-16

## 2022-11-16 ENCOUNTER — HOSPITAL ENCOUNTER (OUTPATIENT)
Dept: GENERAL RADIOLOGY | Facility: HOSPITAL | Age: 42
Discharge: HOME | End: 2022-11-16
Admitting: FAMILY MEDICINE

## 2022-11-16 VITALS
HEIGHT: 68 IN | SYSTOLIC BLOOD PRESSURE: 130 MMHG | HEART RATE: 82 BPM | DIASTOLIC BLOOD PRESSURE: 87 MMHG | WEIGHT: 184.4 LBS | BODY MASS INDEX: 27.95 KG/M2 | OXYGEN SATURATION: 97 %

## 2022-11-16 DIAGNOSIS — S92.415D CLOSED NONDISPLACED FRACTURE OF PROXIMAL PHALANX OF LEFT GREAT TOE WITH ROUTINE HEALING, SUBSEQUENT ENCOUNTER: ICD-10-CM

## 2022-11-16 DIAGNOSIS — M79.672 FOOT PAIN, LEFT: ICD-10-CM

## 2022-11-16 DIAGNOSIS — M79.672 FOOT PAIN, LEFT: Primary | ICD-10-CM

## 2022-11-16 PROCEDURE — 99213 OFFICE O/P EST LOW 20 MIN: CPT | Performed by: FAMILY MEDICINE

## 2022-11-16 PROCEDURE — 73630 X-RAY EXAM OF FOOT: CPT

## 2022-11-16 NOTE — PROGRESS NOTES
Kang Grey presents to [facility].    Chief Complaint: Toe fracture    Subjective       History of Present Illness:  HPI   Recent surgical removal of his R arm mass by Dr Greenberg on 11/11/22 and he is healing nicely.    He has L toe pain and fracture, he has a left foot acute oblique nondisplaced fracture involving the proximal phalanx of the 1st digit.    Review of Systems:  Review of Systems   Constitutional: Negative for chills, fatigue and fever.   HENT: Negative for congestion, ear discharge and sore throat.    Respiratory: Negative for shortness of breath.    Cardiovascular: Negative for chest pain.   Gastrointestinal: Negative for abdominal pain, constipation, diarrhea, nausea, vomiting and GERD.   Genitourinary: Negative for flank pain.   Neurological: Negative for dizziness and headache.   Psychiatric/Behavioral: Negative for depressed mood.        Objective   Medical History:  Past Medical History:   • Acute upper respiratory infection   • Age-related osteoporosis without current pathological fracture   • Broken toe    left great toe   • Cerebral palsy (HCC)    diagnosed as infant   • Compression fracture of first lumbar vertebra ([facility])   • Constipation   • Disease of pancreas   • History of urinary self-catheterization    pt or pt's mother catheterizes 4-6  times a day   • Hydrocephalus in diseases classified elsewhere ([facility])   • Injury of back   • Low back pain   • Major depressive disorder    single episode, moderate   • Other abnormal glucose   • Other cerebral palsy ([facility])   • Other fatigue   • Other generalized epilepsy and epileptic syndromes, not intractable, without status epilepticus ([facility])   • Other hypertrophic disorders of the skin   • Pain in left ankle and joints of left foot   • Pain in right toe(s)   • Pain in thoracic spine   • Paranoid schizophrenia ([facility])   • Pelvic and perineal pain   • PONV (postoperative nausea and vomiting)   • Primary insomnia   • Pure  hypercholesterolemia   • Repeated falls    last fall 11/2022   • Schizoaffective disorder, unspecified (HCC)   • Seizures (HCC)    last seizure was approx 2018, medication controlled   • Sleep apnea    uses cpap machine   • Somnolence   • TIA (transient ischemic attack)   • Vitamin D deficiency   • Wedge compression fracture of fourth thoracic vertebra, subsequent encounter for fracture with routine healing     Past Surgical History:   • CYST REMOVAL    Procedure: Excision of subcutaneous mass from right forearm;  Surgeon: Oni Greenberg MD;  Location: [facility] OR [facility];  Service: General;  Laterality: Right;   • HAMSTRING REPAIR    age 10   • OTHER SURGICAL HISTORY    hyposadius repair   • TOE FUSION    great toe at age 21      Family History   Problem Relation Age of Onset   • Hypothyroidism Mother    • No Known Problems Father    • Ulcers Brother    • Malig Hyperthermia Neg Hx      Social History     Tobacco Use   • Smoking status: Never   • Smokeless tobacco: Never   Substance Use Topics   • Alcohol use: Never       There are no preventive care reminders to display for this patient.     Immunization History   Administered Date(s) Administered   • COVID-19 (MODERNA) 1st, 2nd, 3rd Dose Only 03/25/2021, 04/22/2021, 11/19/2021   • COVID-19 (PFIZER) BIVALENT BOOSTER 12+YRS 10/19/2022   • Covid-19 (Pfizer) Gray Cap 06/20/2022   • FluLaval/Fluzone >6mos 12/07/2021, 10/19/2022   • Influenza Quad Vaccine (Inpatient) 10/01/2013   • Influenza, Unspecified 10/07/2016   • Tdap 05/05/2016       Allergies   Allergen Reactions   • Valproic Acid Irritability     DEPAKOTE CAUSES DIZZINESS AND IRRITABILITY          Medications:  Current Outpatient Medications on File Prior to Visit   Medication Sig   • ascorbic acid (VITAMIN C) 1000 MG tablet Take 1 tablet by mouth Daily.   • aspirin 81 MG EC tablet Take 1 tablet by mouth Daily.   • busPIRone (BUSPAR) 10 MG tablet Take 1 tablet by mouth 3 (Three) Times a Day As Needed.   • Calcium  "Carbonate 1500 (600 Ca) MG tablet Take 1 tablet by mouth 2 (two) times a day.   • cholecalciferol (Vitamin D) 25 MCG (1000 UT) tablet Take 2 tablets by mouth Daily.   • cyclobenzaprine (FLEXERIL) 5 MG tablet Take 1 tablet by mouth 3 (Three) Times a Day As Needed for Muscle Spasms.   • Diclofenac Sodium (VOLTAREN) 1 % gel gel Apply 1 g topically to the appropriate area as directed As Needed.   • docusate sodium (Colace) 100 MG capsule Take 1 capsule by mouth Daily.   • famotidine (Pepcid) 20 MG tablet Take 1 tablet by mouth Daily.   • folic acid (FOLVITE) 800 MCG tablet Take 1 tablet by mouth Daily.   • HM TRUEplus Fiber 2 g chewable tablet Chew 1 tablet Daily.   • HYDROcodone-acetaminophen (Norco) 5-325 MG per tablet Take 1 tablet by mouth Every 6 (Six) Hours As Needed for Moderate Pain (NOTE: You can take two tablets instead of one tablet every four hours instead of every six hours if needed for pain).   • Invega Sustenna 234 MG/1.5ML suspension prefilled syringe IM injection Inject 1.5 mL into the appropriate muscle as directed by prescriber Every 30 (Thirty) Days.   • Keppra 500 MG tablet Take 3 tablets by mouth Every 12 (Twelve) Hours As Needed.   • lamoTRIgine (LaMICtal) 200 MG tablet Take 1 tablet by mouth Daily.   • Melatonin 10 MG tablet Take 1 tablet by mouth every night at bedtime.   • pravastatin (PRAVACHOL) 20 MG tablet Take 1 tablet by mouth Every Night.   • venlafaxine XR (EFFEXOR-XR) 150 MG 24 hr capsule      No current facility-administered medications on file prior to visit.       Vital Signs:   /87 (BP Location: Right arm, Patient Position: Sitting, Cuff Size: Adult)   Pulse 82   Ht 172.7 cm (68\")   Wt 83.6 kg (184 lb 6.4 oz)   SpO2 97%   BMI 28.04 kg/m²       Physical Exam:  Physical Exam  Vitals and nursing note reviewed.   Constitutional:       General: He is not in acute distress.     Appearance: Normal appearance. He is not ill-appearing, toxic-appearing or diaphoretic.   HENT:      " Head: Normocephalic and atraumatic.      Right Ear: Tympanic membrane, ear canal and external ear normal.      Left Ear: Tympanic membrane, ear canal and external ear normal.      Nose: No congestion or rhinorrhea.      Mouth/Throat:      Mouth: Mucous membranes are moist.      Pharynx: Oropharynx is clear. No oropharyngeal exudate or posterior oropharyngeal erythema.   Eyes:      Extraocular Movements: Extraocular movements intact.      Conjunctiva/sclera: Conjunctivae normal.      Pupils: Pupils are equal, round, and reactive to light.   Cardiovascular:      Rate and Rhythm: Normal rate and regular rhythm.      Heart sounds: Normal heart sounds.   Pulmonary:      Effort: Pulmonary effort is normal.      Breath sounds: Normal breath sounds. No wheezing, rhonchi or rales.   Abdominal:      General: Abdomen is flat.      Palpations: Abdomen is soft.   Musculoskeletal:      Cervical back: Neck supple. No rigidity.   Lymphadenopathy:      Cervical: No cervical adenopathy.   Skin:     General: Skin is warm and dry.   Neurological:      Mental Status: He is alert and oriented to person, place, and time.   Psychiatric:         Mood and Affect: Mood normal.         Behavior: Behavior normal.         Result Review      The following data was reviewed by Hien Parham MD on 11/16/2022.  Lab Results   Component Value Date    WBC 6.62 07/26/2022    HGB 14.8 07/26/2022    HCT 44.3 07/26/2022    MCV 86.0 07/26/2022     07/26/2022     Lab Results   Component Value Date    GLUCOSE 97 08/03/2022    BUN 17 08/03/2022    CREATININE 0.90 09/23/2022    EGFRIFNONA 80 12/07/2021    BCR 18.9 08/03/2022    K 4.3 08/03/2022    CO2 28.2 08/03/2022    CALCIUM 9.8 08/03/2022    ALBUMIN 4.60 08/03/2022    LABIL2 1.4 05/06/2021    AST 13 08/03/2022    ALT 15 08/03/2022     Lab Results   Component Value Date    CHOL 147 07/26/2022    CHLPL 174 05/06/2021    TRIG 86 07/26/2022    HDL 41 07/26/2022    LDL 90 07/26/2022     Lab Results    Component Value Date    TSH 1.590 12/07/2021     Lab Results   Component Value Date    HGBA1C 5.20 10/21/2021     No results found for: PSA                    Assessment and Plan:          Diagnoses and all orders for this visit:    1. Foot pain, left (Primary)  -     XR Foot 3+ View Left; Future    2. Closed nondisplaced fracture of proximal phalanx of left great toe with routine healing, subsequent encounter  Comments:  Repeat x-ray shows a healing nondisplaced fracture.  Follow-up 4 weeks to reeval.  Continue walking boot for 4 more weeks          Follow Up   Return if symptoms worsen or fail to improve.

## 2022-11-28 ENCOUNTER — OFFICE VISIT (OUTPATIENT)
Dept: SURGERY | Facility: CLINIC | Age: 42
End: 2022-11-28

## 2022-11-28 VITALS — BODY MASS INDEX: 27.8 KG/M2 | RESPIRATION RATE: 16 BRPM | WEIGHT: 183.4 LBS | HEIGHT: 68 IN

## 2022-11-28 DIAGNOSIS — Z09 ENCOUNTER FOR FOLLOW-UP: Primary | ICD-10-CM

## 2022-11-28 PROCEDURE — 99024 POSTOP FOLLOW-UP VISIT: CPT | Performed by: SURGERY

## 2022-11-28 NOTE — PROGRESS NOTES
Patient is here for follow-up after I removed a cystic structure from her right forearm.  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    Patient is doing fine.  Incision is healing well.  Pathology discussed.  No new issues to address.  Patient can see me PRN.

## 2022-12-19 ENCOUNTER — OFFICE VISIT (OUTPATIENT)
Dept: FAMILY MEDICINE CLINIC | Age: 42
End: 2022-12-19

## 2022-12-19 ENCOUNTER — HOSPITAL ENCOUNTER (OUTPATIENT)
Dept: GENERAL RADIOLOGY | Facility: HOSPITAL | Age: 42
Discharge: HOME | End: 2022-12-19
Admitting: FAMILY MEDICINE

## 2022-12-19 VITALS
DIASTOLIC BLOOD PRESSURE: 79 MMHG | WEIGHT: 180.4 LBS | SYSTOLIC BLOOD PRESSURE: 117 MMHG | HEIGHT: 68 IN | OXYGEN SATURATION: 97 % | BODY MASS INDEX: 27.34 KG/M2 | HEART RATE: 90 BPM

## 2022-12-19 DIAGNOSIS — S99.292A OTHER PHYSEAL FRACTURE OF PHALANX OF LEFT TOE, INITIAL ENCOUNTER FOR CLOSED FRACTURE: Primary | ICD-10-CM

## 2022-12-19 PROCEDURE — 73660 X-RAY EXAM OF TOE(S): CPT

## 2022-12-19 PROCEDURE — 99213 OFFICE O/P EST LOW 20 MIN: CPT | Performed by: FAMILY MEDICINE

## 2022-12-19 RX ORDER — VENLAFAXINE 37.5 MG/1
37.5 TABLET ORAL DAILY
COMMUNITY
End: 2023-03-20

## 2022-12-19 NOTE — PROGRESS NOTES
Kang Grey presents to [facility].    Chief Complaint: L foot fracture  Subjective       History of Present Illness:  HPI     He has L toe pain due to left great toe acute oblique nondisplaced fracture involving the proximal phalanx of the 1st digit, he has been in a walking boot for 6 weeks now.  Repeat x-ray shows a healing fracture of the proximal phalanx of the great toe and the fracture line is almost completely obscured by bridging callus.  I reviewed results with patient today and recommend he continue the walking boot for 2 more weeks and then he can be done with it.  No follow-up needed unless he reinjures himself.    Review of Systems:  Review of Systems   Constitutional: Negative for chills, fatigue and fever.   HENT: Negative for congestion, ear discharge and sore throat.    Respiratory: Negative for shortness of breath.    Cardiovascular: Negative for chest pain.   Gastrointestinal: Negative for abdominal pain, constipation, diarrhea, nausea, vomiting and GERD.   Genitourinary: Negative for flank pain.   Neurological: Negative for dizziness and headache.   Psychiatric/Behavioral: Negative for depressed mood.        Objective   Medical History:  Past Medical History:   • Acute upper respiratory infection   • Age-related osteoporosis without current pathological fracture   • Broken toe    left great toe   • Cerebral palsy (HCC)    diagnosed as infant   • Compression fracture of first lumbar vertebra ([facility])   • Constipation   • Disease of pancreas   • History of urinary self-catheterization    pt or pt's mother catheterizes 4-6  times a day   • Hydrocephalus in diseases classified elsewhere ([facility])   • Injury of back   • Low back pain   • Major depressive disorder    single episode, moderate   • Other abnormal glucose   • Other cerebral palsy (HCC)   • Other fatigue   • Other generalized epilepsy and epileptic syndromes, not intractable, without status epilepticus ([facility])   •  Other hypertrophic disorders of the skin   • Pain in left ankle and joints of left foot   • Pain in right toe(s)   • Pain in thoracic spine   • Paranoid schizophrenia (HCC)   • Pelvic and perineal pain   • PONV (postoperative nausea and vomiting)   • Primary insomnia   • Pure hypercholesterolemia   • Repeated falls    last fall 11/2022   • Schizoaffective disorder, unspecified (HCC)   • Seizures (HCC)    last seizure was approx 2018, medication controlled   • Sleep apnea    uses cpap machine   • Somnolence   • TIA (transient ischemic attack)   • Vitamin D deficiency   • Wedge compression fracture of fourth thoracic vertebra, subsequent encounter for fracture with routine healing     Past Surgical History:   • CYST REMOVAL    Procedure: Excision of subcutaneous mass from right forearm;  Surgeon: Oni Greenberg MD;  Location: [facility] [place] [facility];  Service: General;  Laterality: Right;   • HAMSTRING REPAIR    age 10   • OTHER SURGICAL HISTORY    hyposadius repair   • TOE FUSION    great toe at age 21      Family History   Problem Relation Age of Onset   • Hypothyroidism Mother    • No Known Problems Father    • Ulcers Brother    • Malig Hyperthermia Neg Hx      Social History     Tobacco Use   • Smoking status: Never   • Smokeless tobacco: Never   Substance Use Topics   • Alcohol use: Never       There are no preventive care reminders to display for this patient.     Immunization History   Administered Date(s) Administered   • COVID-19 (MODERNA) 1st, 2nd, 3rd Dose Only 03/25/2021, 04/22/2021, 11/19/2021   • COVID-19 (PFIZER) BIVALENT BOOSTER 12+YRS 10/19/2022   • Covid-19 (Pfizer) Gray Cap 06/20/2022   • FluLaval/Fluzone >6mos 12/07/2021, 10/19/2022   • Influenza Quad Vaccine (Inpatient) 10/01/2013   • Influenza, Unspecified 10/07/2016   • Tdap 05/05/2016       Allergies   Allergen Reactions   • Valproic Acid Irritability     DEPAKOTE CAUSES DIZZINESS AND IRRITABILITY          Medications:  Current Outpatient Medications  on File Prior to Visit   Medication Sig   • ascorbic acid (VITAMIN C) 1000 MG tablet Take 1 tablet by mouth Daily.   • aspirin 81 MG EC tablet Take 1 tablet by mouth Daily.   • busPIRone (BUSPAR) 10 MG tablet Take 1 tablet by mouth 3 (Three) Times a Day As Needed.   • Calcium Carbonate 1500 (600 Ca) MG tablet Take 1 tablet by mouth 2 (two) times a day.   • cholecalciferol (Vitamin D) 25 MCG (1000 UT) tablet Take 2 tablets by mouth Daily.   • cyclobenzaprine (FLEXERIL) 5 MG tablet Take 1 tablet by mouth 3 (Three) Times a Day As Needed for Muscle Spasms.   • Diclofenac Sodium (VOLTAREN) 1 % gel gel Apply 1 g topically to the appropriate area as directed As Needed.   • docusate sodium (Colace) 100 MG capsule Take 1 capsule by mouth Daily.   • famotidine (Pepcid) 20 MG tablet Take 1 tablet by mouth Daily.   • folic acid (FOLVITE) 800 MCG tablet Take 1 tablet by mouth Daily.   • HM TRUEplus Fiber 2 g chewable tablet Chew 1 tablet Daily.   • Invega Sustenna 234 MG/1.5ML suspension prefilled syringe IM injection Inject 1.5 mL into the appropriate muscle as directed by prescriber Every 30 (Thirty) Days.   • Keppra 500 MG tablet Take 3 tablets by mouth Every 12 (Twelve) Hours As Needed.   • lamoTRIgine (LaMICtal) 200 MG tablet Take 1 tablet by mouth Daily.   • Melatonin 10 MG tablet Take 1 tablet by mouth every night at bedtime.   • pravastatin (PRAVACHOL) 20 MG tablet Take 1 tablet by mouth Every Night.   • venlafaxine (EFFEXOR) 37.5 MG tablet Take 37.5 mg by mouth Daily.   • venlafaxine XR (EFFEXOR-XR) 150 MG 24 hr capsule    • [DISCONTINUED] HYDROcodone-acetaminophen (Norco) 5-325 MG per tablet Take 1 tablet by mouth Every 6 (Six) Hours As Needed for Moderate Pain (NOTE: You can take two tablets instead of one tablet every four hours instead of every six hours if needed for pain).     No current facility-administered medications on file prior to visit.       Vital Signs:   /79 (BP Location: Left arm, Patient  "Position: Sitting, Cuff Size: Adult)   Pulse 90   Ht 172.7 cm (68\")   Wt 81.8 kg (180 lb 6.4 oz) Comment: Pt is wearing boot  SpO2 97%   BMI 27.43 kg/m²       Physical Exam:  Physical Exam  Vitals and nursing note reviewed.   Constitutional:       General: He is not in acute distress.     Appearance: Normal appearance. He is not ill-appearing, toxic-appearing or diaphoretic.   HENT:      Head: Normocephalic and atraumatic.      Nose: No congestion or rhinorrhea.   Eyes:      Extraocular Movements: Extraocular movements intact.      Conjunctiva/sclera: Conjunctivae normal.      Pupils: Pupils are equal, round, and reactive to light.   Cardiovascular:      Rate and Rhythm: Normal rate and regular rhythm.      Heart sounds: Normal heart sounds.   Pulmonary:      Effort: Pulmonary effort is normal.      Breath sounds: Normal breath sounds. No wheezing, rhonchi or rales.   Feet:      Comments: Left great toe has full range of motion and nontender to palpation.  No bruising or swelling noted  Skin:     General: Skin is warm and dry.   Neurological:      Mental Status: He is alert and oriented to person, place, and time.   Psychiatric:         Mood and Affect: Mood normal.         Behavior: Behavior normal.         Result Review      The following data was reviewed by Hien Parham MD on 12/19/2022.  Lab Results   Component Value Date    WBC 6.62 07/26/2022    HGB 14.8 07/26/2022    HCT 44.3 07/26/2022    MCV 86.0 07/26/2022     07/26/2022     Lab Results   Component Value Date    GLUCOSE 97 08/03/2022    BUN 17 08/03/2022    CREATININE 0.90 09/23/2022    EGFRIFNONA 80 12/07/2021    BCR 18.9 08/03/2022    K 4.3 08/03/2022    CO2 28.2 08/03/2022    CALCIUM 9.8 08/03/2022    ALBUMIN 4.60 08/03/2022    LABIL2 1.4 05/06/2021    AST 13 08/03/2022    ALT 15 08/03/2022     Lab Results   Component Value Date    CHOL 147 07/26/2022    CHLPL 174 05/06/2021    TRIG 86 07/26/2022    HDL 41 07/26/2022    LDL 90 " 07/26/2022     Lab Results   Component Value Date    TSH 1.590 12/07/2021     Lab Results   Component Value Date    HGBA1C 5.20 10/21/2021     No results found for: PSA                    Assessment and Plan:          Diagnoses and all orders for this visit:    1. Other physeal fracture of phalanx of left toe, initial encounter for closed fracture (Primary)  Comments:  Left great toe is healing nicely.  Walking boot 2 more weeks and then he can be done.  No other treatment needed at this time.  Orders:  -     XR Toe 2+ View Left (In Office)          Follow Up   No follow-ups on file.

## 2023-01-05 DIAGNOSIS — Z79.82 ASPIRIN LONG-TERM USE: ICD-10-CM

## 2023-01-05 RX ORDER — FAMOTIDINE 20 MG/1
20 TABLET, FILM COATED ORAL DAILY
Qty: 90 TABLET | Refills: 1 | Status: SHIPPED | OUTPATIENT
Start: 2023-01-05

## 2023-01-16 ENCOUNTER — LAB (OUTPATIENT)
Dept: LAB | Facility: HOSPITAL | Age: 43
End: 2023-01-16
Payer: MEDICARE

## 2023-01-16 ENCOUNTER — TRANSCRIBE ORDERS (OUTPATIENT)
Dept: ADMINISTRATIVE | Facility: HOSPITAL | Age: 43
End: 2023-01-16
Payer: MEDICARE

## 2023-01-16 DIAGNOSIS — Z79.899 ENCOUNTER FOR LONG-TERM (CURRENT) USE OF OTHER MEDICATIONS: Primary | ICD-10-CM

## 2023-01-16 DIAGNOSIS — Z79.899 ENCOUNTER FOR LONG-TERM (CURRENT) USE OF OTHER MEDICATIONS: ICD-10-CM

## 2023-01-16 LAB
ALBUMIN SERPL-MCNC: 4.4 G/DL (ref 3.5–5.2)
ALBUMIN/GLOB SERPL: 1.7 G/DL
ALP SERPL-CCNC: 69 U/L (ref 39–117)
ALT SERPL W P-5'-P-CCNC: 28 U/L (ref 1–41)
ANION GAP SERPL CALCULATED.3IONS-SCNC: 13.1 MMOL/L (ref 5–15)
AST SERPL-CCNC: 24 U/L (ref 1–40)
BASOPHILS # BLD AUTO: 0.03 10*3/MM3 (ref 0–0.2)
BASOPHILS NFR BLD AUTO: 0.4 % (ref 0–1.5)
BILIRUB SERPL-MCNC: 0.3 MG/DL (ref 0–1.2)
BUN SERPL-MCNC: 21 MG/DL (ref 6–20)
BUN/CREAT SERPL: 26.6 (ref 7–25)
CALCIUM SPEC-SCNC: 9.9 MG/DL (ref 8.6–10.5)
CHLORIDE SERPL-SCNC: 103 MMOL/L (ref 98–107)
CHOLEST SERPL-MCNC: 173 MG/DL (ref 0–200)
CO2 SERPL-SCNC: 24.9 MMOL/L (ref 22–29)
CREAT SERPL-MCNC: 0.79 MG/DL (ref 0.76–1.27)
DEPRECATED RDW RBC AUTO: 40.8 FL (ref 37–54)
EGFRCR SERPLBLD CKD-EPI 2021: 113.7 ML/MIN/1.73
EOSINOPHIL # BLD AUTO: 0.21 10*3/MM3 (ref 0–0.4)
EOSINOPHIL NFR BLD AUTO: 2.6 % (ref 0.3–6.2)
ERYTHROCYTE [DISTWIDTH] IN BLOOD BY AUTOMATED COUNT: 12.9 % (ref 12.3–15.4)
GLOBULIN UR ELPH-MCNC: 2.6 GM/DL
GLUCOSE SERPL-MCNC: 90 MG/DL (ref 65–99)
HBA1C MFR BLD: 5.6 % (ref 4.8–5.6)
HCT VFR BLD AUTO: 45.3 % (ref 37.5–51)
HDLC SERPL-MCNC: 40 MG/DL (ref 40–60)
HGB BLD-MCNC: 15.4 G/DL (ref 13–17.7)
IMM GRANULOCYTES # BLD AUTO: 0.01 10*3/MM3 (ref 0–0.05)
IMM GRANULOCYTES NFR BLD AUTO: 0.1 % (ref 0–0.5)
LDLC SERPL CALC-MCNC: 107 MG/DL (ref 0–100)
LDLC/HDLC SERPL: 2.59 {RATIO}
LYMPHOCYTES # BLD AUTO: 2.55 10*3/MM3 (ref 0.7–3.1)
LYMPHOCYTES NFR BLD AUTO: 31.6 % (ref 19.6–45.3)
MCH RBC QN AUTO: 29.3 PG (ref 26.6–33)
MCHC RBC AUTO-ENTMCNC: 34 G/DL (ref 31.5–35.7)
MCV RBC AUTO: 86.3 FL (ref 79–97)
MONOCYTES # BLD AUTO: 0.75 10*3/MM3 (ref 0.1–0.9)
MONOCYTES NFR BLD AUTO: 9.3 % (ref 5–12)
NEUTROPHILS NFR BLD AUTO: 4.51 10*3/MM3 (ref 1.7–7)
NEUTROPHILS NFR BLD AUTO: 56 % (ref 42.7–76)
PLATELET # BLD AUTO: 211 10*3/MM3 (ref 140–450)
PMV BLD AUTO: 10.6 FL (ref 6–12)
POTASSIUM SERPL-SCNC: 4.1 MMOL/L (ref 3.5–5.2)
PROLACTIN SERPL-MCNC: 26.4 NG/ML (ref 4.04–15.2)
PROT SERPL-MCNC: 7 G/DL (ref 6–8.5)
RBC # BLD AUTO: 5.25 10*6/MM3 (ref 4.14–5.8)
SODIUM SERPL-SCNC: 141 MMOL/L (ref 136–145)
TRIGL SERPL-MCNC: 147 MG/DL (ref 0–150)
VLDLC SERPL-MCNC: 26 MG/DL (ref 5–40)
WBC NRBC COR # BLD: 8.06 10*3/MM3 (ref 3.4–10.8)

## 2023-01-16 PROCEDURE — 80061 LIPID PANEL: CPT

## 2023-01-16 PROCEDURE — 83036 HEMOGLOBIN GLYCOSYLATED A1C: CPT

## 2023-01-16 PROCEDURE — 36415 COLL VENOUS BLD VENIPUNCTURE: CPT

## 2023-01-16 PROCEDURE — 80053 COMPREHEN METABOLIC PANEL: CPT

## 2023-01-16 PROCEDURE — 84146 ASSAY OF PROLACTIN: CPT

## 2023-01-16 PROCEDURE — 85025 COMPLETE CBC W/AUTO DIFF WBC: CPT

## 2023-01-20 ENCOUNTER — TELEPHONE (OUTPATIENT)
Dept: GASTROENTEROLOGY | Facility: CLINIC | Age: 43
End: 2023-01-20
Payer: MEDICARE

## 2023-01-20 NOTE — TELEPHONE ENCOUNTER
[employer] CALLED ASKING IF PTS MRI CAN BE PUSHED OUT DUE TO NOT HAVING PRIOR AUTH FROM INSURANCE. PT ISNT SCHEDULED FOR A F/U APPT AT THIS TIME, MELISA FROM [employer] IS GOING TO PUSH THE MRI OUT AT THIS TIME. WORKING ON GETTING PA FROM [employer] AND WILL SCHEDULE PATIENT ACCORDINGLY.

## 2023-01-30 ENCOUNTER — HOSPITAL ENCOUNTER (OUTPATIENT)
Dept: MRI IMAGING | Facility: HOSPITAL | Age: 43
Discharge: HOME | End: 2023-01-30
Admitting: NURSE PRACTITIONER
Payer: MEDICARE

## 2023-01-30 DIAGNOSIS — K59.09 OTHER CONSTIPATION: ICD-10-CM

## 2023-01-30 DIAGNOSIS — K86.2 PANCREATIC CYST: ICD-10-CM

## 2023-01-30 PROCEDURE — 0 GADOBENATE DIMEGLUMINE 529 MG/ML SOLUTION: Performed by: NURSE PRACTITIONER

## 2023-01-30 PROCEDURE — 74183 MRI ABD W/O CNTR FLWD CNTR: CPT

## 2023-01-30 PROCEDURE — A9577 INJ MULTIHANCE: HCPCS | Performed by: NURSE PRACTITIONER

## 2023-01-30 RX ORDER — DOCUSATE SODIUM 100 MG/1
100 CAPSULE, LIQUID FILLED ORAL DAILY
Qty: 30 CAPSULE | Refills: 1 | Status: SHIPPED | OUTPATIENT
Start: 2023-01-30

## 2023-01-30 RX ADMIN — GADOBENATE DIMEGLUMINE 18 ML: 529 INJECTION, SOLUTION INTRAVENOUS at 13:17

## 2023-01-31 DIAGNOSIS — K86.2 PANCREATIC CYST: Primary | ICD-10-CM

## 2023-02-01 ENCOUNTER — TELEPHONE (OUTPATIENT)
Dept: GASTROENTEROLOGY | Facility: CLINIC | Age: 43
End: 2023-02-01
Payer: MEDICARE

## 2023-02-01 NOTE — TELEPHONE ENCOUNTER
----- Message from ÁNGELA Sanchez sent at 1/31/2023  3:25 PM EST -----  I have reviewed results with patient's mother.  They would like to proceed with EUS for further evaluation of cystic lesion on the pancreatic head.  I have placed a referral to Dr. Macdonald.  
Clinical documents faxed to [facility].   
134.4

## 2023-02-02 ENCOUNTER — OFFICE VISIT (OUTPATIENT)
Dept: FAMILY MEDICINE CLINIC | Age: 43
End: 2023-02-02
Payer: MEDICARE

## 2023-02-02 VITALS
WEIGHT: 180.8 LBS | HEART RATE: 94 BPM | HEIGHT: 68 IN | BODY MASS INDEX: 27.4 KG/M2 | TEMPERATURE: 98.1 F | SYSTOLIC BLOOD PRESSURE: 138 MMHG | DIASTOLIC BLOOD PRESSURE: 80 MMHG

## 2023-02-02 DIAGNOSIS — K86.89 PANCREATIC MASS: ICD-10-CM

## 2023-02-02 DIAGNOSIS — R30.0 DYSURIA: Primary | ICD-10-CM

## 2023-02-02 DIAGNOSIS — N30.00 ACUTE CYSTITIS WITHOUT HEMATURIA: ICD-10-CM

## 2023-02-02 DIAGNOSIS — T14.8XXA CLOSED FRACTURE OF BONE: ICD-10-CM

## 2023-02-02 LAB
BACTERIA UR QL AUTO: ABNORMAL /HPF
BILIRUB UR QL STRIP: NEGATIVE
CLARITY UR: ABNORMAL
COLOR UR: YELLOW
GLUCOSE UR STRIP-MCNC: NEGATIVE MG/DL
HGB UR QL STRIP.AUTO: ABNORMAL
KETONES UR QL STRIP: NEGATIVE
LEUKOCYTE ESTERASE UR QL STRIP.AUTO: ABNORMAL
NITRITE UR QL STRIP: POSITIVE
PH UR STRIP.AUTO: 6.5 [PH] (ref 5–8)
PROT UR QL STRIP: NEGATIVE
RBC # UR STRIP: ABNORMAL /HPF
REF LAB TEST METHOD: ABNORMAL
SP GR UR STRIP: >=1.03 (ref 1–1.03)
SQUAMOUS #/AREA URNS HPF: ABNORMAL /HPF
UROBILINOGEN UR QL STRIP: ABNORMAL
WBC # UR STRIP: ABNORMAL /HPF

## 2023-02-02 PROCEDURE — 81001 URINALYSIS AUTO W/SCOPE: CPT | Performed by: FAMILY MEDICINE

## 2023-02-02 PROCEDURE — 87086 URINE CULTURE/COLONY COUNT: CPT | Performed by: FAMILY MEDICINE

## 2023-02-02 PROCEDURE — 99214 OFFICE O/P EST MOD 30 MIN: CPT | Performed by: FAMILY MEDICINE

## 2023-02-02 RX ORDER — SULFAMETHOXAZOLE AND TRIMETHOPRIM 800; 160 MG/1; MG/1
1 TABLET ORAL 2 TIMES DAILY
Qty: 10 TABLET | Refills: 0 | Status: SHIPPED | OUTPATIENT
Start: 2023-02-02 | End: 2023-02-23

## 2023-02-02 NOTE — PROGRESS NOTES
Kang Grey presents to [facility].    Chief Complaint: L foot fracture, UTI    Jenni [phone]  Subjective       History of Present Illness:  HPI     Kang Grey is a 42-year-old male presenting to the clinic complaining of pus in his catheter since last week.  He is concerned about a urinary tract infection.  He has a history of neurogenic bladder and uses an In-N-Out catheter to void.  His mother is with him today and states since last week they have noticed pus in the catheter and that the urine looks cloudy and different than normal.  He follows with Dr. Morris of urology for management of his bladder. His mother often has to do the cathertization for him.   Patient has been treated for 2 UTIs in the last several months         1/22 MRI abdomen with and without - stable 28 cm cyst in the pancreatic head.  Unchanged for 2 years.  Stable Marked distention of the urinary bladder, stable simple cyst left kidney 6 cm -he is scheduled for further work up with biopsy.      He has L toe pain due to left great toe acute oblique nondisplaced fracture involving the proximal phalanx of the 1st digit, he has been in a walking boot for 8 weeks now.  He is pain free and walking well.  No follow-up needed unless he reinjures himself.    Review of Systems:  Review of Systems   Constitutional: Negative for chills, fatigue and fever.   HENT: Negative for congestion, ear discharge and sore throat.    Respiratory: Negative for shortness of breath.    Cardiovascular: Negative for chest pain.   Gastrointestinal: Negative for abdominal pain, constipation, diarrhea, nausea, vomiting and GERD.   Genitourinary: Negative for flank pain.   Neurological: Negative for dizziness and headache.   Psychiatric/Behavioral: Negative for depressed mood.        Objective   Medical History:  Past Medical History:   • Acute upper respiratory infection   • Age-related osteoporosis without current pathological  fracture   • Broken toe    left great toe   • Cerebral palsy (HCC)    diagnosed as infant   • Compression fracture of first lumbar vertebra ([facility])   • Constipation   • Disease of pancreas   • History of urinary self-catheterization    pt or pt's mother catheterizes 4-6  times a day   • Hydrocephalus in diseases classified elsewhere ([facility])   • Injury of back   • Low back pain   • Major depressive disorder    single episode, moderate   • Other abnormal glucose   • Other cerebral palsy ([facility])   • Other fatigue   • Other generalized epilepsy and epileptic syndromes, not intractable, without status epilepticus ([facility])   • Other hypertrophic disorders of the skin   • Pain in left ankle and joints of left foot   • Pain in right toe(s)   • Pain in thoracic spine   • Paranoid schizophrenia ([facility])   • Pelvic and perineal pain   • PONV (postoperative nausea and vomiting)   • Primary insomnia   • Pure hypercholesterolemia   • Repeated falls    last fall 11/2022   • Schizoaffective disorder, unspecified ([facility])   • Seizures ([facility])    last seizure was approx 2018, medication controlled   • Sleep apnea    uses cpap machine   • Somnolence   • TIA (transient ischemic attack)   • Vitamin D deficiency   • Wedge compression fracture of fourth thoracic vertebra, subsequent encounter for fracture with routine healing     Past Surgical History:   • CYST REMOVAL    Procedure: Excision of subcutaneous mass from right forearm;  Surgeon: Oni Greenberg MD;  [place] [facility] [place] [facility] [place]  Service: General;  Laterality: Right;   • HAMSTRING REPAIR    age 10   • OTHER SURGICAL HISTORY    hyposadius repair   • TOE FUSION    great toe at age 21      Family History   Problem Relation Age of Onset   • Hypothyroidism Mother    • No Known Problems Father    • Ulcers Brother    • Malig Hyperthermia Neg Hx      Social History     Tobacco Use   • Smoking status: Never   • Smokeless tobacco: Never   Substance Use Topics   • Alcohol use: Never       There are no  preventive care reminders to display for this patient.     Immunization History   Administered Date(s) Administered   • COVID-19 (MODERNA) 1st, 2nd, 3rd Dose Only 03/25/2021, 04/22/2021, 11/19/2021   • COVID-19 (PFIZER) BIVALENT BOOSTER 12+YRS 10/19/2022   • Covid-19 (Pfizer) Gray Cap 06/20/2022   • FluLaval/Fluzone >6mos 12/07/2021, 10/19/2022   • Influenza Quad Vaccine (Inpatient) 10/01/2013   • Influenza, Unspecified 10/07/2016   • Tdap 05/05/2016       Allergies   Allergen Reactions   • Valproic Acid Irritability     DEPAKOTE CAUSES DIZZINESS AND IRRITABILITY          Medications:  Current Outpatient Medications on File Prior to Visit   Medication Sig   • ascorbic acid (VITAMIN C) 1000 MG tablet Take 1 tablet by mouth Daily.   • aspirin 81 MG EC tablet Take 1 tablet by mouth Daily.   • busPIRone (BUSPAR) 10 MG tablet Take 1 tablet by mouth 3 (Three) Times a Day As Needed.   • Calcium Carbonate 1500 (600 Ca) MG tablet Take 1 tablet by mouth 2 (two) times a day.   • cholecalciferol (Vitamin D) 25 MCG (1000 UT) tablet Take 2 tablets by mouth Daily.   • cyclobenzaprine (FLEXERIL) 5 MG tablet Take 1 tablet by mouth 3 (Three) Times a Day As Needed for Muscle Spasms.   • Diclofenac Sodium (VOLTAREN) 1 % gel gel Apply 1 g topically to the appropriate area as directed As Needed.   • docusate sodium (COLACE) 100 MG capsule TAKE 1 CAPSULE BY MOUTH DAILY.   • famotidine (PEPCID) 20 MG tablet TAKE 1 TABLET BY MOUTH DAILY.   • folic acid (FOLVITE) 800 MCG tablet Take 1 tablet by mouth Daily.   • HM TRUEplus Fiber 2 g chewable tablet Chew 1 tablet Daily.   • Invega Sustenna 234 MG/1.5ML suspension prefilled syringe IM injection Inject 1.5 mL into the appropriate muscle as directed by prescriber Every 30 (Thirty) Days.   • Keppra 500 MG tablet Take 3 tablets by mouth Every 12 (Twelve) Hours As Needed.   • lamoTRIgine (LaMICtal) 200 MG tablet Take 1 tablet by mouth Daily.   • Melatonin 10 MG tablet Take 1 tablet by mouth every  "night at bedtime.   • pravastatin (PRAVACHOL) 20 MG tablet Take 1 tablet by mouth Every Night.   • venlafaxine (EFFEXOR) 37.5 MG tablet Take 37.5 mg by mouth Daily.   • venlafaxine XR (EFFEXOR-XR) 150 MG 24 hr capsule      No current facility-administered medications on file prior to visit.       Vital Signs:   /80 (BP Location: Right arm, Patient Position: Sitting)   Pulse 94   Temp 98.1 °F (36.7 °C) (Oral)   Ht 172.7 cm (68\")   Wt 82 kg (180 lb 12.8 oz)   BMI 27.49 kg/m²       Physical Exam:  Physical Exam  Vitals and nursing note reviewed.   Constitutional:       General: He is not in acute distress.     Appearance: Normal appearance. He is not ill-appearing, toxic-appearing or diaphoretic.   HENT:      Head: Normocephalic and atraumatic.      Nose: No congestion or rhinorrhea.   Eyes:      Extraocular Movements: Extraocular movements intact.      Conjunctiva/sclera: Conjunctivae normal.      Pupils: Pupils are equal, round, and reactive to light.   Cardiovascular:      Rate and Rhythm: Normal rate and regular rhythm.      Heart sounds: Normal heart sounds.   Pulmonary:      Effort: Pulmonary effort is normal.      Breath sounds: Normal breath sounds. No wheezing, rhonchi or rales.   Feet:      Comments: Left great toe has full range of motion and nontender to palpation.  No bruising or swelling noted  Skin:     General: Skin is warm and dry.   Neurological:      Mental Status: He is alert and oriented to person, place, and time.   Psychiatric:         Mood and Affect: Mood normal.         Behavior: Behavior normal.         Result Review      The following data was reviewed by Hien Parham MD on 12/19/2022.  Lab Results   Component Value Date    WBC 8.06 01/16/2023    HGB 15.4 01/16/2023    HCT 45.3 01/16/2023    MCV 86.3 01/16/2023     01/16/2023     Lab Results   Component Value Date    GLUCOSE 90 01/16/2023    BUN 21 (H) 01/16/2023    CREATININE 0.79 01/16/2023    EGFRIFNONA 80 " 12/07/2021    BCR 26.6 (H) 01/16/2023    K 4.1 01/16/2023    CO2 24.9 01/16/2023    CALCIUM 9.9 01/16/2023    ALBUMIN 4.4 01/16/2023    LABIL2 1.4 05/06/2021    AST 24 01/16/2023    ALT 28 01/16/2023     Lab Results   Component Value Date    CHOL 173 01/16/2023    CHLPL 174 05/06/2021    TRIG 147 01/16/2023    HDL 40 01/16/2023     (H) 01/16/2023     Lab Results   Component Value Date    TSH 1.590 12/07/2021     Lab Results   Component Value Date    HGBA1C 5.60 01/16/2023     No results found for: PSA                      Assessment and Plan:          Diagnoses and all orders for this visit:    1. Dysuria (Primary)  -     Cancel: Urinalysis With Microscopic - Urine, Catheter  -     Urinalysis With Culture If Indicated - Urine, Catheter In/Out  -     Urinalysis, Microscopic Only - Urine, Catheter In/Out  -     Urine Culture - Urine, Urine, Catheter In/Out    2. Acute cystitis without hematuria  Comments:  We will treat him for UTI with Bactrim DS and he is to push fluids  Orders:  -     sulfamethoxazole-trimethoprim (Bactrim DS) 800-160 MG per tablet; Take 1 tablet by mouth 2 (Two) Times a Day.  Dispense: 10 tablet; Refill: 0    3. Pancreatic mass  Comments:  He is scheduled for biopsy.    4. Closed fracture of bone  Comments:  In the great toe.  Normal physical exam and he is pain-free and out of his walking boot and walking without issues          Follow Up   Return in about 3 months (around 5/2/2023), or if symptoms worsen or fail to improve, for Recheck.

## 2023-02-03 LAB — BACTERIA SPEC AEROBE CULT: NO GROWTH

## 2023-02-23 ENCOUNTER — OFFICE VISIT (OUTPATIENT)
Dept: PODIATRY | Facility: CLINIC | Age: 43
End: 2023-02-23
Payer: MEDICARE

## 2023-02-23 VITALS
HEIGHT: 68 IN | OXYGEN SATURATION: 98 % | WEIGHT: 180 LBS | SYSTOLIC BLOOD PRESSURE: 124 MMHG | TEMPERATURE: 98.9 F | HEART RATE: 100 BPM | DIASTOLIC BLOOD PRESSURE: 72 MMHG | BODY MASS INDEX: 27.28 KG/M2

## 2023-02-23 DIAGNOSIS — M21.372 FOOT DROP, BILATERAL: ICD-10-CM

## 2023-02-23 DIAGNOSIS — G62.9 NEUROPATHY: ICD-10-CM

## 2023-02-23 DIAGNOSIS — M79.672 FOOT PAIN, BILATERAL: Primary | ICD-10-CM

## 2023-02-23 DIAGNOSIS — M21.371 FOOT DROP, BILATERAL: ICD-10-CM

## 2023-02-23 DIAGNOSIS — M79.671 FOOT PAIN, BILATERAL: Primary | ICD-10-CM

## 2023-02-23 DIAGNOSIS — B35.1 ONYCHOMYCOSIS: ICD-10-CM

## 2023-02-23 DIAGNOSIS — L60.0 ONYCHOCRYPTOSIS: ICD-10-CM

## 2023-02-23 DIAGNOSIS — R26.2 DIFFICULTY WALKING: ICD-10-CM

## 2023-02-23 PROCEDURE — 11721 DEBRIDE NAIL 6 OR MORE: CPT | Performed by: PODIATRIST

## 2023-02-23 NOTE — PROGRESS NOTES
[facility] - PODIATRY    Today's Date: 02/23/23    Patient Name: Kang Grey  MRN: [id number]  CSN: [id number]  PCP: Hien Parham MD, Last PCP Visit: 12/19/2022  Referring Provider: No ref. provider found    SUBJECTIVE     Chief Complaint   Patient presents with   • Left Foot - Follow-up, Nail Problem   • Right Foot - Follow-up, Nail Problem     HPI: Kang Grey, a 42 y.o.male, comes to clinic with his mother.    New, Established, New Problem:  established   Location:  Toenails  Duration:   Greater than five years  Onset:  Gradual  Nature:  sore with palpation.  Stable, worsening, improving:   Stable  Aggravating factors:  Pain with shoe gear and ambulation.  Previous Treatment:  debridement    Medical changes:  Having to start using a catheter regularly.    Patient denies any fevers, chills, nausea, vomiting, shortness of breathe, nor any other constitutional signs nor symptoms.       Past Medical History:   Diagnosis Date   • Acute upper respiratory infection 02/2022   • Age-related osteoporosis without current pathological fracture    • Broken toe 11/2022    left great toe   • Cerebral palsy (HCC)     diagnosed as infant   • Compression fracture of first lumbar vertebra ([facility])    • Constipation    • Disease of pancreas    • History of urinary self-catheterization     pt or pt's mother catheterizes 4-6  times a day   • Hydrocephalus in diseases classified elsewhere ([facility])    • Injury of back 02/2020   • Low back pain    • Major depressive disorder     single episode, moderate   • Other abnormal glucose    • Other cerebral palsy ([facility])    • Other fatigue    • Other generalized epilepsy and epileptic syndromes, not intractable, without status epilepticus ([facility])    • Other hypertrophic disorders of the skin    • Pain in left ankle and joints of left foot    • Pain in right toe(s)    • Pain in thoracic spine    • Paranoid schizophrenia ([facility])    • Pelvic and perineal pain    • PONV  (postoperative nausea and vomiting)    • Primary insomnia    • Pure hypercholesterolemia    • Repeated falls     last fall 11/2022   • Schizoaffective disorder, unspecified (HCC)    • Seizures (HCC)     last seizure was approx 2018, medication controlled   • Sleep apnea     uses cpap machine   • Somnolence    • TIA (transient ischemic attack)    • Vitamin D deficiency    • Wedge compression fracture of fourth thoracic vertebra, subsequent encounter for fracture with routine healing      Past Surgical History:   Procedure Laterality Date   • CYST REMOVAL Right 11/11/2022    Procedure: Excision of subcutaneous mass from right forearm;  Surgeon: Oni Greenberg MD;  [place] [facility] [place] [facility] [place];  Service: General;  Laterality: Right;   • HAMSTRING REPAIR Bilateral     age 10   • OTHER SURGICAL HISTORY      hyposadius repair   • TOE FUSION Right     great toe at age 21     Family History   Problem Relation Age of Onset   • Hypothyroidism Mother    • No Known Problems Father    • Ulcers Brother    • Malig Hyperthermia Neg Hx      Social History     Socioeconomic History   • Marital status: Single   Tobacco Use   • Smoking status: Never   • Smokeless tobacco: Never   Vaping Use   • Vaping Use: Never used   Substance and Sexual Activity   • Alcohol use: Never   • Drug use: Never   • Sexual activity: Defer     Allergies   Allergen Reactions   • Valproic Acid Irritability     DEPAKOTE CAUSES DIZZINESS AND IRRITABILITY       Current Outpatient Medications   Medication Sig Dispense Refill   • ascorbic acid (VITAMIN C) 1000 MG tablet Take 1 tablet by mouth Daily.     • aspirin 81 MG EC tablet Take 1 tablet by mouth Daily.     • busPIRone (BUSPAR) 10 MG tablet Take 1 tablet by mouth 3 (Three) Times a Day As Needed.     • Calcium Carbonate 1500 (600 Ca) MG tablet Take 1 tablet by mouth 2 (two) times a day.     • cholecalciferol (Vitamin D) 25 MCG (1000 UT) tablet Take 2 tablets by mouth Daily. 180 tablet 1   • cyclobenzaprine  (FLEXERIL) 5 MG tablet Take 1 tablet by mouth 3 (Three) Times a Day As Needed for Muscle Spasms. 40 tablet 2   • Diclofenac Sodium (VOLTAREN) 1 % gel gel Apply 1 g topically to the appropriate area as directed As Needed.     • docusate sodium (COLACE) 100 MG capsule TAKE 1 CAPSULE BY MOUTH DAILY. 30 capsule 1   • famotidine (PEPCID) 20 MG tablet TAKE 1 TABLET BY MOUTH DAILY. 90 tablet 1   • folic acid (FOLVITE) 800 MCG tablet Take 1 tablet by mouth Daily.     • HM TRUEplus Fiber 2 g chewable tablet Chew 1 tablet Daily. 90 tablet 1   • Invega Sustenna 234 MG/1.5ML suspension prefilled syringe IM injection Inject 1.5 mL into the appropriate muscle as directed by prescriber Every 30 (Thirty) Days.     • Keppra 500 MG tablet Take 3 tablets by mouth Every 12 (Twelve) Hours As Needed.     • lamoTRIgine (LaMICtal) 200 MG tablet Take 1 tablet by mouth Daily.     • Melatonin 10 MG tablet Take 1 tablet by mouth every night at bedtime. 90 tablet 1   • pravastatin (PRAVACHOL) 20 MG tablet Take 1 tablet by mouth Every Night. 90 tablet 1   • venlafaxine (EFFEXOR) 37.5 MG tablet Take 37.5 mg by mouth Daily.     • venlafaxine XR (EFFEXOR-XR) 150 MG 24 hr capsule        No current facility-administered medications for this visit.     Review of Systems   Constitutional: Negative.    Skin:        Painful toenails bilaterally   All other systems reviewed and are negative.      OBJECTIVE     Vitals:    02/23/23 0726   BP: 124/72   Pulse: 100   Temp: 98.9 °F (37.2 °C)   SpO2: 98%       Patient seen in no apparent distress.      PHYSICAL EXAM:     Foot/Ankle Exam:       General:   Appearance: appears stated age and healthy    Orientation: unable to assess    Affect: inappropriate    Gait: antalgic    Assistance: cane      VASCULAR      Right Foot Vascularity   Normal vascular exam    Dorsalis pedis:  2+  Posterior tibial:  2+  Skin Temperature: warm    Edema Grading:  None  CFT:  < 3 seconds  Pedal Hair Growth:  Present  Varicosities:  none       Left Foot Vascularity   Normal vascular exam    Dorsalis pedis:  2+  Posterior tibial:  2+  Skin Temperature: warm    Edema Grading:  None  CFT:  < 3 seconds  Pedal Hair Growth:  Present  Varicosities: none        NEUROLOGIC     Right Foot Neurologic   Light touch sensation:  Diminished  Vibratory sensation:  Diminished  Hot/Cold sensation: diminished    Protective Sensation using Malcom-Manpreet Monofilament:  3     Left Foot Neurologic   Light touch sensation:  Diminished  Vibratory sensation:  Diminished  Hot/cold sensation: diminished    Protective Sensation using Malcom-Manpreet Monofilament:  3     MUSCULOSKELETAL      Right Foot Musculoskeletal   Hammertoe:  First toe     Left Foot Musculoskeletal   Ecchymosis:  None  Hammertoe:  First toe     MUSCLE STRENGTH     Right Foot Muscle Strength   Foot dorsiflexion:  1  Foot plantar flexion:  4-  Foot inversion:  2  Foot eversion:  2     Left Foot Muscle Strength   Foot dorsiflexion:  1  Foot plantar flexion:  4-  Foot inversion:  2  Foot eversion:  2     DERMATOLOGIC     Right Foot Dermatologic   Skin: skin intact    Nails: onychomycosis, abnormally thick, subungual debris, dystrophic nails and ingrown toenail    Nails comment:  Toenails 1 through 5     Left Foot Dermatologic   Skin: skin intact    Nails: onychomycosis, abnormally thick, subungual debris, dystrophic nails and ingrown toenail    Nails comment:  Toenails 1 through 5    MRI abdomen w wo contrast mrcp    Result Date: 1/30/2023  Narrative: PROCEDURE: MRI ABDOMEN W WO CONTRAST MRCP  COMPARISON: MR JOSIE, MRI ABDOMEN W WO CONTRAST, 1/25/2022, 8:52.  INDICATIONS: FOLLOW UP PANCREATIC CYST  CONTRAST: 19ML  Multihance I.V.  TECHNIQUE: A comprehensive examination was performed utilizing a variety of imaging planes and imaging parameters to optimize visualization of suspected pathology.  Images were obtained both before and after intravenous gadolinium injection. Magnetic  resonance cholangiopancreatography was also performed.  FINDINGS:  Stable cholelithiasis without evidence of acute cholecystitis.  33 mm diameter cystic lesion in the pancreatic head appears increased in size from the prior study.  This lesion still appears simple in morphology without internal solid or enhancing component.  The bile ducts and pancreatic duct appear normal.  There is no solid or enhancing pancreatic mass and there is no pancreatic inflammation.  The liver, spleen and right kidney appear unremarkable.  There is a large simple left kidney cyst measuring up to 7.3 cm.  Both proximal ureters appear normal.  The bowel is unremarkable.  No adrenal mass.  The stomach is nondistended.  Lung bases are grossly clear.  The heart size is normal.      Impression:   1. 33 mm cystic lesion in the pancreatic head has mildly increased in size.  No solid or enhancing component. 2. Stable cholelithiasis without evidence of acute cholecystitis.     DIRK GROSS MD       Electronically Signed and Approved By: DIRK GROSS MD on 1/30/2023 at 15:28                 ASSESSMENT/PLAN     Diagnoses and all orders for this visit:    1. Foot pain, bilateral (Primary)    2. Difficulty walking    3. Foot drop, bilateral    4. Onychocryptosis    5. Onychomycosis    6. Neuropathy        Comprehensive lower extremity examination and evaluation was performed.    Discussed findings and treatment plan including risks, benefits, and treatment options with patient in detail. Patient agreed with treatment plan.    Toenails 1 through 5 bilaterally were debrided in thickness and length and then smoothed with a Dremel Tool.  Tolerated the procedure well without complications.    An After Visit Summary was printed and given to the patient at discharge, including (if requested) any available informative/educational handouts regarding diagnosis, treatment, or medications. All questions were answered to patient/family satisfaction. Should  symptoms fail to improve or worsen they agree to call or return to clinic or to go to the Emergency Department. Discussed the importance of following up with any needed screening tests/labs/specialist appointments and any requested follow-up recommended by me today. Importance of maintaining follow-up discussed and patient accepts that missed appointments can delay diagnosis and potentially lead to worsening of conditions.    Return in about 9 weeks (around 4/27/2023) for Toenail Care., or sooner if acute issues arise.    This document has been electronically signed by Roni Osullivan DPM on February 23, 2023 07:42 EST

## 2023-02-28 DIAGNOSIS — E78.2 MIXED HYPERLIPIDEMIA: ICD-10-CM

## 2023-03-01 RX ORDER — PRAVASTATIN SODIUM 20 MG
20 TABLET ORAL NIGHTLY
Qty: 90 TABLET | Refills: 1 | Status: SHIPPED | OUTPATIENT
Start: 2023-03-01

## 2023-03-07 DIAGNOSIS — K86.2 PANCREATIC CYST: Primary | ICD-10-CM

## 2023-03-09 ENCOUNTER — TELEPHONE (OUTPATIENT)
Dept: GASTROENTEROLOGY | Facility: CLINIC | Age: 43
End: 2023-03-09
Payer: MEDICARE

## 2023-03-09 NOTE — TELEPHONE ENCOUNTER
----- Message from ÁNGELA Sanchez sent at 3/7/2023 10:48 AM EST -----  I have reviewed the patient's EUS with fine-needle biopsy.  Dr. Macdonald recommended 3-month CT follow-up.  I have placed order at this time.  I am awaiting biopsy results.  ----- Message -----  From: Kendra Almonte RegSched Rep  Sent: 3/7/2023  10:39 AM EST  To: ÁNGELA Sacnhez

## 2023-03-10 ENCOUNTER — TELEPHONE (OUTPATIENT)
Dept: GASTROENTEROLOGY | Facility: CLINIC | Age: 43
End: 2023-03-10
Payer: MEDICARE

## 2023-03-10 NOTE — TELEPHONE ENCOUNTER
Spoke to pt Mother per WENDI voiced understanding and transfer to scheduling to get scheduled for CT in 3 months

## 2023-03-10 NOTE — TELEPHONE ENCOUNTER
----- Message from ÁNGELA Sanchez sent at 3/7/2023 10:48 AM EST -----  I have reviewed the patient's EUS with fine-needle biopsy.  Dr. Macdonald recommended 3-month CT follow-up.  I have placed order at this time.  I am awaiting biopsy results.  ----- Message -----  From: Kendra Almonte RegSched Rep  Sent: 3/7/2023  10:39 AM EST  To: ÁNGELA Sanchez

## 2023-03-20 NOTE — PROGRESS NOTES
"Chief Complaint  Follow-up (From [facility] on 3-20-23 for bronchitis)    Subjective          Kang Grey presents to [facility]  History of Present Illness  He went to the urgent care yesterday and was diagnosed with acute bronchitis and given a prescription for Z-Sae, Medrol pack, and Tessalon Perles.  URI   This is a new problem. The current episode started in the past 7 days. The problem has been unchanged. There has been no fever. Associated symptoms include coughing (nonproductive), a plugged ear sensation and a sore throat. Pertinent negatives include no congestion, diarrhea, ear pain, headaches, nausea, rhinorrhea, sinus pain, sneezing, swollen glands, vomiting or wheezing.       Objective   Vital Signs:   /75 (BP Location: Left arm, Patient Position: Sitting)   Pulse 113   Temp 98.7 °F (37.1 °C) (Oral)   Ht 172.7 cm (68\")   Wt 82.3 kg (181 lb 6.4 oz)   SpO2 94% Comment: on room air  BMI 27.58 kg/m²     Physical Exam  Constitutional:       General: He is not in acute distress.     Appearance: Normal appearance. He is normal weight. He is not ill-appearing.   HENT:      Head: Normocephalic.      Right Ear: Tympanic membrane, ear canal and external ear normal.      Left Ear: Tympanic membrane, ear canal and external ear normal.      Nose: Nose normal.      Right Sinus: No maxillary sinus tenderness or frontal sinus tenderness.      Left Sinus: No maxillary sinus tenderness or frontal sinus tenderness.      Mouth/Throat:      Mouth: Mucous membranes are moist.      Pharynx: Oropharynx is clear. No oropharyngeal exudate or posterior oropharyngeal erythema.   Eyes:      Conjunctiva/sclera: Conjunctivae normal.      Pupils: Pupils are equal, round, and reactive to light.   Cardiovascular:      Rate and Rhythm: Regular rhythm. Tachycardia present.      Pulses: Normal pulses.      Heart sounds: Normal heart sounds.   Pulmonary:      Effort: Pulmonary effort " is normal. No respiratory distress.      Breath sounds: Normal breath sounds. No wheezing, rhonchi or rales.   Musculoskeletal:      Cervical back: Normal range of motion.   Lymphadenopathy:      Cervical: No cervical adenopathy.   Neurological:      Mental Status: He is alert and oriented to person, place, and time.   Psychiatric:         Mood and Affect: Mood normal.         Behavior: Behavior normal.         Thought Content: Thought content normal.        Result Review :   The following data was reviewed by: ÁNGELA Teresa on 03/21/2023:                  Assessment and Plan    Diagnoses and all orders for this visit:    1. Influenza B (Primary)    2. Acute cough  -     POCT SARS-CoV-2 Antigen TERESA + Flu    I have discussed with both him and his father that he can stop taking the antibiotic, as he is positive for the flu.  I encourage increased fluid intake and plenty of rest.  He can take over-the-counter cough and flu medication to help with the symptoms.  We have discussed treating the flu is symptomatic.      Follow Up   Return if symptoms worsen or fail to improve.  Patient was given instructions and counseling regarding his condition or for health maintenance advice. Please see specific information pulled into the AVS if appropriate.

## 2023-03-21 ENCOUNTER — OFFICE VISIT (OUTPATIENT)
Dept: FAMILY MEDICINE CLINIC | Age: 43
End: 2023-03-21
Payer: MEDICARE

## 2023-03-21 VITALS
OXYGEN SATURATION: 94 % | BODY MASS INDEX: 27.49 KG/M2 | HEIGHT: 68 IN | HEART RATE: 113 BPM | TEMPERATURE: 98.7 F | DIASTOLIC BLOOD PRESSURE: 75 MMHG | SYSTOLIC BLOOD PRESSURE: 124 MMHG | WEIGHT: 181.4 LBS

## 2023-03-21 DIAGNOSIS — R05.1 ACUTE COUGH: ICD-10-CM

## 2023-03-21 DIAGNOSIS — J10.1 INFLUENZA B: Primary | ICD-10-CM

## 2023-03-21 LAB
EXPIRATION DATE: ABNORMAL
FLUAV AG UPPER RESP QL IA.RAPID: NOT DETECTED
FLUBV AG UPPER RESP QL IA.RAPID: DETECTED
INTERNAL CONTROL: ABNORMAL
Lab: ABNORMAL
SARS-COV-2 AG UPPER RESP QL IA.RAPID: NOT DETECTED

## 2023-03-21 PROCEDURE — 1159F MED LIST DOCD IN RCRD: CPT | Performed by: NURSE PRACTITIONER

## 2023-03-21 PROCEDURE — 87428 SARSCOV & INF VIR A&B AG IA: CPT | Performed by: NURSE PRACTITIONER

## 2023-03-21 PROCEDURE — 99213 OFFICE O/P EST LOW 20 MIN: CPT | Performed by: NURSE PRACTITIONER

## 2023-03-21 PROCEDURE — 1160F RVW MEDS BY RX/DR IN RCRD: CPT | Performed by: NURSE PRACTITIONER

## 2023-03-24 ENCOUNTER — TELEPHONE (OUTPATIENT)
Dept: FAMILY MEDICINE CLINIC | Age: 43
End: 2023-03-24

## 2023-03-24 NOTE — TELEPHONE ENCOUNTER
Caller: GLADYS ACOSTA    Relationship to patient: Other    Best call back number: [phone]    Patient is needing: CALLED TO INVITE PROVIDER TO ANNUAL ICP MEETING THAT WILL BE 3.29.2023 AT 2:30. IF PROVIDER WOULD LIKE TO ATTEND OR HAVE SOMEONE ATTEND IN HER PLACE PLEASE REACH BACK TO THE PERSON LISTED ON MESSAGE.

## 2023-03-27 DIAGNOSIS — E55.9 VITAMIN D DEFICIENCY: ICD-10-CM

## 2023-03-27 RX ORDER — CHOLECALCIFEROL (VITAMIN D3) 25 MCG
TABLET ORAL
Qty: 60 TABLET | Refills: 1 | Status: SHIPPED | OUTPATIENT
Start: 2023-03-27

## 2023-04-04 ENCOUNTER — OFFICE VISIT (OUTPATIENT)
Dept: FAMILY MEDICINE CLINIC | Age: 43
End: 2023-04-04
Payer: MEDICARE

## 2023-04-04 VITALS
HEART RATE: 77 BPM | RESPIRATION RATE: 16 BRPM | HEIGHT: 68 IN | OXYGEN SATURATION: 96 % | BODY MASS INDEX: 27.16 KG/M2 | DIASTOLIC BLOOD PRESSURE: 62 MMHG | TEMPERATURE: 97.9 F | SYSTOLIC BLOOD PRESSURE: 124 MMHG | WEIGHT: 179.2 LBS

## 2023-04-04 DIAGNOSIS — R19.09 GROIN SWELLING: ICD-10-CM

## 2023-04-04 DIAGNOSIS — R39.89 ABNORMAL URINE COLOR: Primary | ICD-10-CM

## 2023-04-04 DIAGNOSIS — F51.01 PRIMARY INSOMNIA: ICD-10-CM

## 2023-04-04 LAB
AMORPH URATE CRY URNS QL MICRO: ABNORMAL /HPF
BACTERIA UR QL AUTO: ABNORMAL /HPF
BILIRUB UR QL STRIP: NEGATIVE
CLARITY UR: ABNORMAL
COLOR UR: YELLOW
GLUCOSE UR STRIP-MCNC: NEGATIVE MG/DL
HGB UR QL STRIP.AUTO: ABNORMAL
KETONES UR QL STRIP: NEGATIVE
LEUKOCYTE ESTERASE UR QL STRIP.AUTO: ABNORMAL
NITRITE UR QL STRIP: POSITIVE
PH UR STRIP.AUTO: 5.5 [PH] (ref 5–8)
PROT UR QL STRIP: NEGATIVE
RBC # UR STRIP: ABNORMAL /HPF
REF LAB TEST METHOD: ABNORMAL
SP GR UR STRIP: 1.02 (ref 1–1.03)
SPERM URNS QL MICRO: ABNORMAL /HPF
SQUAMOUS #/AREA URNS HPF: ABNORMAL /HPF
UROBILINOGEN UR QL STRIP: ABNORMAL
WBC # UR STRIP: ABNORMAL /HPF

## 2023-04-04 PROCEDURE — 1160F RVW MEDS BY RX/DR IN RCRD: CPT | Performed by: NURSE PRACTITIONER

## 2023-04-04 PROCEDURE — 99213 OFFICE O/P EST LOW 20 MIN: CPT | Performed by: NURSE PRACTITIONER

## 2023-04-04 PROCEDURE — 87086 URINE CULTURE/COLONY COUNT: CPT | Performed by: NURSE PRACTITIONER

## 2023-04-04 PROCEDURE — 87147 CULTURE TYPE IMMUNOLOGIC: CPT | Performed by: NURSE PRACTITIONER

## 2023-04-04 PROCEDURE — 1159F MED LIST DOCD IN RCRD: CPT | Performed by: NURSE PRACTITIONER

## 2023-04-04 PROCEDURE — 81001 URINALYSIS AUTO W/SCOPE: CPT | Performed by: NURSE PRACTITIONER

## 2023-04-04 RX ORDER — PHENOL 1.4 %
1 AEROSOL, SPRAY (ML) MUCOUS MEMBRANE
Qty: 90 TABLET | Refills: 1 | Status: CANCELLED | OUTPATIENT
Start: 2023-04-04

## 2023-04-04 RX ORDER — CIPROFLOXACIN 500 MG/1
500 TABLET, FILM COATED ORAL 2 TIMES DAILY
Qty: 14 TABLET | Refills: 0 | Status: SHIPPED | OUTPATIENT
Start: 2023-04-04

## 2023-04-04 RX ORDER — PHENOL 1.4 %
1 AEROSOL, SPRAY (ML) MUCOUS MEMBRANE
Qty: 90 TABLET | Refills: 1 | Status: SHIPPED | OUTPATIENT
Start: 2023-04-04

## 2023-04-04 NOTE — PROGRESS NOTES
"Kang Grey presents to [facility].    Chief Complaint:  Groin Swelling (Knot on right side of groin appeared last week. Reports concern for UTI. Reports has a catheter and there is a \"creamy yellow color in catheter\")         History of Present Illness:  UTI type symptoms:  Symptoms started: last week   Associated symptoms: swelling in groin, creamy yellow discoloration in his catheter, when he straight cath's himself  Treatments tried:nothing   Recent + flu    PAST MEDICAL HISTORY changes:         Cerebral Palsy: diagnoised as infant;         CURRENT MEDICAL PROVIDERS:    Chiropractor    Neurologist: Dr. Dr Sanford Mccoy    Ophthalmologist: Dr. Dr Roman    Psychologist: Dr. Burger             ADVANCE DIRECTIVES: Living will on file, signed 7/23/2013./pr and Organ Donation         Surgical History:       EGD 2-2-23    hypospadius repair;    hamstring repair       Family History:     Father: Healthy     Mother:    Positive for Hypothyroidism;     Brother(s): 1 brother(s) total    ; Positive for PUD;     Sister(s): 1 sister(s) total         Social History:     Occupation:    Disabled     Marital Status: Single     Children: None       Review of Systems:  Review of Systems   Constitutional: Negative for fever.   Respiratory: Positive for cough (recent flu 3-21-23).    Genitourinary: Negative for hematuria.   Musculoskeletal: Negative for back pain.          Current Outpatient Medications:   •  alclometasone (ACLOVATE) 0.05 % cream, , Disp: , Rfl:   •  ascorbic acid (VITAMIN C) 1000 MG tablet, Take 1 tablet by mouth Daily., Disp: , Rfl:   •  aspirin 81 MG EC tablet, Take 1 tablet by mouth Daily., Disp: , Rfl:   •  benzonatate (TESSALON) 200 MG capsule, Take 1 capsule by mouth 3 (Three) Times a Day As Needed for Cough., Disp: 20 capsule, Rfl: 0  •  busPIRone (BUSPAR) 10 MG tablet, Take 1 tablet by mouth 3 (Three) Times a Day As Needed., Disp: , Rfl:   •  Calcium Carbonate 1500 " (600 Ca) MG tablet, Take 1 tablet by mouth 2 (two) times a day., Disp: , Rfl:   •  cholecalciferol 25 MCG tablet, TAKE 2 TABLETS BY MOUTH DAILY., Disp: 60 tablet, Rfl: 1  •  cyclobenzaprine (FLEXERIL) 5 MG tablet, Take 1 tablet by mouth 3 (Three) Times a Day As Needed for Muscle Spasms., Disp: 40 tablet, Rfl: 2  •  Diclofenac Sodium (VOLTAREN) 1 % gel gel, Apply 1 g topically to the appropriate area as directed As Needed., Disp: , Rfl:   •  docusate sodium (COLACE) 100 MG capsule, TAKE 1 CAPSULE BY MOUTH DAILY., Disp: 30 capsule, Rfl: 1  •  famotidine (PEPCID) 20 MG tablet, TAKE 1 TABLET BY MOUTH DAILY., Disp: 90 tablet, Rfl: 1  •  folic acid (FOLVITE) 800 MCG tablet, Take 1 tablet by mouth Daily., Disp: , Rfl:   •  HM TRUEplus Fiber 2 g chewable tablet, Chew 1 tablet Daily., Disp: 90 tablet, Rfl: 1  •  Invega Sustenna 234 MG/1.5ML suspension prefilled syringe IM injection, Inject 1.5 mL into the appropriate muscle as directed by prescriber Every 30 (Thirty) Days., Disp: , Rfl:   •  Keppra 500 MG tablet, Take 3 tablets by mouth Every 12 (Twelve) Hours As Needed., Disp: , Rfl:   •  ketoconazole (NIZORAL) 2 % shampoo, , Disp: , Rfl:   •  lamoTRIgine (LaMICtal) 200 MG tablet, Take 1 tablet by mouth Daily., Disp: , Rfl:   •  Melatonin 10 MG tablet, Take 1 tablet by mouth every night at bedtime., Disp: 90 tablet, Rfl: 1  •  NON FORMULARY, CPAP, Disp: , Rfl:   •  pravastatin (PRAVACHOL) 20 MG tablet, TAKE 1 TABLET BY MOUTH EVERY NIGHT., Disp: 90 tablet, Rfl: 1  •  venlafaxine XR (EFFEXOR-XR) 150 MG 24 hr capsule, , Disp: , Rfl:   •  venlafaxine XR (EFFEXOR-XR) 37.5 MG 24 hr capsule, , Disp: , Rfl:   •  ciprofloxacin (Cipro) 500 MG tablet, Take 1 tablet by mouth 2 (Two) Times a Day., Disp: 14 tablet, Rfl: 0    Vital Signs:   Vitals:    04/04/23 0922   BP: 124/62   BP Location: Left arm   Patient Position: Sitting   Cuff Size: Adult   Pulse: 77   Resp: 16   Temp: 97.9 °F (36.6 °C)   TempSrc: Oral   SpO2: 96%  Comment: room  "air   Weight: 81.3 kg (179 lb 3.2 oz)   Height: 172.7 cm (68\")         Physical Exam:  Physical Exam  Constitutional:       Appearance: Normal appearance.   Cardiovascular:      Rate and Rhythm: Normal rate and regular rhythm.      Heart sounds: No murmur heard.  Pulmonary:      Effort: Pulmonary effort is normal.      Breath sounds: Normal breath sounds.   Abdominal:      Tenderness: There is no right CVA tenderness or left CVA tenderness.   Lymphadenopathy:      Lower Body: Right inguinal adenopathy (discrete swelling, slightly tender, no redness ) present.   Neurological:      Mental Status: He is alert.   Psychiatric:         Mood and Affect: Mood normal.         Behavior: Behavior normal.         Result Review      The following data was reviewed by: ÁNGELA Busby on 04/04/2023:    Results for orders placed or performed in visit on 04/04/23   Urinalysis With Culture If Indicated - Urine, Catheter In/Out    Specimen: Urine, Catheter In/Out   Result Value Ref Range    Color, UA Yellow Yellow, Straw    Appearance, UA Cloudy (A) Clear    pH, UA 5.5 5.0 - 8.0    Specific Gravity, UA 1.020 1.005 - 1.030    Glucose, UA Negative Negative    Ketones, UA Negative Negative    Bilirubin, UA Negative Negative    Blood, UA Small (1+) (A) Negative    Protein, UA Negative Negative    Leuk Esterase, UA Moderate (2+) (A) Negative    Nitrite, UA Positive (A) Negative    Urobilinogen, UA 0.2 E.U./dL 0.2 - 1.0 E.U./dL   Urinalysis, Microscopic Only - Urine, Catheter In/Out    Specimen: Urine, Catheter In/Out   Result Value Ref Range    RBC, UA 0-2 (A) None Seen /HPF    WBC, UA 31-50 (A) None Seen /HPF    Bacteria, UA Trace (A) None Seen /HPF    Squamous Epithelial Cells, UA 0-2 None Seen, 0-2 /HPF    Amorphous Crystals, UA Small/1+ None Seen /HPF    Sperm, UA Occasional (A) None Seen /HPF    Methodology Manual Light Microscopy                Assessment and Plan:          Diagnoses and all orders for this visit:    1. " Abnormal urine color (Primary)  Assessment & Plan:  Drink adequate water, will check a full u/a with culture. + nitrite, blood and leukocytes, microscopic 31-50 WBC's, trace bacteria and a few RBC's, will cover with Cipro    Orders:  -     Urinalysis With Culture If Indicated - Urine, Catheter In/Out  -     Urinalysis, Microscopic Only - Urine, Catheter In/Out  -     Urine Culture - Urine, Urine, Catheter In/Out  -     ciprofloxacin (Cipro) 500 MG tablet; Take 1 tablet by mouth 2 (Two) Times a Day.  Dispense: 14 tablet; Refill: 0    2. Groin swelling  Assessment & Plan:  Warm compresses     Orders:  -     ciprofloxacin (Cipro) 500 MG tablet; Take 1 tablet by mouth 2 (Two) Times a Day.  Dispense: 14 tablet; Refill: 0        Follow Up   Return if symptoms worsen or fail to improve, for followup pending lab results.  Patient was given instructions and counseling regarding his condition or for health maintenance advice. Please see specific information pulled into the AVS if appropriate.

## 2023-04-04 NOTE — TELEPHONE ENCOUNTER
Rx Refill Note  Requested Prescriptions     Pending Prescriptions Disp Refills   • Melatonin 10 MG tablet 90 tablet 1     Sig: Take 1 tablet by mouth every night at bedtime.      Last office visit with prescribing clinician: 2/2/2023   Last telemedicine visit with prescribing clinician: 4/4/2023   Next office visit with prescribing clinician: Visit date not found                         Would you like a call back once the refill request has been completed: [] Yes [] No    If the office needs to give you a call back, can they leave a voicemail: [] Yes [] No    Analy Diaz MA  04/04/23, 09:31 EDT

## 2023-04-04 NOTE — ASSESSMENT & PLAN NOTE
Drink adequate water, will check a full u/a with culture. + nitrite, blood and leukocytes, microscopic 31-50 WBC's, trace bacteria and a few RBC's, will cover with Cipro

## 2023-04-06 LAB — BACTERIA SPEC AEROBE CULT: ABNORMAL

## 2023-05-17 ENCOUNTER — HOSPITAL ENCOUNTER (EMERGENCY)
Facility: HOSPITAL | Age: 43
Discharge: HOME | End: 2023-05-18
Attending: EMERGENCY MEDICINE
Payer: MEDICARE

## 2023-05-17 DIAGNOSIS — N31.9 NEUROGENIC BLADDER: ICD-10-CM

## 2023-05-17 DIAGNOSIS — F33.3 SEVERE EPISODE OF RECURRENT MAJOR DEPRESSIVE DISORDER, WITH PSYCHOTIC FEATURES: Primary | ICD-10-CM

## 2023-05-17 LAB
ALBUMIN SERPL-MCNC: 4.8 G/DL (ref 3.5–5.2)
ALBUMIN/GLOB SERPL: 1.7 G/DL
ALP SERPL-CCNC: 73 U/L (ref 39–117)
ALT SERPL W P-5'-P-CCNC: 16 U/L (ref 1–41)
ANION GAP SERPL CALCULATED.3IONS-SCNC: 12.2 MMOL/L (ref 5–15)
APAP SERPL-MCNC: <5 MCG/ML (ref 0–30)
AST SERPL-CCNC: 16 U/L (ref 1–40)
BASOPHILS # BLD AUTO: 0.04 10*3/MM3 (ref 0–0.2)
BASOPHILS NFR BLD AUTO: 0.7 % (ref 0–1.5)
BILIRUB SERPL-MCNC: 0.3 MG/DL (ref 0–1.2)
BUN SERPL-MCNC: 15 MG/DL (ref 6–20)
BUN/CREAT SERPL: 20.5 (ref 7–25)
CALCIUM SPEC-SCNC: 9.8 MG/DL (ref 8.6–10.5)
CHLORIDE SERPL-SCNC: 106 MMOL/L (ref 98–107)
CO2 SERPL-SCNC: 20.8 MMOL/L (ref 22–29)
CREAT SERPL-MCNC: 0.73 MG/DL (ref 0.76–1.27)
DEPRECATED RDW RBC AUTO: 37.7 FL (ref 37–54)
EGFRCR SERPLBLD CKD-EPI 2021: 116.5 ML/MIN/1.73
EOSINOPHIL # BLD AUTO: 0.13 10*3/MM3 (ref 0–0.4)
EOSINOPHIL NFR BLD AUTO: 2.1 % (ref 0.3–6.2)
ERYTHROCYTE [DISTWIDTH] IN BLOOD BY AUTOMATED COUNT: 12.6 % (ref 12.3–15.4)
ETHANOL BLD-MCNC: <10 MG/DL (ref 0–10)
ETHANOL UR QL: <0.01 %
GLOBULIN UR ELPH-MCNC: 2.8 GM/DL
GLUCOSE SERPL-MCNC: 110 MG/DL (ref 65–99)
HCT VFR BLD AUTO: 45.1 % (ref 37.5–51)
HGB BLD-MCNC: 16.2 G/DL (ref 13–17.7)
HOLD SPECIMEN: NORMAL
HOLD SPECIMEN: NORMAL
IMM GRANULOCYTES # BLD AUTO: 0.02 10*3/MM3 (ref 0–0.05)
IMM GRANULOCYTES NFR BLD AUTO: 0.3 % (ref 0–0.5)
LYMPHOCYTES # BLD AUTO: 2.06 10*3/MM3 (ref 0.7–3.1)
LYMPHOCYTES NFR BLD AUTO: 33.8 % (ref 19.6–45.3)
MCH RBC QN AUTO: 30.2 PG (ref 26.6–33)
MCHC RBC AUTO-ENTMCNC: 35.9 G/DL (ref 31.5–35.7)
MCV RBC AUTO: 84 FL (ref 79–97)
MONOCYTES # BLD AUTO: 0.49 10*3/MM3 (ref 0.1–0.9)
MONOCYTES NFR BLD AUTO: 8 % (ref 5–12)
NEUTROPHILS NFR BLD AUTO: 3.36 10*3/MM3 (ref 1.7–7)
NEUTROPHILS NFR BLD AUTO: 55.1 % (ref 42.7–76)
NRBC BLD AUTO-RTO: 0 /100 WBC (ref 0–0.2)
PLATELET # BLD AUTO: 204 10*3/MM3 (ref 140–450)
PMV BLD AUTO: 10.3 FL (ref 6–12)
POTASSIUM SERPL-SCNC: 3.8 MMOL/L (ref 3.5–5.2)
PROT SERPL-MCNC: 7.6 G/DL (ref 6–8.5)
RBC # BLD AUTO: 5.37 10*6/MM3 (ref 4.14–5.8)
SALICYLATES SERPL-MCNC: <0.3 MG/DL
SODIUM SERPL-SCNC: 139 MMOL/L (ref 136–145)
WBC NRBC COR # BLD: 6.1 10*3/MM3 (ref 3.4–10.8)
WHOLE BLOOD HOLD COAG: NORMAL
WHOLE BLOOD HOLD SPECIMEN: NORMAL

## 2023-05-17 PROCEDURE — 99282 EMERGENCY DEPT VISIT SF MDM: CPT | Performed by: UROLOGY

## 2023-05-17 PROCEDURE — 36415 COLL VENOUS BLD VENIPUNCTURE: CPT

## 2023-05-17 PROCEDURE — 80143 DRUG ASSAY ACETAMINOPHEN: CPT

## 2023-05-17 PROCEDURE — 80179 DRUG ASSAY SALICYLATE: CPT

## 2023-05-17 PROCEDURE — 85025 COMPLETE CBC W/AUTO DIFF WBC: CPT

## 2023-05-17 PROCEDURE — 80053 COMPREHEN METABOLIC PANEL: CPT

## 2023-05-17 PROCEDURE — 99284 EMERGENCY DEPT VISIT MOD MDM: CPT

## 2023-05-17 PROCEDURE — 51702 INSERT TEMP BLADDER CATH: CPT | Performed by: UROLOGY

## 2023-05-17 PROCEDURE — 82077 ASSAY SPEC XCP UR&BREATH IA: CPT | Performed by: EMERGENCY MEDICINE

## 2023-05-17 RX ORDER — SODIUM CHLORIDE 0.9 % (FLUSH) 0.9 %
10 SYRINGE (ML) INJECTION AS NEEDED
Status: DISCONTINUED | OUTPATIENT
Start: 2023-05-17 | End: 2023-05-18 | Stop reason: HOSPADM

## 2023-05-18 ENCOUNTER — TELEPHONE (OUTPATIENT)
Dept: UROLOGY | Facility: CLINIC | Age: 43
End: 2023-05-18
Payer: MEDICARE

## 2023-05-18 VITALS
HEART RATE: 92 BPM | SYSTOLIC BLOOD PRESSURE: 129 MMHG | HEIGHT: 68 IN | RESPIRATION RATE: 16 BRPM | DIASTOLIC BLOOD PRESSURE: 93 MMHG | OXYGEN SATURATION: 100 % | TEMPERATURE: 98.4 F | BODY MASS INDEX: 26.23 KG/M2 | WEIGHT: 173.06 LBS

## 2023-05-18 LAB
AMPHET+METHAMPHET UR QL: NEGATIVE
BARBITURATES UR QL SCN: NEGATIVE
BENZODIAZ UR QL SCN: NEGATIVE
CANNABINOIDS SERPL QL: NEGATIVE
COCAINE UR QL: NEGATIVE
FENTANYL UR-MCNC: NEGATIVE NG/ML
METHADONE UR QL SCN: NEGATIVE
OPIATES UR QL: NEGATIVE
OXYCODONE UR QL SCN: NEGATIVE

## 2023-05-18 PROCEDURE — 80307 DRUG TEST PRSMV CHEM ANLYZR: CPT | Performed by: EMERGENCY MEDICINE

## 2023-05-18 NOTE — CONSULTS
[facility]   Urology Consult Note    Patient Name: Kang Grey  : 1980  MRN: [id number]  Primary Care Physician:  Hien Parham MD  Referring Physician: No Known Provider  Date of admission: 2023    Inpatient Urology Consult  Consult performed by: Alisson Torres MD  Consult ordered by: Lori Don APRN        Subjective   Subjective     Reason for Consult/ Chief Complaint: Neurogenic bladder, urinary retention, inability to place bills catheter    History of Present Illness  Kang Grey is a 42 y.o. male sees Dr. Morris.  He has neurogenic bladder and is supposed to be cathing three times a day but admits he rarely does that.  He states today the last time he tried to cath was 3 days ago.  His mother admits the last time she tried to cath him was three days ago as well.  Staff attempted to place bills in ED and were unsuccessful.       Personal History     Past Medical History:   Diagnosis Date   • Acute upper respiratory infection 2022   • Age-related osteoporosis without current pathological fracture    • Broken toe 2022    left great toe   • Cerebral palsy     diagnosed as infant   • Compression fracture of first lumbar vertebra    • Constipation    • Disease of pancreas    • History of urinary self-catheterization     pt or pt's mother catheterizes 4-6  times a day   • Hydrocephalus in diseases classified elsewhere    • Injury of back 2020   • Low back pain    • Major depressive disorder     single episode, moderate   • Other abnormal glucose    • Other cerebral palsy    • Other fatigue    • Other generalized epilepsy and epileptic syndromes, not intractable, without status epilepticus    • Other hypertrophic disorders of the skin    • Pain in left ankle and joints of left foot    • Pain in right toe(s)    • Pain in thoracic spine    • Paranoid schizophrenia    • Pelvic and perineal pain    • PONV (postoperative nausea and vomiting)    • Primary insomnia    • Pure  hypercholesterolemia    • Repeated falls     last fall 11/2022   • Schizoaffective disorder, unspecified    • Seizures     last seizure was approx 2018, medication controlled   • Sleep apnea     uses cpap machine   • Somnolence    • TIA (transient ischemic attack)    • Vitamin D deficiency    • Wedge compression fracture of fourth thoracic vertebra, subsequent encounter for fracture with routine healing        Past Surgical History:   Procedure Laterality Date   • CYST REMOVAL Right 11/11/2022    Procedure: Excision of subcutaneous mass from right forearm;  Surgeon: Oni Greenberg MD;  [facility];  Service: General;  Laterality: Right;   • HAMSTRING REPAIR Bilateral     age 10   • OTHER SURGICAL HISTORY      hyposadius repair   • TOE FUSION Right     great toe at age 21       Family History: family history includes Hypothyroidism in his mother; No Known Problems in his father; Ulcers in his brother. Otherwise pertinent FHx was reviewed and not pertinent to current issue.    Social History:  reports that he has never smoked. He has never used smokeless tobacco. He reports that he does not drink alcohol and does not use drugs.    Home Medications:   Calcium Carbonate, Diclofenac Sodium, HM TRUEplus Fiber, Melatonin, NON FORMULARY, alclometasone, ascorbic acid, aspirin, benzonatate, busPIRone, cholecalciferol, ciprofloxacin, cyclobenzaprine, docusate sodium, famotidine, folic acid, ketoconazole, lamoTRIgine, levETIRAcetam, paliperidone palmitate, pravastatin, and venlafaxine XR    Allergies:  Allergies   Allergen Reactions   • Valproic Acid Irritability     DEPAKOTE CAUSES DIZZINESS AND IRRITABILITY         Objective    Objective     Vitals:  Temp:  [98.4 °F (36.9 °C)] 98.4 °F (36.9 °C)  Heart Rate:  [87-95] 87  Resp:  [16] 16  BP: (126-138)/(79-96) 126/96    Physical Exam  Genitourinary:     Comments: Penis normal, circumcised, no meatal stenosis, bladder non-palpable        Result Review    Result  Review:  I have personally reviewed the results from the time of this admission to 5/17/2023 23:39 EDT and agree with these findings:  [x]  Laboratory  []  Microbiology  []  Radiology  []  EKG/Telemetry   []  Cardiology/Vascular   []  Pathology  [x]  Old records  []  Other:    Procedure:  I attempted to place a coude catheter and was unable to pass it through either the sphincter or the prostate.  I then placed a wire from the [employer] Urologist tray and was easily able to advance it into the bladder.  I then placed a [place] tip catheter over the wire and into the bladder without issue.  There was return of clear yellow urine. Balloon inflated to 10cc.     Assessment & Plan   Assessment / Plan       Active Hospital Problems:  Active Hospital Problems    Diagnosis    • Urinary retention    • Neurogenic bladder    • Benign prostatic hyperplasia with urinary frequency    • Cerebral palsy      Plan: Patient will need to follow up with Dr. Morris as an outpatient for discussion of long term plan on emptying his bladder.  He admits to rarely cathing.  Bills placed today.  He can go home with bills and follow up with Dr. Morris to discuss long term plans.        Electronically signed by Alisson Torres MD, 05/17/23, 11:39 PM EDT.

## 2023-05-18 NOTE — TELEPHONE ENCOUNTER
Pt was seen in the ER and had difficulty urinating and had a bills catheter placed. Was instructed to f/u with Dr Morris. Please call mom with an appt.

## 2023-05-18 NOTE — POST-PROCEDURE NOTE
[facility]   Urology Consult Note    Patient Name: Kang Grey  : 1980  MRN: [id number]  Primary Care Physician:  Hien Parham MD  Referring Physician: No Known Provider  Date of admission: 2023    Consults  Subjective   Subjective     Reason for Consult/ Chief Complaint: Neurogenic bladder, urinary retention, inability to place bills catheter    History of Present Illness  Kang Grey is a 42 y.o. male who is a Dr. Morris patient and is seen for neurogenic bladder and urinary retention.  He is supposed to cath three times a day but rarely does that.  He has not cathed for 3 days according to the patient.  He rarely allows his mother to cath him.  He often has difficulty placing a bills catheter.  He states it has been 3 days since he cathed and his mother has not tried for 3 days either.  He states it hurts to cath at times. Patient is supposed to self cath at home but has not been.  Attempts to cath today by patient and ED staff were unsuccessful.         Personal History     Past Medical History:   Diagnosis Date   • Acute upper respiratory infection 2022   • Age-related osteoporosis without current pathological fracture    • Broken toe 2022    left great toe   • Cerebral palsy     diagnosed as infant   • Compression fracture of first lumbar vertebra    • Constipation    • Disease of pancreas    • History of urinary self-catheterization     pt or pt's mother catheterizes 4-6  times a day   • Hydrocephalus in diseases classified elsewhere    • Injury of back 2020   • Low back pain    • Major depressive disorder     single episode, moderate   • Other abnormal glucose    • Other cerebral palsy    • Other fatigue    • Other generalized epilepsy and epileptic syndromes, not intractable, without status epilepticus    • Other hypertrophic disorders of the skin    • Pain in left ankle and joints of left foot    • Pain in right toe(s)    • Pain in thoracic spine    • Paranoid  schizophrenia    • Pelvic and perineal pain    • PONV (postoperative nausea and vomiting)    • Primary insomnia    • Pure hypercholesterolemia    • Repeated falls     last fall 11/2022   • Schizoaffective disorder, unspecified    • Seizures     last seizure was approx 2018, medication controlled   • Sleep apnea     uses cpap machine   • Somnolence    • TIA (transient ischemic attack)    • Vitamin D deficiency    • Wedge compression fracture of fourth thoracic vertebra, subsequent encounter for fracture with routine healing        Past Surgical History:   Procedure Laterality Date   • CYST REMOVAL Right 11/11/2022    Procedure: Excision of subcutaneous mass from right forearm;  Surgeon: Oni Greenberg MD;  [place] [facility] [place] [facility] [place]  Service: General;  Laterality: Right;   • HAMSTRING REPAIR Bilateral     age 10   • OTHER SURGICAL HISTORY      hyposadius repair   • TOE FUSION Right     great toe at age 21       Family History: family history includes Hypothyroidism in his mother; No Known Problems in his father; Ulcers in his brother. Otherwise pertinent FHx was reviewed and not pertinent to current issue.    Social History:  reports that he has never smoked. He has never used smokeless tobacco. He reports that he does not drink alcohol and does not use drugs.    Home Medications:   Calcium Carbonate, Diclofenac Sodium, HM TRUEplus Fiber, Melatonin, NON FORMULARY, alclometasone, ascorbic acid, aspirin, benzonatate, busPIRone, cholecalciferol, ciprofloxacin, cyclobenzaprine, docusate sodium, famotidine, folic acid, ketoconazole, lamoTRIgine, levETIRAcetam, paliperidone palmitate, pravastatin, and venlafaxine XR    Allergies:  Allergies   Allergen Reactions   • Valproic Acid Irritability     DEPAKOTE CAUSES DIZZINESS AND IRRITABILITY         Objective    Objective     Vitals:  Temp:  [98.4 °F (36.9 °C)] 98.4 °F (36.9 °C)  Heart Rate:  [87-95] 87  Resp:  [16] 16  BP: (126-138)/(79-96) 126/96    Physical  Exam  Genitourinary:     Comments: Bladder non-palpable, penis without swelling, circumcised, no meatal stenosis noted, bladder non-palpable        Result Review    Result Review:  I have personally reviewed the results from the time of this admission to 5/17/2023 23:34 EDT and agree with these findings:  [x]  Laboratory  []  Microbiology  []  Radiology  []  EKG/Telemetry   []  Cardiology/Vascular   []  Pathology  [x]  Old records  []  Other:    Procedure:  I initially attempted to place a coude catheter but was unable to get the catheter through either the sphincter or the prostate.  I then placed a wire from the [employer] urologist tray and was easily able to advance it into the bladder.  A [place] tip catheter was then passed over the wire and into the bladder without difficulty.  There was return of clear yellow urine.     Assessment & Plan   Assessment / Plan       Active Hospital Problems:  Active Hospital Problems    Diagnosis    • Urinary retention    • Neurogenic bladder    • Benign prostatic hyperplasia with urinary frequency    • Cerebral palsy      Plan: patient will need to follow up with Dr. Morris for long term plan on neurogenic bladder and retention.  He is not cathing at home like instructed and likely is routinely going days without emptying his bladder.  I placed a bills catheter today.  Follow up with Dr. Morris as outpatient for discussion of long term plan for emptying his bladder.       Electronically signed by Alisson Torres MD, 05/17/23, 11:28 PM EDT.

## 2023-05-18 NOTE — ED NOTES
Jasiel Luna is a 64 y.o. female who was seen by synchronous (real-time) audio-video technology on 6/22/2020. Consent: Jasiel Luna, who was seen by synchronous (real-time) audio-video technology, and/or her healthcare decision maker, is aware that this patient-initiated, Telehealth encounter on 6/22/2020 is a billable service, with coverage as determined by her insurance carrier. She is aware that she may receive a bill and has provided verbal consent to proceed: Yes. Assessment & Plan:   Diagnoses and all orders for this visit:    1. Essential hypertension  -     CBC W/O DIFF; Future  -     LIPID PANEL; Future  -     METABOLIC PANEL, COMPREHENSIVE; Future  -     URINALYSIS W/ RFLX MICROSCOPIC; Future    2. Gastroesophageal reflux disease without esophagitis    3. Thyroid nodules neg bx Dr. Elliot Asher 2009    4. Rheumatoid arthritis of multiple sites with negative rheumatoid factor ([facility] [address]    5. YOSELIN (stress urinary incontinence, female)    6. Dyslipidemia    7. Depression, unspecified depression type    8. Chronic insomnia        I spent at least 21 minutes on this visit with this established patient. 712  Subjective:   Jasiel Luna is a 64 y.o. female who was seen for No chief complaint on file. Objective: There were no vitals taken for this visit. General: alert, cooperative, no distress   Mental  status: normal mood, behavior, speech, dress, motor activity, and thought processes, able to follow commands   HENT: NCAT   Neck: no visualized mass   Resp: no respiratory distress   Neuro: no gross deficits   Skin: no discoloration or lesions of concern on visible areas   Psychiatric: normal affect, consistent with stated mood, no evidence of hallucinations     Additional exam findings: We discussed the expected course, resolution and complications of the diagnosis(es) in detail. Medication risks, benefits, costs, interactions, and alternatives were discussed as indicated.   I advised Pt states he self caths at home. Pt was given in and out catheter to get some urine. Pt was unable to obtain urine. ALICE Acosta attempted to cath pt for urine and was unsuccessful. GIBRAN Burgess attempted to cath patient with a coude and peds catheter and was unsuccessful.   Pt has 635mL urine in bladder. ÁNGELA Redman notified.    her to contact the office if her condition worsens, changes or fails to improve as anticipated. She expressed understanding with the diagnosis(es) and plan. Olga Lidia Pruitt is a 64 y.o. female who was evaluated by a video visit encounter for concerns as above. Patient identification was verified prior to start of the visit. A caregiver was present when appropriate. Due to this being a TeleHealth encounter (During DIXQX-09 public health emergency), evaluation of the following organ systems was limited: Vitals/Constitutional/EENT/Resp/CV/GI//MS/Neuro/Skin/Heme-Lymph-Imm. Pursuant to the emergency declaration under the [facility], 1135 waiver authority and the [employer] Act, this Virtual  Visit was conducted, with patient's (and/or legal guardian's) consent, to reduce the patient's risk of exposure to COVID-19 and provide necessary medical care. Services were provided through a video synchronous discussion virtually to substitute for in-person clinic visit. Patient and provider were located at their individual homes. Mckenzie Velazquez MD    She seems to be doing well. She walks for exercise and no cardiovascular complaints.   Bp controlled when she checks    She sees Dr Hubert Mendez for the thyroid every 2 years now    The lexapro continues to do well for her and she wants to continue    On plaquenil and pred 5qd for the RA which is doing well, sees Dr La Kemp regularly    The insomnia is responding well to ambien nightly    She saw Dr Dk Mercado who discussed immunotherapy but they are oppting to use topicals for now    She saw Dr Comfort Willams for the incontinence and will be undergoing pelvic floor PT    Past Medical History:   Diagnosis Date    Allergic rhinitis     Dr Bibiana Patton 4/14    KIMBERLEY-7 was 19/21    Chronic insomnia 06/17/2019    tried benadryl, trazodone; intol belsomra, doxepin; using [place] Depression     has tried paxil, zoloft, wellbutrin, cymbalta, trazodone    Dyslipidemia     calculated 10 year risk score was 2.5% (4/15)    Endometriosis     Fibromyalgia     Dr. La Kemp in past    GERD (gastroesophageal reflux disease)     Hypertension     Multiple thyroid nodules 07/2009    neg FNA Dr. Cici Diez now Dr Jennifer Pulido Nephrolithiasis 1980s    Osteopenia     DEXA t score -0.8 spine, -1.6 hip (10/07);  -0.4 spine, -1.1 hip w FRAX 5.2/0.5 (10/12); -1.1 spine, -1.0 hip (4/15); neg w/u secondary causes    Overweight (BMI 25.0-29.9)     peak weight 149 lbs, bmi 29.1 rom 9/16; failed qsymia and belviq; 24h U patria elev but LDST neg 9/16    Polymyalgia rheumatica (HCC)     Dr La Kemp    PUD (peptic ulcer disease) 2018    Dr Trini Arguello on EGD; h pylori neg    Rheumatoid arthritis of multiple sites with negative rheumatoid factor ([facility] [address] 3/10/2019    Dr La Kemp 2018; ccp+    Rosacea     YOSELIN (stress urinary incontinence, female) 2017    Dr Comfort Willams 2022    Trochanteric bursitis     Vertical diplopia 1/14    Vitamin D deficiency      Past Surgical History:   Procedure Laterality Date    COLONOSCOPY N/A 6/1/2016    Dr Sushila Artis 2005 neg; Dr Yisel Patel 2016 neg    EGD  2005    negative Dr. Sushila Artis, biopsy neg for sprue also    HX APPENDECTOMY      HX CHOLECYSTECTOMY  9/15    Dr Feliberto Artis 2005 neg; Dr Morillo Shadow 6/1/16 neg   Lilian Grams      Dr. Rachel Crowe 2003    HX HEENT  2/14    MRI negative    HX ORTHOPAEDIC  2017    Dr Alex Moon; avulsion fx LEFT ankle    HX SEPTOPLASTY      HX JENNY AND BSO      NEUROLOGICAL PROCEDURE UNLISTED  6/14    MRA neck neg, MRA brain negative    NEUROLOGICAL PROCEDURE UNLISTED  6/14    MRI brain neg outside scattered minimal wm change     Social History     Socioeconomic History    Marital status:      Spouse name: Not on file    Number of children: 1    Years of education: Not on file    Highest education level: Not on file   Occupational History    Occupation: [employer]   Social Needs    Financial resource strain: Not on file    Food insecurity     Worry: Not on file     Inability: Not on file   [employer] needs     Medical: Not on file     Non-medical: Not on file   Tobacco Use    Smoking status: Never Smoker    Smokeless tobacco: Never Used   Substance and Sexual Activity    Alcohol use: Yes     Comment: occasionally    Drug use: No    Sexual activity: Not on file   Lifestyle    Physical activity     Days per week: Not on file     Minutes per session: Not on file    Stress: Not on file   Relationships    Social connections     Talks on phone: Not on file     Gets together: Not on file     Attends Worship service: Not on file     Active member of club or organization: Not on file     Attends meetings of clubs or organizations: Not on file     Relationship status: Not on file    Intimate partner violence     Fear of current or ex partner: Not on file     Emotionally abused: Not on file     Physically abused: Not on file     Forced sexual activity: Not on file   Other Topics Concern    Not on file   Social History Narrative    Not on file     Current Outpatient Medications   Medication Sig    azelastine (ASTELIN) 137 mcg (0.1 %) nasal spray 2 Sprays by Both Nostrils route two (2) times a day. Use in each nostril as directed    predniSONE (DELTASONE) 5 mg tablet Take 5 mg by mouth daily.  hydrOXYchloroQUINE (Plaquenil) 200 mg tablet Take 200 mg by mouth daily.  zolpidem (Ambien) 5 mg tablet Take 1 Tab by mouth nightly as needed for Sleep. Max Daily Amount: 5 mg.  escitalopram oxalate (LEXAPRO) 20 mg tablet Take 1 Tab by mouth daily.  benazepril (LOTENSIN) 10 mg tablet TAKE 1 TABLET DAILY (SOLCO MFR)    ergocalciferol (ERGOCALCIFEROL) 50,000 unit capsule Take 1 Cap by mouth every fourteen (14) days.  LORazepam (ATIVAN) 0.5 mg tablet Take 1 Tab by mouth every twelve (12) hours as needed for Anxiety.  Max Daily Amount: 1 mg.  lubiPROStone (AMITIZA) 8 mcg capsule Take  by mouth.  estradiol (ESTRACE) 0.5 mg tablet Take  by mouth daily.  glucosamine-chondroitin (ARTHX) 500-400 mg cap Take 1 Cap by mouth daily.  pantoprazole (PROTONIX) 40 mg tablet Take 1 Tab by mouth daily.  multivitamin (ONE A DAY) tablet Take 1 Tab by mouth daily. No current facility-administered medications for this visit.       Allergies   Allergen Reactions    Augmentin [Amoxicillin-Pot Clavulanate] Rash    Avelox [Moxifloxacin] Rash    Belsomra [Suvorexant] Other (comments)     anxiety    Cymbalta [Duloxetine] Unknown (comments)     Pt unsure      Elavil Unknown (comments)     Pt unsure      Lexapro [Escitalopram] Other (comments)     Wt gain    Lyrica [Pregabalin] Other (comments)     Wt gain and double vision    Motrin [Ibuprofen] Rash    Nsaids (Non-Steroidal Anti-Inflammatory Drug) Other (comments)     H/o PUD    Paxil [Paroxetine Hcl] Other (comments)     Weight gain    Pcn [Penicillins] Rash    Trazodone Unknown (comments)     Pt unsure       REVIEW OF SYSTEMS: mammo 11/19, gyn Dr Barber Dubois 2019, colo 6/16 Dr Saintclair Grimmer  Ophtho  no vision change or eye pain  Oral  no mouth pain, tongue or tooth problems  Ears  no hearing loss, ear pain, fullness, no swallowing problems  Cardiac  no CP, PND, orthopnea, edema, palpitations or syncope  Chest  no breast masses  Resp  no wheezing, chronic coughing, dyspnea  GI  no heartburn, nausea, vomiting, bleeding, hemorrhoids  Urinary  no dysuria, hematuria, flank pain, urgency, frequency    LABS  From 7/10 showed   gluc 96, cr 0.90, gfr>60, alt 61,        chol 210, tg 179, hdl 41, ldl-c 133, wbc 6.0, hb 14.3, plt 268, ua neg  From 6/12 showed   gluc 87, cr 0.80, gfr 84,  alt 23, ldl-p 1405,                            tsh 1.46, vit d 46.6  From 1/13 showed                 ldl-p 1483, chol 200, tg 122, hdl 57, ldl-c 119  From 8/13 showed   gluc 99, cr 0.90, alt 32,          chol 190, tg 68,   hdl 47, ldl-c 129, wbc 6.3, hb 13.7, plt 310, ra neg  From 2/14 showed                 ldl-p 1423, chol 173, tg 100, hdl 47, ldl-c 106  From 4/14 showed                                     vit d 52.7, b12 538, fol>20  From 4/14 showed                      ach receptor ab neg  From 6/14 showed                      lobo neg, esr neg, vgcc ab neg  From 10/14 showed                      ace level nl, musk ab neg, lyme wb neg  From 4/15 showed   gluc 92, cr 0.92, gfr 70,         chol 202, tg 151, hdl 42, ldl-c 130, wbc 6.2, hb 13.4, plt 323  From 9/15 showed             wbc 6.2, hb 13.8, plt 302, lip 201, ck/trop neg  From 4/16 showed   gluc 90, cr 0.93, gfr 68 alt 23,         chol 204, tg 187, hdl 54, ldl-c 113, wbc 6.7, hb 14.4, plt 347, tsh 2.08, vit d 40.9, ft4 0.85, 24 hr U ca 123, 24h U patria 53, spep neg  From 5/16 showed                      pth 29, tTgAb neg  From 9/16 showed                      LDST neg, ua neg pro  From 11/17 showed gluc 92, cr 0.88, gfr 73, alt 25,         chol 200, tg 174, hdl 61, ldl-c 104, wbc 6.5, hb 13.3, plt 305, hep c neg, vit d 81.3  From 12/18 showed gluc 94, cr 0.89, gfr 71, alt 25,               vit d 52.2  From 5/19 showed                         esr 14, RA neg, ccp 170    Results for orders placed or performed during the hospital encounter of 06/15/20   CBC W/O DIFF   Result Value Ref Range    WBC 8.5 4.6 - 13.2 K/uL    RBC 4.34 4.20 - 5.30 M/uL    HGB 13.4 12.0 - 16.0 g/dL    HCT 41.9 35.0 - 45.0 %    MCV 96.5 74.0 - 97.0 FL    MCH 30.9 24.0 - 34.0 PG    MCHC 32.0 31.0 - 37.0 g/dL    RDW 14.3 11.6 - 14.5 %    PLATELET 894 925 - 374 K/uL    MPV 10.1 9.2 - 11.8 FL   LIPID PANEL   Result Value Ref Range    LIPID PROFILE          Cholesterol, total 204 (H) <200 MG/DL    Triglyceride 201 (H) <150 MG/DL    HDL Cholesterol 76 (H) 40 - 60 MG/DL    LDL, calculated 87.8 0 - 100 MG/DL    VLDL, calculated 40.2 MG/DL    CHOL/HDL Ratio 2.7 0 - 5.0     METABOLIC PANEL, COMPREHENSIVE   Result Value Ref Range    Sodium 138 136 - 145 mmol/L    Potassium 3.9 3.5 - 5.5 mmol/L    Chloride 103 100 - 111 mmol/L    CO2 28 21 - 32 mmol/L    Anion gap 7 3.0 - 18 mmol/L    Glucose 74 74 - 99 mg/dL    BUN 18 7.0 - 18 MG/DL    Creatinine 1.00 0.6 - 1.3 MG/DL    BUN/Creatinine ratio 18 12 - 20      GFR est AA >60 >60 ml/min/1.73m2    GFR est non-AA 56 (L) >60 ml/min/1.73m2    Calcium 8.5 8.5 - 10.1 MG/DL    Bilirubin, total 0.3 0.2 - 1.0 MG/DL    ALT (SGPT) 32 13 - 56 U/L    AST (SGOT) 28 10 - 38 U/L    Alk. phosphatase 54 45 - 117 U/L    Protein, total 7.0 6.4 - 8.2 g/dL    Albumin 3.7 3.4 - 5.0 g/dL    Globulin 3.3 2.0 - 4.0 g/dL    A-G Ratio 1.1 0.8 - 1.7       Patient Active Problem List   Diagnosis Code    Depression and anxiety F32.9    Osteopenia 2007 Dr. Duyen Garcia, FRAX 5.2/0.5 10/12 M85.80    Thyroid nodules neg bx Dr. Jacinto Dural 2009 E04.1    Dyslipidemia E78.5    Essential hypertension I10    Gastroesophageal reflux disease without esophagitis K21.9    Overweight (BMI 25.0-29. 9) [id number]    Chronic constipation K59.09    YOSELIN (stress urinary incontinence, female) N39.3    Fibromyalgia M79.7    Rheumatoid arthritis of multiple sites with negative rheumatoid factor (HCC) M06.09    Chronic insomnia F51.04     Assessment and plan:  1. Allergic rhinitis. Continue current  2. GERD. PPI and avoidance measures  3. Hypertension. Continue current  4. Lipids. Lifestyle and dietary measures; her hdl and ldl are on positive trend  5. Thyroid nodules. F/U Dr. Rama Patterson as scheduled  6. Osteopenia. Continue current and f/u Dr Duyen Garcia  7. Neuro  F/u Dr. Allison Leyva  8. Depression and anxiety. Continue current  9. Constipation. Continue current regimen. 10.  Insomnia. Continue current regimen. 11.  Overweight. Lifestyle and dietary measures. Portion control reiterated. RTC 6/21    Above conditions discussed at length and patient vocalized understanding.   All questions answered to patient satisfaction

## 2023-05-23 ENCOUNTER — OFFICE VISIT (OUTPATIENT)
Dept: FAMILY MEDICINE CLINIC | Age: 43
End: 2023-05-23
Payer: MEDICARE

## 2023-05-23 ENCOUNTER — LAB (OUTPATIENT)
Dept: LAB | Facility: HOSPITAL | Age: 43
End: 2023-05-23
Payer: MEDICARE

## 2023-05-23 VITALS
OXYGEN SATURATION: 94 % | BODY MASS INDEX: 26.31 KG/M2 | TEMPERATURE: 98.5 F | DIASTOLIC BLOOD PRESSURE: 83 MMHG | HEART RATE: 103 BPM | HEIGHT: 68 IN | SYSTOLIC BLOOD PRESSURE: 134 MMHG

## 2023-05-23 DIAGNOSIS — R33.9 URINARY RETENTION: Primary | ICD-10-CM

## 2023-05-23 DIAGNOSIS — F33.3 SEVERE EPISODE OF RECURRENT MAJOR DEPRESSIVE DISORDER, WITH PSYCHOTIC FEATURES: ICD-10-CM

## 2023-05-23 DIAGNOSIS — R33.9 URINARY RETENTION: ICD-10-CM

## 2023-05-23 DIAGNOSIS — N31.9 NEUROGENIC BLADDER: ICD-10-CM

## 2023-05-23 DIAGNOSIS — N48.89 PENILE PAIN: ICD-10-CM

## 2023-05-23 DIAGNOSIS — R82.90 CLOUDY URINE: ICD-10-CM

## 2023-05-23 DIAGNOSIS — N30.00 ACUTE CYSTITIS WITHOUT HEMATURIA: ICD-10-CM

## 2023-05-23 DIAGNOSIS — N36.8 URETHRAL MEATUS PAIN: ICD-10-CM

## 2023-05-23 DIAGNOSIS — F20.0 PARANOID SCHIZOPHRENIA: ICD-10-CM

## 2023-05-23 DIAGNOSIS — N30.00 ACUTE CYSTITIS WITHOUT HEMATURIA: Primary | ICD-10-CM

## 2023-05-23 LAB
ALBUMIN SERPL-MCNC: 4.7 G/DL (ref 3.5–5.2)
ALBUMIN/GLOB SERPL: 1.6 G/DL
ALP SERPL-CCNC: 80 U/L (ref 39–117)
ALT SERPL W P-5'-P-CCNC: 22 U/L (ref 1–41)
ANION GAP SERPL CALCULATED.3IONS-SCNC: 11 MMOL/L (ref 5–15)
AST SERPL-CCNC: 18 U/L (ref 1–40)
BACTERIA UR QL AUTO: ABNORMAL /HPF
BASOPHILS # BLD AUTO: 0.04 10*3/MM3 (ref 0–0.2)
BASOPHILS NFR BLD AUTO: 0.6 % (ref 0–1.5)
BILIRUB SERPL-MCNC: 0.3 MG/DL (ref 0–1.2)
BILIRUB UR QL STRIP: NEGATIVE
BUN SERPL-MCNC: 14 MG/DL (ref 6–20)
BUN/CREAT SERPL: 16.3 (ref 7–25)
CALCIUM SPEC-SCNC: 9.8 MG/DL (ref 8.6–10.5)
CHLORIDE SERPL-SCNC: 104 MMOL/L (ref 98–107)
CLARITY UR: ABNORMAL
CO2 SERPL-SCNC: 25 MMOL/L (ref 22–29)
COD CRY URNS QL: ABNORMAL /HPF
COLOR UR: ABNORMAL
CREAT SERPL-MCNC: 0.86 MG/DL (ref 0.76–1.27)
DEPRECATED RDW RBC AUTO: 39.8 FL (ref 37–54)
EGFRCR SERPLBLD CKD-EPI 2021: 110.9 ML/MIN/1.73
EOSINOPHIL # BLD AUTO: 0.09 10*3/MM3 (ref 0–0.4)
EOSINOPHIL NFR BLD AUTO: 1.4 % (ref 0.3–6.2)
ERYTHROCYTE [DISTWIDTH] IN BLOOD BY AUTOMATED COUNT: 12.7 % (ref 12.3–15.4)
GLOBULIN UR ELPH-MCNC: 2.9 GM/DL
GLUCOSE SERPL-MCNC: 96 MG/DL (ref 65–99)
GLUCOSE UR STRIP-MCNC: NEGATIVE MG/DL
HCT VFR BLD AUTO: 46.7 % (ref 37.5–51)
HGB BLD-MCNC: 16.2 G/DL (ref 13–17.7)
HGB UR QL STRIP.AUTO: ABNORMAL
IMM GRANULOCYTES # BLD AUTO: 0 10*3/MM3 (ref 0–0.05)
IMM GRANULOCYTES NFR BLD AUTO: 0 % (ref 0–0.5)
KETONES UR QL STRIP: NEGATIVE
LEUKOCYTE ESTERASE UR QL STRIP.AUTO: ABNORMAL
LYMPHOCYTES # BLD AUTO: 1.43 10*3/MM3 (ref 0.7–3.1)
LYMPHOCYTES NFR BLD AUTO: 21.9 % (ref 19.6–45.3)
MCH RBC QN AUTO: 29.7 PG (ref 26.6–33)
MCHC RBC AUTO-ENTMCNC: 34.7 G/DL (ref 31.5–35.7)
MCV RBC AUTO: 85.5 FL (ref 79–97)
MONOCYTES # BLD AUTO: 0.53 10*3/MM3 (ref 0.1–0.9)
MONOCYTES NFR BLD AUTO: 8.1 % (ref 5–12)
NEUTROPHILS NFR BLD AUTO: 4.45 10*3/MM3 (ref 1.7–7)
NEUTROPHILS NFR BLD AUTO: 68 % (ref 42.7–76)
NITRITE UR QL STRIP: POSITIVE
PH UR STRIP.AUTO: 6 [PH] (ref 5–8)
PLATELET # BLD AUTO: 189 10*3/MM3 (ref 140–450)
PMV BLD AUTO: 10.6 FL (ref 6–12)
POTASSIUM SERPL-SCNC: 4.2 MMOL/L (ref 3.5–5.2)
PROT SERPL-MCNC: 7.6 G/DL (ref 6–8.5)
PROT UR QL STRIP: ABNORMAL
RBC # BLD AUTO: 5.46 10*6/MM3 (ref 4.14–5.8)
RBC # UR STRIP: ABNORMAL /HPF
REF LAB TEST METHOD: ABNORMAL
SODIUM SERPL-SCNC: 140 MMOL/L (ref 136–145)
SP GR UR STRIP: >=1.03 (ref 1–1.03)
SQUAMOUS #/AREA URNS HPF: ABNORMAL /HPF
UROBILINOGEN UR QL STRIP: ABNORMAL
WBC # UR STRIP: ABNORMAL /HPF
WBC NRBC COR # BLD: 6.54 10*3/MM3 (ref 3.4–10.8)

## 2023-05-23 PROCEDURE — 36415 COLL VENOUS BLD VENIPUNCTURE: CPT

## 2023-05-23 PROCEDURE — 81001 URINALYSIS AUTO W/SCOPE: CPT | Performed by: FAMILY MEDICINE

## 2023-05-23 PROCEDURE — 87077 CULTURE AEROBIC IDENTIFY: CPT | Performed by: FAMILY MEDICINE

## 2023-05-23 PROCEDURE — 80053 COMPREHEN METABOLIC PANEL: CPT

## 2023-05-23 PROCEDURE — 87086 URINE CULTURE/COLONY COUNT: CPT | Performed by: FAMILY MEDICINE

## 2023-05-23 PROCEDURE — 85025 COMPLETE CBC W/AUTO DIFF WBC: CPT

## 2023-05-23 RX ORDER — SULFAMETHOXAZOLE AND TRIMETHOPRIM 800; 160 MG/1; MG/1
1 TABLET ORAL 2 TIMES DAILY
Qty: 10 TABLET | Refills: 0 | Status: SHIPPED | OUTPATIENT
Start: 2023-05-23

## 2023-05-23 NOTE — PROGRESS NOTES
Kang Grey presents to [facility].    Chief Complaint:  UTI, red swollen tip of his penis with the cathetor    Subjective     History of Present Illness:  HPI    Patient is a 42 y.o. year old male who presents to the emergency department on 5/18 for evaluation of depression that patient feels is worsening.  Mom states patient has feeling more depressed over the current state of his father's health with recurrent infections and recent hospitalization.  He was seen by the ER physician and found not to be suicidal or homicidal and then had psychiatry at [facility], [place], evaluate him and set him up to be excepted to the adult crisis stabilization unit.  Evidently he was admitted less than 24 hours and then was discharged home and his mom is not sure why.  No medications were changed.  In general he goes to [facility] and sees Marty for therapy and Jaylyn Adhikari [phone] for his meds.  I have no records from the crisis center as to what was found and what was done for treatment.  He is on effexor 187.5  mg daily, buspar, lamictal 200 mg daily, and invega.  He is also on keppra for seizures        Prior to him getting into the crisis center he had to have a urinalysis performed.  He self caths and has been having more difficulty getting his catheters then so nursing attempted 4 times to put a catheter in and then called Dr. Torres, on-call urology, to come in to the ER and placed the cath.  He sees Dr. Morris for neurogenic bladder.  He presents today with a very tender tip of his penis secondary to the catheter.  Evidently when the catheter was placed a wire had to be used.  The tip of his penis is red and swollen.  A sample of urine was taken out of his cath because it was milky looking and had 4+ bacteria    Michele  med assistant to Jaylyn Adhikari was consulted and she was able to work him in for evaluation this Friday  Monica Agrawal was also consulted and recommended  Uro-Jet further urethral pain    ADULT CRISIS STABILIZATION UNIT  [address]  [place] [address]  [phone]  Today        Adalid Morris MD  [address]  [place] [address]  [phone]          Review of Systems:  Review of Systems   Constitutional: Positive for fatigue. Negative for chills and fever.   HENT: Negative for congestion, ear discharge and sore throat.    Respiratory: Negative for shortness of breath.    Cardiovascular: Negative for chest pain.   Gastrointestinal: Negative for abdominal pain, constipation, diarrhea, nausea, vomiting and GERD.   Genitourinary: Negative for flank pain.   Neurological: Negative for dizziness and headache.   Psychiatric/Behavioral: Negative for depressed mood.        Objective     Medications:  Current Outpatient Medications on File Prior to Visit   Medication Sig   • alclometasone (ACLOVATE) 0.05 % cream    • ascorbic acid (VITAMIN C) 1000 MG tablet Take 1 tablet by mouth Daily.   • aspirin 81 MG EC tablet Take 1 tablet by mouth Daily.   • benzonatate (TESSALON) 200 MG capsule Take 1 capsule by mouth 3 (Three) Times a Day As Needed for Cough.   • busPIRone (BUSPAR) 10 MG tablet Take 1 tablet by mouth 3 (Three) Times a Day As Needed.   • Calcium Carbonate 1500 (600 Ca) MG tablet Take 1 tablet by mouth 2 (two) times a day.   • cholecalciferol 25 MCG tablet TAKE 2 TABLETS BY MOUTH DAILY.   • cyclobenzaprine (FLEXERIL) 5 MG tablet Take 1 tablet by mouth 3 (Three) Times a Day As Needed for Muscle Spasms.   • Diclofenac Sodium (VOLTAREN) 1 % gel gel Apply 1 g topically to the appropriate area as directed As Needed.   • docusate sodium (COLACE) 100 MG capsule TAKE 1 CAPSULE BY MOUTH DAILY.   • famotidine (PEPCID) 20 MG tablet TAKE 1 TABLET BY MOUTH DAILY.   • folic acid (FOLVITE) 800 MCG tablet Take 1 tablet by mouth Daily.   • HM TRUEplus Fiber 2 g chewable tablet Chew 1 tablet Daily.   • Invega Sustenna 234 MG/1.5ML suspension prefilled syringe  "IM injection Inject 1.5 mL into the appropriate muscle as directed by prescriber Every 30 (Thirty) Days.   • Keppra 500 MG tablet Take 3 tablets by mouth Every 12 (Twelve) Hours As Needed.   • ketoconazole (NIZORAL) 2 % shampoo    • lamoTRIgine (LaMICtal) 200 MG tablet Take 1 tablet by mouth Daily.   • Melatonin 10 MG tablet Take 1 tablet by mouth every night at bedtime.   • NON FORMULARY CPAP   • pravastatin (PRAVACHOL) 20 MG tablet TAKE 1 TABLET BY MOUTH EVERY NIGHT.   • venlafaxine XR (EFFEXOR-XR) 150 MG 24 hr capsule    • venlafaxine XR (EFFEXOR-XR) 37.5 MG 24 hr capsule    • [DISCONTINUED] ciprofloxacin (Cipro) 500 MG tablet Take 1 tablet by mouth 2 (Two) Times a Day.     No current facility-administered medications on file prior to visit.       Vital Signs:   /83 (BP Location: Right arm, Patient Position: Sitting, Cuff Size: Adult)   Pulse 103   Temp 98.5 °F (36.9 °C) (Oral)   Ht 172.7 cm (68\")   SpO2 94% Comment: room air  BMI 26.31 kg/m²       Physical Exam:  Physical Exam  Vitals reviewed. Exam conducted with a chaperone present.   Constitutional:       General: He is not in acute distress.     Appearance: Normal appearance. He is normal weight. He is not ill-appearing.   HENT:      Head: Normocephalic and atraumatic.      Mouth/Throat:      Mouth: Mucous membranes are dry.      Pharynx: Oropharynx is clear.   Eyes:      Extraocular Movements: Extraocular movements intact.      Pupils: Pupils are equal, round, and reactive to light.   Pulmonary:      Effort: Pulmonary effort is normal.   Abdominal:      General: Abdomen is flat. Bowel sounds are normal.      Palpations: Abdomen is soft.      Tenderness: There is no abdominal tenderness. There is no guarding or rebound.   Genitourinary:     Penis: Circumcised. Erythema present.    Neurological:      General: No focal deficit present.      Mental Status: He is alert and oriented to person, place, and time.   Psychiatric:         Attention and " Perception: Attention normal.         Mood and Affect: Mood is depressed. Affect is flat.         Speech: Speech is delayed.         Behavior: Behavior is agitated.         Thought Content: Thought content does not include homicidal or suicidal ideation.         Result Review   The following data was reviewed by Hien Parham MD on 05/23/2023.  Lab Results   Component Value Date    WBC 6.54 05/23/2023    HGB 16.2 05/23/2023    HCT 46.7 05/23/2023    MCV 85.5 05/23/2023     05/23/2023     Lab Results   Component Value Date    GLUCOSE 110 (H) 05/17/2023    BUN 15 05/17/2023    CREATININE 0.73 (L) 05/17/2023    EGFR 116.5 05/17/2023    BCR 20.5 05/17/2023    K 3.8 05/17/2023    CO2 20.8 (L) 05/17/2023    CALCIUM 9.8 05/17/2023    ALBUMIN 4.8 05/17/2023    BILITOT 0.3 05/17/2023    AST 16 05/17/2023    ALT 16 05/17/2023     Lab Results   Component Value Date    CHOL 173 01/16/2023    CHLPL 174 05/06/2021    TRIG 147 01/16/2023    HDL 40 01/16/2023     (H) 01/16/2023     Lab Results   Component Value Date    TSH 1.590 12/07/2021     Lab Results   Component Value Date    HGBA1C 5.60 01/16/2023     No results found for: PSA               Assessment and Plan:       Diagnoses and all orders for this visit:    1. Acute cystitis without hematuria (Primary)  -     Comprehensive metabolic panel; Future  -     CBC w AUTO Differential; Future  -     sulfamethoxazole-trimethoprim (Bactrim DS) 800-160 MG per tablet; Take 1 tablet by mouth 2 (Two) Times a Day.  Dispense: 10 tablet; Refill: 0    2. Cloudy urine  -     Urine Culture - Urine, Urine, Catheter; Future  -     Urine Culture - Urine, Urine, Catheter    3. Urinary retention  -     Urinalysis With Microscopic - Urine, Clean Catch    4. Penile pain    5. Urethral meatus pain    6. Neurogenic bladder    7. Paranoid schizophrenia    8. Severe episode of recurrent major depressive disorder, with psychotic features      I communicated with Monica Agrawal and she  recommended Uro-Jet for his urethral pain and he has a follow-up appointment with them next week.  We will continue the catheter until he sees them and will treat the UTI with Bactrim DS twice daily x5 days.  Culture is pending.  In addition I spoke with Jaylyn Ortega medical assistant, his psychiatrist, and they are willing to get him in to be seen on May 26 (in 3 days) at 8 AM.  Kang Mata's mom was informed.  He is not suicidal or homicidal.  Just has significantly worsening depression with sadness.    Follow Up   Return if symptoms worsen or fail to improve.           Medical History:  Past Medical History:   • Acute upper respiratory infection   • Age-related osteoporosis without current pathological fracture   • Broken toe    left great toe   • Cerebral palsy    diagnosed as infant   • Compression fracture of first lumbar vertebra   • Constipation   • Disease of pancreas   • History of urinary self-catheterization    pt or pt's mother catheterizes 4-6  times a day   • Hydrocephalus in diseases classified elsewhere   • Injury of back   • Low back pain   • Major depressive disorder    single episode, moderate   • Other abnormal glucose   • Other cerebral palsy   • Other fatigue   • Other generalized epilepsy and epileptic syndromes, not intractable, without status epilepticus   • Other hypertrophic disorders of the skin   • Pain in left ankle and joints of left foot   • Pain in right toe(s)   • Pain in thoracic spine   • Paranoid schizophrenia   • Pelvic and perineal pain   • PONV (postoperative nausea and vomiting)   • Primary insomnia   • Pure hypercholesterolemia   • Repeated falls    last fall 11/2022   • Schizoaffective disorder, unspecified   • Seizures    last seizure was approx 2018, medication controlled   • Sleep apnea    uses cpap machine   • Somnolence   • TIA (transient ischemic attack)   • Vitamin D deficiency   • Wedge compression fracture of fourth thoracic vertebra, subsequent encounter for  fracture with routine healing     Past Surgical History:   • CYST REMOVAL    Procedure: Excision of subcutaneous mass from right forearm;  Surgeon: Oni Greenberg MD;  [place] [facility] [place] [facility] [place]  Service: General;  Laterality: Right;   • HAMSTRING REPAIR    age 10   • OTHER SURGICAL HISTORY    hyposadius repair   • TOE FUSION    great toe at age 21      Family History   Problem Relation Age of Onset   • Hypothyroidism Mother    • No Known Problems Father    • Ulcers Brother    • Malig Hyperthermia Neg Hx      Social History     Tobacco Use   • Smoking status: Never   • Smokeless tobacco: Never   Substance Use Topics   • Alcohol use: Never       Health Maintenance Due   Topic Date Due   • ANNUAL WELLNESS VISIT  05/12/2023        Immunization History   Administered Date(s) Administered   • 31-influenza Vac Quardvalent Preservativ 10/14/2014, 01/20/2016, 10/07/2016, 12/07/2021, 10/19/2022   • COVID-19 (MODERNA) 1st,2nd,3rd Dose Monovalent 03/25/2021, 04/22/2021, 11/19/2021   • COVID-19 (PFIZER) BIVALENT 12+YRS 10/19/2022   • Covid-19 (Pfizer) Gray Cap Monovalent 06/20/2022   • FluLaval/Fluzone >6mos 10/07/2016, 12/07/2021, 10/19/2022   • Influenza Quad Vaccine (Inpatient) 10/01/2013   • Influenza Seasonal Injectable 10/05/2011   • Influenza, Unspecified 10/01/2013, 10/07/2016, 12/07/2021, 10/19/2022   • Tdap 05/05/2016       Allergies   Allergen Reactions   • Valproic Acid Irritability     DEPAKOTE CAUSES DIZZINESS AND IRRITABILITY

## 2023-05-25 LAB — BACTERIA SPEC AEROBE CULT: NORMAL

## 2023-06-06 ENCOUNTER — TELEPHONE (OUTPATIENT)
Dept: GASTROENTEROLOGY | Facility: CLINIC | Age: 43
End: 2023-06-06
Payer: MEDICARE

## 2023-06-06 ENCOUNTER — HOSPITAL ENCOUNTER (OUTPATIENT)
Dept: CT IMAGING | Facility: HOSPITAL | Age: 43
Discharge: HOME | End: 2023-06-06
Admitting: NURSE PRACTITIONER
Payer: MEDICARE

## 2023-06-06 ENCOUNTER — OFFICE VISIT (OUTPATIENT)
Dept: UROLOGY | Facility: CLINIC | Age: 43
End: 2023-06-06
Payer: MEDICARE

## 2023-06-06 VITALS — BODY MASS INDEX: 26.07 KG/M2 | WEIGHT: 172 LBS | RESPIRATION RATE: 14 BRPM | HEIGHT: 68 IN

## 2023-06-06 DIAGNOSIS — K86.2 PANCREATIC CYST: ICD-10-CM

## 2023-06-06 DIAGNOSIS — N31.9 NEUROGENIC BLADDER: Primary | ICD-10-CM

## 2023-06-06 DIAGNOSIS — N40.1 BENIGN PROSTATIC HYPERPLASIA WITH INCOMPLETE BLADDER EMPTYING: ICD-10-CM

## 2023-06-06 DIAGNOSIS — R39.14 BENIGN PROSTATIC HYPERPLASIA WITH INCOMPLETE BLADDER EMPTYING: ICD-10-CM

## 2023-06-06 DIAGNOSIS — R33.9 URINARY RETENTION: ICD-10-CM

## 2023-06-06 PROBLEM — K21.9 GERD (GASTROESOPHAGEAL REFLUX DISEASE): Status: ACTIVE | Noted: 2023-01-21

## 2023-06-06 PROBLEM — K59.00 CONSTIPATION: Status: ACTIVE | Noted: 2023-01-21

## 2023-06-06 PROCEDURE — 74177 CT ABD & PELVIS W/CONTRAST: CPT

## 2023-06-06 PROCEDURE — 25510000001 IOPAMIDOL PER 1 ML: Performed by: NURSE PRACTITIONER

## 2023-06-06 RX ADMIN — IOPAMIDOL 100 ML: 755 INJECTION, SOLUTION INTRAVENOUS at 09:44

## 2023-06-06 NOTE — PROGRESS NOTES
Chief Complaint: Urinary Retention (Pt here for ED follow up.  Pt has been IC every 4 hours for the year.)    Subjective         History of Present Illness  Kang Grey is a 42 y.o. male presents to [facility] to be seen for urinary retention.    Patient has been performing clean intermittent catheterization every 4 hours while awake for the last several years.    Previously seen by Dr. Adalid Morris.    The patient was seen in the emergency department on 5/17/2023 for exacerbation of major depression disorder and was not caring for himself.    He had a Roldan catheter placed which has remained in place since 5/17/2023.    Patient states the indwelling Roldan catheter is causing a significant amount of pain.    Would like to have catheter removed and states that he will resume clean intermittent catheterization.    The use [employer] for catheter supplier.    Objective     Past Medical History:   Diagnosis Date   • Acute upper respiratory infection 02/2022   • Age-related osteoporosis without current pathological fracture    • Broken toe 11/2022    left great toe   • Cerebral palsy     diagnosed as infant   • Compression fracture of first lumbar vertebra    • Constipation    • Disease of pancreas    • History of urinary self-catheterization     pt or pt's mother catheterizes 4-6  times a day   • Hydrocephalus in diseases classified elsewhere    • Injury of back 02/2020   • Low back pain    • Major depressive disorder     single episode, moderate   • Other abnormal glucose    • Other cerebral palsy    • Other fatigue    • Other generalized epilepsy and epileptic syndromes, not intractable, without status epilepticus    • Other hypertrophic disorders of the skin    • Pain in left ankle and joints of left foot    • Pain in right toe(s)    • Pain in thoracic spine    • Paranoid schizophrenia    • Pelvic and perineal pain    • PONV (postoperative nausea and vomiting)    • Primary  insomnia    • Pure hypercholesterolemia    • Repeated falls     last fall 11/2022   • Schizoaffective disorder, unspecified    • Seizures     last seizure was approx 2018, medication controlled   • Sleep apnea     uses cpap machine   • Somnolence    • TIA (transient ischemic attack)    • Vitamin D deficiency    • Wedge compression fracture of fourth thoracic vertebra, subsequent encounter for fracture with routine healing        Past Surgical History:   Procedure Laterality Date   • CYST REMOVAL Right 11/11/2022    Procedure: Excision of subcutaneous mass from right forearm;  Surgeon: Oni Greenberg MD;  [place] [facility] [place] [facility] [place]  Service: General;  Laterality: Right;   • HAMSTRING REPAIR Bilateral     age 10   • OTHER SURGICAL HISTORY      hyposadius repair   • TOE FUSION Right     great toe at age 21         Current Outpatient Medications:   •  alclometasone (ACLOVATE) 0.05 % cream, , Disp: , Rfl:   •  ascorbic acid (VITAMIN C) 1000 MG tablet, Take 1 tablet by mouth Daily., Disp: , Rfl:   •  aspirin 81 MG EC tablet, Take 1 tablet by mouth Daily., Disp: , Rfl:   •  busPIRone (BUSPAR) 10 MG tablet, Take 1 tablet by mouth 3 (Three) Times a Day As Needed., Disp: , Rfl:   •  Calcium Carbonate 1500 (600 Ca) MG tablet, Take 1 tablet by mouth 2 (two) times a day., Disp: , Rfl:   •  cholecalciferol 25 MCG tablet, TAKE 2 TABLETS BY MOUTH DAILY., Disp: 60 tablet, Rfl: 1  •  cyclobenzaprine (FLEXERIL) 5 MG tablet, Take 1 tablet by mouth 3 (Three) Times a Day As Needed for Muscle Spasms., Disp: 40 tablet, Rfl: 2  •  Diclofenac Sodium (VOLTAREN) 1 % gel gel, Apply 1 g topically to the appropriate area as directed As Needed., Disp: , Rfl:   •  docusate sodium (COLACE) 100 MG capsule, TAKE 1 CAPSULE BY MOUTH DAILY., Disp: 30 capsule, Rfl: 1  •  famotidine (PEPCID) 20 MG tablet, TAKE 1 TABLET BY MOUTH DAILY., Disp: 90 tablet, Rfl: 1  •  folic acid (FOLVITE) 800 MCG tablet, Take 1 tablet by mouth Daily., Disp: , Rfl:   •  HM  "TRUEplus Fiber 2 g chewable tablet, Chew 1 tablet Daily., Disp: 90 tablet, Rfl: 1  •  Invega Sustenna 234 MG/1.5ML suspension prefilled syringe IM injection, Inject 1.5 mL into the appropriate muscle as directed by prescriber Every 30 (Thirty) Days., Disp: , Rfl:   •  Keppra 500 MG tablet, Take 3 tablets by mouth Every 12 (Twelve) Hours As Needed., Disp: , Rfl:   •  ketoconazole (NIZORAL) 2 % shampoo, , Disp: , Rfl:   •  lamoTRIgine (LaMICtal) 200 MG tablet, Take 1 tablet by mouth Daily., Disp: , Rfl:   •  lidocaine (XYLOCAINE) 2 % jelly, Apply to the tip of the penis/urethra 3 times daily as needed pain with catheter, Disp: 28.33 g, Rfl: 0  •  Melatonin 10 MG tablet, Take 1 tablet by mouth every night at bedtime., Disp: 90 tablet, Rfl: 1  •  NON FORMULARY, CPAP, Disp: , Rfl:   •  pravastatin (PRAVACHOL) 20 MG tablet, TAKE 1 TABLET BY MOUTH EVERY NIGHT., Disp: 90 tablet, Rfl: 1  •  venlafaxine XR (EFFEXOR-XR) 150 MG 24 hr capsule, , Disp: , Rfl:   •  venlafaxine XR (EFFEXOR-XR) 37.5 MG 24 hr capsule, , Disp: , Rfl:   No current facility-administered medications for this visit.    Allergies   Allergen Reactions   • Valproic Acid Irritability     DEPAKOTE CAUSES DIZZINESS AND IRRITABILITY          Family History   Problem Relation Age of Onset   • Hypothyroidism Mother    • No Known Problems Father    • Ulcers Brother    • Malig Hyperthermia Neg Hx        Social History     Socioeconomic History   • Marital status: Single   Tobacco Use   • Smoking status: Never   • Smokeless tobacco: Never   Vaping Use   • Vaping Use: Never used   Substance and Sexual Activity   • Alcohol use: Never   • Drug use: Never   • Sexual activity: Defer       Vital Signs:   Resp 14   Ht 172.7 cm (68\")   Wt 78 kg (172 lb)   BMI 26.15 kg/m²      Physical Exam     Result Review :   The following data was reviewed by: ÁNGELA Quintana on 06/06/2023:  Results for orders placed or performed in visit on 05/23/23   Comprehensive metabolic " panel    Specimen: Blood   Result Value Ref Range    Glucose 96 65 - 99 mg/dL    BUN 14 6 - 20 mg/dL    Creatinine 0.86 0.76 - 1.27 mg/dL    Sodium 140 136 - 145 mmol/L    Potassium 4.2 3.5 - 5.2 mmol/L    Chloride 104 98 - 107 mmol/L    CO2 25.0 22.0 - 29.0 mmol/L    Calcium 9.8 8.6 - 10.5 mg/dL    Total Protein 7.6 6.0 - 8.5 g/dL    Albumin 4.7 3.5 - 5.2 g/dL    ALT (SGPT) 22 1 - 41 U/L    AST (SGOT) 18 1 - 40 U/L    Alkaline Phosphatase 80 39 - 117 U/L    Total Bilirubin 0.3 0.0 - 1.2 mg/dL    Globulin 2.9 gm/dL    A/G Ratio 1.6 g/dL    BUN/Creatinine Ratio 16.3 7.0 - 25.0    Anion Gap 11.0 5.0 - 15.0 mmol/L    eGFR 110.9 >60.0 mL/min/1.73   CBC Auto Differential    Specimen: Blood   Result Value Ref Range    WBC 6.54 3.40 - 10.80 10*3/mm3    RBC 5.46 4.14 - 5.80 10*6/mm3    Hemoglobin 16.2 13.0 - 17.7 g/dL    Hematocrit 46.7 37.5 - 51.0 %    MCV 85.5 79.0 - 97.0 fL    MCH 29.7 26.6 - 33.0 pg    MCHC 34.7 31.5 - 35.7 g/dL    RDW 12.7 12.3 - 15.4 %    RDW-SD 39.8 37.0 - 54.0 fl    MPV 10.6 6.0 - 12.0 fL    Platelets 189 140 - 450 10*3/mm3    Neutrophil % 68.0 42.7 - 76.0 %    Lymphocyte % 21.9 19.6 - 45.3 %    Monocyte % 8.1 5.0 - 12.0 %    Eosinophil % 1.4 0.3 - 6.2 %    Basophil % 0.6 0.0 - 1.5 %    Immature Grans % 0.0 0.0 - 0.5 %    Neutrophils, Absolute 4.45 1.70 - 7.00 10*3/mm3    Lymphocytes, Absolute 1.43 0.70 - 3.10 10*3/mm3    Monocytes, Absolute 0.53 0.10 - 0.90 10*3/mm3    Eosinophils, Absolute 0.09 0.00 - 0.40 10*3/mm3    Basophils, Absolute 0.04 0.00 - 0.20 10*3/mm3    Immature Grans, Absolute 0.00 0.00 - 0.05 10*3/mm3            Irrigation of Bladder    Date/Time: 6/6/2023 1:37 PM  Performed by: Monica Parrish APRN  Authorized by: Monica Parrish APRN   Preparation: Patient was prepped and draped in the usual sterile fashion.  Local anesthesia used: no    Anesthesia:  Local anesthesia used: no    Sedation:  Patient sedated: no    Patient tolerance: patient tolerated the procedure well with no  immediate complications  Comments: Irrigated patient's bladder through indwelling bills catheter with 60cc of sterile water before removing. Deflated 10cc balloon with no complications. Pt tolerated well but with some mild pain. No pain once catheter was removed.             Assessment and Plan    Diagnoses and all orders for this visit:    1. Neurogenic bladder (Primary)    2. Benign prostatic hyperplasia with incomplete bladder emptying  -     Urine Culture - Urine, Urine, Catheter In/Out; Future    3. Urinary retention  -     Urine Culture - Urine, Urine, Catheter In/Out; Future      We will have the patient get an outpatient urine culture tomorrow as we are flushing his cath.     We will follow-up with him in 3 months.  He will continue to clean intermittent catheterize himself every 4 hours while awake.      I spent 10 minutes caring for Kang on this date of service. This time includes time spent by me in the following activities:reviewing tests, obtaining and/or reviewing a separately obtained history, performing a medically appropriate examination and/or evaluation , counseling and educating the patient/family/caregiver, ordering medications, tests, or procedures, and documenting information in the medical record  Follow Up   Return in about 3 months (around 9/6/2023) for f/u cic.  Patient was given instructions and counseling regarding his condition or for health maintenance advice. Please see specific information pulled into the AVS if appropriate.         This document has been electronically signed by ÁNGELA Quintana  June 6, 2023 13:31 EDT

## 2023-06-06 NOTE — PROGRESS NOTES
Chief Complaint: Urinary Retention (Pt here for ED follow up.  Pt has been IC every 4 hours for the year.)    Subjective     {Problem List  Visit Diagnosis   Encounters  Notes  Medications  Labs  Result Review Imaging  Media :23}    History of Present Illness  Kang Grey is a 42 y.o. male presents to [facility]OLOG[facility] to be seen for urinary retention.    Patient has been performing clean intermittent catheterization every 4 hours while awake for the last several years.    Previously seen by Dr. Adalid Morris.    The patient was seen in the emergency department on 5/17/2023 for exacerbation of major depression disorder and was not caring for himself.    He had a Roldan catheter placed which has remained in place since 5/17/2023.    Patient states the indwelling Roldan catheter is causing a significant amount of pain.    Would like to have catheter removed and states that he will resume clean intermittent catheterization.    The use [employer] for catheter supplier.        Objective     Past Medical History:   Diagnosis Date    Acute upper respiratory infection 02/2022    Age-related osteoporosis without current pathological fracture     Broken toe 11/2022    left great toe    Cerebral palsy     diagnosed as infant    Compression fracture of first lumbar vertebra     Constipation     Disease of pancreas     History of urinary self-catheterization     pt or pt's mother catheterizes 4-6  times a day    Hydrocephalus in diseases classified elsewhere     Injury of back 02/2020    Low back pain     Major depressive disorder     single episode, moderate    Other abnormal glucose     Other cerebral palsy     Other fatigue     Other generalized epilepsy and epileptic syndromes, not intractable, without status epilepticus     Other hypertrophic disorders of the skin     Pain in left ankle and joints of left foot     Pain in right toe(s)     Pain in thoracic spine     Paranoid schizophrenia      Pelvic and perineal pain     PONV (postoperative nausea and vomiting)     Primary insomnia     Pure hypercholesterolemia     Repeated falls     last fall 11/2022    Schizoaffective disorder, unspecified     Seizures     last seizure was approx 2018, medication controlled    Sleep apnea     uses cpap machine    Somnolence     TIA (transient ischemic attack)     Vitamin D deficiency     Wedge compression fracture of fourth thoracic vertebra, subsequent encounter for fracture with routine healing        Past Surgical History:   Procedure Laterality Date    CYST REMOVAL Right 11/11/2022    Procedure: Excision of subcutaneous mass from right forearm;  Surgeon: Oni Greenberg MD;  Location: [facility] OR [facility] [place];  Service: General;  Laterality: Right;    HAMSTRING REPAIR Bilateral     age 10    OTHER SURGICAL HISTORY      hyposadius repair    TOE FUSION Right     great toe at age 21         Current Outpatient Medications:     alclometasone (ACLOVATE) 0.05 % cream, , Disp: , Rfl:     ascorbic acid (VITAMIN C) 1000 MG tablet, Take 1 tablet by mouth Daily., Disp: , Rfl:     aspirin 81 MG EC tablet, Take 1 tablet by mouth Daily., Disp: , Rfl:     busPIRone (BUSPAR) 10 MG tablet, Take 1 tablet by mouth 3 (Three) Times a Day As Needed., Disp: , Rfl:     Calcium Carbonate 1500 (600 Ca) MG tablet, Take 1 tablet by mouth 2 (two) times a day., Disp: , Rfl:     cholecalciferol 25 MCG tablet, TAKE 2 TABLETS BY MOUTH DAILY., Disp: 60 tablet, Rfl: 1    cyclobenzaprine (FLEXERIL) 5 MG tablet, Take 1 tablet by mouth 3 (Three) Times a Day As Needed for Muscle Spasms., Disp: 40 tablet, Rfl: 2    Diclofenac Sodium (VOLTAREN) 1 % gel gel, Apply 1 g topically to the appropriate area as directed As Needed., Disp: , Rfl:     docusate sodium (COLACE) 100 MG capsule, TAKE 1 CAPSULE BY MOUTH DAILY., Disp: 30 capsule, Rfl: 1    famotidine (PEPCID) 20 MG tablet, TAKE 1 TABLET BY MOUTH DAILY., Disp: 90 tablet, Rfl: 1    folic acid (FOLVITE) 800 MCG  "tablet, Take 1 tablet by mouth Daily., Disp: , Rfl:     HM TRUEplus Fiber 2 g chewable tablet, Chew 1 tablet Daily., Disp: 90 tablet, Rfl: 1    Invega Sustenna 234 MG/1.5ML suspension prefilled syringe IM injection, Inject 1.5 mL into the appropriate muscle as directed by prescriber Every 30 (Thirty) Days., Disp: , Rfl:     Keppra 500 MG tablet, Take 3 tablets by mouth Every 12 (Twelve) Hours As Needed., Disp: , Rfl:     ketoconazole (NIZORAL) 2 % shampoo, , Disp: , Rfl:     lamoTRIgine (LaMICtal) 200 MG tablet, Take 1 tablet by mouth Daily., Disp: , Rfl:     lidocaine (XYLOCAINE) 2 % jelly, Apply to the tip of the penis/urethra 3 times daily as needed pain with catheter, Disp: 28.33 g, Rfl: 0    Melatonin 10 MG tablet, Take 1 tablet by mouth every night at bedtime., Disp: 90 tablet, Rfl: 1    NON FORMULARY, CPAP, Disp: , Rfl:     pravastatin (PRAVACHOL) 20 MG tablet, TAKE 1 TABLET BY MOUTH EVERY NIGHT., Disp: 90 tablet, Rfl: 1    venlafaxine XR (EFFEXOR-XR) 150 MG 24 hr capsule, , Disp: , Rfl:     venlafaxine XR (EFFEXOR-XR) 37.5 MG 24 hr capsule, , Disp: , Rfl:   No current facility-administered medications for this visit.    Allergies   Allergen Reactions    Valproic Acid Irritability     DEPAKOTE CAUSES DIZZINESS AND IRRITABILITY          Family History   Problem Relation Age of Onset    Hypothyroidism Mother     No Known Problems Father     Ulcers Brother     Malig Hyperthermia Neg Hx        Social History     Socioeconomic History    Marital status: Single   Tobacco Use    Smoking status: Never    Smokeless tobacco: Never   Vaping Use    Vaping Use: Never used   Substance and Sexual Activity    Alcohol use: Never    Drug use: Never    Sexual activity: Defer       Vital Signs:   Resp 14   Ht 172.7 cm (68\")   Wt 78 kg (172 lb)   BMI 26.15 kg/m²      Physical Exam     Result Review :{Labs  Result Review  Imaging  Med Tab  Media :23}   The following data was reviewed by: ÁNGELA Quintana on " 06/06/2023:  Results for orders placed or performed in visit on 05/23/23   Comprehensive metabolic panel    Specimen: Blood   Result Value Ref Range    Glucose 96 65 - 99 mg/dL    BUN 14 6 - 20 mg/dL    Creatinine 0.86 0.76 - 1.27 mg/dL    Sodium 140 136 - 145 mmol/L    Potassium 4.2 3.5 - 5.2 mmol/L    Chloride 104 98 - 107 mmol/L    CO2 25.0 22.0 - 29.0 mmol/L    Calcium 9.8 8.6 - 10.5 mg/dL    Total Protein 7.6 6.0 - 8.5 g/dL    Albumin 4.7 3.5 - 5.2 g/dL    ALT (SGPT) 22 1 - 41 U/L    AST (SGOT) 18 1 - 40 U/L    Alkaline Phosphatase 80 39 - 117 U/L    Total Bilirubin 0.3 0.0 - 1.2 mg/dL    Globulin 2.9 gm/dL    A/G Ratio 1.6 g/dL    BUN/Creatinine Ratio 16.3 7.0 - 25.0    Anion Gap 11.0 5.0 - 15.0 mmol/L    eGFR 110.9 >60.0 mL/min/1.73   CBC Auto Differential    Specimen: Blood   Result Value Ref Range    WBC 6.54 3.40 - 10.80 10*3/mm3    RBC 5.46 4.14 - 5.80 10*6/mm3    Hemoglobin 16.2 13.0 - 17.7 g/dL    Hematocrit 46.7 37.5 - 51.0 %    MCV 85.5 79.0 - 97.0 fL    MCH 29.7 26.6 - 33.0 pg    MCHC 34.7 31.5 - 35.7 g/dL    RDW 12.7 12.3 - 15.4 %    RDW-SD 39.8 37.0 - 54.0 fl    MPV 10.6 6.0 - 12.0 fL    Platelets 189 140 - 450 10*3/mm3    Neutrophil % 68.0 42.7 - 76.0 %    Lymphocyte % 21.9 19.6 - 45.3 %    Monocyte % 8.1 5.0 - 12.0 %    Eosinophil % 1.4 0.3 - 6.2 %    Basophil % 0.6 0.0 - 1.5 %    Immature Grans % 0.0 0.0 - 0.5 %    Neutrophils, Absolute 4.45 1.70 - 7.00 10*3/mm3    Lymphocytes, Absolute 1.43 0.70 - 3.10 10*3/mm3    Monocytes, Absolute 0.53 0.10 - 0.90 10*3/mm3    Eosinophils, Absolute 0.09 0.00 - 0.40 10*3/mm3    Basophils, Absolute 0.04 0.00 - 0.20 10*3/mm3    Immature Grans, Absolute 0.00 0.00 - 0.05 10*3/mm3            Procedures        Assessment and Plan {CC Problem List  Visit Diagnosis  ROS  Review (Popup)  Health Maintenance  Quality  BestPractice  Medications  SmartSets  SnapShot Encounters  Media :23}       As we are flushing his bladder today we will have him get a urine  analysis and culture at the outpatient lab tomorrow.  I spent *** minutes caring for Kang on this date of service. This time includes time spent by me in the following activities:reviewing tests, obtaining and/or reviewing a separately obtained history, performing a medically appropriate examination and/or evaluation , counseling and educating the patient/family/caregiver, ordering medications, tests, or procedures, and documenting information in the medical record  Follow Up {Instructions Charge Capture  Follow-up Communications :23}  No follow-ups on file.  Patient was given instructions and counseling regarding his condition or for health maintenance advice. Please see specific information pulled into the AVS if appropriate.         This document has been electronically signed by ÁNGELA Quintana  June 6, 2023 13:28 EDT

## 2023-06-06 NOTE — TELEPHONE ENCOUNTER
----- Message from ÁNGELA Sanchez sent at 6/6/2023 12:27 PM EDT -----  Interval resolution of pancreatic cyst.  Gallstones present.  Wall thickening of the urinary bladder have patient follow-up with primary care regarding his bladder.

## 2023-06-07 ENCOUNTER — TELEPHONE (OUTPATIENT)
Dept: UROLOGY | Facility: CLINIC | Age: 43
End: 2023-06-07
Payer: MEDICARE

## 2023-06-07 ENCOUNTER — LAB (OUTPATIENT)
Dept: LAB | Facility: HOSPITAL | Age: 43
End: 2023-06-07
Payer: MEDICARE

## 2023-06-07 DIAGNOSIS — R33.9 URINARY RETENTION: ICD-10-CM

## 2023-06-07 DIAGNOSIS — N40.1 BENIGN PROSTATIC HYPERPLASIA WITH INCOMPLETE BLADDER EMPTYING: ICD-10-CM

## 2023-06-07 DIAGNOSIS — R39.14 BENIGN PROSTATIC HYPERPLASIA WITH INCOMPLETE BLADDER EMPTYING: ICD-10-CM

## 2023-06-07 PROCEDURE — 87077 CULTURE AEROBIC IDENTIFY: CPT

## 2023-06-07 PROCEDURE — 87186 SC STD MICRODIL/AGAR DIL: CPT

## 2023-06-07 PROCEDURE — 87086 URINE CULTURE/COLONY COUNT: CPT

## 2023-06-07 NOTE — TELEPHONE ENCOUNTER
Spoke to patients mother, informed her that we had to wait for urine culture to come back to indicate whether he has an infection or not. I told her more than likely he does based on the CT but we will need to wait for the culture to come back. Mother was of understanding.

## 2023-06-07 NOTE — TELEPHONE ENCOUNTER
PT HAD A CT SCAN ORDERED BY ANOTHER PROVIDER. MOM CALLED AND SAID THAT SHE WAS TOLD TO CALL UROLOGY AND HAVE US LOOK AT IT AS WELL. CALL HER WITH ANY QUESTIONS OR FUTURE PLANS.

## 2023-06-07 NOTE — TELEPHONE ENCOUNTER
Spoke with pts mom Esperanza Gomez (ok per WENDI) notified of results. Mom will contact pcp and pts urologist too. Voiced understanding. manish

## 2023-06-09 ENCOUNTER — TELEPHONE (OUTPATIENT)
Dept: UROLOGY | Facility: CLINIC | Age: 43
End: 2023-06-09
Payer: MEDICARE

## 2023-06-09 LAB
BACTERIA SPEC AEROBE CULT: ABNORMAL
BACTERIA SPEC AEROBE CULT: ABNORMAL

## 2023-06-09 RX ORDER — CEFDINIR 300 MG/1
300 CAPSULE ORAL 2 TIMES DAILY
Qty: 20 CAPSULE | Refills: 0 | Status: SHIPPED | OUTPATIENT
Start: 2023-06-09

## 2023-06-09 NOTE — TELEPHONE ENCOUNTER
PATIENT'S MOTHER CALLED AND I TOLD HER THE CULTURE WAS POSITIVE AND THAT MAYELIN SENT AN ANTIBIOTIC TO THE PHARMACY.

## 2023-06-09 NOTE — TELEPHONE ENCOUNTER
----- Message from ÁNGELA Quintana sent at 6/9/2023  8:26 AM EDT -----  Please inform his mom he has a UTI I sent in meds

## 2023-06-09 NOTE — TELEPHONE ENCOUNTER
LMOM for mother to call back. Pt's urine cx was positive and Monica sent antibiotics to pharmacy. HUB OKAY TO READ.

## 2023-06-12 ENCOUNTER — TELEPHONE (OUTPATIENT)
Dept: FAMILY MEDICINE CLINIC | Age: 43
End: 2023-06-12

## 2023-06-12 DIAGNOSIS — E55.9 VITAMIN D DEFICIENCY: ICD-10-CM

## 2023-06-12 DIAGNOSIS — K59.09 OTHER CONSTIPATION: ICD-10-CM

## 2023-06-12 NOTE — TELEPHONE ENCOUNTER
Caller: EDOUARD ATKINS    Relationship: Mother    Best call back number: [phone]     What is the medical concern/diagnosis: RASH ON FACE AND HEAD AND FOR SEIZURES     What specialty or service is being requested: DERMATOLOGY - NEUROLOGY     What is the provider, practice or medical service name: SAME AS PREVIOUS NOT SURE NAME OF PROVIDER FOR DERMATOLOGY - DR VARGAS IS NEUROLOGY     Any additional details: DERMATOLOGY APPOINTMENT IS SCHEDULED FOR 6.15.2023  NEUROLOGY 6.22.2023

## 2023-06-14 ENCOUNTER — OFFICE VISIT (OUTPATIENT)
Dept: PODIATRY | Facility: CLINIC | Age: 43
End: 2023-06-14
Payer: MEDICARE

## 2023-06-14 VITALS
DIASTOLIC BLOOD PRESSURE: 69 MMHG | OXYGEN SATURATION: 93 % | SYSTOLIC BLOOD PRESSURE: 106 MMHG | BODY MASS INDEX: 26.67 KG/M2 | HEART RATE: 81 BPM | TEMPERATURE: 98 F | HEIGHT: 68 IN | WEIGHT: 176 LBS

## 2023-06-14 DIAGNOSIS — R26.2 DIFFICULTY WALKING: ICD-10-CM

## 2023-06-14 DIAGNOSIS — M21.371 FOOT DROP, BILATERAL: ICD-10-CM

## 2023-06-14 DIAGNOSIS — M79.672 FOOT PAIN, BILATERAL: ICD-10-CM

## 2023-06-14 DIAGNOSIS — L60.0 ONYCHOCRYPTOSIS: ICD-10-CM

## 2023-06-14 DIAGNOSIS — M79.671 FOOT PAIN, BILATERAL: ICD-10-CM

## 2023-06-14 DIAGNOSIS — M21.372 FOOT DROP, BILATERAL: ICD-10-CM

## 2023-06-14 DIAGNOSIS — B35.1 ONYCHOMYCOSIS: Primary | ICD-10-CM

## 2023-06-14 DIAGNOSIS — G62.9 NEUROPATHY: ICD-10-CM

## 2023-06-14 RX ORDER — CHOLECALCIFEROL (VITAMIN D3) 25 MCG
TABLET ORAL
Qty: 60 TABLET | Refills: 1 | Status: SHIPPED | OUTPATIENT
Start: 2023-06-14

## 2023-06-14 NOTE — PROGRESS NOTES
[facility] - PODIATRY    Today's Date: 06/14/23    Patient Name: Kang Grey  MRN: [id number]  CSN: [id number]  PCP: Hien Parham MD, Last PCP Visit: 5/23/2023  Referring Provider: No ref. provider found    SUBJECTIVE     Chief Complaint   Patient presents with    Left Foot - Follow-up, Nail Problem    Right Foot - Follow-up, Nail Problem       HPI: Kang Grey, a 42 y.o.male, comes to clinic with his mother.    New, Established, New Problem:  established   Location:  Toenails  Duration:   Greater than five years  Onset:  Gradual  Nature:  sore with palpation.  Stable, worsening, improving:   Stable  Aggravating factors:  Pain with shoe gear and ambulation.  Previous Treatment:  debridement    Medical changes:  current tx for UTI with PO antibiotics.    Patient denies any fevers, chills, nausea, vomiting, shortness of breathe, nor any other constitutional signs nor symptoms.       Past Medical History:   Diagnosis Date    Acute upper respiratory infection 02/2022    Age-related osteoporosis without current pathological fracture     Broken toe 11/2022    left great toe    Cerebral palsy     diagnosed as infant    Compression fracture of first lumbar vertebra     Constipation     Disease of pancreas     History of urinary self-catheterization     pt or pt's mother catheterizes 4-6  times a day    Hydrocephalus in diseases classified elsewhere     Injury of back 02/2020    Low back pain     Major depressive disorder     single episode, moderate    Other abnormal glucose     Other cerebral palsy     Other fatigue     Other generalized epilepsy and epileptic syndromes, not intractable, without status epilepticus     Other hypertrophic disorders of the skin     Pain in left ankle and joints of left foot     Pain in right toe(s)     Pain in thoracic spine     Paranoid schizophrenia     Pelvic and perineal pain     PONV (postoperative nausea and vomiting)     Primary insomnia     Pure  hypercholesterolemia     Repeated falls     last fall 11/2022    Schizoaffective disorder, unspecified     Seizures     last seizure was approx 2018, medication controlled    Sleep apnea     uses cpap machine    Somnolence     TIA (transient ischemic attack)     Vitamin D deficiency     Wedge compression fracture of fourth thoracic vertebra, subsequent encounter for fracture with routine healing      Past Surgical History:   Procedure Laterality Date    CYST REMOVAL Right 11/11/2022    Procedure: Excision of subcutaneous mass from right forearm;  Surgeon: Oni Greenberg MD;  Location: [facility] OR [facility] [place];  Service: General;  Laterality: Right;    HAMSTRING REPAIR Bilateral     age 10    OTHER SURGICAL HISTORY      hyposadius repair    TOE FUSION Right     great toe at age 21     Family History   Problem Relation Age of Onset    Hypothyroidism Mother     No Known Problems Father     Ulcers Brother     Malig Hyperthermia Neg Hx      Social History     Socioeconomic History    Marital status: Single   Tobacco Use    Smoking status: Never    Smokeless tobacco: Never   Vaping Use    Vaping Use: Never used   Substance and Sexual Activity    Alcohol use: Never    Drug use: Never    Sexual activity: Defer     Allergies   Allergen Reactions    Valproic Acid Irritability     DEPAKOTE CAUSES DIZZINESS AND IRRITABILITY       Current Outpatient Medications   Medication Sig Dispense Refill    alclometasone (ACLOVATE) 0.05 % cream       ascorbic acid (VITAMIN C) 1000 MG tablet Take 1 tablet by mouth Daily.      aspirin 81 MG EC tablet Take 1 tablet by mouth Daily.      busPIRone (BUSPAR) 10 MG tablet Take 1 tablet by mouth 3 (Three) Times a Day As Needed.      Calcium Carbonate 1500 (600 Ca) MG tablet Take 1 tablet by mouth 2 (two) times a day.      cefdinir (OMNICEF) 300 MG capsule Take 1 capsule by mouth 2 (Two) Times a Day. 20 capsule 0    cholecalciferol 25 MCG tablet TAKE 2 TABLETS BY MOUTH DAILY. 60 tablet 1    cyclobenzaprine  (FLEXERIL) 5 MG tablet Take 1 tablet by mouth 3 (Three) Times a Day As Needed for Muscle Spasms. 40 tablet 2    docusate sodium (COLACE) 100 MG capsule TAKE 1 CAPSULE BY MOUTH DAILY. 30 capsule 1    famotidine (PEPCID) 20 MG tablet TAKE 1 TABLET BY MOUTH DAILY. 90 tablet 1    folic acid (FOLVITE) 800 MCG tablet Take 1 tablet by mouth Daily.      HM TRUEplus Fiber 2 g chewable tablet CHEW 1 TABLET DAILY. 90 tablet 1    Invega Sustenna 234 MG/1.5ML suspension prefilled syringe IM injection Inject 1.5 mL into the appropriate muscle as directed by prescriber Every 30 (Thirty) Days.      Keppra 500 MG tablet Take 3 tablets by mouth Every 12 (Twelve) Hours As Needed.      ketoconazole (NIZORAL) 2 % shampoo       lamoTRIgine (LaMICtal) 200 MG tablet Take 1 tablet by mouth Daily.      lidocaine (XYLOCAINE) 2 % jelly Apply to the tip of the penis/urethra 3 times daily as needed pain with catheter 28.33 g 0    Melatonin 10 MG tablet Take 1 tablet by mouth every night at bedtime. 90 tablet 1    NON FORMULARY CPAP      pravastatin (PRAVACHOL) 20 MG tablet TAKE 1 TABLET BY MOUTH EVERY NIGHT. 90 tablet 1    venlafaxine XR (EFFEXOR-XR) 150 MG 24 hr capsule       venlafaxine XR (EFFEXOR-XR) 37.5 MG 24 hr capsule       Diclofenac Sodium (VOLTAREN) 1 % gel gel Apply 1 g topically to the appropriate area as directed As Needed. (Patient not taking: Reported on 6/14/2023)       No current facility-administered medications for this visit.     Review of Systems   Constitutional: Negative.    Skin:         Painful toenails bilaterally   All other systems reviewed and are negative.    OBJECTIVE     Vitals:    06/14/23 0746   BP: 106/69   Pulse: 81   Temp: 98 °F (36.7 °C)   SpO2: 93%       Patient seen in no apparent distress.      PHYSICAL EXAM:     Foot/Ankle Exam    GENERAL  Appearance:  appears stated age and chronically ill  Orientation:  unable to assess  Affect:  inappropriate  Gait:  antalgic  Assistance:  cane use  Right shoe gear:  casual shoe ([employer] [employer])  Left shoe gear: casual shoe ([employer])    VASCULAR     Right Foot Vascularity   Dorsalis pedis:  2+  Posterior tibial:  2+  Skin temperature:  warm  Edema grading:  None  CFT:  < 3 seconds  Pedal hair growth:  Present  Varicosities:  mild varicosities     Left Foot Vascularity   Dorsalis pedis:  2+  Posterior tibial:  2+  Skin temperature:  warm  Edema grading:  None  CFT:  < 3 seconds  Pedal hair growth:  Present  Varicosities:  mild varicosities     NEUROLOGIC     Right Foot Neurologic   Light touch sensation: diminished  Vibratory sensation: diminished  Hot/Cold sensation: diminished  Protective Sensation using Dunbar-Manpreet Monofilament:   Sites intact: 3  Sites tested: 10     Left Foot Neurologic   Light touch sensation: diminished  Vibratory sensation: diminished  Hot/Cold sensation:  diminished  Protective Sensation using Dunbar-Manpreet Monofilament:   Sites intact: 3  Sites tested: 10    MUSCULOSKELETAL     Right Foot Musculoskeletal   Hammertoe:  First toe     Left Foot Musculoskeletal   Hammertoe:  First toe    MUSCLE STRENGTH     Right Foot Muscle Strength   Foot dorsiflexion:  1  Foot plantar flexion:  4-  Foot inversion:  2  Foot eversion:  2     Left Foot Muscle Strength   Foot dorsiflexion:  1  Foot plantar flexion:  4-  Foot inversion:  2  Foot eversion:  2    DERMATOLOGIC      Right Foot Dermatologic   Skin  Right foot skin is intact.   Nails  1.  Positive for elongated, onychomycosis, abnormal thickness, subungual debris and ingrown toenail.  2.  Positive for elongated, onychomycosis, abnormal thickness, subungual debris and ingrown toenail.  3.  Positive for elongated, onychomycosis, abnormal thickness, subungual debris and ingrown toenail.  4.  Positive for elongated, onychomycosis, abnormal thickness, subungual debris and ingrown toenail.  5.  Positive for elongated, onychomycosis, abnormal thickness, subungual debris and ingrown toenail.     Left Foot  Dermatologic   Skin  Left foot skin is intact.   Nails  1.  Positive for elongated, onychomycosis, abnormal thickness, subungual debris and ingrown toenail.  2.  Positive for elongated, onychomycosis, abnormal thickness, subungual debris and ingrown toenail.  3.  Positive for elongated, onychomycosis, abnormal thickness, subungual debris and ingrown toenail.  4.  Positive for elongated, onychomycosis, abnormally thick, subungual debris and ingrown toenail.  5.  Positive for elongated, onychomycosis, abnormally thick, subungual debris and ingrown toenail.    ASSESSMENT/PLAN     Diagnoses and all orders for this visit:    1. Onychomycosis (Primary)    2. Foot pain, bilateral    3. Foot drop, bilateral    4. Difficulty walking    5. Onychocryptosis    6. Neuropathy        Comprehensive lower extremity examination and evaluation was performed.    Discussed findings and treatment plan including risks, benefits, and treatment options with patient in detail. Patient agreed with treatment plan.    Toenails 1 through 5 bilaterally were debrided in thickness and length and then smoothed with a Dremel Tool.  Tolerated the procedure well without complications.    An After Visit Summary was printed and given to the patient at discharge, including (if requested) any available informative/educational handouts regarding diagnosis, treatment, or medications. All questions were answered to patient/family satisfaction. Should symptoms fail to improve or worsen they agree to call or return to clinic or to go to the Emergency Department. Discussed the importance of following up with any needed screening tests/labs/specialist appointments and any requested follow-up recommended by me today. Importance of maintaining follow-up discussed and patient accepts that missed appointments can delay diagnosis and potentially lead to worsening of conditions.    Return in about 9 weeks (around 8/16/2023) for Toenail Care., or sooner if acute issues  arise.    This document has been electronically signed by Roni Osullivan DPM on June 14, 2023 08:00 EDT

## 2023-08-07 ENCOUNTER — TELEPHONE (OUTPATIENT)
Dept: UROLOGY | Facility: CLINIC | Age: 43
End: 2023-08-07
Payer: MEDICARE

## 2023-08-07 ENCOUNTER — LAB (OUTPATIENT)
Dept: LAB | Facility: HOSPITAL | Age: 43
End: 2023-08-07
Payer: MEDICARE

## 2023-08-07 DIAGNOSIS — N39.0 URINARY TRACT INFECTION WITHOUT HEMATURIA, SITE UNSPECIFIED: ICD-10-CM

## 2023-08-07 DIAGNOSIS — N31.9 NEUROGENIC BLADDER: ICD-10-CM

## 2023-08-07 DIAGNOSIS — N31.9 NEUROGENIC BLADDER: Primary | ICD-10-CM

## 2023-08-07 LAB
BACTERIA UR QL AUTO: ABNORMAL /HPF
BILIRUB UR QL STRIP: NEGATIVE
CLARITY UR: ABNORMAL
COLOR UR: YELLOW
GLUCOSE UR STRIP-MCNC: NEGATIVE MG/DL
HGB UR QL STRIP.AUTO: ABNORMAL
HYALINE CASTS UR QL AUTO: ABNORMAL /LPF
KETONES UR QL STRIP: NEGATIVE
LEUKOCYTE ESTERASE UR QL STRIP.AUTO: ABNORMAL
NITRITE UR QL STRIP: NEGATIVE
PH UR STRIP.AUTO: 6.5 [PH] (ref 5–8)
PROT UR QL STRIP: ABNORMAL
RBC # UR STRIP: ABNORMAL /HPF
REF LAB TEST METHOD: ABNORMAL
SP GR UR STRIP: 1.02 (ref 1–1.03)
SQUAMOUS #/AREA URNS HPF: ABNORMAL /HPF
UROBILINOGEN UR QL STRIP: ABNORMAL
WBC # UR STRIP: ABNORMAL /HPF

## 2023-08-07 PROCEDURE — 87077 CULTURE AEROBIC IDENTIFY: CPT

## 2023-08-07 PROCEDURE — 87186 SC STD MICRODIL/AGAR DIL: CPT

## 2023-08-07 PROCEDURE — 87086 URINE CULTURE/COLONY COUNT: CPT

## 2023-08-07 PROCEDURE — 81001 URINALYSIS AUTO W/SCOPE: CPT

## 2023-08-07 NOTE — TELEPHONE ENCOUNTER
EDOUARD, PATIENT'S MOTHER, CALLED.  SHE SAID YESTERDAY WHEN SHE CATHED PATIENT, THE CATHETER WAS PLUGGED UP.  SHE SAID HE HAS SOME PAIN.  SHE SEES NO BLOOD.  THINKS HE MAY HAVE A UTI.

## 2023-08-09 ENCOUNTER — TELEPHONE (OUTPATIENT)
Dept: UROLOGY | Facility: CLINIC | Age: 43
End: 2023-08-09
Payer: MEDICARE

## 2023-08-09 LAB — BACTERIA SPEC AEROBE CULT: ABNORMAL

## 2023-08-09 RX ORDER — NITROFURANTOIN 25; 75 MG/1; MG/1
100 CAPSULE ORAL 2 TIMES DAILY
Qty: 20 CAPSULE | Refills: 0 | Status: SHIPPED | OUTPATIENT
Start: 2023-08-09 | End: 2023-08-19

## 2023-08-09 NOTE — TELEPHONE ENCOUNTER
----- Message from ÁNGELA Quintana sent at 8/9/2023 11:05 AM EDT -----  Please inform patient's mother that he does not fact have a urinary tract infection I have sent in medications to his pharmacy to treat

## 2023-08-09 NOTE — TELEPHONE ENCOUNTER
Lmom for pt's mother to call back for urine culture results. It is positive and yamilex did send in antibiotics to his pharmacy. HUB OKAY TO READ.

## 2023-08-21 DIAGNOSIS — E55.9 VITAMIN D DEFICIENCY: ICD-10-CM

## 2023-08-21 RX ORDER — CHOLECALCIFEROL (VITAMIN D3) 25 MCG
TABLET ORAL
Qty: 60 TABLET | Refills: 1 | Status: SHIPPED | OUTPATIENT
Start: 2023-08-21

## 2023-08-22 ENCOUNTER — OFFICE VISIT (OUTPATIENT)
Dept: FAMILY MEDICINE CLINIC | Age: 43
End: 2023-08-22
Payer: MEDICARE

## 2023-08-22 ENCOUNTER — HOSPITAL ENCOUNTER (OUTPATIENT)
Dept: GENERAL RADIOLOGY | Facility: HOSPITAL | Age: 43
Discharge: HOME | End: 2023-08-22
Admitting: PHYSICIAN ASSISTANT
Payer: MEDICARE

## 2023-08-22 VITALS
OXYGEN SATURATION: 95 % | HEIGHT: 68 IN | DIASTOLIC BLOOD PRESSURE: 64 MMHG | WEIGHT: 171.4 LBS | BODY MASS INDEX: 25.98 KG/M2 | SYSTOLIC BLOOD PRESSURE: 98 MMHG | HEART RATE: 117 BPM | RESPIRATION RATE: 18 BRPM

## 2023-08-22 DIAGNOSIS — M54.50 ACUTE BILATERAL LOW BACK PAIN WITHOUT SCIATICA: Primary | ICD-10-CM

## 2023-08-22 DIAGNOSIS — R19.09 GROIN SWELLING: ICD-10-CM

## 2023-08-22 DIAGNOSIS — M54.50 ACUTE BILATERAL LOW BACK PAIN WITHOUT SCIATICA: ICD-10-CM

## 2023-08-22 PROCEDURE — 72100 X-RAY EXAM L-S SPINE 2/3 VWS: CPT

## 2023-08-22 RX ORDER — KETOROLAC TROMETHAMINE 30 MG/ML
30 INJECTION, SOLUTION INTRAMUSCULAR; INTRAVENOUS EVERY 6 HOURS PRN
Status: SHIPPED | OUTPATIENT
Start: 2023-08-22 | End: 2023-08-27

## 2023-08-22 RX ORDER — TRIAMCINOLONE ACETONIDE 0.25 MG/G
CREAM TOPICAL
COMMUNITY
Start: 2023-07-31

## 2023-08-22 RX ADMIN — KETOROLAC TROMETHAMINE 30 MG: 30 INJECTION, SOLUTION INTRAMUSCULAR; INTRAVENOUS at 15:55

## 2023-08-22 NOTE — PROGRESS NOTES
"  Diagnoses and all orders for this visit:    1. Acute bilateral low back pain without sciatica (Primary)  -     ketorolac (TORADOL) injection 30 mg  -     XR Spine Lumbar 2 or 3 View; Future    2. Groin swelling  -     US Soft Tissue; Future            Subjective     CHIEF COMPLAINT    Chief Complaint   Patient presents with    Back Pain     1 day     Groin Swelling            History of Present Illness  This is a 43-year-old male with past medical history of cerebral palsy presenting to the clinic, accompanied by his mother, with complaint of low back pain since yesterday.  He states he \"pulled something in his back.\"  He describes a constant 8/10 \"pain\" that is not improved with ibuprofen or heat.  He does have a history of compression fractures but states those are after injuries and states that this pain feels different.  He denies any numbness or tingling.  He already self caths due to neurogenic bladder.    Additionally, his mother states while she was doing his catheter yesterday she noticed some swelling in his groin.  She has not noticed this previously, but does state he had similar symptoms several years ago.  Unfortunately she cannot recall what it was or how it was treated.          Review of Systems   Constitutional:  Negative for chills and fever.   Genitourinary:         Groin swelling   Musculoskeletal:  Positive for back pain. Negative for arthralgias, gait problem, joint swelling and myalgias.   Skin:  Negative for wound.   Neurological:  Negative for weakness and numbness.          Past Medical History:   Diagnosis Date    Acute upper respiratory infection 02/2022    Age-related osteoporosis without current pathological fracture     Broken toe 11/2022    left great toe    Cerebral palsy     diagnosed as infant    Compression fracture of first lumbar vertebra     Constipation     Disease of pancreas     History of urinary self-catheterization     pt or pt's mother catheterizes 4-6  times a day    " Hydrocephalus in diseases classified elsewhere     Injury of back 02/2020    Low back pain     Major depressive disorder     single episode, moderate    Other abnormal glucose     Other cerebral palsy     Other fatigue     Other generalized epilepsy and epileptic syndromes, not intractable, without status epilepticus     Other hypertrophic disorders of the skin     Pain in left ankle and joints of left foot     Pain in right toe(s)     Pain in thoracic spine     Paranoid schizophrenia     Pelvic and perineal pain     PONV (postoperative nausea and vomiting)     Primary insomnia     Pure hypercholesterolemia     Repeated falls     last fall 11/2022    Schizoaffective disorder, unspecified     Seizures     last seizure was approx 2018, medication controlled    Sleep apnea     uses cpap machine    Somnolence     TIA (transient ischemic attack)     Vitamin D deficiency     Wedge compression fracture of fourth thoracic vertebra, subsequent encounter for fracture with routine healing             Past Surgical History:   Procedure Laterality Date    CYST REMOVAL Right 11/11/2022    Procedure: Excision of subcutaneous mass from right forearm;  Surgeon: Oni Greenberg MD;  [place] [facility] [place] [facility] [place]  Service: General;  Laterality: Right;    HAMSTRING REPAIR Bilateral     age 10    OTHER SURGICAL HISTORY      hyposadius repair    TOE FUSION Right     great toe at age 21            Family History   Problem Relation Age of Onset    Hypothyroidism Mother     No Known Problems Father     Ulcers Brother     Malig Hyperthermia Neg Hx             Social History     Socioeconomic History    Marital status: Single   Tobacco Use    Smoking status: Never    Smokeless tobacco: Never   Vaping Use    Vaping Use: Never used   Substance and Sexual Activity    Alcohol use: Never    Drug use: Never    Sexual activity: Defer            Allergies   Allergen Reactions    Valproic Acid Irritability     DEPAKOTE CAUSES DIZZINESS AND IRRITABILITY    "           Current Outpatient Medications on File Prior to Visit   Medication Sig Dispense Refill    alclometasone (ACLOVATE) 0.05 % cream       ascorbic acid (VITAMIN C) 1000 MG tablet Take 1 tablet by mouth Daily.      aspirin 81 MG EC tablet Take 1 tablet by mouth Daily.      busPIRone (BUSPAR) 10 MG tablet Take 1 tablet by mouth 3 (Three) Times a Day As Needed.      Calcium Carbonate 1500 (600 Ca) MG tablet Take 1 tablet by mouth 2 (two) times a day.      cholecalciferol 25 MCG tablet TAKE 2 TABLETS BY MOUTH DAILY. 60 tablet 1    Diclofenac Sodium (VOLTAREN) 1 % gel gel Apply 1 g topically to the appropriate area as directed As Needed.      docusate sodium (COLACE) 100 MG capsule TAKE 1 CAPSULE BY MOUTH DAILY. 30 capsule 1    famotidine (PEPCID) 20 MG tablet TAKE 1 TABLET BY MOUTH DAILY. 90 tablet 1    folic acid (FOLVITE) 800 MCG tablet Take 1 tablet by mouth Daily.      HM TRUEplus Fiber 2 g chewable tablet CHEW 1 TABLET DAILY. 90 tablet 1    Invega Sustenna 234 MG/1.5ML suspension prefilled syringe IM injection Inject 1.5 mL into the appropriate muscle as directed by prescriber Every 30 (Thirty) Days.      Keppra 500 MG tablet Take 3 tablets by mouth Every 12 (Twelve) Hours As Needed.      ketoconazole (NIZORAL) 2 % shampoo       lamoTRIgine (LaMICtal) 200 MG tablet Take 1 tablet by mouth Daily.      Melatonin 10 MG tablet Take 1 tablet by mouth every night at bedtime. 90 tablet 1    NON FORMULARY CPAP      pravastatin (PRAVACHOL) 20 MG tablet TAKE 1 TABLET BY MOUTH EVERY NIGHT. 90 tablet 1    triamcinolone (KENALOG) 0.025 % cream       venlafaxine XR (EFFEXOR-XR) 150 MG 24 hr capsule       venlafaxine XR (EFFEXOR-XR) 37.5 MG 24 hr capsule        No current facility-administered medications on file prior to visit.            BP 98/64 (BP Location: Left arm, Patient Position: Sitting, Cuff Size: Adult)   Pulse 117   Resp 18   Ht 172.7 cm (67.99\")   Wt 77.7 kg (171 lb 6.4 oz)   SpO2 95% Comment: room air  " BMI 26.07 kg/mý          Objective     Physical Exam  Vitals and nursing note reviewed. Exam conducted with a chaperone present (Williams BLANCO, APRN student).   Constitutional:       General: He is not in acute distress.     Appearance: Normal appearance.   Cardiovascular:      Rate and Rhythm: Normal rate and regular rhythm.      Heart sounds: Normal heart sounds.   Pulmonary:      Effort: Pulmonary effort is normal. No respiratory distress.      Breath sounds: Normal breath sounds.   Genitourinary:      Musculoskeletal:      Lumbar back: Normal. No swelling, deformity, spasms, tenderness or bony tenderness.        Back:    Skin:     General: Skin is warm and dry.      Findings: No bruising or erythema.   Neurological:      Mental Status: He is alert and oriented to person, place, and time.   Psychiatric:         Mood and Affect: Mood normal.         Behavior: Behavior normal.                        Diagnoses and all orders for this visit:    1. Acute bilateral low back pain without sciatica (Primary)  -     ketorolac (TORADOL) injection 30 mg  -     XR Spine Lumbar 2 or 3 View; Future    2. Groin swelling  -     US Soft Tissue; Future             Additional Instructions for the Follow-ups that You Need to Schedule       US Soft Tissue   Sep 05, 2023      Exam reason: suspected hernia right groin   Release to patient: Routine Release                          FOR FULL DISCHARGE INSTRUCTIONS/COMMENTS/HANDOUTS please see the   AVS

## 2023-08-24 ENCOUNTER — TELEPHONE (OUTPATIENT)
Dept: FAMILY MEDICINE CLINIC | Age: 43
End: 2023-08-24
Payer: MEDICARE

## 2023-08-24 DIAGNOSIS — M54.50 ACUTE BILATERAL LOW BACK PAIN WITHOUT SCIATICA: Primary | ICD-10-CM

## 2023-08-24 RX ORDER — METHOCARBAMOL 750 MG/1
750 TABLET, FILM COATED ORAL 4 TIMES DAILY PRN
Qty: 12 TABLET | Refills: 0 | Status: SHIPPED | OUTPATIENT
Start: 2023-08-24

## 2023-08-24 NOTE — TELEPHONE ENCOUNTER
Please call Kang/his mother to see how his back is feeling today. If he is still having pain, would they like to try a muscle relaxer? Thanks, [place]

## 2023-09-05 NOTE — PROGRESS NOTES
[facility] - PODIATRY    Today's Date: 09/06/23    Patient Name: Kang Grey  MRN: [id number]  CSN: [id number]  PCP: Hien Parham MD, Last PCP Visit: 5/23/2023  Referring Provider: No ref. provider found    SUBJECTIVE     Chief Complaint   Patient presents with    Left Foot - Follow-up, Nail Problem    Right Foot - Follow-up, Nail Problem       HPI: Kang Grey, a 43 y.o.male, comes to clinic with his mother.    New, Established, New Problem:  established   Location:  Toenails  Duration:   Greater than five years  Onset:  Gradual  Nature:  sore with palpation.  Stable, worsening, improving:   Stable  Aggravating factors:  Pain with shoe gear and ambulation.  Previous Treatment:  debridement    Medical changes:  back pain, xray on spine 8/22/23.    Patient denies any fevers, chills, nausea, vomiting, shortness of breathe, nor any other constitutional signs nor symptoms.       Past Medical History:   Diagnosis Date    Acute upper respiratory infection 02/2022    Age-related osteoporosis without current pathological fracture     Broken toe 11/2022    left great toe    Cerebral palsy     diagnosed as infant    Compression fracture of first lumbar vertebra     Constipation     Disease of pancreas     History of urinary self-catheterization     pt or pt's mother catheterizes 4-6  times a day    Hydrocephalus in diseases classified elsewhere     Injury of back 02/2020    Low back pain     Major depressive disorder     single episode, moderate    Other abnormal glucose     Other cerebral palsy     Other fatigue     Other generalized epilepsy and epileptic syndromes, not intractable, without status epilepticus     Other hypertrophic disorders of the skin     Pain in left ankle and joints of left foot     Pain in right toe(s)     Pain in thoracic spine     Paranoid schizophrenia     Pelvic and perineal pain     PONV (postoperative nausea and vomiting)     Primary insomnia     Pure  hypercholesterolemia     Repeated falls     last fall 11/2022    Schizoaffective disorder, unspecified     Seizures     last seizure was approx 2018, medication controlled    Sleep apnea     uses cpap machine    Somnolence     TIA (transient ischemic attack)     Vitamin D deficiency     Wedge compression fracture of fourth thoracic vertebra, subsequent encounter for fracture with routine healing      Past Surgical History:   Procedure Laterality Date    CYST REMOVAL Right 11/11/2022    Procedure: Excision of subcutaneous mass from right forearm;  Surgeon: Oni Greenberg MD;  Location: [facility] OR [facility] [place];  Service: General;  Laterality: Right;    HAMSTRING REPAIR Bilateral     age 10    OTHER SURGICAL HISTORY      hyposadius repair    TOE FUSION Right     great toe at age 21     Family History   Problem Relation Age of Onset    Hypothyroidism Mother     No Known Problems Father     Ulcers Brother     Malig Hyperthermia Neg Hx      Social History     Socioeconomic History    Marital status: Single   Tobacco Use    Smoking status: Never    Smokeless tobacco: Never   Vaping Use    Vaping Use: Never used   Substance and Sexual Activity    Alcohol use: Never    Drug use: Never    Sexual activity: Defer     Allergies   Allergen Reactions    Valproic Acid Irritability     DEPAKOTE CAUSES DIZZINESS AND IRRITABILITY       Current Outpatient Medications   Medication Sig Dispense Refill    alclometasone (ACLOVATE) 0.05 % cream       ascorbic acid (VITAMIN C) 1000 MG tablet Take 1 tablet by mouth Daily.      aspirin 81 MG EC tablet Take 1 tablet by mouth Daily.      busPIRone (BUSPAR) 10 MG tablet Take 1 tablet by mouth 3 (Three) Times a Day As Needed.      Calcium Carbonate 1500 (600 Ca) MG tablet Take 1 tablet by mouth 2 (two) times a day.      cholecalciferol 25 MCG tablet TAKE 2 TABLETS BY MOUTH DAILY. 60 tablet 1    Diclofenac Sodium (VOLTAREN) 1 % gel gel Apply 1 g topically to the appropriate area as directed As Needed.       docusate sodium (COLACE) 100 MG capsule TAKE 1 CAPSULE BY MOUTH DAILY. 30 capsule 1    famotidine (PEPCID) 20 MG tablet TAKE 1 TABLET BY MOUTH DAILY. 90 tablet 1    folic acid (FOLVITE) 800 MCG tablet Take 1 tablet by mouth Daily.      HM TRUEplus Fiber 2 g chewable tablet CHEW 1 TABLET DAILY. 90 tablet 1    Invega Sustenna 234 MG/1.5ML suspension prefilled syringe IM injection Inject 1.5 mL into the appropriate muscle as directed by prescriber Every 30 (Thirty) Days.      Keppra 500 MG tablet Take 3 tablets by mouth Every 12 (Twelve) Hours As Needed.      ketoconazole (NIZORAL) 2 % shampoo       lamoTRIgine (LaMICtal) 200 MG tablet Take 1 tablet by mouth Daily.      Melatonin 10 MG tablet Take 1 tablet by mouth every night at bedtime. 90 tablet 1    methocarbamol (ROBAXIN) 750 MG tablet Take 1 tablet by mouth 4 (Four) Times a Day As Needed for Muscle Spasms. 12 tablet 0    NON FORMULARY CPAP      pravastatin (PRAVACHOL) 20 MG tablet TAKE 1 TABLET BY MOUTH EVERY NIGHT. 90 tablet 1    triamcinolone (KENALOG) 0.025 % cream       venlafaxine XR (EFFEXOR-XR) 150 MG 24 hr capsule       venlafaxine XR (EFFEXOR-XR) 37.5 MG 24 hr capsule        No current facility-administered medications for this visit.     Review of Systems   Constitutional: Negative.    Skin:         Painful toenails bilaterally   All other systems reviewed and are negative.    OBJECTIVE     Vitals:    09/06/23 0834   BP: 116/80   Pulse: 88   Temp: 97.8 °F (36.6 °C)   SpO2: 94%         Patient seen in no apparent distress.      PHYSICAL EXAM:     Foot/Ankle Exam    GENERAL  Appearance:  appears stated age and chronically ill  Orientation:  unable to assess  Affect:  inappropriate  Gait:  antalgic  Assistance:  cane use  Right shoe gear: casual shoe (Williams Brace)  Left shoe gear: casual shoe (Derick Brace)    VASCULAR     Right Foot Vascularity   Dorsalis pedis:  2+  Posterior tibial:  2+  Skin temperature:  warm  Edema grading:  None  CFT:  < 3  seconds  Pedal hair growth:  Present  Varicosities:  mild varicosities     Left Foot Vascularity   Dorsalis pedis:  2+  Posterior tibial:  2+  Skin temperature:  warm  Edema grading:  None  CFT:  < 3 seconds  Pedal hair growth:  Present  Varicosities:  mild varicosities     NEUROLOGIC     Right Foot Neurologic   Light touch sensation: diminished  Vibratory sensation: diminished  Hot/Cold sensation: diminished  Protective Sensation using [place] Monofilament:   Sites intact: 3  Sites tested: 10     Left Foot Neurologic   Light touch sensation: diminished  Vibratory sensation: diminished  Hot/Cold sensation:  diminished  Protective Sensation using [place] Monofilament:   Sites intact: 3  Sites tested: 10    MUSCULOSKELETAL     Right Foot Musculoskeletal   [place]:  First toe     Left Foot Musculoskeletal   [place]:  First toe    MUSCLE STRENGTH     Right Foot Muscle Strength   Foot dorsiflexion:  1  Foot plantar flexion:  4-  Foot inversion:  2  Foot eversion:  2     Left Foot Muscle Strength   Foot dorsiflexion:  1  Foot plantar flexion:  4-  Foot inversion:  2  Foot eversion:  2    DERMATOLOGIC      Right Foot Dermatologic   Skin  Right foot skin is intact.   Nails  1.  Positive for elongated, onychomycosis, abnormal thickness, subungual debris and ingrown toenail.  2.  Positive for elongated, onychomycosis, abnormal thickness, subungual debris and ingrown toenail.  3.  Positive for elongated, onychomycosis, abnormal thickness, subungual debris and ingrown toenail.  4.  Positive for elongated, onychomycosis, abnormal thickness, subungual debris and ingrown toenail.  5.  Positive for elongated, onychomycosis, abnormal thickness, subungual debris and ingrown toenail.     Left Foot Dermatologic   Skin  Left foot skin is intact.   Nails  1.  Positive for elongated, onychomycosis, abnormal thickness, subungual debris and ingrown toenail.  2.  Positive for elongated, onychomycosis, abnormal  thickness, subungual debris and ingrown toenail.  3.  Positive for elongated, onychomycosis, abnormal thickness, subungual debris and ingrown toenail.  4.  Positive for elongated, onychomycosis, abnormally thick, subungual debris and ingrown toenail.  5.  Positive for elongated, onychomycosis, abnormally thick, subungual debris and ingrown toenail.    ASSESSMENT/PLAN     Diagnoses and all orders for this visit:    1. Onychomycosis (Primary)    2. Foot pain, bilateral    3. Foot drop, bilateral    4. Neuropathy    5. Onychocryptosis    6. Difficulty walking        Comprehensive lower extremity examination and evaluation was performed.    Discussed findings and treatment plan including risks, benefits, and treatment options with patient in detail. Patient agreed with treatment plan.    Toenails 1 through 5 bilaterally were debrided in thickness and length and then smoothed with a Dremel Tool.  Tolerated the procedure well without complications.    An After Visit Summary was printed and given to the patient at discharge, including (if requested) any available informative/educational handouts regarding diagnosis, treatment, or medications. All questions were answered to patient/family satisfaction. Should symptoms fail to improve or worsen they agree to call or return to clinic or to go to the Emergency Department. Discussed the importance of following up with any needed screening tests/labs/specialist appointments and any requested follow-up recommended by me today. Importance of maintaining follow-up discussed and patient accepts that missed appointments can delay diagnosis and potentially lead to worsening of conditions.    Return in about 9 weeks (around 11/8/2023) for Toenail Care., or sooner if acute issues arise.    This document has been electronically signed by Roni Osullivan DPM on September 6, 2023 08:59 EDT

## 2023-09-06 ENCOUNTER — OFFICE VISIT (OUTPATIENT)
Dept: PODIATRY | Facility: CLINIC | Age: 43
End: 2023-09-06
Payer: MEDICARE

## 2023-09-06 VITALS
OXYGEN SATURATION: 94 % | HEART RATE: 88 BPM | WEIGHT: 169 LBS | BODY MASS INDEX: 25.61 KG/M2 | TEMPERATURE: 97.8 F | SYSTOLIC BLOOD PRESSURE: 116 MMHG | DIASTOLIC BLOOD PRESSURE: 80 MMHG | HEIGHT: 68 IN

## 2023-09-06 DIAGNOSIS — R26.2 DIFFICULTY WALKING: ICD-10-CM

## 2023-09-06 DIAGNOSIS — M79.671 FOOT PAIN, BILATERAL: ICD-10-CM

## 2023-09-06 DIAGNOSIS — L60.0 ONYCHOCRYPTOSIS: ICD-10-CM

## 2023-09-06 DIAGNOSIS — M21.371 FOOT DROP, BILATERAL: ICD-10-CM

## 2023-09-06 DIAGNOSIS — M79.672 FOOT PAIN, BILATERAL: ICD-10-CM

## 2023-09-06 DIAGNOSIS — M21.372 FOOT DROP, BILATERAL: ICD-10-CM

## 2023-09-06 DIAGNOSIS — B35.1 ONYCHOMYCOSIS: Primary | ICD-10-CM

## 2023-09-06 DIAGNOSIS — G62.9 NEUROPATHY: ICD-10-CM

## 2023-09-07 ENCOUNTER — TELEPHONE (OUTPATIENT)
Dept: UROLOGY | Facility: CLINIC | Age: 43
End: 2023-09-07

## 2023-09-07 NOTE — TELEPHONE ENCOUNTER
Provider: MAYELIN ROE  Caller: EDOUARD ATKINS  Relationship to Patient: MOTHER  Reason for Call: SAME DAY CANCEL-PT REFUSED TO GET OUT OF BED.

## 2023-09-21 ENCOUNTER — HOSPITAL ENCOUNTER (OUTPATIENT)
Dept: ULTRASOUND IMAGING | Facility: HOSPITAL | Age: 43
Discharge: HOME | End: 2023-09-21
Admitting: PHYSICIAN ASSISTANT
Payer: MEDICARE

## 2023-09-21 DIAGNOSIS — R19.09 GROIN SWELLING: ICD-10-CM

## 2023-09-21 PROCEDURE — 76999 ECHO EXAMINATION PROCEDURE: CPT

## 2023-09-26 DIAGNOSIS — K40.90 RIGHT INGUINAL HERNIA: Primary | ICD-10-CM

## 2023-10-02 ENCOUNTER — APPOINTMENT (OUTPATIENT)
Dept: GENERAL RADIOLOGY | Facility: HOSPITAL | Age: 43
End: 2023-10-02
Payer: MEDICARE

## 2023-10-02 ENCOUNTER — HOSPITAL ENCOUNTER (EMERGENCY)
Facility: HOSPITAL | Age: 43
Discharge: HOME | End: 2023-10-02
Attending: EMERGENCY MEDICINE | Admitting: EMERGENCY MEDICINE
Payer: MEDICARE

## 2023-10-02 ENCOUNTER — TELEPHONE (OUTPATIENT)
Dept: FAMILY MEDICINE CLINIC | Age: 43
End: 2023-10-02
Payer: MEDICARE

## 2023-10-02 VITALS
WEIGHT: 169.75 LBS | OXYGEN SATURATION: 95 % | SYSTOLIC BLOOD PRESSURE: 117 MMHG | TEMPERATURE: 98.2 F | BODY MASS INDEX: 25.73 KG/M2 | HEIGHT: 68 IN | RESPIRATION RATE: 20 BRPM | DIASTOLIC BLOOD PRESSURE: 85 MMHG | HEART RATE: 99 BPM

## 2023-10-02 DIAGNOSIS — M54.50 ACUTE BILATERAL LOW BACK PAIN WITHOUT SCIATICA: Primary | ICD-10-CM

## 2023-10-02 DIAGNOSIS — S20.221A CONTUSION, BACK, RIGHT, INITIAL ENCOUNTER: ICD-10-CM

## 2023-10-02 DIAGNOSIS — M54.6 ACUTE BILATERAL THORACIC BACK PAIN: ICD-10-CM

## 2023-10-02 PROCEDURE — 72072 X-RAY EXAM THORAC SPINE 3VWS: CPT

## 2023-10-02 PROCEDURE — 99283 EMERGENCY DEPT VISIT LOW MDM: CPT

## 2023-10-02 PROCEDURE — 72100 X-RAY EXAM L-S SPINE 2/3 VWS: CPT

## 2023-10-02 RX ORDER — TRAMADOL HYDROCHLORIDE 50 MG/1
50 TABLET ORAL ONCE
Status: COMPLETED | OUTPATIENT
Start: 2023-10-02 | End: 2023-10-02

## 2023-10-02 RX ADMIN — TRAMADOL HYDROCHLORIDE 50 MG: 50 TABLET, COATED ORAL at 10:42

## 2023-10-02 NOTE — DISCHARGE INSTRUCTIONS
Your CT today was negative for any new fractures.  It is likely that your pain and discomfort is more from your muscles and bruising that is present on your back.  The fall could also aggravated your existing fractures.  If you continue to have pain and discomfort you will need to follow-up with your primary care provider in 2 to 3 days.  Otherwise continue to take all your meds that you have at home as usual.  If it anytime you become unable to ambulate, feel as if you are having difficulty breathing, or have a large increase in pain please return please return to the ER otherwise follow-up with your primary care provider.

## 2023-10-02 NOTE — TELEPHONE ENCOUNTER
Mom called stating that Kang fell this weekend and she thinks he fractured his back.  I advised her to take him to the ER.

## 2023-10-02 NOTE — ED PROVIDER NOTES
Time: 10:23 AM EDT  Date of encounter:  10/2/2023  Room 56  Independent Historian/Clinical History and Information was obtained by:   Patient    History is limited by: N/A    Chief Complaint: back pain       History of Present Illness:  Patient is a 43 y.o. year old male who presents to the emergency department for evaluation of mid and lower back pain with the right side being worse than the left. Pt is reported to have fallen while getting out of the bed on Friday. He has a history of compression fractures.  He rates his pain as a 10.  He did take a hydrocodone at night however has had little relief.    HPI    Patient Care Team  Primary Care Provider: Hien Parham MD    Past Medical History:     Allergies   Allergen Reactions    Valproic Acid Irritability     DEPAKOTE CAUSES DIZZINESS AND IRRITABILITY       Past Medical History:   Diagnosis Date    Acute upper respiratory infection 02/2022    Age-related osteoporosis without current pathological fracture     Broken toe 11/2022    left great toe    Cerebral palsy     diagnosed as infant    Compression fracture of first lumbar vertebra     Constipation     Disease of pancreas     History of urinary self-catheterization     pt or pt's mother catheterizes 4-6  times a day    Hydrocephalus in diseases classified elsewhere     Injury of back 02/2020    Low back pain     Major depressive disorder     single episode, moderate    Other abnormal glucose     Other cerebral palsy     Other fatigue     Other generalized epilepsy and epileptic syndromes, not intractable, without status epilepticus     Other hypertrophic disorders of the skin     Pain in left ankle and joints of left foot     Pain in right toe(s)     Pain in thoracic spine     Paranoid schizophrenia     Pelvic and perineal pain     PONV (postoperative nausea and vomiting)     Primary insomnia     Pure hypercholesterolemia     Repeated falls     last fall 11/2022    Schizoaffective disorder, unspecified      Seizures     last seizure was approx 2018, medication controlled    Sleep apnea     uses cpap machine    Somnolence     TIA (transient ischemic attack)     Vitamin D deficiency     Wedge compression fracture of fourth thoracic vertebra, subsequent encounter for fracture with routine healing      Past Surgical History:   Procedure Laterality Date    CYST REMOVAL Right 11/11/2022    Procedure: Excision of subcutaneous mass from right forearm;  Surgeon: Oni Greenberg MD;  Location: [facility];  Service: General;  Laterality: Right;    HAMSTRING REPAIR Bilateral     age 10    OTHER SURGICAL HISTORY      hyposadius repair    TOE FUSION Right     great toe at age 21     Family History   Problem Relation Age of Onset    Hypothyroidism Mother     No Known Problems Father     Ulcers Brother     Malig Hyperthermia Neg Hx        Home Medications:  Prior to Admission medications    Medication Sig Start Date End Date Taking? Authorizing Provider   alclometasone (ACLOVATE) 0.05 % cream  3/15/23   Francisca Woodard MD   ascorbic acid (VITAMIN C) 1000 MG tablet Take 1 tablet by mouth Daily.    Francisca Woodard MD   aspirin 81 MG EC tablet Take 1 tablet by mouth Daily.    Francisca Woodard MD   busPIRone (BUSPAR) 10 MG tablet Take 1 tablet by mouth 3 (Three) Times a Day As Needed. 6/2/21   Francisca Woodard MD   Calcium Carbonate 1500 (600 Ca) MG tablet Take 1 tablet by mouth 2 (two) times a day. 6/2/21   Francisca Woodard MD   cholecalciferol 25 MCG tablet TAKE 2 TABLETS BY MOUTH DAILY. 8/21/23   Hien Parham MD   Diclofenac Sodium (VOLTAREN) 1 % gel gel Apply 1 g topically to the appropriate area as directed As Needed. 6/2/21   Francisca Woodard MD   docusate sodium (COLACE) 100 MG capsule TAKE 1 CAPSULE BY MOUTH DAILY. 1/30/23   Hien Parham MD   famotidine (PEPCID) 20 MG tablet TAKE 1 TABLET BY MOUTH DAILY. 1/5/23   Hien Parham MD   folic acid (FOLVITE) 800 MCG tablet  Take 1 tablet by mouth Daily.    Francisca Woodard MD   HM TRUEplus Fiber 2 g chewable tablet CHEW 1 TABLET DAILY. 6/14/23   Hien Parham MD   Invega Sustenna 234 MG/1.5ML suspension prefilled syringe IM injection Inject 1.5 mL into the appropriate muscle as directed by prescriber Every 30 (Thirty) Days. 5/13/21   Francisca Woodard MD   Keppra 500 MG tablet Take 3 tablets by mouth Every 12 (Twelve) Hours As Needed. 6/2/21   Francisca Woodard MD   ketoconazole (NIZORAL) 2 % shampoo  3/9/23   Francisca Woodard MD   lamoTRIgine (LaMICtal) 200 MG tablet Take 1 tablet by mouth Daily. 6/2/21   Francisca Woodard MD   Melatonin 10 MG tablet Take 1 tablet by mouth every night at bedtime. 4/4/23   Hien Parham MD   methocarbamol (ROBAXIN) 750 MG tablet Take 1 tablet by mouth 4 (Four) Times a Day As Needed for Muscle Spasms. 8/24/23   Yun Amador PA-C   NON FORMULARY CPAP    Francisca Woodard MD   pravastatin (PRAVACHOL) 20 MG tablet TAKE 1 TABLET BY MOUTH EVERY NIGHT. 3/1/23   Hien Parham MD   triamcinolone (KENALOG) 0.025 % cream  7/31/23   Francisca Woodard MD   venlafaxine XR (EFFEXOR-XR) 150 MG 24 hr capsule  10/10/22   Francisca Woodard MD   venlafaxine XR (EFFEXOR-XR) 37.5 MG 24 hr capsule  2/28/23   Francisca Woodard MD        Social History:   Social History     Tobacco Use    Smoking status: Never    Smokeless tobacco: Never   Vaping Use    Vaping Use: Never used   Substance Use Topics    Alcohol use: Never    Drug use: Never         Review of Systems:  Review of Systems   Constitutional:  Negative for chills and fever.   HENT:  Negative for congestion, ear pain and sore throat.    Eyes:  Negative for pain.   Respiratory:  Negative for cough, chest tightness and shortness of breath.    Cardiovascular:  Negative for chest pain.   Gastrointestinal:  Negative for abdominal pain, diarrhea, nausea and vomiting.   Genitourinary:  Negative for flank pain  "and hematuria.   Musculoskeletal:  Positive for back pain. Negative for joint swelling.   Skin:  Negative for pallor.   Neurological:  Negative for seizures and headaches.   All other systems reviewed and are negative.     Physical Exam:  /85 (BP Location: Right arm, Patient Position: Lying)   Pulse 99   Temp 98.2 °F (36.8 °C) (Oral)   Resp 20   Ht 172.7 cm (68\")   Wt 77 kg (169 lb 12.1 oz)   SpO2 95%   BMI 25.81 kg/m²     Physical Exam  Vitals and nursing note reviewed.   Constitutional:       General: He is not in acute distress.     Appearance: Normal appearance. He is not toxic-appearing.   HENT:      Head: Normocephalic and atraumatic.      Mouth/Throat:      Mouth: Mucous membranes are moist.   Eyes:      General: No scleral icterus.  Cardiovascular:      Rate and Rhythm: Normal rate and regular rhythm.      Pulses: Normal pulses.      Heart sounds: Normal heart sounds.   Pulmonary:      Effort: Pulmonary effort is normal. No respiratory distress.      Breath sounds: Normal breath sounds.   Abdominal:      General: Abdomen is flat.      Palpations: Abdomen is soft.      Tenderness: There is no abdominal tenderness.   Musculoskeletal:         General: Tenderness present. Normal range of motion.        Arms:       Cervical back: Normal range of motion and neck supple.   Skin:     General: Skin is warm and dry.      Findings: Bruising (Med back on the right side) present.   Neurological:      Mental Status: He is alert and oriented to person, place, and time. Mental status is at baseline.                Procedures:  Procedures      Medical Decision Making:      Comorbidities that affect care:    History of compression fractures, cerebral palsy, seizures, low back pain, TIA,    External Notes reviewed:    Previous Radiological Studies: 2/1/2022 MRI       The following orders were placed and all results were independently analyzed by me:  Orders Placed This Encounter   Procedures    XR Spine Lumbar 2 " or 3 View    XR Spine Thoracic 3 View       Medications Given in the Emergency Department:  Medications   traMADol (ULTRAM) tablet 50 mg (50 mg Oral Given 10/2/23 1042)        ED Course:         Labs:    Lab Results (last 24 hours)       ** No results found for the last 24 hours. **             Imaging:    XR Spine Thoracic 3 View    Result Date: 10/2/2023  PROCEDURE: XR SPINE THORACIC 3 VW  COMPARISON: [facility], CR, T-SPINE-AP-LAT-SWIMMERS, 2/26/2020, 15:46.  INDICATIONS: Pain in mid back, fall on Friday  FINDINGS:  No acute fracture or malalignment of the thoracic spine is demonstrated.  Paraspinal soft tissues appear normal.  Moderate degenerative disc changes are noted throughout the cervical spine.        1. No acute fracture or malalignment in the thoracic spine     Jordon Shukla M.D.       Electronically Signed and Approved By: Jordon Shukla M.D. on 10/02/2023 at 11:35             XR Spine Lumbar 2 or 3 View    Result Date: 10/2/2023  PROCEDURE: XR SPINE LUMBAR 2 OR 3 VW  COMPARISON: [facility], CT, CT ABDOMEN PELVIS W CONTRAST, 6/06/2023, 9:12.  [facility], XR SPINE LUMBAR 2 OR 3 VW, 8/22/2023, 16:22.  INDICATIONS: fall wtih previous compression fracture, bruising present  FINDINGS:  There is a compression fracture of L4 with up to 30 % loss of vertebral body height.  No retropulsion is seen.  Finding appears similar in the interval.  There is mild anterior wedging of L1 with 11% loss of vertebral body height no acute fractures identified.  Paraspinal soft tissues appear normal.        1. Stable compression fractures of L1 and L4, as above 2. No acute fracture     Jordon Shukla M.D.       Electronically Signed and Approved By: Jordon Shukla M.D. on 10/02/2023 at 11:32                Differential Diagnosis and Discussion:    Back Pain: The patient presents with back pain. My differential diagnosis includes but is not limited to acute spinal epidural abscess,  acute spinal epidural bleed, cauda equina syndrome, abdominal aortic aneurysm, aortic dissection, kidney stone, pyelonephritis, musculoskeletal back pain, spinal fracture, and osteoarthritis.     All X-rays impressions were independently interpreted by me.    MDM  Number of Diagnoses or Management Options  Acute bilateral low back pain without sciatica (acute on chronic): new and does not require workup  Acute bilateral thoracic back pain (acute on chronic): new and does not require workup  Contusion, back, right, initial encounter: new and does not require workup     Amount and/or Complexity of Data Reviewed  Tests in the radiology section of CPT®: reviewed and ordered  Review and summarize past medical records: yes (I have personally reviewed patient's previous medical encounters.  )    Risk of Complications, Morbidity, and/or Mortality  Presenting problems: moderate  Diagnostic procedures: low  Management options: moderate    Patient Progress  Patient progress: stable           Patient Care Considerations:    CONSULT: I considered consulting neurosurgery, however his compression fractures are chronic.      Consultants/Shared Management Plan:    None    Social Determinants of Health:    Patient is independent, reliable, and has access to care.       Disposition and Care Coordination:    Discharged: The patient is suitable and stable for discharge with no need for consideration of observation or admission.    I have explained the patient´s condition, diagnoses and treatment plan based on the information available to me at this time. I have answered questions and addressed any concerns. The patient has a good  understanding of the patient´s diagnosis, condition, and treatment plan as can be expected at this point. The vital signs have been stable. The patient´s condition is stable and appropriate for discharge from the emergency department.      The patient will pursue further outpatient evaluation with the primary  care physician or other designated or consulting physician as outlined in the discharge instructions. They are agreeable to this plan of care and follow-up instructions have been explained in detail. The patient has received these instructions in written format and have expressed an understanding of the discharge instructions. The patient is aware that any significant change in condition or worsening of symptoms should prompt an immediate return to this or the closest emergency department or call to 911.    Final diagnoses:   Acute bilateral low back pain without sciatica (acute on chronic)   Acute bilateral thoracic back pain (acute on chronic)   Contusion, back, right, initial encounter        ED Disposition       ED Disposition   Discharge    Condition   Stable    Comment   --               This medical record created using voice recognition software.             Nikky Suarez, APRN  10/02/23 0012

## 2023-10-03 ENCOUNTER — TELEPHONE (OUTPATIENT)
Dept: FAMILY MEDICINE CLINIC | Age: 43
End: 2023-10-03
Payer: MEDICARE

## 2023-10-03 NOTE — TELEPHONE ENCOUNTER
----- Message from Elif Rushing sent at 10/3/2023  3:39 PM EDT -----  Regarding: FW: Appointment Request  Contact: [phone]  I am trying to schedule an ER follow up with Dr. Parham but I am not finding any open spots. Is it ok to try and schedule with another provider?  ----- Message -----  From: Kang Grey  Sent: 10/2/2023   2:40 PM EDT  To: [facility]  Subject: Appointment Request                              Appointment Request From: Kang Grey    With Provider: Hien Parham MD [[facility]]    Preferred Date Range: 10/4/2023 – 10/13/2023    Preferred Times: Any Time    Reason for visit: Hospital Follow-up Visit    Comments:  Kang went to the [facility] today.  He had xrays of his back. Discovered bruising and a hematoma. He states that he is in pain and with sound effects when moving, bath, dressing; etc.

## 2023-10-04 ENCOUNTER — OFFICE VISIT (OUTPATIENT)
Dept: FAMILY MEDICINE CLINIC | Age: 43
End: 2023-10-04
Payer: MEDICARE

## 2023-10-04 VITALS
SYSTOLIC BLOOD PRESSURE: 119 MMHG | HEIGHT: 68 IN | WEIGHT: 171.8 LBS | BODY MASS INDEX: 26.04 KG/M2 | OXYGEN SATURATION: 94 % | TEMPERATURE: 98.3 F | HEART RATE: 94 BPM | DIASTOLIC BLOOD PRESSURE: 77 MMHG

## 2023-10-04 DIAGNOSIS — M54.50 ACUTE BILATERAL LOW BACK PAIN WITHOUT SCIATICA: ICD-10-CM

## 2023-10-04 PROCEDURE — 99213 OFFICE O/P EST LOW 20 MIN: CPT | Performed by: NURSE PRACTITIONER

## 2023-10-04 PROCEDURE — 1160F RVW MEDS BY RX/DR IN RCRD: CPT | Performed by: NURSE PRACTITIONER

## 2023-10-04 PROCEDURE — 1159F MED LIST DOCD IN RCRD: CPT | Performed by: NURSE PRACTITIONER

## 2023-10-04 RX ORDER — METHOCARBAMOL 750 MG/1
750 TABLET, FILM COATED ORAL 4 TIMES DAILY PRN
Qty: 12 TABLET | Refills: 0 | Status: SHIPPED | OUTPATIENT
Start: 2023-10-04

## 2023-10-04 RX ORDER — METHYLPREDNISOLONE 4 MG/1
TABLET ORAL
Qty: 21 EACH | Refills: 0 | Status: SHIPPED | OUTPATIENT
Start: 2023-10-04

## 2023-10-04 NOTE — PROGRESS NOTES
"Chief Complaint  Hospital Follow Up Visit (Pt was seen at [facility] for a fall he had last Friday and his back is still hurting )    Subjective        Kang Grey presents to [facility]  Back Pain  This is a recurrent problem. The current episode started in the past 7 days. The problem is unchanged. The pain is present in the lumbar spine. The quality of the pain is described as aching. The pain does not radiate. The pain is at a severity of 9/10. The symptoms are aggravated by sitting and position. Associated symptoms include dysuria. Pertinent negatives include no bladder incontinence or bowel incontinence. Risk factors include recent trauma. He has tried NSAIDs, muscle relaxant and home exercises for the symptoms. The treatment provided mild relief.     Objective   Vital Signs:  /77 (BP Location: Right arm, Patient Position: Sitting, Cuff Size: Adult)   Pulse 94   Temp 98.3 °F (36.8 °C) (Oral)   Ht 172.7 cm (68\")   Wt 77.9 kg (171 lb 12.8 oz)   SpO2 94% Comment: room air  BMI 26.12 kg/m²   Estimated body mass index is 26.12 kg/m² as calculated from the following:    Height as of this encounter: 172.7 cm (68\").    Weight as of this encounter: 77.9 kg (171 lb 12.8 oz).             Physical Exam  Cardiovascular:      Rate and Rhythm: Normal rate and regular rhythm.   Pulmonary:      Effort: Pulmonary effort is normal.   Musculoskeletal:      Lumbar back: Spasms and tenderness present.   Skin:     General: Skin is warm and dry.   Neurological:      Mental Status: He is alert and oriented to person, place, and time.   Psychiatric:         Mood and Affect: Mood normal.      Result Review :          PROCEDURE:  XR SPINE LUMBAR 2 OR 3 VW     COMPARISON: [facility], CT, CT ABDOMEN PELVIS W CONTRAST, 6/06/2023, 9:12.  [facility], CR, XR SPINE LUMBAR 2 OR 3 VW, 8/22/2023, 16:22.     INDICATIONS:  fall wtih previous compression fracture, bruising " present     FINDINGS:          There is a compression fracture of L4 with up to 30 % loss of vertebral body height.  No   retropulsion is seen.  Finding appears similar in the interval.  There is mild anterior wedging of   L1 with 11% loss of vertebral body height no acute fractures identified.  Paraspinal soft tissues   appear normal.     IMPRESSION:                 1. Stable compression fractures of L1 and L4, as above  2. No acute fracture            Jordon Shukla M.D.         Electronically Signed and Approved By: Jordon Shukla M.D. on 10/02/2023 at 11:32          PROCEDURE:  MRI LUMBAR SPINE WO CONTRAST     COMPARISON: [facility], , LUMBAR SPINE WITHOUT CONTRAST, 3/06/2020, 8:29.     INDICATIONS:  CHRONIC LOW BACK PAIN     TECHNIQUE:    A variety of imaging planes and parameters were utilized for visualization of suspected   pathology.     FINDINGS:          PARASPINAL AREA:     The bladder is markedly distended.  The significance is uncertain.  BONES:             There is a wedge compression deformity of L4 with more of a Schmorl's node along the   superior endplate anteriorly.  There has been some evolution of compression fracture since the   prior study where the finding was more acute.  The degree of stenosis about 30.8 percent.  There is   a wedge compression deformity of L1 unchanged.  CORD/CAUDA EQUINA:            The conus is around the level of T12-L1.     LUMBAR DISC LEVELS:  L1-L2:   There is more of a broad-based disc protrusion extending asymmetrically left of central.    This has developed since the prior study.  There is an annular fissure within the disc.  There is   facet arthropathy and ligamentum flavum redundancy resulting in moderate narrowing of the central   canal.  There is moderate narrowing of the left intervertebral foramina.  The right intervertebral   foramina appears patent.  L2-L3:   There is a slight retrolisthesis of L2 on L3.  There is bulging of the annulus  which extends   asymmetrically laterally to the left.  There is an annular fissure within the disc.  There is   moderate narrowing of the left intervertebral foramina.  There is facet arthropathy and ligamentum   flavum redundancy resulting in moderate narrowing of the central canal.  There have been   progressive degenerative changes at this level since the prior study.  L3-L4:   There is broad-based bulging of the annulus superimposed on more of an osseous bar   peripherally.  There is facet arthropathy and ligamentum flavum redundancy resulting in moderate   narrowing of the central canal.  The appearance to this level is similar to the prior study.  There   is moderate narrowing of the intervertebral foramina.  L4-L5:   There is a slight retrolisthesis of L4 on L5.  There is broad-based bulging of the annulus   more peripherally superimposed on some osseous degenerative changes.  It looks like there is more   of a disc protrusion extending into the right intervertebral foraminal area.  There is moderate to   severe narrowing of the right intervertebral foramina.  There is moderate narrowing of the left   intervertebral foramina.  The findings at this level have been suggested.  L5-S1:   There is bulging of the annulus asymmetric laterally to the right.  There is moderate   narrowing of the right intervertebral foramina.  There is moderate narrowing of the left   intervertebral foramina as well.  The findings at this level seems similar to the prior study.     IMPRESSION:                 1. Wedge compression deformities of L1 and L4 which have been noted with some interval evolution to   the L4 compression fracture.  2. Multilevel degenerative changes with progressive change since prior study as described above.  3. Markedly distended bladder of uncertain significance.               KAT HILARIO MD         Electronically Signed and Approved By: KAT HILARIO MD on 2/28/2022 at 15:20                 Assessment and  Plan   Diagnoses and all orders for this visit:    1. Acute bilateral low back pain without sciatica  Comments:  Rest, alternate ice and heat, F/U if no improvement for possible MRI  Orders:  -     methocarbamol (ROBAXIN) 750 MG tablet; Take 1 tablet by mouth 4 (Four) Times a Day As Needed for Muscle Spasms.  Dispense: 12 tablet; Refill: 0  -     methylPREDNISolone (MEDROL) 4 MG dose pack; Take as directed on package instructions.  Dispense: 21 each; Refill: 0  -     Ambulatory Referral to Pain Management             Follow Up   Return if symptoms worsen or fail to improve.  Patient was given instructions and counseling regarding his condition or for health maintenance advice. Please see specific information pulled into the AVS if appropriate.

## 2023-10-11 ENCOUNTER — OFFICE VISIT (OUTPATIENT)
Dept: SLEEP MEDICINE | Facility: HOSPITAL | Age: 43
End: 2023-10-11
Payer: MEDICARE

## 2023-10-11 VITALS
SYSTOLIC BLOOD PRESSURE: 105 MMHG | DIASTOLIC BLOOD PRESSURE: 67 MMHG | HEIGHT: 68 IN | OXYGEN SATURATION: 96 % | HEART RATE: 95 BPM | WEIGHT: 167 LBS | BODY MASS INDEX: 25.31 KG/M2

## 2023-10-11 DIAGNOSIS — G47.31 COMPLEX SLEEP APNEA SYNDROME: Primary | ICD-10-CM

## 2023-10-11 DIAGNOSIS — G80.9 CEREBRAL PALSY, UNSPECIFIED TYPE: ICD-10-CM

## 2023-10-11 PROCEDURE — G0463 HOSPITAL OUTPT CLINIC VISIT: HCPCS

## 2023-10-11 NOTE — PROGRESS NOTES
"  [address] [address]  Phone: [phone]  Fax: [phone]      SLEEP CLINIC FOLLOW UP PROGRESS NOTE.    Kang Grey  [id number]   1980  43 y.o.  male      PCP: Hien Parham MD      Date of visit: 10/11/2023    Chief Complaint   Patient presents with    Sleep Apnea    Hydrocephalus    History of seizure       HPI:  This is a 43 y.o. years old patient is here for the management of complex sleep apnea.  Patient is using positive airway pressure therapy with ASV and the symptoms of sleep apnea have improved significantly on the therapy. Normally patient goes to bed at 10 PM PM and wakes up at 38 AM .  The patient wakes up 2 time(s) during the night and has no problem going back to sleep.  He is accompanied by his mother.    Medications and allergies are reviewed by me and documented in the encounter.     SOCIAL (habits pertaining to sleep medicine)  History tobacco use:No   History of alcohol use: 0 per week  Caffeine use: 3    REVIEW OF SYSTEMS:   Pertaining positive symptoms are:  [place] Sleepiness Scale :Total score: 2   Nasal congestion      PHYSICAL EXAMINATION:  CONSTITUTIONAL:  Vitals:    10/11/23 1300   BP: 105/67   Pulse: 95   SpO2: 96%   Weight: 75.8 kg (167 lb)   Height: 172.7 cm (67.99\")    Body mass index is 25.4 kg/mý.   NOSE: nasal passages are clear, No deformities noted   RESP SYSTEM: Not in any respiratory distress, no chest deformities noted,   CARDIOVASULAR: No edema noted  NEURO: Oriented x 3, gait normal,  Mood and affect appeared appropriate      Data reviewed:  The Smart card downloaded on 10/11/2023 has been reviewed independently by me for compliance and discussed the data with the patient.   Compliance 70%  Average use is 7 hours and 16 minutes   Rresidual AHI is 4.2/h  Mask type: Fullface mask  Device: DreamStation ASV  DME: [employer]      ASSESSMENT AND PLAN:  Complex sleep apnea ( G 47.33).  The symptoms of " sleep apnea have improved with the device and the treatment.  Patient's compliance with the device is excellent for treatment of sleep apnea.  I have independently reviewed the smart card down load and discussed with the patient the download data and encouarged the patient to continue to use the device.The residual AHI is acceptable. The device is benefiting the patient and the device is medically necessary.  Without proper control of sleep apnea and good compliance there is a increased risk for hypertension, diabetes mellitus and nonrestorative sleep with hypersomnia which can increase risk for motor vehicle accidents.  Untreated sleep apnea is also a risk factor for development of atrial fibrillation, pulmonary hypertension and stroke. The patient is also instructed to get the supplies from the DME company and and change them on a regular basis.  A prescription for supplies has been sent to the DME company. .    History of hydrocephalus  History of seizures  Return in about 1 year (around 10/11/2024) for with smart card down load. . Patient's questions were answered.    10/11/2023  Jimbo Paz MD  Sleep Medicine.  Medical Director,   [facility], [facility].

## 2023-10-12 DIAGNOSIS — E55.9 VITAMIN D DEFICIENCY: ICD-10-CM

## 2023-10-12 DIAGNOSIS — E78.2 MIXED HYPERLIPIDEMIA: ICD-10-CM

## 2023-10-12 RX ORDER — CHOLECALCIFEROL (VITAMIN D3) 25 MCG
TABLET ORAL
Qty: 60 TABLET | Refills: 1 | Status: SHIPPED | OUTPATIENT
Start: 2023-10-12

## 2023-10-12 RX ORDER — PRAVASTATIN SODIUM 20 MG
20 TABLET ORAL NIGHTLY
Qty: 30 TABLET | Refills: 1 | Status: SHIPPED | OUTPATIENT
Start: 2023-10-12

## 2023-10-13 ENCOUNTER — OFFICE VISIT (OUTPATIENT)
Dept: UROLOGY | Facility: CLINIC | Age: 43
End: 2023-10-13
Payer: MEDICARE

## 2023-10-13 VITALS
WEIGHT: 167 LBS | BODY MASS INDEX: 26.21 KG/M2 | SYSTOLIC BLOOD PRESSURE: 124 MMHG | HEART RATE: 90 BPM | HEIGHT: 67 IN | DIASTOLIC BLOOD PRESSURE: 77 MMHG

## 2023-10-13 DIAGNOSIS — R33.9 URINARY RETENTION: ICD-10-CM

## 2023-10-13 DIAGNOSIS — N39.0 URINARY TRACT INFECTION WITHOUT HEMATURIA, SITE UNSPECIFIED: Primary | ICD-10-CM

## 2023-10-13 DIAGNOSIS — N39.0 RECURRENT UTI: ICD-10-CM

## 2023-10-13 PROBLEM — G43.009 MIGRAINE WITHOUT AURA AND WITHOUT STATUS MIGRAINOSUS, NOT INTRACTABLE: Status: ACTIVE | Noted: 2023-08-09

## 2023-10-13 LAB
BILIRUB BLD-MCNC: ABNORMAL MG/DL
CLARITY, POC: ABNORMAL
COLOR UR: ABNORMAL
EXPIRATION DATE: ABNORMAL
GLUCOSE UR STRIP-MCNC: NEGATIVE MG/DL
KETONES UR QL: ABNORMAL
LEUKOCYTE EST, POC: ABNORMAL
Lab: ABNORMAL
NITRITE UR-MCNC: NEGATIVE MG/ML
PH UR: 6.5 [PH] (ref 5–8)
PROT UR STRIP-MCNC: ABNORMAL MG/DL
RBC # UR STRIP: ABNORMAL /UL
SP GR UR: 1.02 (ref 1–1.03)
URINE VOLUME: 35
UROBILINOGEN UR QL: NORMAL

## 2023-10-13 PROCEDURE — 87086 URINE CULTURE/COLONY COUNT: CPT | Performed by: NURSE PRACTITIONER

## 2023-10-13 RX ORDER — NITROFURANTOIN MACROCRYSTALS 50 MG/1
50 CAPSULE ORAL NIGHTLY
Qty: 90 CAPSULE | Refills: 3 | Status: SHIPPED | OUTPATIENT
Start: 2023-10-13

## 2023-10-13 NOTE — PROGRESS NOTES
Chief Complaint: neurogenic bladder    Subjective         History of Present Illness  Kang Grey is a 43 y.o. male presents to [facility] to be seen for urinary retention.    Patient returns today after having voiding trial performed and we had recommended for him to continue to perform clean intermittent catheterization every 4 hours.    The patient's urine today has a foul odor and does appear infected on in office urine dip.    Had another UTI in August which was Enterococcus faecalis that was resistant to levofloxacin and tetracycline.    He states he is not cathing regularly due to pain.    Previous:    Patient has been performing clean intermittent catheterization every 4 hours while awake for the last several years.    Previously seen by Dr. Adalid Morris.    The patient was seen in the emergency department on 5/17/2023 for exacerbation of major depression disorder and was not caring for himself.    He had a Roldan catheter placed which has remained in place since 5/17/2023.    Patient states the indwelling Roldan catheter is causing a significant amount of pain.    Would like to have catheter removed and states that he will resume clean intermittent catheterization.    The use [employer] for catheter supplier.    Objective     Past Medical History:   Diagnosis Date    Acute upper respiratory infection 02/2022    Age-related osteoporosis without current pathological fracture     Broken toe 11/2022    left great toe    Cerebral palsy     diagnosed as infant    Compression fracture of first lumbar vertebra     Constipation     Disease of pancreas     History of urinary self-catheterization     pt or pt's mother catheterizes 4-6  times a day    Hydrocephalus in diseases classified elsewhere     Injury of back 02/2020    Low back pain     Major depressive disorder     single episode, moderate    Other abnormal glucose     Other cerebral palsy     Other fatigue     Other  generalized epilepsy and epileptic syndromes, not intractable, without status epilepticus     Other hypertrophic disorders of the skin     Pain in left ankle and joints of left foot     Pain in right toe(s)     Pain in thoracic spine     Paranoid schizophrenia     Pelvic and perineal pain     PONV (postoperative nausea and vomiting)     Primary insomnia     Pure hypercholesterolemia     Repeated falls     last fall 11/2022    Schizoaffective disorder, unspecified     Seizures     last seizure was approx 2018, medication controlled    Sleep apnea     uses cpap machine    Somnolence     TIA (transient ischemic attack)     Vitamin D deficiency     Wedge compression fracture of fourth thoracic vertebra, subsequent encounter for fracture with routine healing        Past Surgical History:   Procedure Laterality Date    CYST REMOVAL Right 11/11/2022    Procedure: Excision of subcutaneous mass from right forearm;  Surgeon: Oni Greenberg MD;  Location: [facility];  Service: General;  Laterality: Right;    HAMSTRING REPAIR Bilateral     age 10    OTHER SURGICAL HISTORY      hyposadius repair    TOE FUSION Right     great toe at age 21         Current Outpatient Medications:     alclometasone (ACLOVATE) 0.05 % cream, , Disp: , Rfl:     ascorbic acid (VITAMIN C) 1000 MG tablet, Take 1 tablet by mouth Daily., Disp: , Rfl:     aspirin 81 MG EC tablet, Take 1 tablet by mouth Daily., Disp: , Rfl:     busPIRone (BUSPAR) 10 MG tablet, Take 1 tablet by mouth 3 (Three) Times a Day As Needed., Disp: , Rfl:     Calcium Carbonate 1500 (600 Ca) MG tablet, Take 1 tablet by mouth 2 (two) times a day., Disp: , Rfl:     cholecalciferol 25 MCG tablet, TAKE 2 TABLETS BY MOUTH DAILY., Disp: 60 tablet, Rfl: 1    Diclofenac Sodium (VOLTAREN) 1 % gel gel, Apply 1 g topically to the appropriate area as directed As Needed., Disp: , Rfl:     docusate sodium (COLACE) 100 MG capsule, TAKE 1 CAPSULE BY MOUTH DAILY., Disp: 30 capsule, Rfl: 1     famotidine (PEPCID) 20 MG tablet, TAKE 1 TABLET BY MOUTH DAILY., Disp: 90 tablet, Rfl: 1    folic acid (FOLVITE) 800 MCG tablet, Take 1 tablet by mouth Daily., Disp: , Rfl:     HM TRUEplus Fiber 2 g chewable tablet, CHEW 1 TABLET DAILY., Disp: 90 tablet, Rfl: 1    Invega Sustenna 234 MG/1.5ML suspension prefilled syringe IM injection, Inject 1.5 mL into the appropriate muscle as directed by prescriber Every 30 (Thirty) Days., Disp: , Rfl:     Keppra 500 MG tablet, Take 3 tablets by mouth Every 12 (Twelve) Hours As Needed., Disp: , Rfl:     ketoconazole (NIZORAL) 2 % shampoo, , Disp: , Rfl:     lamoTRIgine (LaMICtal) 200 MG tablet, Take 1 tablet by mouth Daily., Disp: , Rfl:     Melatonin 10 MG tablet, Take 1 tablet by mouth every night at bedtime., Disp: 90 tablet, Rfl: 1    methocarbamol (ROBAXIN) 750 MG tablet, Take 1 tablet by mouth 4 (Four) Times a Day As Needed for Muscle Spasms., Disp: 12 tablet, Rfl: 0    methylPREDNISolone (MEDROL) 4 MG dose pack, Take as directed on package instructions., Disp: 21 each, Rfl: 0    NON FORMULARY, CPAP, Disp: , Rfl:     pravastatin (PRAVACHOL) 20 MG tablet, TAKE 1 TABLET BY MOUTH EVERY NIGHT., Disp: 30 tablet, Rfl: 1    triamcinolone (KENALOG) 0.025 % cream, , Disp: , Rfl:     venlafaxine XR (EFFEXOR-XR) 150 MG 24 hr capsule, , Disp: , Rfl:     venlafaxine XR (EFFEXOR-XR) 37.5 MG 24 hr capsule, , Disp: , Rfl:     nitrofurantoin (MACRODANTIN) 50 MG capsule, Take 1 capsule by mouth Every Night., Disp: 90 capsule, Rfl: 3    Allergies   Allergen Reactions    Valproic Acid Irritability     DEPAKOTE CAUSES DIZZINESS AND IRRITABILITY          Family History   Problem Relation Age of Onset    Hypothyroidism Mother     No Known Problems Father     Ulcers Brother     Malig Hyperthermia Neg Hx        Social History     Socioeconomic History    Marital status: Single   Tobacco Use    Smoking status: Never     Passive exposure: Never    Smokeless tobacco: Never   Vaping Use    Vaping Use:  "Never used   Substance and Sexual Activity    Alcohol use: Never    Drug use: Never    Sexual activity: Defer       Vital Signs:   /77 (BP Location: Left arm, Patient Position: Sitting, Cuff Size: Adult)   Pulse 90   Ht 170.2 cm (67\")   Wt 75.8 kg (167 lb)   BMI 26.16 kg/mý      Physical Exam     Result Review :   The following data was reviewed by: ÁNGELA Quintana on 10/13/2023:  Results for orders placed or performed in visit on 10/13/23   Bladder Scan   Result Value Ref Range    Urine Volume 35    POC Urinalysis Dipstick, Automated    Specimen: Urine   Result Value Ref Range    Color Dark Yellow Yellow, Straw, Dark Yellow, Yanni    Clarity, UA Slightly Cloudy (A) Clear    Specific Gravity  1.020 1.005 - 1.030    pH, Urine 6.5 5.0 - 8.0    Leukocytes Large (3+) (A) Negative    Nitrite, UA Negative Negative    Protein,  mg/dL (A) Negative mg/dL    Glucose, UA Negative Negative mg/dL    Ketones, UA 15 mg/dL (A) Negative    Urobilinogen, UA Normal Normal, 0.2 E.U./dL    Bilirubin Small (1+) (A) Negative    Blood, UA Trace (A) Negative    Lot Number [id number]     Expiration Date 9/2,024         Bladder Scan interpretation 10/13/2023    Estimation of residual urine via PopJamI 3000 OnMyBlockon Bladder Scan  MA/nurse performing: zara holley Ma   Residual Urine: 35 ml  Indication: Urinary tract infection without hematuria, site unspecified    Urinary retention    Recurrent UTI   Position: Supine  Examination: Incremental scanning of the suprapubic area using 2.0 MHz transducer using copious amounts of acoustic gel.   Findings: An anechoic area was demonstrated which represented the bladder, with measurement of residual urine as noted. I inspected this myself. In that the residual urine was stable or insignificant, refer to plan for treatment and plan necessary at this time.            Procedures        Assessment and Plan    Diagnoses and all orders for this visit:    1. Urinary tract infection without " hematuria, site unspecified (Primary)  -     POC Urinalysis Dipstick, Automated  -     Urine Culture - Urine, Urine, Clean Catch; Future  -     Urine Culture - Urine, Urine, Clean Catch  -     Comprehensive Metabolic Panel; Future    2. Urinary retention  -     Bladder Scan  -     nitrofurantoin (MACRODANTIN) 50 MG capsule; Take 1 capsule by mouth Every Night.  Dispense: 90 capsule; Refill: 3    3. Recurrent UTI      Discussed with patient and patient's mother at this point in time I believe that he is continuing to get infections as he is not catheterizing himself.    Recommended he continue to try the cath at least 3 times a day.    Given he has been susceptibility with nitrofurantoin I will go ahead and prescribe low-dose Macrodantin to take daily for UTI prophylaxis.    We will send his urine for culture today.    He will follow-up with us in 3 months with a CMP prior to evaluate kidney functions after being on Macrodantin.      I spent 10 minutes caring for Kang on this date of service. This time includes time spent by me in the following activities:reviewing tests, obtaining and/or reviewing a separately obtained history, performing a medically appropriate examination and/or evaluation , counseling and educating the patient/family/caregiver, ordering medications, tests, or procedures, and documenting information in the medical record  Follow Up   Return in about 3 months (around 1/13/2024) for f/u utis with CMP .  Patient was given instructions and counseling regarding his condition or for health maintenance advice. Please see specific information pulled into the AVS if appropriate.         This document has been electronically signed by ÁNGELA Quintana  October 13, 2023 09:11 EDT

## 2023-10-14 LAB — BACTERIA SPEC AEROBE CULT: NORMAL

## 2023-10-24 ENCOUNTER — OFFICE VISIT (OUTPATIENT)
Dept: SURGERY | Facility: CLINIC | Age: 43
End: 2023-10-24
Payer: MEDICARE

## 2023-10-24 VITALS — WEIGHT: 174.6 LBS | RESPIRATION RATE: 20 BRPM | BODY MASS INDEX: 27.4 KG/M2 | HEIGHT: 67 IN

## 2023-10-24 DIAGNOSIS — K40.90 RIGHT INGUINAL HERNIA: Primary | ICD-10-CM

## 2023-10-24 PROCEDURE — 99203 OFFICE O/P NEW LOW 30 MIN: CPT | Performed by: SURGERY

## 2023-10-24 PROCEDURE — 1159F MED LIST DOCD IN RCRD: CPT | Performed by: SURGERY

## 2023-10-24 PROCEDURE — 1160F RVW MEDS BY RX/DR IN RCRD: CPT | Performed by: SURGERY

## 2023-10-24 RX ORDER — SODIUM CHLORIDE 0.9 % (FLUSH) 0.9 %
10 SYRINGE (ML) INJECTION AS NEEDED
OUTPATIENT
Start: 2023-10-24

## 2023-10-24 RX ORDER — ONDANSETRON 2 MG/ML
4 INJECTION INTRAMUSCULAR; INTRAVENOUS EVERY 6 HOURS PRN
OUTPATIENT
Start: 2023-10-24

## 2023-10-24 RX ORDER — SODIUM CHLORIDE, SODIUM LACTATE, POTASSIUM CHLORIDE, CALCIUM CHLORIDE 600; 310; 30; 20 MG/100ML; MG/100ML; MG/100ML; MG/100ML
70 INJECTION, SOLUTION INTRAVENOUS CONTINUOUS
OUTPATIENT
Start: 2023-10-24

## 2023-10-24 RX ORDER — SODIUM CHLORIDE 0.9 % (FLUSH) 0.9 %
10 SYRINGE (ML) INJECTION EVERY 12 HOURS SCHEDULED
OUTPATIENT
Start: 2023-10-24

## 2023-10-24 RX ORDER — SODIUM CHLORIDE 9 MG/ML
40 INJECTION, SOLUTION INTRAVENOUS AS NEEDED
OUTPATIENT
Start: 2023-10-24

## 2023-10-24 NOTE — H&P (VIEW-ONLY)
Inpatient History and Physical Surgical Orders    Preadmission Location:   Preadmission Time:  Facility:  Surgery Date:  Surgery Time:  Preadmission Test date:     Chief Complaint  Outpatient History and Physical / Surgical Orders    Primary Care Provider: Hien Parham MD    Referring Provider: Hien Parham MD    Subjective      Patient Name: Kang Grey : 1980    HPI  The patient is a 43-year-old gentleman who presents with a right inguinal hernia.  He has developed a reducible bulge in the right groin is gotten a little bit larger.    Past History:  Medical History: has a past medical history of Acute upper respiratory infection (2022), Age-related osteoporosis without current pathological fracture, Broken toe (2022), Cerebral palsy, Compression fracture of first lumbar vertebra, Constipation, Disease of pancreas, History of urinary self-catheterization, Hydrocephalus in diseases classified elsewhere, Injury of back (2020), Low back pain, Major depressive disorder, Other abnormal glucose, Other cerebral palsy, Other fatigue, Other generalized epilepsy and epileptic syndromes, not intractable, without status epilepticus, Other hypertrophic disorders of the skin, Pain in left ankle and joints of left foot, Pain in right toe(s), Pain in thoracic spine, Paranoid schizophrenia, Pelvic and perineal pain, PONV (postoperative nausea and vomiting), Primary insomnia, Pure hypercholesterolemia, Repeated falls, Schizoaffective disorder, unspecified, Seizures, Sleep apnea, Somnolence, TIA (transient ischemic attack), Vitamin D deficiency, and Wedge compression fracture of fourth thoracic vertebra, subsequent encounter for fracture with routine healing.   Surgical History: has a past surgical history that includes Other surgical history; Hamstring Repair (Bilateral); Toe Fusion (Right); and Cyst Removal (Right, 2022).   Family History: family history includes Hypothyroidism in his  mother; No Known Problems in his father; Ulcers in his brother.   Social History: reports that he has never smoked. He has never been exposed to tobacco smoke. He has never used smokeless tobacco. He reports that he does not drink alcohol and does not use drugs.  Allergies: Valproic acid       Current Outpatient Medications:     alclometasone (ACLOVATE) 0.05 % cream, , Disp: , Rfl:     ascorbic acid (VITAMIN C) 1000 MG tablet, Take 1 tablet by mouth Daily., Disp: , Rfl:     aspirin 81 MG EC tablet, Take 1 tablet by mouth Daily., Disp: , Rfl:     busPIRone (BUSPAR) 10 MG tablet, Take 1 tablet by mouth 3 (Three) Times a Day As Needed., Disp: , Rfl:     Calcium Carbonate 1500 (600 Ca) MG tablet, Take 1 tablet by mouth 2 (two) times a day., Disp: , Rfl:     cholecalciferol 25 MCG tablet, TAKE 2 TABLETS BY MOUTH DAILY., Disp: 60 tablet, Rfl: 1    Diclofenac Sodium (VOLTAREN) 1 % gel gel, Apply 1 g topically to the appropriate area as directed As Needed., Disp: , Rfl:     docusate sodium (COLACE) 100 MG capsule, TAKE 1 CAPSULE BY MOUTH DAILY., Disp: 30 capsule, Rfl: 1    famotidine (PEPCID) 20 MG tablet, TAKE 1 TABLET BY MOUTH DAILY., Disp: 90 tablet, Rfl: 1    folic acid (FOLVITE) 800 MCG tablet, Take 1 tablet by mouth Daily., Disp: , Rfl:     HM TRUEplus Fiber 2 g chewable tablet, CHEW 1 TABLET DAILY., Disp: 90 tablet, Rfl: 1    Invega Sustenna 234 MG/1.5ML suspension prefilled syringe IM injection, Inject 1.5 mL into the appropriate muscle as directed by prescriber Every 30 (Thirty) Days., Disp: , Rfl:     Keppra 500 MG tablet, Take 3 tablets by mouth Every 12 (Twelve) Hours As Needed., Disp: , Rfl:     ketoconazole (NIZORAL) 2 % shampoo, , Disp: , Rfl:     lamoTRIgine (LaMICtal) 200 MG tablet, Take 1 tablet by mouth Daily., Disp: , Rfl:     Melatonin 10 MG tablet, Take 1 tablet by mouth every night at bedtime., Disp: 90 tablet, Rfl: 1    methocarbamol (ROBAXIN) 750 MG tablet, Take 1 tablet by mouth 4 (Four) Times a  "Day As Needed for Muscle Spasms., Disp: 12 tablet, Rfl: 0    NON FORMULARY, CPAP, Disp: , Rfl:     pravastatin (PRAVACHOL) 20 MG tablet, TAKE 1 TABLET BY MOUTH EVERY NIGHT., Disp: 30 tablet, Rfl: 1    triamcinolone (KENALOG) 0.025 % cream, , Disp: , Rfl:     venlafaxine XR (EFFEXOR-XR) 150 MG 24 hr capsule, , Disp: , Rfl:     venlafaxine XR (EFFEXOR-XR) 37.5 MG 24 hr capsule, , Disp: , Rfl:        Objective   Vital Signs:   Resp 20   Ht 170.2 cm (67.01\")   Wt 79.2 kg (174 lb 9.6 oz)   BMI 27.34 kg/m²       Physical Exam  Vitals and nursing note reviewed.   Constitutional:       Appearance: Normal appearance. The patient is well-developed.   Cardiovascular:      Rate and Rhythm: Normal rate and regular rhythm.   Pulmonary:      Effort: Pulmonary effort is normal.      Breath sounds: Normal air entry.   Abdominal:      General: Bowel sounds are normal.      Palpations: Abdomen is soft.      Skin:     General: Skin is warm and dry.   Neurological:      Mental Status: The patient is alert and oriented to person, place, and time.      Motor: Motor function is intact.   Psychiatric:         Mood and Affect: Mood normal.   Groin: Medium sized reducible right inguinal hernia noted    Result Review :               Assessment and Plan   Diagnoses and all orders for this visit:    1. Right inguinal hernia (Primary)  -     Case Request; Standing  -     Follow Anesthesia Guidelines / Protocol; Standing  -     Verify NPO Status; Standing  -     Obtain Informed Consent; Standing  -     Verify / Perform Chlorhexidine Skin Prep; Standing  -     Verify / Perform Chlorhexidine Skin Prep if Indicated (If Not Already Completed); Standing  -     Insert Peripheral IV; Standing  -     Saline Lock & Maintain IV Access; Standing  -     sodium chloride 0.9 % flush 10 mL  -     sodium chloride 0.9 % flush 10 mL  -     sodium chloride 0.9 % infusion 40 mL  -     lactated ringers infusion  -     ondansetron (ZOFRAN) injection 4 mg  -     " ceFAZolin (ANCEF) 1,000 mg in sodium chloride 0.9 % 100 mL IVPB  -     Case Request    We will schedule him for a robotic right inguinal hernia repair.  I have described the procedure to him as well as the risk and benefits and he is agreeable to proceeding.    I  Mayur Quintana MD  10/24/2023

## 2023-11-06 NOTE — PRE-PROCEDURE INSTRUCTIONS
IMPORTANT INSTRUCTIONS - PRE-ADMISSION TESTING  DO NOT EAT OR CHEW anything after midnight the night before your procedure.    You may have CLEAR liquids up to 2_ hours prior to ARRIVAL time.   Take the following medications the morning of your procedure with JUST A SIP OF WATER: BUSPIRONE IF NEEDED, KEPPRA, LAMOTRIGINE, VENLAFAXINE, FOLIC ACID, METHOCARBAMOL, FAMOTIDINE    DO NOT BRING your medications to the hospital with you, UNLESS something has changed since your PRE-Admission Testing appointment.  Hold all vitamins, supplements, and NSAIDS (Non- steroidal anti-inflammatory meds) for one week prior to surgery (you MAY take Tylenol or Acetaminophen).  If you are diabetic, check your blood sugar the morning of your procedure. If it is less than 70 or if you are feeling symptomatic, call the following number for further instructions: [phone]_.  Use your inhalers/nebulizers as usual, the morning of your procedure. BRING YOUR INHALERS with you.   Bring your CPAP or BIPAP to hospital, ONLY IF YOU WILL BE SPENDING THE NIGHT.   Make sure you have a ride home and have someone who will stay with you the day of your procedure after you go home.  If you have any questions, please call your Pre-Admission Testing NurseENRIQUE_ at [phone]__.   Per anesthesia request, do not smoke for 24 hours before your procedure or as instructed by your surgeon.  Clear Liquid Diet        Find out when you need to start a clear liquid diet.   Think of “clear liquids” as anything you could read a newspaper through. This includes things like water, broth, sports drinks, or tea WITHOUT any kind of milk or cream.           Once you are told to start a clear liquid diet, only drink these things until 2 hours before arrival to the hospital or when the hospital says to stop. Total volume limitation: 8 oz.       Clear liquids you CAN drink:   Water   Clear broth: beef, chicken, vegetable, or bone broth with nothing in it   Mario    Lemonade or Marc-aid   Soda   Tea, coffee (NO cream or honey)   Jell-O (without fruit)   Popsicles (without fruit or cream)   Italian ices   Juice without pulp: apple, white, grape   You may use salt, pepper, and sugar    Do NOT drink:   Milk or cream   Soy milk, almond milk, coconut milk, or other non-dairy drinks and   creamers   Milkshakes or smoothies   Tomato juice   Orange juice   Grapefruit juice   Cream soups or any other than broth         Clear Liquid Diet:  Do NOT eat any solid food.  Do NOT eat or suck on mints or candy.  Do NOT chew gum.  Do NOT drink thick liquids like milk or juice with pulp in it.  Do NOT add milk, cream, or anything like soy milk or almond milk to coffee or tea.

## 2023-11-08 ENCOUNTER — ANESTHESIA EVENT (OUTPATIENT)
Dept: PERIOP | Facility: HOSPITAL | Age: 43
End: 2023-11-08
Payer: MEDICARE

## 2023-11-08 ENCOUNTER — HOSPITAL ENCOUNTER (OUTPATIENT)
Facility: HOSPITAL | Age: 43
Setting detail: HOSPITAL OUTPATIENT SURGERY
Discharge: HOME | End: 2023-11-08
Attending: SURGERY | Admitting: SURGERY
Payer: MEDICARE

## 2023-11-08 ENCOUNTER — ANESTHESIA (OUTPATIENT)
Dept: PERIOP | Facility: HOSPITAL | Age: 43
End: 2023-11-08
Payer: MEDICARE

## 2023-11-08 VITALS
TEMPERATURE: 97.6 F | RESPIRATION RATE: 16 BRPM | HEART RATE: 108 BPM | BODY MASS INDEX: 26.06 KG/M2 | WEIGHT: 171.96 LBS | SYSTOLIC BLOOD PRESSURE: 111 MMHG | HEIGHT: 68 IN | OXYGEN SATURATION: 93 % | DIASTOLIC BLOOD PRESSURE: 75 MMHG

## 2023-11-08 DIAGNOSIS — E78.2 MIXED HYPERLIPIDEMIA: ICD-10-CM

## 2023-11-08 DIAGNOSIS — K40.90 RIGHT INGUINAL HERNIA: ICD-10-CM

## 2023-11-08 PROCEDURE — 25010000002 DEXAMETHASONE PER 1 MG: Performed by: NURSE ANESTHETIST, CERTIFIED REGISTERED

## 2023-11-08 PROCEDURE — 25010000002 PROPOFOL 10 MG/ML EMULSION: Performed by: NURSE ANESTHETIST, CERTIFIED REGISTERED

## 2023-11-08 PROCEDURE — 25010000002 FENTANYL CITRATE (PF) 50 MCG/ML SOLUTION: Performed by: NURSE ANESTHETIST, CERTIFIED REGISTERED

## 2023-11-08 PROCEDURE — 25010000002 SUGAMMADEX 200 MG/2ML SOLUTION: Performed by: NURSE ANESTHETIST, CERTIFIED REGISTERED

## 2023-11-08 PROCEDURE — 25010000002 BUPIVACAINE (PF) 0.5 % SOLUTION: Performed by: SURGERY

## 2023-11-08 PROCEDURE — 25010000002 ONDANSETRON PER 1 MG: Performed by: SURGERY

## 2023-11-08 PROCEDURE — C1781 MESH (IMPLANTABLE): HCPCS | Performed by: SURGERY

## 2023-11-08 PROCEDURE — 25010000002 CEFAZOLIN 1-4 GM/50ML-% SOLUTION: Performed by: SURGERY

## 2023-11-08 PROCEDURE — 25810000003 LACTATED RINGERS PER 1000 ML: Performed by: SURGERY

## 2023-11-08 PROCEDURE — 25010000002 ESMOLOL 100 MG/10ML SOLUTION: Performed by: NURSE ANESTHETIST, CERTIFIED REGISTERED

## 2023-11-08 DEVICE — ABSORBABLE WOUND CLOSURE DEVICE
Type: IMPLANTABLE DEVICE | Site: INGUINAL | Status: FUNCTIONAL
Brand: V-LOC 180

## 2023-11-08 DEVICE — MESH PROGRIP LAP S/FIXATING LPG1510: Type: IMPLANTABLE DEVICE | Site: INGUINAL | Status: FUNCTIONAL

## 2023-11-08 RX ORDER — SODIUM CHLORIDE 9 MG/ML
40 INJECTION, SOLUTION INTRAVENOUS AS NEEDED
Status: DISCONTINUED | OUTPATIENT
Start: 2023-11-08 | End: 2023-11-08 | Stop reason: HOSPADM

## 2023-11-08 RX ORDER — SODIUM CHLORIDE 0.9 % (FLUSH) 0.9 %
10 SYRINGE (ML) INJECTION EVERY 12 HOURS SCHEDULED
Status: DISCONTINUED | OUTPATIENT
Start: 2023-11-08 | End: 2023-11-08 | Stop reason: HOSPADM

## 2023-11-08 RX ORDER — ESMOLOL HYDROCHLORIDE 10 MG/ML
INJECTION INTRAVENOUS AS NEEDED
Status: DISCONTINUED | OUTPATIENT
Start: 2023-11-08 | End: 2023-11-08 | Stop reason: SURG

## 2023-11-08 RX ORDER — FENTANYL CITRATE 50 UG/ML
INJECTION, SOLUTION INTRAMUSCULAR; INTRAVENOUS AS NEEDED
Status: DISCONTINUED | OUTPATIENT
Start: 2023-11-08 | End: 2023-11-08 | Stop reason: SURG

## 2023-11-08 RX ORDER — SODIUM CHLORIDE, SODIUM LACTATE, POTASSIUM CHLORIDE, CALCIUM CHLORIDE 600; 310; 30; 20 MG/100ML; MG/100ML; MG/100ML; MG/100ML
70 INJECTION, SOLUTION INTRAVENOUS CONTINUOUS
Status: DISCONTINUED | OUTPATIENT
Start: 2023-11-08 | End: 2023-11-08 | Stop reason: HOSPADM

## 2023-11-08 RX ORDER — CEFAZOLIN SODIUM 1 G/50ML
1000 INJECTION, SOLUTION INTRAVENOUS ONCE
Status: COMPLETED | OUTPATIENT
Start: 2023-11-08 | End: 2023-11-08

## 2023-11-08 RX ORDER — PHENYLEPHRINE HCL IN 0.9% NACL 1 MG/10 ML
SYRINGE (ML) INTRAVENOUS AS NEEDED
Status: DISCONTINUED | OUTPATIENT
Start: 2023-11-08 | End: 2023-11-08 | Stop reason: SURG

## 2023-11-08 RX ORDER — ONDANSETRON 2 MG/ML
4 INJECTION INTRAMUSCULAR; INTRAVENOUS ONCE AS NEEDED
Status: DISCONTINUED | OUTPATIENT
Start: 2023-11-08 | End: 2023-11-08 | Stop reason: HOSPADM

## 2023-11-08 RX ORDER — BUPIVACAINE HYDROCHLORIDE 5 MG/ML
INJECTION, SOLUTION EPIDURAL; INTRACAUDAL AS NEEDED
Status: DISCONTINUED | OUTPATIENT
Start: 2023-11-08 | End: 2023-11-08 | Stop reason: HOSPADM

## 2023-11-08 RX ORDER — ROCURONIUM BROMIDE 10 MG/ML
INJECTION, SOLUTION INTRAVENOUS AS NEEDED
Status: DISCONTINUED | OUTPATIENT
Start: 2023-11-08 | End: 2023-11-08 | Stop reason: SURG

## 2023-11-08 RX ORDER — PROMETHAZINE HYDROCHLORIDE 12.5 MG/1
25 TABLET ORAL ONCE AS NEEDED
Status: DISCONTINUED | OUTPATIENT
Start: 2023-11-08 | End: 2023-11-08 | Stop reason: HOSPADM

## 2023-11-08 RX ORDER — HYDROCODONE BITARTRATE AND ACETAMINOPHEN 5; 325 MG/1; MG/1
1 TABLET ORAL ONCE AS NEEDED
Status: DISCONTINUED | OUTPATIENT
Start: 2023-11-08 | End: 2023-11-08 | Stop reason: HOSPADM

## 2023-11-08 RX ORDER — PROPOFOL 10 MG/ML
VIAL (ML) INTRAVENOUS AS NEEDED
Status: DISCONTINUED | OUTPATIENT
Start: 2023-11-08 | End: 2023-11-08 | Stop reason: SURG

## 2023-11-08 RX ORDER — PROMETHAZINE HYDROCHLORIDE 25 MG/1
25 SUPPOSITORY RECTAL ONCE AS NEEDED
Status: DISCONTINUED | OUTPATIENT
Start: 2023-11-08 | End: 2023-11-08 | Stop reason: HOSPADM

## 2023-11-08 RX ORDER — DIAZEPAM 5 MG/1
10 TABLET ORAL ONCE AS NEEDED
Status: DISCONTINUED | OUTPATIENT
Start: 2023-11-08 | End: 2023-11-08 | Stop reason: HOSPADM

## 2023-11-08 RX ORDER — ONDANSETRON 2 MG/ML
4 INJECTION INTRAMUSCULAR; INTRAVENOUS EVERY 6 HOURS PRN
Status: DISCONTINUED | OUTPATIENT
Start: 2023-11-08 | End: 2023-11-08 | Stop reason: HOSPADM

## 2023-11-08 RX ORDER — SODIUM CHLORIDE 0.9 % (FLUSH) 0.9 %
10 SYRINGE (ML) INJECTION AS NEEDED
Status: DISCONTINUED | OUTPATIENT
Start: 2023-11-08 | End: 2023-11-08 | Stop reason: HOSPADM

## 2023-11-08 RX ORDER — DEXAMETHASONE SODIUM PHOSPHATE 4 MG/ML
INJECTION, SOLUTION INTRA-ARTICULAR; INTRALESIONAL; INTRAMUSCULAR; INTRAVENOUS; SOFT TISSUE AS NEEDED
Status: DISCONTINUED | OUTPATIENT
Start: 2023-11-08 | End: 2023-11-08 | Stop reason: SURG

## 2023-11-08 RX ORDER — SODIUM CHLORIDE, SODIUM LACTATE, POTASSIUM CHLORIDE, CALCIUM CHLORIDE 600; 310; 30; 20 MG/100ML; MG/100ML; MG/100ML; MG/100ML
9 INJECTION, SOLUTION INTRAVENOUS CONTINUOUS PRN
Status: DISCONTINUED | OUTPATIENT
Start: 2023-11-08 | End: 2023-11-08 | Stop reason: HOSPADM

## 2023-11-08 RX ORDER — HYDROCODONE BITARTRATE AND ACETAMINOPHEN 5; 325 MG/1; MG/1
1 TABLET ORAL EVERY 6 HOURS PRN
Qty: 10 TABLET | Refills: 0 | Status: SHIPPED | OUTPATIENT
Start: 2023-11-08

## 2023-11-08 RX ORDER — OXYCODONE HYDROCHLORIDE 5 MG/1
5 TABLET ORAL
Status: DISCONTINUED | OUTPATIENT
Start: 2023-11-08 | End: 2023-11-08 | Stop reason: HOSPADM

## 2023-11-08 RX ORDER — DEXMEDETOMIDINE HYDROCHLORIDE 100 UG/ML
INJECTION, SOLUTION INTRAVENOUS AS NEEDED
Status: DISCONTINUED | OUTPATIENT
Start: 2023-11-08 | End: 2023-11-08 | Stop reason: SURG

## 2023-11-08 RX ORDER — MEPERIDINE HYDROCHLORIDE 25 MG/ML
12.5 INJECTION INTRAMUSCULAR; INTRAVENOUS; SUBCUTANEOUS
Status: DISCONTINUED | OUTPATIENT
Start: 2023-11-08 | End: 2023-11-08 | Stop reason: HOSPADM

## 2023-11-08 RX ORDER — LIDOCAINE HYDROCHLORIDE 20 MG/ML
INJECTION, SOLUTION EPIDURAL; INFILTRATION; INTRACAUDAL; PERINEURAL AS NEEDED
Status: DISCONTINUED | OUTPATIENT
Start: 2023-11-08 | End: 2023-11-08 | Stop reason: SURG

## 2023-11-08 RX ORDER — ONDANSETRON 4 MG/1
4 TABLET, FILM COATED ORAL ONCE AS NEEDED
Status: DISCONTINUED | OUTPATIENT
Start: 2023-11-08 | End: 2023-11-08 | Stop reason: HOSPADM

## 2023-11-08 RX ORDER — ACETAMINOPHEN 500 MG
1000 TABLET ORAL ONCE
Status: COMPLETED | OUTPATIENT
Start: 2023-11-08 | End: 2023-11-08

## 2023-11-08 RX ORDER — IBUPROFEN 600 MG/1
600 TABLET ORAL EVERY 6 HOURS PRN
Status: DISCONTINUED | OUTPATIENT
Start: 2023-11-08 | End: 2023-11-08 | Stop reason: HOSPADM

## 2023-11-08 RX ADMIN — FENTANYL CITRATE 25 MCG: 50 INJECTION, SOLUTION INTRAMUSCULAR; INTRAVENOUS at 10:07

## 2023-11-08 RX ADMIN — Medication 200 MCG: at 09:48

## 2023-11-08 RX ADMIN — CEFAZOLIN SODIUM 1000 MG: 1 INJECTION, SOLUTION INTRAVENOUS at 09:09

## 2023-11-08 RX ADMIN — ROCURONIUM BROMIDE 10 MG: 50 INJECTION INTRAVENOUS at 10:17

## 2023-11-08 RX ADMIN — LIDOCAINE HYDROCHLORIDE 100 MG: 20 INJECTION, SOLUTION EPIDURAL; INFILTRATION; INTRACAUDAL; PERINEURAL at 09:24

## 2023-11-08 RX ADMIN — FENTANYL CITRATE 50 MCG: 50 INJECTION, SOLUTION INTRAMUSCULAR; INTRAVENOUS at 09:24

## 2023-11-08 RX ADMIN — ONDANSETRON 4 MG: 2 INJECTION INTRAMUSCULAR; INTRAVENOUS at 09:45

## 2023-11-08 RX ADMIN — FENTANYL CITRATE 50 MCG: 50 INJECTION, SOLUTION INTRAMUSCULAR; INTRAVENOUS at 09:32

## 2023-11-08 RX ADMIN — ACETAMINOPHEN 1000 MG: 500 TABLET ORAL at 08:46

## 2023-11-08 RX ADMIN — DEXMEDETOMIDINE 70 MCG: 100 INJECTION, SOLUTION INTRAVENOUS at 09:30

## 2023-11-08 RX ADMIN — ROCURONIUM BROMIDE 50 MG: 50 INJECTION INTRAVENOUS at 09:24

## 2023-11-08 RX ADMIN — Medication 100 MCG: at 09:52

## 2023-11-08 RX ADMIN — SUGAMMADEX 200 MG: 100 INJECTION, SOLUTION INTRAVENOUS at 10:40

## 2023-11-08 RX ADMIN — PROPOFOL 200 MG: 10 INJECTION, EMULSION INTRAVENOUS at 09:24

## 2023-11-08 RX ADMIN — ROCURONIUM BROMIDE 20 MG: 50 INJECTION INTRAVENOUS at 09:40

## 2023-11-08 RX ADMIN — SODIUM CHLORIDE, POTASSIUM CHLORIDE, SODIUM LACTATE AND CALCIUM CHLORIDE: 600; 310; 30; 20 INJECTION, SOLUTION INTRAVENOUS at 09:09

## 2023-11-08 RX ADMIN — SODIUM CHLORIDE, POTASSIUM CHLORIDE, SODIUM LACTATE AND CALCIUM CHLORIDE: 600; 310; 30; 20 INJECTION, SOLUTION INTRAVENOUS at 10:08

## 2023-11-08 RX ADMIN — FENTANYL CITRATE 25 MCG: 50 INJECTION, SOLUTION INTRAMUSCULAR; INTRAVENOUS at 10:17

## 2023-11-08 RX ADMIN — ESMOLOL HYDROCHLORIDE 10 MG: 100 INJECTION, SOLUTION INTRAVENOUS at 09:44

## 2023-11-08 RX ADMIN — DEXAMETHASONE SODIUM PHOSPHATE 4 MG: 4 INJECTION, SOLUTION INTRAMUSCULAR; INTRAVENOUS at 09:45

## 2023-11-08 RX ADMIN — Medication 200 MCG: at 09:56

## 2023-11-08 RX ADMIN — DIAZEPAM 10 MG: 5 TABLET ORAL at 08:25

## 2023-11-08 NOTE — ANESTHESIA PREPROCEDURE EVALUATION
Anesthesia Evaluation     Patient summary reviewed and Nursing notes reviewed   no history of anesthetic complications:   NPO Solid Status: > 8 hours  NPO Liquid Status: > 2 hours           Airway   Mallampati: II  TM distance: >3 FB  Neck ROM: full  No difficulty expected  Dental      Pulmonary - normal exam    breath sounds clear to auscultation  (+) ,sleep apnea  Cardiovascular - negative cardio ROS and normal exam  Exercise tolerance: good (4-7 METS)    Rhythm: regular  Rate: normal        Neuro/Psych  (+) seizures, TIA  GI/Hepatic/Renal/Endo    (+) GERD well controlled    Musculoskeletal (-) negative ROS    Abdominal    Substance History - negative use     OB/GYN negative ob/gyn ROS         Other - negative ROS       ROS/Med Hx Other: >4METS, HX CEREBRAL PALSY, GENERALIZED SEIZURES (LAST 2018),HYDROCEPHALIS,TIA,SLEEP APNEA. KT               Anesthesia Plan    ASA 3     general       Anesthetic plan, risks, benefits, and alternatives have been provided, discussed and informed consent has been obtained with: mother, father and patient.    CODE STATUS:

## 2023-11-08 NOTE — OP NOTE
INGUINAL HERNIA REPAIR LAPAROSCOPIC WITH DAVINCI ROBOT  Procedure Report    Patient Name:  Kang Grey  YOB: 1980    Date of Surgery:  11/8/2023     Indications: The patient is a 43-year-old gentleman that presents with a symptomatic right inguinal hernia.  The decision was made proceed with a robotic right inguinal hernia repair.    Pre-op Diagnosis: Right inguinal hernia    Post-Op Diagnosis: Same    Procedure/CPT® Codes:    Robotic right inguinal hernia repair    Staff:  Surgeon(s):  Mayur Quintana MD    Assistant: Khoa Sams    Anesthesia: General    Estimated Blood Loss: 20 mL    Implants:    Implant Name Type Inv. Item Serial No.  Lot No. LRB No. Used Action   DEV CLS WND VLOC/180 EH ABS 1/2CIR SZ2/0 15CM 27MM GRN - [id number] Implant DEV CLS WND VLOC/180 EH ABS 1/2CIR SZ2/0 15CM 27MM GRN  COVID[employer] [id number] Right 1 Implanted   MESH PROGRIP LAP S/FIXATING [id number] - [id number] Implant MESH PROGRIP LAP S/FIXATING [id number]  [employer] [id number] Right 1 Implanted       Specimen:          None        Findings: Direct right inguinal hernia    Complications: None    Description of Procedure: The patient was taken to the operating room and placed on the table in supine position.  After induction of general endotracheal anesthesia, the abdomen was prepped and draped sterilely.  The abdomen was insufflated with a Veress needle and a 12 mm left upper quadrant assist port was placed into the peritoneal cavity using a Visiport.  Three 8 mm da Max robotic trochars were then placed into the peritoneum above the umbilicus under direct vision.  The da Max robot was then brought up to the table and the robotic arms were docked to the trochars.  The camera and instruments were then placed into the peritoneal cavity under direct vision.  There was a direct right inguinal hernia visible with no evidence of a left inguinal hernia.  The peritoneum was incised cephalad to the right  inguinal canal using cautery scissors and the peritoneum was dissected down from the anterior abdominal wall.  Dissection was carried down into the right inguinal canal.  I dissected medially and identified Sanjay's ligament.  The direct hernia sac was dissected free and reduced.  I then dissected over the peritoneal flap laterally and created a large pocket for mesh placement.    I then dissected the peritoneum back further posteriorly off of the vessels to facilitate good mesh positioning.  A 10 x 15 cm piece of ProGrip hernia mesh was then placed into a preperitoneal position.  Once the mesh was fixated in position we appear to have good coverage of the direct and indirect hernia spaces.  We had good hemostasis.  The peritoneum was then reclosed with a running 2-0 absorbable V-Loc suture.  The bowel in the lower abdomen looked fine.  The robotic instruments were removed from the abdomen and the robotic arms were undocked from the trochars.  The left upper quadrant port site fascial defect was then closed with a Fercho Cherry closure device and a 0 Mersilene tie.  The abdomen was desufflated and the other ports were removed.  All skin incisions were closed with 4-0 Vicryl subcuticular sutures and sterile dressings were applied.  The patient was taken the postanesthesia recovery room in stable condition      Assistant: Khoa Sams  was responsible for performing the following activities: Retraction, Placing Dressing, and Held/Positioned Camera and their skilled assistance was necessary for the success of this case.    Mayur Quintana MD     Date: 11/8/2023  Time: 10:53 EST

## 2023-11-08 NOTE — ANESTHESIA POSTPROCEDURE EVALUATION
Patient: Kang Grey    Procedure Summary       Date: 11/08/23 Room / Location: [facility]  /  [facility]    Anesthesia Start: 0909 Anesthesia Stop: 1051    Procedure: INGUINAL HERNIA REPAIR LAPAROSCOPIC WITH DAVINCI ROBOT (Right: Abdomen) Diagnosis:       Right inguinal hernia      (Right inguinal hernia [K40.90])    Surgeons: Mayur Quintana MD Provider: David Bell MD    Anesthesia Type: general ASA Status: 3            Anesthesia Type: general    Vitals  Vitals Value Taken Time   /72 11/08/23 1144   Temp 36.4 °C (97.6 °F) 11/08/23 1050   Pulse 91 11/08/23 1151   Resp 16 11/08/23 1140   SpO2 92 % 11/08/23 1151   Vitals shown include unfiled device data.        Post Anesthesia Care and Evaluation    Patient location during evaluation: bedside  Patient participation: complete - patient participated  Level of consciousness: awake  Pain management: adequate    Airway patency: patent  PONV Status: none  Cardiovascular status: acceptable and stable  Respiratory status: acceptable  Hydration status: acceptable    Comments: An Anesthesiologist personally participated in the most demanding procedures (including induction and emergence if applicable) in the anesthesia plan, monitored the course of anesthesia administration at frequent intervals and remained physically present and available for immediate diagnosis and treatment of emergencies.

## 2023-11-08 NOTE — DISCHARGE INSTRUCTIONS
DISCHARGE INSTRUCTIONS  HERNIA      For your surgery you had:  General anesthesia (you may have a sore throat for the first 24 hours)  You may experience dizziness, drowsiness, or light-headedness for several hours following surgery/procedure.  Do not stay alone today or tonight.  Limit your activity for 24 hours.  Resume your diet slowly.  Follow whatever special dietary instructions you may have been given by your doctor.  You should not drive, operate machinery, drink alcohol, or sign legally binding documents for 24 hours or while you are taking pain medication.  Last dose of pain medication was given at:   .  NOTIFY YOUR DOCTOR IF YOU EXPERIENCE ANY OF THE FOLLOWING:  Temperature greater than 101 degrees Fahrenheit  Shaking Chills  Redness or excessive drainage from incision  Nausea, vomiting and/or pain that is not controlled by prescribed medications  Increase in bleeding or bleeding that is excessive  Unable to urinate in 6 hours after surgery  If unable to reach your doctor, please go to the closest Emergency Room [] You may remove dressing:   [] in 24 hours   [x] in 48 hours-Saturday   [] Other:    [x] You may shower or bathe: Saturday   Apply an ice pack for 24-48 hours.  [x] Wear a jockey support or tight fitting briefs to prevent  swelling.  Do not do any heavy lifting, pushing or pulling.  You may walk up and down stairs.  You may ride in a car but do not drive until instructed by your physician.  Avoid constipation.  If unable to urinate in 6 to 8 hours after surgery or urinating frequently in small amounts, notify your doctor or go to the nearest Emergency Room.  Medications per physician instructions as indicated on Discharge Medication Information Sheet.  You should see   for follow-up care   on  .  Phone number:       SPECIAL INSTRUCTIONS:                 I have read and received the above instructions.     Patient/Responsible Party's Signature Date/Time     RN Signature Date/Time

## 2023-11-08 NOTE — PERIOPERATIVE NURSING NOTE
MOTHER STATES HAS PAPERWORK SHOWING [employer] BUT DOESN'T HAVE IT WITH HER.  INSTRUCTED MOM TO BRING PAPERWORK NEXT TIME SHE COMES TO THE HOSPITAL.  STATES UNDERSTANDING.  PERMIT SIGNED BY MOM  CIELO BECERRA RN

## 2023-11-10 DIAGNOSIS — F51.01 PRIMARY INSOMNIA: ICD-10-CM

## 2023-11-10 RX ORDER — PHENOL 1.4 %
1 AEROSOL, SPRAY (ML) MUCOUS MEMBRANE
Qty: 30 TABLET | Refills: 1 | Status: SHIPPED | OUTPATIENT
Start: 2023-11-10

## 2023-11-14 ENCOUNTER — OFFICE VISIT (OUTPATIENT)
Dept: FAMILY MEDICINE CLINIC | Age: 43
End: 2023-11-14
Payer: MEDICARE

## 2023-11-14 VITALS
OXYGEN SATURATION: 95 % | BODY MASS INDEX: 26.67 KG/M2 | DIASTOLIC BLOOD PRESSURE: 76 MMHG | WEIGHT: 176 LBS | SYSTOLIC BLOOD PRESSURE: 126 MMHG | HEART RATE: 103 BPM | HEIGHT: 68 IN

## 2023-11-14 DIAGNOSIS — K40.90 RIGHT INGUINAL HERNIA: Primary | ICD-10-CM

## 2023-11-14 DIAGNOSIS — G40.209 COMPLEX PARTIAL SEIZURE EVOLVING TO GENERALIZED SEIZURE: ICD-10-CM

## 2023-11-14 DIAGNOSIS — G89.29 CHRONIC BILATERAL LOW BACK PAIN WITHOUT SCIATICA: ICD-10-CM

## 2023-11-14 DIAGNOSIS — F51.01 PRIMARY INSOMNIA: ICD-10-CM

## 2023-11-14 DIAGNOSIS — Z13.6 ENCOUNTER FOR SCREENING FOR CARDIOVASCULAR DISORDERS: ICD-10-CM

## 2023-11-14 DIAGNOSIS — K59.09 OTHER CONSTIPATION: ICD-10-CM

## 2023-11-14 DIAGNOSIS — N31.9 NEUROGENIC BLADDER: ICD-10-CM

## 2023-11-14 DIAGNOSIS — F20.0 PARANOID SCHIZOPHRENIA: ICD-10-CM

## 2023-11-14 DIAGNOSIS — E55.9 VITAMIN D DEFICIENCY: ICD-10-CM

## 2023-11-14 DIAGNOSIS — G47.31 COMPLEX SLEEP APNEA SYNDROME: ICD-10-CM

## 2023-11-14 DIAGNOSIS — E78.2 MIXED HYPERLIPIDEMIA: ICD-10-CM

## 2023-11-14 DIAGNOSIS — M54.50 CHRONIC BILATERAL LOW BACK PAIN WITHOUT SCIATICA: ICD-10-CM

## 2023-11-14 DIAGNOSIS — G80.8 OTHER CEREBRAL PALSY: ICD-10-CM

## 2023-11-14 NOTE — PROGRESS NOTES
Kang Grey presents to [facility].    Chief Complaint:    Subjective     History of Present Illness:  HPI  F/u for recent hospitalization outpt for R inguinal hernia repair, he is doing well, had a hard BM yesterday, eating well, sleeps all the time, he does not exercise.  Dr Quintana performed the surgery robotically on 11/8/23    Chronic fatigue with sleep apnea, he is on cpap and doesn't tolerate well, I advised he shave his beard, start good bedtime habits, cont melatonin, shave beard     Intermittent constipation, increase colace to 200 mg daily, cont fiber supplements.     Chronic underlying history of seizure disorder and he is stable on Keppra.  He sees Dr. Mccoy routinely.  No acute issues at this time     He has paranoid schizophrenia and is managed by psychiatry for this, he is chronic ongoing issues but is somewhat functional on Invega shots, depression is stable at this time.  He denies anxiety..  He is on lamictal, keppra, effexor and buspar as well as folic acid.       Concerning pure hypercholesterolemia, unspecified, current treatment includes a lipid lowering agent, pravastatin.  Tolerates medication well.  Compliance with treatment has been good.  He denies experiencing any hypercholesterolemia related symptoms.  He needs med refills today and is due for labs      His chronic depression is overall stable at this time.  He goes to [facility] and sees Marty for therapy and Jaylyn Adhikari [phone] for his meds.  He is on effexor 187.5  mg daily, buspar, lamictal 200 mg daily, and invega.  He is also on keppra for seizures and sees neurologist Dr. Mccoy as directed and has not had any seizures in the past few months     He is seen urology Dr. Torres and currently self cathing but evidently is not doing a good job with self cathing and waiting days between cathing.  He was advised that this is very detrimental to the bladder by overstretching it and causing  "permanent damage to the nerves and he needs to be self cathing 3-4 times a day.  He also has frequent UTIs but was recently treated for UTI and is doing well.       Result Review   The following data was reviewed by Hien Parham MD on 11/14/2023.  Lab Results   Component Value Date    WBC 6.54 05/23/2023    HGB 16.2 05/23/2023    HCT 46.7 05/23/2023    MCV 85.5 05/23/2023     05/23/2023     Lab Results   Component Value Date    GLUCOSE 96 05/23/2023    BUN 14 05/23/2023    CREATININE 0.86 05/23/2023    EGFR 110.9 05/23/2023    BCR 16.3 05/23/2023    K 4.2 05/23/2023    CO2 25.0 05/23/2023    CALCIUM 9.8 05/23/2023    ALBUMIN 4.7 05/23/2023    BILITOT 0.3 05/23/2023    AST 18 05/23/2023    ALT 22 05/23/2023     Lab Results   Component Value Date    CHOL 173 01/16/2023    CHLPL 174 05/06/2021    TRIG 147 01/16/2023    HDL 40 01/16/2023     (H) 01/16/2023     Lab Results   Component Value Date    TSH 1.590 12/07/2021     Lab Results   Component Value Date    HGBA1C 5.60 01/16/2023     No results found for: \"PSA\"      Lab Results   Component Value Date    KBDB21HA 44.1 12/07/2021               Assessment and Plan:   Diagnoses and all orders for this visit:    1. Right inguinal hernia (Primary)  Comments:  s/p repair with Dr Quintana, denies pain, BM are stable and he is eating well    2. Chronic bilateral low back pain without sciatica  Comments:  stable on topical diclofenac gel and robaxin prn.  Orders:  -     Diclofenac Sodium (VOLTAREN) 1 % gel gel; Apply 1 g topically to the appropriate area as directed As Needed (LBP).  Dispense: 100 g; Refill: 2    3. Paranoid schizophrenia  Comments:  moods are stable on Invega and buspar, rama Ferguson at [facility] for therapy and Jaylyn Adhikari    4. Other constipation  Comments:  stable on lev and fiber supplementation  Orders:  -     TSH+Free T4; Future    5. Primary insomnia  Comments:  on melatonin at night, uses cpap    6. Neurogenic " bladder  Comments:  he is self cathing and doing it sometimes only once every 3 days.  I emphasized that this is not healthy and he needs to be doing this at least 3 times a day    7. Other cerebral palsy    8. Complex partial seizure evolving to generalized seizure  Comments:  Stable and controlled on Keppra.  He will follow-up with his neurologist as directed and continue current med dosing, tolerates meds well    9. Complex sleep apnea syndrome    10. Mixed hyperlipidemia    11. Vitamin D deficiency  -     Comprehensive Metabolic Panel; Future  -     CBC (No Diff); Future  -     Lipid Panel; Future    12. Encounter for screening for cardiovascular disorders  -     Lipid Panel; Future              Objective     Medications:  Current Outpatient Medications   Medication Instructions    alclometasone (ACLOVATE) 0.05 % cream No dose, route, or frequency recorded.    ascorbic acid (VITAMIN C) 1000 MG tablet 1 tablet, Oral, Daily    aspirin 81 mg, Oral, Daily    busPIRone (BUSPAR) 10 MG tablet 1 tablet, Oral, 3 Times Daily PRN    Calcium Carbonate 1500 (600 Ca) MG tablet 1 tablet, Oral, 2 times daily    cholecalciferol 25 MCG tablet TAKE 2 TABLETS BY MOUTH DAILY.    Diclofenac Sodium (VOLTAREN) 1 g, Topical, As Needed    docusate sodium (COLACE) 100 mg, Oral, Daily    famotidine (PEPCID) 20 mg, Oral, Daily    folic acid (FOLVITE) 800 MCG tablet 1 tablet, Oral, Daily    HM TRUEplus Fiber 2 g chewable tablet 1 tablet, Oral, Daily    Invega Sustenna 234 MG/1.5ML suspension prefilled syringe IM injection 1.5 mL, Intramuscular, Every 30 Days    Keppra 500 MG tablet 3 tablets, Oral, Every 12 Hours    ketoconazole (NIZORAL) 2 % shampoo No dose, route, or frequency recorded.    lamoTRIgine (LaMICtal) 200 MG tablet 1 tablet, Oral, Daily    Melatonin 10 mg, Oral, Every Night at Bedtime    methocarbamol (ROBAXIN) 750 mg, Oral, 4 Times Daily PRN    pravastatin (PRAVACHOL) 20 mg, Oral, Nightly    triamcinolone (KENALOG) 0.025 %  "cream     venlafaxine XR (EFFEXOR-XR) 150 mg, Oral, Daily    venlafaxine XR (EFFEXOR-XR) 37.5 mg, Oral, Daily        Vital Signs:   /76 (BP Location: Left arm, Patient Position: Sitting, Cuff Size: Adult)   Pulse 103   Ht 172.7 cm (68\")   Wt 79.8 kg (176 lb)   SpO2 95%   BMI 26.76 kg/m²             Physical Exam:  Physical Exam  Vitals and nursing note reviewed.   Constitutional:       General: He is not in acute distress.     Appearance: Normal appearance. He is not ill-appearing, toxic-appearing or diaphoretic.      Comments: Disheveled   HENT:      Head: Normocephalic and atraumatic.      Right Ear: Tympanic membrane, ear canal and external ear normal.      Left Ear: Tympanic membrane, ear canal and external ear normal.      Nose: No congestion or rhinorrhea.      Mouth/Throat:      Mouth: Mucous membranes are moist.      Pharynx: Oropharynx is clear. No oropharyngeal exudate or posterior oropharyngeal erythema.   Eyes:      Extraocular Movements: Extraocular movements intact.      Conjunctiva/sclera: Conjunctivae normal.      Pupils: Pupils are equal, round, and reactive to light.   Cardiovascular:      Rate and Rhythm: Normal rate and regular rhythm.      Heart sounds: Normal heart sounds.   Pulmonary:      Effort: Pulmonary effort is normal.      Breath sounds: Normal breath sounds. No wheezing, rhonchi or rales.   Abdominal:      General: Abdomen is flat.      Palpations: Abdomen is soft. There is no mass.      Tenderness: There is no abdominal tenderness.      Hernia: No hernia is present.   Musculoskeletal:      Cervical back: Neck supple. No rigidity.      Right lower leg: No edema.      Left lower leg: No edema.   Lymphadenopathy:      Cervical: No cervical adenopathy.   Skin:     General: Skin is warm and dry.   Neurological:      General: No focal deficit present.      Mental Status: He is alert and oriented to person, place, and time. Mental status is at baseline.      Sensory: Sensation is " intact.      Motor: No weakness.      Gait: Gait normal.   Psychiatric:         Mood and Affect: Mood normal.         Speech: Speech normal.         Behavior: Behavior normal.         Thought Content: Thought content normal.         Cognition and Memory: Cognition is impaired.         Judgment: Judgment normal.           Review of Systems:  Review of Systems   Constitutional:  Negative for chills, fatigue and fever.   HENT:  Negative for congestion, ear discharge and sore throat.    Respiratory:  Negative for cough, shortness of breath and wheezing.    Cardiovascular:  Negative for chest pain and leg swelling.   Gastrointestinal:  Positive for constipation. Negative for abdominal pain, diarrhea, nausea, vomiting and GERD.   Genitourinary:  Negative for flank pain.   Neurological:  Negative for dizziness and headache.   Psychiatric/Behavioral:  Positive for sleep disturbance. Negative for suicidal ideas.               Follow Up   Return in about 3 months (around 2/14/2024) for Medicare Wellness.    Part of this note may be an electronic transcription/translation of spoken language to printed   text using the Dragon Dictation System.            Medical History:  Medications Discontinued During This Encounter   Medication Reason    HYDROcodone-acetaminophen (NORCO) 5-325 MG per tablet *Error    Diclofenac Sodium (VOLTAREN) 1 % gel gel Reorder      Past Medical History:    Acute upper respiratory infection    Age-related osteoporosis without current pathological fracture    Broken toe    left great toe    Cerebral palsy    diagnosed as infant    Compression fracture of first lumbar vertebra    Constipation    Disease of pancreas    GERD (gastroesophageal reflux disease)    History of urinary self-catheterization    pt or pt's mother catheterizes 4-6  times a day    Hydrocephalus in diseases classified elsewhere    NO  SHUNT    Injury of back    Low back pain    Major depressive disorder    single episode, moderate     Other cerebral palsy    Other fatigue    Other generalized epilepsy and epileptic syndromes, not intractable, without status epilepticus    Other hypertrophic disorders of the skin    Pain in left ankle and joints of left foot    Pain in right toe(s)    Pain in thoracic spine    Pancreatic cyst    Paranoid schizophrenia    Pelvic and perineal pain    Primary insomnia    Pure hypercholesterolemia    Repeated falls    last fall 11/2022    Schizoaffective disorder, unspecified    Seizures    last seizure was approx 2018, medication controlled    Sleep apnea    uses cpap machine    Somnolence    TIA (transient ischemic attack)    UNSURE OF  DATE    Vitamin D deficiency    Wedge compression fracture of fourth thoracic vertebra, subsequent encounter for fracture with routine healing     Past Surgical History:    CYST REMOVAL    Procedure: Excision of subcutaneous mass from right forearm;  Surgeon: Oni Greenberg MD;  Location: [facility] OR [facility];  Service: General;  Laterality: Right;    HAMSTRING REPAIR    age 10    INGUINAL HERNIA REPAIR    Procedure: INGUINAL HERNIA REPAIR LAPAROSCOPIC WITH DAVINCI ROBOT;  Surgeon: Mayur Quintana MD;  Location: [facility] OR [facility];  Service: Robotics - DaVinci;  Laterality: Right;    OTHER SURGICAL HISTORY    hyposadius repair    TOE FUSION    great toe at age 21      Family History   Problem Relation Age of Onset    Hypothyroidism Mother     No Known Problems Father     Ulcers Brother     Malig Hyperthermia Neg Hx      Social History     Tobacco Use    Smoking status: Never     Passive exposure: Never    Smokeless tobacco: Never   Substance Use Topics    Alcohol use: Never       Health Maintenance Due   Topic Date Due    ANNUAL WELLNESS VISIT  05/12/2023    INFLUENZA VACCINE  08/01/2023    COVID-19 Vaccine (6 - 2023-24 season) 09/01/2023        Immunization History   Administered Date(s) Administered    31-influenza Vac Quardvalent Preservativ 10/14/2014, 01/20/2016, 10/07/2016, 12/07/2021,  10/19/2022    COVID-19 (MODERNA) 1st,2nd,3rd Dose Monovalent 03/25/2021, 04/22/2021, 11/19/2021    COVID-19 (PFIZER) BIVALENT 12+YRS 10/19/2022    Covid-19 (Pfizer) Gray Cap Monovalent 06/20/2022    Fluzone (or Fluarix & Flulaval for VFC) >6mos 10/07/2016, 12/07/2021, 10/19/2022    Influenza Quad Vaccine (Inpatient) 10/01/2013    Influenza Seasonal Injectable 10/05/2011    Influenza, Unspecified 10/01/2013, 10/07/2016, 12/07/2021, 10/19/2022    Tdap 05/05/2016       Allergies   Allergen Reactions    Valproic Acid Irritability     DEPAKOTE CAUSES DIZZINESS AND IRRITABILITY

## 2023-11-16 ENCOUNTER — CLINICAL SUPPORT (OUTPATIENT)
Dept: FAMILY MEDICINE CLINIC | Age: 43
End: 2023-11-16
Payer: MEDICARE

## 2023-11-16 ENCOUNTER — LAB (OUTPATIENT)
Dept: LAB | Facility: HOSPITAL | Age: 43
End: 2023-11-16
Payer: MEDICARE

## 2023-11-16 DIAGNOSIS — Z13.6 ENCOUNTER FOR SCREENING FOR CARDIOVASCULAR DISORDERS: ICD-10-CM

## 2023-11-16 DIAGNOSIS — N39.0 URINARY TRACT INFECTION WITHOUT HEMATURIA, SITE UNSPECIFIED: ICD-10-CM

## 2023-11-16 DIAGNOSIS — E55.9 VITAMIN D DEFICIENCY: ICD-10-CM

## 2023-11-16 DIAGNOSIS — K59.09 OTHER CONSTIPATION: ICD-10-CM

## 2023-11-16 LAB
ALBUMIN SERPL-MCNC: 4.9 G/DL (ref 3.5–5.2)
ALBUMIN/GLOB SERPL: 1.9 G/DL
ALP SERPL-CCNC: 80 U/L (ref 39–117)
ALT SERPL W P-5'-P-CCNC: 22 U/L (ref 1–41)
ANION GAP SERPL CALCULATED.3IONS-SCNC: 9 MMOL/L (ref 5–15)
AST SERPL-CCNC: 19 U/L (ref 1–40)
BILIRUB SERPL-MCNC: 0.3 MG/DL (ref 0–1.2)
BUN SERPL-MCNC: 19 MG/DL (ref 6–20)
BUN/CREAT SERPL: 20.7 (ref 7–25)
CALCIUM SPEC-SCNC: 10.3 MG/DL (ref 8.6–10.5)
CHLORIDE SERPL-SCNC: 104 MMOL/L (ref 98–107)
CHOLEST SERPL-MCNC: 180 MG/DL (ref 0–200)
CO2 SERPL-SCNC: 26 MMOL/L (ref 22–29)
CREAT SERPL-MCNC: 0.92 MG/DL (ref 0.76–1.27)
DEPRECATED RDW RBC AUTO: 43.3 FL (ref 37–54)
EGFRCR SERPLBLD CKD-EPI 2021: 105.8 ML/MIN/1.73
ERYTHROCYTE [DISTWIDTH] IN BLOOD BY AUTOMATED COUNT: 13.2 % (ref 12.3–15.4)
GLOBULIN UR ELPH-MCNC: 2.6 GM/DL
GLUCOSE SERPL-MCNC: 103 MG/DL (ref 65–99)
HCT VFR BLD AUTO: 43.9 % (ref 37.5–51)
HDLC SERPL-MCNC: 45 MG/DL (ref 40–60)
HGB BLD-MCNC: 14.6 G/DL (ref 13–17.7)
LDLC SERPL CALC-MCNC: 116 MG/DL (ref 0–100)
LDLC/HDLC SERPL: 2.55 {RATIO}
MCH RBC QN AUTO: 29.3 PG (ref 26.6–33)
MCHC RBC AUTO-ENTMCNC: 33.3 G/DL (ref 31.5–35.7)
MCV RBC AUTO: 88 FL (ref 79–97)
PLATELET # BLD AUTO: 227 10*3/MM3 (ref 140–450)
PMV BLD AUTO: 10.4 FL (ref 6–12)
POTASSIUM SERPL-SCNC: 4.1 MMOL/L (ref 3.5–5.2)
PROT SERPL-MCNC: 7.5 G/DL (ref 6–8.5)
RBC # BLD AUTO: 4.99 10*6/MM3 (ref 4.14–5.8)
SODIUM SERPL-SCNC: 139 MMOL/L (ref 136–145)
T4 FREE SERPL-MCNC: 1.14 NG/DL (ref 0.93–1.7)
TRIGL SERPL-MCNC: 102 MG/DL (ref 0–150)
TSH SERPL DL<=0.05 MIU/L-ACNC: 1.73 UIU/ML (ref 0.27–4.2)
VLDLC SERPL-MCNC: 19 MG/DL (ref 5–40)
WBC NRBC COR # BLD: 8.22 10*3/MM3 (ref 3.4–10.8)

## 2023-11-16 PROCEDURE — 85027 COMPLETE CBC AUTOMATED: CPT

## 2023-11-16 PROCEDURE — 80053 COMPREHEN METABOLIC PANEL: CPT

## 2023-11-16 PROCEDURE — 84443 ASSAY THYROID STIM HORMONE: CPT

## 2023-11-16 PROCEDURE — 36415 COLL VENOUS BLD VENIPUNCTURE: CPT

## 2023-11-16 PROCEDURE — 84439 ASSAY OF FREE THYROXINE: CPT

## 2023-11-16 PROCEDURE — 80061 LIPID PANEL: CPT

## 2023-11-20 RX ORDER — HYDROCODONE BITARTRATE AND ACETAMINOPHEN 5; 325 MG/1; MG/1
TABLET ORAL
COMMUNITY

## 2023-11-21 ENCOUNTER — OFFICE VISIT (OUTPATIENT)
Dept: SURGERY | Facility: CLINIC | Age: 43
End: 2023-11-21
Payer: MEDICARE

## 2023-11-21 VITALS — BODY MASS INDEX: 25.61 KG/M2 | HEIGHT: 68 IN | WEIGHT: 169 LBS | RESPIRATION RATE: 20 BRPM

## 2023-11-21 DIAGNOSIS — K40.90 RIGHT INGUINAL HERNIA: Primary | ICD-10-CM

## 2023-11-21 PROCEDURE — 99024 POSTOP FOLLOW-UP VISIT: CPT | Performed by: SURGERY

## 2023-11-21 PROCEDURE — 1159F MED LIST DOCD IN RCRD: CPT | Performed by: SURGERY

## 2023-11-21 PROCEDURE — 1160F RVW MEDS BY RX/DR IN RCRD: CPT | Performed by: SURGERY

## 2023-11-21 NOTE — LETTER
November 21, 2023     Hien Parham MD  [address] [facility]  [address]  [place] [address]    Patient: Kang Grey   YOB: 1980   Date of Visit: 11/21/2023     Dear Hien Parham MD:       Thank you for referring Kang Grey to me for evaluation. Below are the relevant portions of my assessment and plan of care.    If you have questions, please do not hesitate to call me. I look forward to following Kang along with you.         Sincerely,        Mayur Quintana MD        CC: No Recipients    Mayur Quintana MD  11/21/23 1331  Sign when Signing Visit  Chief Complaint  Post-op Hernia    Subjective         Kang Grey presents to [facility]  History of Present Illness    Kang Grey is a 43 y.o. male  who presents today for a postoperative visit.     Patient is here for a follow-up after a robotic right inguinal hernia repair.  He is doing well and had no complaints today.    Past History:  Medical History: has a past medical history of Acute upper respiratory infection (02/2022), Age-related osteoporosis without current pathological fracture, Broken toe (11/2022), Cerebral palsy, Compression fracture of first lumbar vertebra, Constipation, Disease of pancreas, GERD (gastroesophageal reflux disease), History of urinary self-catheterization, Hydrocephalus in diseases classified elsewhere, Inguinal hernia, Injury of back (02/2020), Low back pain, Major depressive disorder, Other cerebral palsy, Other fatigue, Other generalized epilepsy and epileptic syndromes, not intractable, without status epilepticus, Other hypertrophic disorders of the skin, Pain in left ankle and joints of left foot, Pain in right toe(s), Pain in thoracic spine, Pancreatic cyst, Paranoid schizophrenia, Pelvic and perineal pain, Primary insomnia, Pure hypercholesterolemia, Repeated falls, Schizoaffective disorder, unspecified, Seizures, Sleep apnea, Somnolence, TIA  (transient ischemic attack), Vitamin D deficiency, and Wedge compression fracture of fourth thoracic vertebra, subsequent encounter for fracture with routine healing.   Surgical History: has a past surgical history that includes Other surgical history; Hamstring Repair (Bilateral); Toe Fusion (Right); Cyst Removal (Right, 11/11/2022); and Inguinal Hernia Repair (Right, 11/8/2023).   Family History: family history includes Hypothyroidism in his mother; No Known Problems in his father; Ulcers in his brother.   Social History: reports that he has never smoked. He has never been exposed to tobacco smoke. He has never used smokeless tobacco. He reports that he does not drink alcohol and does not use drugs.  Allergies: Valproic acid       Current Outpatient Medications:   •  alclometasone (ACLOVATE) 0.05 % cream, , Disp: , Rfl:   •  ascorbic acid (VITAMIN C) 1000 MG tablet, Take 1 tablet by mouth Daily., Disp: , Rfl:   •  aspirin 81 MG EC tablet, Take 1 tablet by mouth Daily., Disp: , Rfl:   •  busPIRone (BUSPAR) 10 MG tablet, Take 1 tablet by mouth 3 (Three) Times a Day As Needed., Disp: , Rfl:   •  Calcium Carbonate 1500 (600 Ca) MG tablet, Take 1 tablet by mouth 2 (two) times a day., Disp: , Rfl:   •  cholecalciferol 25 MCG tablet, TAKE 2 TABLETS BY MOUTH DAILY., Disp: 60 tablet, Rfl: 1  •  Diclofenac Sodium (VOLTAREN) 1 % gel gel, Apply 1 g topically to the appropriate area as directed As Needed (LBP)., Disp: 100 g, Rfl: 2  •  docusate sodium (COLACE) 100 MG capsule, TAKE 1 CAPSULE BY MOUTH DAILY., Disp: 30 capsule, Rfl: 1  •  famotidine (PEPCID) 20 MG tablet, TAKE 1 TABLET BY MOUTH DAILY., Disp: 90 tablet, Rfl: 1  •  folic acid (FOLVITE) 800 MCG tablet, Take 1 tablet by mouth Daily., Disp: , Rfl:   •  HM TRUEplus Fiber 2 g chewable tablet, CHEW 1 TABLET DAILY., Disp: 90 tablet, Rfl: 1  •  HYDROcodone-acetaminophen (NORCO) 5-325 MG per tablet, , Disp: , Rfl:   •  Invega Sustenna 234 MG/1.5ML suspension prefilled  "syringe IM injection, Inject 1.5 mL into the appropriate muscle as directed by prescriber Every 30 (Thirty) Days., Disp: , Rfl:   •  Keppra 500 MG tablet, Take 3 tablets by mouth Every 12 (Twelve) Hours., Disp: , Rfl:   •  ketoconazole (NIZORAL) 2 % shampoo, , Disp: , Rfl:   •  lamoTRIgine (LaMICtal) 200 MG tablet, Take 1 tablet by mouth Daily., Disp: , Rfl:   •  Melatonin 10 MG tablet, TAKE 1 TABLET BY MOUTH EVERY NIGHT AT BEDTIME., Disp: 30 tablet, Rfl: 1  •  methocarbamol (ROBAXIN) 750 MG tablet, Take 1 tablet by mouth 4 (Four) Times a Day As Needed for Muscle Spasms., Disp: 12 tablet, Rfl: 0  •  pravastatin (PRAVACHOL) 20 MG tablet, TAKE 1 TABLET BY MOUTH EVERY NIGHT., Disp: 30 tablet, Rfl: 1  •  triamcinolone (KENALOG) 0.025 % cream, , Disp: , Rfl:   •  venlafaxine XR (EFFEXOR-XR) 150 MG 24 hr capsule, Take 1 capsule by mouth Daily., Disp: , Rfl:   •  venlafaxine XR (EFFEXOR-XR) 37.5 MG 24 hr capsule, Take 1 capsule by mouth Daily., Disp: , Rfl:        Physical Exam  His incisions look good.  There is no evidence of infection.  Objective    Vital Signs:   Resp 20   Ht 172.7 cm (67.99\")   Wt 76.7 kg (169 lb)   BMI 25.70 kg/m²              Assessment and Plan    Diagnoses and all orders for this visit:    1. Right inguinal hernia (Primary)    We will see him back on an as-needed basis.  I have asked them to call me should have any further problems.      "

## 2023-11-21 NOTE — PROGRESS NOTES
Chief Complaint  Post-op Hernia    Subjective          Kang Grey presents to [facility] GENERAL SURGERY  History of Present Illness    Kang Grey is a 43 y.o. male  who presents today for a postoperative visit.     Patient is here for a follow-up after a robotic right inguinal hernia repair.  He is doing well and had no complaints today.    Past History:  Medical History: has a past medical history of Acute upper respiratory infection (02/2022), Age-related osteoporosis without current pathological fracture, Broken toe (11/2022), Cerebral palsy, Compression fracture of first lumbar vertebra, Constipation, Disease of pancreas, GERD (gastroesophageal reflux disease), History of urinary self-catheterization, Hydrocephalus in diseases classified elsewhere, Inguinal hernia, Injury of back (02/2020), Low back pain, Major depressive disorder, Other cerebral palsy, Other fatigue, Other generalized epilepsy and epileptic syndromes, not intractable, without status epilepticus, Other hypertrophic disorders of the skin, Pain in left ankle and joints of left foot, Pain in right toe(s), Pain in thoracic spine, Pancreatic cyst, Paranoid schizophrenia, Pelvic and perineal pain, Primary insomnia, Pure hypercholesterolemia, Repeated falls, Schizoaffective disorder, unspecified, Seizures, Sleep apnea, Somnolence, TIA (transient ischemic attack), Vitamin D deficiency, and Wedge compression fracture of fourth thoracic vertebra, subsequent encounter for fracture with routine healing.   Surgical History: has a past surgical history that includes Other surgical history; Hamstring Repair (Bilateral); Toe Fusion (Right); Cyst Removal (Right, 11/11/2022); and Inguinal Hernia Repair (Right, 11/8/2023).   Family History: family history includes Hypothyroidism in his mother; No Known Problems in his father; Ulcers in his brother.   Social History: reports that he has never smoked. He has never been exposed to tobacco  smoke. He has never used smokeless tobacco. He reports that he does not drink alcohol and does not use drugs.  Allergies: Valproic acid       Current Outpatient Medications:     alclometasone (ACLOVATE) 0.05 % cream, , Disp: , Rfl:     ascorbic acid (VITAMIN C) 1000 MG tablet, Take 1 tablet by mouth Daily., Disp: , Rfl:     aspirin 81 MG EC tablet, Take 1 tablet by mouth Daily., Disp: , Rfl:     busPIRone (BUSPAR) 10 MG tablet, Take 1 tablet by mouth 3 (Three) Times a Day As Needed., Disp: , Rfl:     Calcium Carbonate 1500 (600 Ca) MG tablet, Take 1 tablet by mouth 2 (two) times a day., Disp: , Rfl:     cholecalciferol 25 MCG tablet, TAKE 2 TABLETS BY MOUTH DAILY., Disp: 60 tablet, Rfl: 1    Diclofenac Sodium (VOLTAREN) 1 % gel gel, Apply 1 g topically to the appropriate area as directed As Needed (LBP)., Disp: 100 g, Rfl: 2    docusate sodium (COLACE) 100 MG capsule, TAKE 1 CAPSULE BY MOUTH DAILY., Disp: 30 capsule, Rfl: 1    famotidine (PEPCID) 20 MG tablet, TAKE 1 TABLET BY MOUTH DAILY., Disp: 90 tablet, Rfl: 1    folic acid (FOLVITE) 800 MCG tablet, Take 1 tablet by mouth Daily., Disp: , Rfl:     HM TRUEplus Fiber 2 g chewable tablet, CHEW 1 TABLET DAILY., Disp: 90 tablet, Rfl: 1    HYDROcodone-acetaminophen (NORCO) 5-325 MG per tablet, , Disp: , Rfl:     Invega Sustenna 234 MG/1.5ML suspension prefilled syringe IM injection, Inject 1.5 mL into the appropriate muscle as directed by prescriber Every 30 (Thirty) Days., Disp: , Rfl:     Keppra 500 MG tablet, Take 3 tablets by mouth Every 12 (Twelve) Hours., Disp: , Rfl:     ketoconazole (NIZORAL) 2 % shampoo, , Disp: , Rfl:     lamoTRIgine (LaMICtal) 200 MG tablet, Take 1 tablet by mouth Daily., Disp: , Rfl:     Melatonin 10 MG tablet, TAKE 1 TABLET BY MOUTH EVERY NIGHT AT BEDTIME., Disp: 30 tablet, Rfl: 1    methocarbamol (ROBAXIN) 750 MG tablet, Take 1 tablet by mouth 4 (Four) Times a Day As Needed for Muscle Spasms., Disp: 12 tablet, Rfl: 0    pravastatin  "(PRAVACHOL) 20 MG tablet, TAKE 1 TABLET BY MOUTH EVERY NIGHT., Disp: 30 tablet, Rfl: 1    triamcinolone (KENALOG) 0.025 % cream, , Disp: , Rfl:     venlafaxine XR (EFFEXOR-XR) 150 MG 24 hr capsule, Take 1 capsule by mouth Daily., Disp: , Rfl:     venlafaxine XR (EFFEXOR-XR) 37.5 MG 24 hr capsule, Take 1 capsule by mouth Daily., Disp: , Rfl:        Physical Exam  His incisions look good.  There is no evidence of infection.  Objective     Vital Signs:   Resp 20   Ht 172.7 cm (67.99\")   Wt 76.7 kg (169 lb)   BMI 25.70 kg/m²              Assessment and Plan    Diagnoses and all orders for this visit:    1. Right inguinal hernia (Primary)    We will see him back on an as-needed basis.  I have asked them to call me should have any further problems.      "

## 2023-12-06 DIAGNOSIS — E55.9 VITAMIN D DEFICIENCY: ICD-10-CM

## 2023-12-06 RX ORDER — CHOLECALCIFEROL (VITAMIN D3) 25 MCG
2000 TABLET ORAL DAILY
Qty: 60 TABLET | Refills: 1 | Status: SHIPPED | OUTPATIENT
Start: 2023-12-06

## 2023-12-07 DIAGNOSIS — E78.2 MIXED HYPERLIPIDEMIA: ICD-10-CM

## 2023-12-07 RX ORDER — PRAVASTATIN SODIUM 20 MG
20 TABLET ORAL NIGHTLY
Qty: 30 TABLET | Refills: 1 | Status: SHIPPED | OUTPATIENT
Start: 2023-12-07

## 2024-01-03 DIAGNOSIS — F51.01 PRIMARY INSOMNIA: ICD-10-CM

## 2024-01-03 RX ORDER — PHENOL 1.4 %
1 AEROSOL, SPRAY (ML) MUCOUS MEMBRANE
Qty: 30 TABLET | Refills: 0 | Status: SHIPPED | OUTPATIENT
Start: 2024-01-03

## 2024-01-11 ENCOUNTER — OFFICE VISIT (OUTPATIENT)
Dept: PODIATRY | Facility: CLINIC | Age: 44
End: 2024-01-11
Payer: MEDICARE

## 2024-01-11 VITALS
DIASTOLIC BLOOD PRESSURE: 85 MMHG | WEIGHT: 168 LBS | SYSTOLIC BLOOD PRESSURE: 135 MMHG | TEMPERATURE: 97.4 F | BODY MASS INDEX: 25.55 KG/M2 | OXYGEN SATURATION: 95 % | HEART RATE: 99 BPM

## 2024-01-11 DIAGNOSIS — B35.1 ONYCHOMYCOSIS: Primary | ICD-10-CM

## 2024-01-11 DIAGNOSIS — R26.2 DIFFICULTY WALKING: ICD-10-CM

## 2024-01-11 DIAGNOSIS — L60.0 ONYCHOCRYPTOSIS: ICD-10-CM

## 2024-01-11 DIAGNOSIS — M79.672 FOOT PAIN, BILATERAL: ICD-10-CM

## 2024-01-11 DIAGNOSIS — M79.671 FOOT PAIN, BILATERAL: ICD-10-CM

## 2024-01-11 DIAGNOSIS — M21.371 FOOT DROP, BILATERAL: ICD-10-CM

## 2024-01-11 DIAGNOSIS — M21.372 FOOT DROP, BILATERAL: ICD-10-CM

## 2024-01-11 DIAGNOSIS — G62.9 NEUROPATHY: ICD-10-CM

## 2024-01-11 NOTE — PROGRESS NOTES
[facility] - PODIATRY    Today's Date: 01/11/24    Patient Name: Kang Grey  MRN: [id number]  CSN: [id number]  PCP: Hien Parham MD, Last PCP Visit: 11/14/2023  Referring Provider: No ref. provider found    SUBJECTIVE     Chief Complaint   Patient presents with    Left Foot - Follow-up, Nail Problem    Right Foot - Follow-up, Nail Problem       HPI: Kang Grey, a 43 y.o.male, comes to clinic.    New, Established, New Problem:  established   Location:  Toenails  Duration:   Greater than five years  Onset:  Gradual  Nature:  sore with palpation.  Stable, worsening, improving:   Stable  Aggravating factors:  Pain with shoe gear and ambulation.  Previous Treatment:  debridement    Medical changes:  Inguinal hernia repair 11/21/23    Patient denies any fevers, chills, nausea, vomiting, shortness of breathe, nor any other constitutional signs nor symptoms.       Past Medical History:   Diagnosis Date    Acute upper respiratory infection 02/2022    Age-related osteoporosis without current pathological fracture     Broken toe 11/2022    left great toe    Cerebral palsy     diagnosed as infant    Compression fracture of first lumbar vertebra     Constipation     Disease of pancreas     GERD (gastroesophageal reflux disease)     History of urinary self-catheterization     pt or pt's mother catheterizes 4-6  times a day    Hydrocephalus in diseases classified elsewhere     NO  SHUNT    Inguinal hernia     Injury of back 02/2020    Low back pain     Major depressive disorder     single episode, moderate    Other cerebral palsy     Other fatigue     Other generalized epilepsy and epileptic syndromes, not intractable, without status epilepticus     Other hypertrophic disorders of the skin     Pain in left ankle and joints of left foot     Pain in right toe(s)     Pain in thoracic spine     Pancreatic cyst     Paranoid schizophrenia     Pelvic and perineal pain     Primary insomnia     Pure  hypercholesterolemia     Repeated falls     last fall 11/2022    Schizoaffective disorder, unspecified     Seizures     last seizure was approx 2018, medication controlled    Sleep apnea     uses cpap machine    Somnolence     TIA (transient ischemic attack)     UNSURE OF  DATE    Vitamin D deficiency     Wedge compression fracture of fourth thoracic vertebra, subsequent encounter for fracture with routine healing      Past Surgical History:   Procedure Laterality Date    CYST REMOVAL Right 11/11/2022    Procedure: Excision of subcutaneous mass from right forearm;  Surgeon: Oni Greenberg MD;  Location: [facility] [place];  Service: General;  Laterality: Right;    HAMSTRING REPAIR Bilateral     age 10    INGUINAL HERNIA REPAIR Right 11/8/2023    Procedure: INGUINAL HERNIA REPAIR LAPAROSCOPIC WITH DAVINCI ROBOT;  Surgeon: Mayur Quintana MD;  Location: [facility] [place];  Service: Robotics - DaVinci;  Laterality: Right;    OTHER SURGICAL HISTORY      hyposadius repair    TOE FUSION Right     great toe at age 21     Family History   Problem Relation Age of Onset    Hypothyroidism Mother     No Known Problems Father     Ulcers Brother     Malig Hyperthermia Neg Hx      Social History     Socioeconomic History    Marital status: Single   Tobacco Use    Smoking status: Never     Passive exposure: Never    Smokeless tobacco: Never   Vaping Use    Vaping Use: Never used   Substance and Sexual Activity    Alcohol use: Never    Drug use: Never    Sexual activity: Defer     Allergies   Allergen Reactions    Valproic Acid Irritability     DEPAKOTE CAUSES DIZZINESS AND IRRITABILITY       Current Outpatient Medications   Medication Sig Dispense Refill    alclometasone (ACLOVATE) 0.05 % cream       ascorbic acid (VITAMIN C) 1000 MG tablet Take 1 tablet by mouth Daily.      aspirin 81 MG EC tablet Take 1 tablet by mouth Daily.      busPIRone (BUSPAR) 10 MG tablet Take 1 tablet by mouth 3 (Three) Times a Day As Needed.      Calcium  Carbonate 1500 (600 Ca) MG tablet Take 1 tablet by mouth 2 (two) times a day.      cholecalciferol 25 MCG tablet TAKE TWO TABLETS BY MOUTH ONCE DAILY 60 tablet 1    Diclofenac Sodium (VOLTAREN) 1 % gel gel Apply 1 g topically to the appropriate area as directed As Needed (LBP). 100 g 2    docusate sodium (COLACE) 100 MG capsule TAKE 1 CAPSULE BY MOUTH DAILY. 30 capsule 1    famotidine (PEPCID) 20 MG tablet TAKE 1 TABLET BY MOUTH DAILY. 90 tablet 1    folic acid (FOLVITE) 800 MCG tablet Take 1 tablet by mouth Daily.      HM TRUEplus Fiber 2 g chewable tablet CHEW 1 TABLET DAILY. 90 tablet 1    Invega Sustenna 234 MG/1.5ML suspension prefilled syringe IM injection Inject 1.5 mL into the appropriate muscle as directed by prescriber Every 30 (Thirty) Days.      Keppra 500 MG tablet Take 3 tablets by mouth Every 12 (Twelve) Hours.      ketoconazole (NIZORAL) 2 % shampoo       lamoTRIgine (LaMICtal) 200 MG tablet Take 1 tablet by mouth Daily.      Melatonin 10 MG tablet TAKE 1 TABLET BY MOUTH EVERY NIGHT AT BEDTIME. 30 tablet 0    methocarbamol (ROBAXIN) 750 MG tablet Take 1 tablet by mouth 4 (Four) Times a Day As Needed for Muscle Spasms. 12 tablet 0    pravastatin (PRAVACHOL) 20 MG tablet TAKE 1 TABLET BY MOUTH EVERY NIGHT. 30 tablet 1    triamcinolone (KENALOG) 0.025 % cream       venlafaxine XR (EFFEXOR-XR) 150 MG 24 hr capsule Take 1 capsule by mouth Daily.      venlafaxine XR (EFFEXOR-XR) 37.5 MG 24 hr capsule Take 1 capsule by mouth Daily.      HYDROcodone-acetaminophen (NORCO) 5-325 MG per tablet  (Patient not taking: Reported on 1/11/2024)       No current facility-administered medications for this visit.     Review of Systems   Constitutional: Negative.    Skin:         Painful toenails bilaterally   All other systems reviewed and are negative.      OBJECTIVE     Vitals:    01/11/24 0851   BP: 135/85   Pulse: 99   Temp: 97.4 °F (36.3 °C)   SpO2: 95%         Patient seen in no apparent distress.      PHYSICAL  EXAM:     Foot/Ankle Exam    GENERAL  Appearance:  appears stated age and chronically ill  Orientation:  unable to assess  Affect:  inappropriate  Gait:  antalgic  Assistance:  cane use  Right shoe gear: casual shoe (Derick Brace)  Left shoe gear: casual shoe (Larue Brace)    VASCULAR     Right Foot Vascularity   Dorsalis pedis:  2+  Posterior tibial:  2+  Skin temperature:  warm  Edema grading:  None  CFT:  < 3 seconds  Pedal hair growth:  Present  Varicosities:  mild varicosities     Left Foot Vascularity   Dorsalis pedis:  2+  Posterior tibial:  2+  Skin temperature:  warm  Edema grading:  None  CFT:  < 3 seconds  Pedal hair growth:  Present  Varicosities:  mild varicosities     NEUROLOGIC     Right Foot Neurologic   Light touch sensation: diminished  Vibratory sensation: diminished  Hot/Cold sensation: diminished  Protective Sensation using Ludlow-Manpreet Monofilament:   Sites intact: 3  Sites tested: 10     Left Foot Neurologic   Light touch sensation: diminished  Vibratory sensation: diminished  Hot/Cold sensation:  diminished  Protective Sensation using Ludlow-Manpreet Monofilament:   Sites intact: 3  Sites tested: 10    MUSCULOSKELETAL     Right Foot Musculoskeletal   Hammertoe:  First toe     Left Foot Musculoskeletal   Hammertoe:  First toe    MUSCLE STRENGTH     Right Foot Muscle Strength   Foot dorsiflexion:  1  Foot plantar flexion:  4-  Foot inversion:  2  Foot eversion:  2     Left Foot Muscle Strength   Foot dorsiflexion:  1  Foot plantar flexion:  4-  Foot inversion:  2  Foot eversion:  2    DERMATOLOGIC      Right Foot Dermatologic   Skin  Right foot skin is intact.   Nails  1.  Positive for elongated, onychomycosis, abnormal thickness, subungual debris and ingrown toenail.  2.  Positive for elongated, onychomycosis, abnormal thickness, subungual debris and ingrown toenail.  3.  Positive for elongated, onychomycosis, abnormal thickness, subungual debris and ingrown toenail.  4.  Positive for  elongated, onychomycosis, abnormal thickness, subungual debris and ingrown toenail.  5.  Positive for elongated, onychomycosis, abnormal thickness, subungual debris and ingrown toenail.     Left Foot Dermatologic   Skin  Left foot skin is intact.   Nails  1.  Positive for elongated, onychomycosis, abnormal thickness, subungual debris and ingrown toenail.  2.  Positive for elongated, onychomycosis, abnormal thickness, subungual debris and ingrown toenail.  3.  Positive for elongated, onychomycosis, abnormal thickness, subungual debris and ingrown toenail.  4.  Positive for elongated, onychomycosis, abnormally thick, subungual debris and ingrown toenail.  5.  Positive for elongated, onychomycosis, abnormally thick, subungual debris and ingrown toenail.      ASSESSMENT/PLAN     Diagnoses and all orders for this visit:    1. Onychomycosis (Primary)    2. Foot pain, bilateral    3. Foot drop, bilateral    4. Neuropathy    5. Onychocryptosis    6. Difficulty walking        Comprehensive lower extremity examination and evaluation was performed.    Discussed findings and treatment plan including risks, benefits, and treatment options with patient in detail. Patient agreed with treatment plan.    Toenails 1 through 5 bilaterally were debrided in thickness and length and then smoothed with a Dremel Tool.  Tolerated the procedure well without complications.    An After Visit Summary was printed and given to the patient at discharge, including (if requested) any available informative/educational handouts regarding diagnosis, treatment, or medications. All questions were answered to patient/family satisfaction. Should symptoms fail to improve or worsen they agree to call or return to clinic or to go to the Emergency Department. Discussed the importance of following up with any needed screening tests/labs/specialist appointments and any requested follow-up recommended by me today. Importance of maintaining follow-up discussed and  patient accepts that missed appointments can delay diagnosis and potentially lead to worsening of conditions.    Return in about 9 weeks (around 3/14/2024) for Toenail Care., or sooner if acute issues arise.    This document has been electronically signed by Roni Osullivan DPM on January 11, 2024 09:08 EST

## 2024-01-12 ENCOUNTER — OFFICE VISIT (OUTPATIENT)
Dept: UROLOGY | Facility: CLINIC | Age: 44
End: 2024-01-12
Payer: MEDICARE

## 2024-01-12 VITALS
SYSTOLIC BLOOD PRESSURE: 112 MMHG | HEIGHT: 60 IN | DIASTOLIC BLOOD PRESSURE: 85 MMHG | HEART RATE: 16 BPM | WEIGHT: 176.2 LBS | BODY MASS INDEX: 34.59 KG/M2 | RESPIRATION RATE: 16 BRPM

## 2024-01-12 DIAGNOSIS — N40.1 BENIGN PROSTATIC HYPERPLASIA WITH INCOMPLETE BLADDER EMPTYING: ICD-10-CM

## 2024-01-12 DIAGNOSIS — R33.9 URINARY RETENTION: ICD-10-CM

## 2024-01-12 DIAGNOSIS — R39.14 BENIGN PROSTATIC HYPERPLASIA WITH INCOMPLETE BLADDER EMPTYING: ICD-10-CM

## 2024-01-12 DIAGNOSIS — N39.0 RECURRENT UTI: Primary | ICD-10-CM

## 2024-01-12 PROBLEM — M47.817 LUMBOSACRAL SPONDYLOSIS WITHOUT MYELOPATHY: Status: ACTIVE | Noted: 2024-01-12

## 2024-01-12 LAB
BILIRUB BLD-MCNC: NEGATIVE MG/DL
CLARITY, POC: ABNORMAL
COLOR UR: YELLOW
EXPIRATION DATE: ABNORMAL
GLUCOSE UR STRIP-MCNC: NEGATIVE MG/DL
KETONES UR QL: NEGATIVE
LEUKOCYTE EST, POC: ABNORMAL
Lab: 1.02
NITRITE UR-MCNC: NEGATIVE MG/ML
PH UR: 7 [PH] (ref 5–8)
PROT UR STRIP-MCNC: NEGATIVE MG/DL
RBC # UR STRIP: ABNORMAL /UL
SP GR UR: 1.02 (ref 1–1.03)
UROBILINOGEN UR QL: NORMAL

## 2024-01-12 NOTE — PROGRESS NOTES
"Chief Complaint: Urinary Retention    Subjective         History of Present Illness  Kang Grey is a 43 y.o. male presents to [facility] UROLOGY to be seen for urinary retention.    He states that he is \"not cathing with any regularity\"     Was using magic 3 go but \"these are not made\" anymore.     He is getting non lubricated caths and he is having issues with pain as he is not able to get the lubricant on the cath.     He is on macrodantin for UTI prophylaxis.     No UTI since we last saw him.    GFR 11/2023 was 105.      Previous:  Patient returns today after having voiding trial performed and we had recommended for him to continue to perform clean intermittent catheterization every 4 hours.    The patient's urine today has a foul odor and does appear infected on in office urine dip.    Had another UTI in August which was Enterococcus faecalis that was resistant to levofloxacin and tetracycline.    He states he is not cathing regularly due to pain.    Patient has been performing clean intermittent catheterization every 4 hours while awake for the last several years.    Previously seen by Dr. Adalid Morris.    The patient was seen in the emergency department on 5/17/2023 for exacerbation of major depression disorder and was not caring for himself.    He had a Roldan catheter placed which has remained in place since 5/17/2023.    Patient states the indwelling Roldan catheter is causing a significant amount of pain.    Would like to have catheter removed and states that he will resume clean intermittent catheterization.    The use [facility] for catheter supplier.    Objective     Past Medical History:   Diagnosis Date    Acute upper respiratory infection 02/2022    Age-related osteoporosis without current pathological fracture     Broken toe 11/2022    left great toe    Cerebral palsy     diagnosed as infant    Compression fracture of first lumbar vertebra     Constipation     Disease of " pancreas     GERD (gastroesophageal reflux disease)     History of urinary self-catheterization     pt or pt's mother catheterizes 4-6  times a day    Hydrocephalus in diseases classified elsewhere     NO  SHUNT    Inguinal hernia     Injury of back 02/2020    Low back pain     Major depressive disorder     single episode, moderate    Other cerebral palsy     Other fatigue     Other generalized epilepsy and epileptic syndromes, not intractable, without status epilepticus     Other hypertrophic disorders of the skin     Pain in left ankle and joints of left foot     Pain in right toe(s)     Pain in thoracic spine     Pancreatic cyst     Paranoid schizophrenia     Pelvic and perineal pain     Primary insomnia     Pure hypercholesterolemia     Repeated falls     last fall 11/2022    Schizoaffective disorder, unspecified     Seizures     last seizure was approx 2018, medication controlled    Sleep apnea     uses cpap machine    Somnolence     TIA (transient ischemic attack)     UNSURE OF  DATE    Vitamin D deficiency     Wedge compression fracture of fourth thoracic vertebra, subsequent encounter for fracture with routine healing        Past Surgical History:   Procedure Laterality Date    CYST REMOVAL Right 11/11/2022    Procedure: Excision of subcutaneous mass from right forearm;  Surgeon: Oni Greenberg MD;  Location: [facility] [place];  Service: General;  Laterality: Right;    HAMSTRING REPAIR Bilateral     age 10    INGUINAL HERNIA REPAIR Right 11/8/2023    Procedure: INGUINAL HERNIA REPAIR LAPAROSCOPIC WITH DAVINCI ROBOT;  Surgeon: Mayur Quintana MD;  Location: [facility] [place];  Service: Robotics - DaVinci;  Laterality: Right;    OTHER SURGICAL HISTORY      hyposadius repair    TOE FUSION Right     great toe at age 21         Current Outpatient Medications:     alclometasone (ACLOVATE) 0.05 % cream, , Disp: , Rfl:     ascorbic acid (VITAMIN C) 1000 MG tablet, Take 1 tablet by mouth Daily., Disp: , Rfl:     aspirin  81 MG EC tablet, Take 1 tablet by mouth Daily., Disp: , Rfl:     busPIRone (BUSPAR) 10 MG tablet, Take 1 tablet by mouth 3 (Three) Times a Day As Needed., Disp: , Rfl:     Calcium Carbonate 1500 (600 Ca) MG tablet, Take 1 tablet by mouth 2 (two) times a day., Disp: , Rfl:     cholecalciferol 25 MCG tablet, TAKE TWO TABLETS BY MOUTH ONCE DAILY, Disp: 60 tablet, Rfl: 1    Diclofenac Sodium (VOLTAREN) 1 % gel gel, Apply 1 g topically to the appropriate area as directed As Needed (LBP)., Disp: 100 g, Rfl: 2    docusate sodium (COLACE) 100 MG capsule, TAKE 1 CAPSULE BY MOUTH DAILY., Disp: 30 capsule, Rfl: 1    famotidine (PEPCID) 20 MG tablet, TAKE 1 TABLET BY MOUTH DAILY., Disp: 90 tablet, Rfl: 1    folic acid (FOLVITE) 800 MCG tablet, Take 1 tablet by mouth Daily., Disp: , Rfl:     HM TRUEplus Fiber 2 g chewable tablet, CHEW 1 TABLET DAILY., Disp: 90 tablet, Rfl: 1    HYDROcodone-acetaminophen (NORCO) 5-325 MG per tablet, , Disp: , Rfl:     Invega Sustenna 234 MG/1.5ML suspension prefilled syringe IM injection, Inject 1.5 mL into the appropriate muscle as directed by prescriber Every 30 (Thirty) Days., Disp: , Rfl:     Keppra 500 MG tablet, Take 3 tablets by mouth Every 12 (Twelve) Hours., Disp: , Rfl:     ketoconazole (NIZORAL) 2 % shampoo, , Disp: , Rfl:     lamoTRIgine (LaMICtal) 200 MG tablet, Take 1 tablet by mouth Daily., Disp: , Rfl:     Melatonin 10 MG tablet, TAKE 1 TABLET BY MOUTH EVERY NIGHT AT BEDTIME., Disp: 30 tablet, Rfl: 0    methocarbamol (ROBAXIN) 750 MG tablet, Take 1 tablet by mouth 4 (Four) Times a Day As Needed for Muscle Spasms., Disp: 12 tablet, Rfl: 0    pravastatin (PRAVACHOL) 20 MG tablet, TAKE 1 TABLET BY MOUTH EVERY NIGHT., Disp: 30 tablet, Rfl: 1    triamcinolone (KENALOG) 0.025 % cream, , Disp: , Rfl:     venlafaxine XR (EFFEXOR-XR) 150 MG 24 hr capsule, Take 1 capsule by mouth Daily., Disp: , Rfl:     venlafaxine XR (EFFEXOR-XR) 37.5 MG 24 hr capsule, Take 1 capsule by mouth Daily.,  "Disp: , Rfl:     Allergies   Allergen Reactions    Valproic Acid Irritability     DEPAKOTE CAUSES DIZZINESS AND IRRITABILITY          Family History   Problem Relation Age of Onset    Hypothyroidism Mother     No Known Problems Father     Ulcers Brother     Malig Hyperthermia Neg Hx        Social History     Socioeconomic History    Marital status: Single   Tobacco Use    Smoking status: Never     Passive exposure: Never    Smokeless tobacco: Never   Vaping Use    Vaping Use: Never used   Substance and Sexual Activity    Alcohol use: Never    Drug use: Never    Sexual activity: Defer       Vital Signs:   /85   Pulse (!) 16   Resp 16   Ht 68 cm (26.77\")   Wt 79.9 kg (176 lb 3.2 oz)   .89 kg/m²      Physical Exam     Result Review :   The following data was reviewed by: ÁNGELA Quintana on 1/12/2024:  Results for orders placed or performed in visit on 01/12/24   POC Urinalysis Dipstick, Automated    Specimen: Urine   Result Value Ref Range    Color Yellow Yellow, Straw, Dark Yellow, Yanni    Clarity, UA Cloudy (A) Clear    Specific Gravity  1.020 1.005 - 1.030    pH, Urine 7.0 5.0 - 8.0    Leukocytes Large (3+) (A) Negative    Nitrite, UA Negative Negative    Protein, POC Negative Negative mg/dL    Glucose, UA Negative Negative mg/dL    Ketones, UA Negative Negative    Urobilinogen, UA Normal Normal, 0.2 E.U./dL    Bilirubin Negative Negative    Blood, UA Trace (A) Negative    Lot Number [id number]     Expiration Date 2,024/10                  Procedures        Assessment and Plan    Diagnoses and all orders for this visit:    1. Recurrent UTI (Primary)  -     POC Urinalysis Dipstick, Automated  -     Comprehensive Metabolic Panel; Future    2. Urinary retention  -     Comprehensive Metabolic Panel; Future    3. Benign prostatic hyperplasia with incomplete bladder emptying      We will get with medical supplier to get him pre lubricated caths.    Recommended he continue to try the cath at least 3 " times a day.    We will continue on low-dose Macrodantin to take daily for UTI prophylaxis.        I spent 10 minutes caring for Kang on this date of service. This time includes time spent by me in the following activities:reviewing tests, obtaining and/or reviewing a separately obtained history, performing a medically appropriate examination and/or evaluation , counseling and educating the patient/family/caregiver, ordering medications, tests, or procedures, and documenting information in the medical record  Follow Up   Return in about 3 months (around 4/12/2024) for f/u with CMP prior .  Patient was given instructions and counseling regarding his condition or for health maintenance advice. Please see specific information pulled into the AVS if appropriate.         This document has been electronically signed by ÁNGELA Quintana  January 12, 2024 09:13 EST

## 2024-01-23 ENCOUNTER — TELEPHONE (OUTPATIENT)
Dept: UROLOGY | Facility: CLINIC | Age: 44
End: 2024-01-23
Payer: MEDICARE

## 2024-01-23 NOTE — TELEPHONE ENCOUNTER
I spoke to mom and sister about catheters.  I will get this placed for them. They are of understanding.

## 2024-01-23 NOTE — TELEPHONE ENCOUNTER
PATIENT'S SISTER, EMIR, CALLED.  SHE SAID MELISA LIKES THE SMALLER DIAMETER CATHETERS.  THEY ARE [employer] THE [employer].  SHE SAID MAYELIN WAS GOING TO ORDER SOME FOR HIM.    SHE SAID WE MAY CALL HER MOTHER, EDOUARD, BACK [phone].

## 2024-01-23 NOTE — TELEPHONE ENCOUNTER
They do not have these available at this time. Company is relaunching them so they will have to use the regular ones I can reorder a smaller size.

## 2024-01-25 ENCOUNTER — TELEPHONE (OUTPATIENT)
Dept: UROLOGY | Facility: CLINIC | Age: 44
End: 2024-01-25
Payer: MEDICARE

## 2024-01-26 ENCOUNTER — TELEPHONE (OUTPATIENT)
Dept: SURGERY | Facility: CLINIC | Age: 44
End: 2024-01-26
Payer: MEDICARE

## 2024-01-26 NOTE — TELEPHONE ENCOUNTER
Patients mother called and states that she does want to go through [facility] for patient due to the fact they have not had the best experience with them. Patient's mother reports that the catheters there hurt him and he bleeds. Patients mother states that they were told they could go through another vendor.     # [phone]

## 2024-01-29 NOTE — TELEPHONE ENCOUNTER
LMOM for pt mom to return my call.  I will work on changing the catheter order to a different company.

## 2024-02-01 DIAGNOSIS — F51.01 PRIMARY INSOMNIA: ICD-10-CM

## 2024-02-01 DIAGNOSIS — E55.9 VITAMIN D DEFICIENCY: ICD-10-CM

## 2024-02-01 DIAGNOSIS — E78.2 MIXED HYPERLIPIDEMIA: ICD-10-CM

## 2024-02-01 RX ORDER — PHENOL 1.4 %
1 AEROSOL, SPRAY (ML) MUCOUS MEMBRANE
Qty: 30 TABLET | Refills: 0 | Status: SHIPPED | OUTPATIENT
Start: 2024-02-01

## 2024-02-01 RX ORDER — PRAVASTATIN SODIUM 20 MG
20 TABLET ORAL NIGHTLY
Qty: 30 TABLET | Refills: 1 | Status: SHIPPED | OUTPATIENT
Start: 2024-02-01

## 2024-02-01 RX ORDER — CHOLECALCIFEROL (VITAMIN D3) 25 MCG
2000 TABLET ORAL DAILY
Qty: 60 TABLET | Refills: 1 | Status: SHIPPED | OUTPATIENT
Start: 2024-02-01

## 2024-02-06 ENCOUNTER — OFFICE VISIT (OUTPATIENT)
Dept: FAMILY MEDICINE CLINIC | Age: 44
End: 2024-02-06
Payer: MEDICARE

## 2024-02-06 VITALS
DIASTOLIC BLOOD PRESSURE: 75 MMHG | WEIGHT: 166.8 LBS | BODY MASS INDEX: 32.75 KG/M2 | SYSTOLIC BLOOD PRESSURE: 124 MMHG | HEIGHT: 60 IN | TEMPERATURE: 101.7 F | HEART RATE: 137 BPM | OXYGEN SATURATION: 93 %

## 2024-02-06 DIAGNOSIS — R05.9 COUGH, UNSPECIFIED TYPE: ICD-10-CM

## 2024-02-06 DIAGNOSIS — J02.9 SORE THROAT: ICD-10-CM

## 2024-02-06 DIAGNOSIS — R33.9 URINARY RETENTION: ICD-10-CM

## 2024-02-06 DIAGNOSIS — N39.0 RECURRENT UTI: ICD-10-CM

## 2024-02-06 DIAGNOSIS — R50.9 FEVER, UNSPECIFIED FEVER CAUSE: ICD-10-CM

## 2024-02-06 DIAGNOSIS — J10.1 INFLUENZA A: Primary | ICD-10-CM

## 2024-02-06 LAB
EXPIRATION DATE: ABNORMAL
EXPIRATION DATE: NORMAL
FLUAV AG UPPER RESP QL IA.RAPID: DETECTED
FLUBV AG UPPER RESP QL IA.RAPID: NOT DETECTED
INTERNAL CONTROL: ABNORMAL
INTERNAL CONTROL: NORMAL
Lab: ABNORMAL
Lab: NORMAL
S PYO AG THROAT QL: NEGATIVE
SARS-COV-2 AG UPPER RESP QL IA.RAPID: NOT DETECTED

## 2024-02-06 PROCEDURE — 99213 OFFICE O/P EST LOW 20 MIN: CPT | Performed by: NURSE PRACTITIONER

## 2024-02-06 PROCEDURE — 87081 CULTURE SCREEN ONLY: CPT | Performed by: NURSE PRACTITIONER

## 2024-02-06 PROCEDURE — 87428 SARSCOV & INF VIR A&B AG IA: CPT | Performed by: NURSE PRACTITIONER

## 2024-02-06 PROCEDURE — 87880 STREP A ASSAY W/OPTIC: CPT | Performed by: NURSE PRACTITIONER

## 2024-02-06 RX ORDER — GUAIFENESIN 600 MG/1
1200 TABLET, EXTENDED RELEASE ORAL 2 TIMES DAILY
Qty: 30 TABLET | Refills: 0 | Status: SHIPPED | OUTPATIENT
Start: 2024-02-06

## 2024-02-06 RX ORDER — NITROFURANTOIN MACROCRYSTALS 50 MG/1
50 CAPSULE ORAL DAILY
COMMUNITY
Start: 2024-02-01

## 2024-02-06 RX ORDER — BENZONATATE 100 MG/1
100 CAPSULE ORAL 3 TIMES DAILY PRN
Qty: 30 CAPSULE | Refills: 0 | Status: SHIPPED | OUTPATIENT
Start: 2024-02-06

## 2024-02-06 RX ORDER — ACETAMINOPHEN 500 MG
1000 TABLET ORAL ONCE
Status: COMPLETED | OUTPATIENT
Start: 2024-02-06 | End: 2024-02-06

## 2024-02-06 RX ORDER — OSELTAMIVIR PHOSPHATE 75 MG/1
75 CAPSULE ORAL 2 TIMES DAILY
Qty: 10 CAPSULE | Refills: 0 | Status: SHIPPED | OUTPATIENT
Start: 2024-02-06 | End: 2024-02-11

## 2024-02-06 RX ADMIN — Medication 1000 MG: at 11:02

## 2024-02-06 NOTE — PROGRESS NOTES
"Kang Grey presents to [facility] with complaint of  URI (H/a, cough, congestion, diarrhea onset yesterday )    SUBJECTIVE  URI   This is a new problem. The current episode started yesterday. The problem has been gradually worsening. The maximum temperature recorded prior to his arrival was 101 - 101.9 F. The fever has been present for Less than 1 day. Associated symptoms include congestion, coughing (nonproductive), diarrhea (one episode), headaches, rhinorrhea, sinus pain, sneezing, a sore throat and wheezing. Pertinent negatives include no abdominal pain, chest pain, dysuria, ear pain, nausea, plugged ear sensation or vomiting. He has tried acetaminophen for the symptoms. The treatment provided moderate relief.         OBJECTIVE  Vital Signs:   /75 (BP Location: Right arm, Patient Position: Sitting)   Pulse (!) 137   Temp (!) 101.7 °F (38.7 °C) (Oral)   Ht 68 cm (26.77\")   Wt 75.7 kg (166 lb 12.8 oz)   SpO2 93% Comment: room air  .64 kg/m²       Physical Exam  Constitutional:       General: He is not in acute distress.     Appearance: Normal appearance. He is not ill-appearing.   HENT:      Head: Normocephalic and atraumatic.      Nose: Nose normal.      Mouth/Throat:      Pharynx: Oropharynx is clear.   Cardiovascular:      Rate and Rhythm: Regular rhythm. Tachycardia present.      Pulses: Normal pulses.      Heart sounds: Normal heart sounds.   Pulmonary:      Effort: Pulmonary effort is normal. No respiratory distress.      Breath sounds: Examination of the right-upper field reveals wheezing. Examination of the left-upper field reveals wheezing. Examination of the right-lower field reveals wheezing. Examination of the left-lower field reveals wheezing. Wheezing (clears with coughing) present.   Chest:      Chest wall: No tenderness.   Musculoskeletal:         General: Normal range of motion.      Cervical back: Normal range of motion and neck supple. "   Skin:     General: Skin is warm and dry.   Neurological:      General: No focal deficit present.      Mental Status: He is alert and oriented to person, place, and time. Mental status is at baseline.   Psychiatric:         Mood and Affect: Mood normal.         Behavior: Behavior normal.          Results Review:  The following data was reviewed by Thu Contreras, ÁNGELA [unfilled] 09:42 EST.  Office Visit on 02/06/2024   Component Date Value Ref Range Status    SARS Antigen 02/06/2024 Not Detected  Not Detected, Presumptive Negative Final    Influenza A Antigen TERESA 02/06/2024 Detected (A)  Not Detected Final    Influenza B Antigen TERESA 02/06/2024 Not Detected  Not Detected Final    Internal Control 02/06/2024 Passed  Passed Final    Lot Number 02/06/2024 [id number]   Final    Expiration Date 02/06/2024 9,142,024   Final    Rapid Strep A Screen 02/06/2024 Negative  Negative, VALID, INVALID, Not Performed Final    Internal Control 02/06/2024 Passed  Passed Final    Lot Number 02/06/2024 [id number]   Final    Expiration Date 02/06/2024 4,[id number]   Final       ASSESSMENT AND PLAN:  Diagnoses and all orders for this visit:    1. Influenza A (Primary)  -     oseltamivir (Tamiflu) 75 MG capsule; Take 1 capsule by mouth 2 (Two) Times a Day for 5 days.  Dispense: 10 capsule; Refill: 0  -     benzonatate (Tessalon Perles) 100 MG capsule; Take 1 capsule by mouth 3 (Three) Times a Day As Needed for Cough.  Dispense: 30 capsule; Refill: 0  -     guaiFENesin (Mucinex) 600 MG 12 hr tablet; Take 2 tablets by mouth 2 (Two) Times a Day.  Dispense: 30 tablet; Refill: 0  -     acetaminophen (TYLENOL) tablet 1,000 mg    2. Sore throat  -     POCT rapid strep A  -     Beta Strep Culture, Throat - , Throat; Future    3. Cough, unspecified type  -     POCT SARS-CoV-2 Antigen TERESA + Flu    4. Recurrent UTI  -     Cancel: Comprehensive Metabolic Panel    5. Urinary retention  -     Cancel: Comprehensive Metabolic Panel    6. Fever, unspecified fever  cause  -     acetaminophen (TYLENOL) tablet 1,000 mg      Is positive for flu a today.  He was given Tamiflu, Tessalon Perles, and Mucinex.  He was given 1000 mg of Tylenol in office today to help with his fever that he has currently.  He was encouraged to alternate between Motrin and Tylenol at home to help control his fever.  The patient was encouraged to increase rest and fluids. If symptoms persist 7 days or longer, come back in to be seen.  Discussed tricked ER precautions given his chronic comorbid medical conditions, he is high risk for influenza related complications.        Follow Up   Return if symptoms worsen or fail to improve. Patient to notify office with any acute concerns or issues.  Patient verbalizes understanding, agrees with plan of care and has no further questions upon discharge.     Patient was given instructions and counseling regarding his condition or for health maintenance advice. Please see specific information pulled into the AVS if appropriate.     Discussed the importance of following up with any needed screening tests/labs/specialist appointments and any requested follow-up recommended by me today. Importance of maintaining follow-up discussed and patient accepts that missed appointments can delay diagnosis and potentially lead to worsening of conditions.    Part of this note may be an electronic transcription/translation of spoken language to printed text using the Dragon Dictation System.

## 2024-02-08 LAB — BACTERIA SPEC AEROBE CULT: NORMAL

## 2024-02-15 ENCOUNTER — OFFICE VISIT (OUTPATIENT)
Dept: FAMILY MEDICINE CLINIC | Age: 44
End: 2024-02-15
Payer: MEDICARE

## 2024-02-15 VITALS
BODY MASS INDEX: 27.14 KG/M2 | HEIGHT: 66 IN | TEMPERATURE: 97.6 F | SYSTOLIC BLOOD PRESSURE: 104 MMHG | WEIGHT: 168.9 LBS | DIASTOLIC BLOOD PRESSURE: 70 MMHG | HEART RATE: 99 BPM | OXYGEN SATURATION: 95 %

## 2024-02-15 DIAGNOSIS — E55.9 VITAMIN D DEFICIENCY: ICD-10-CM

## 2024-02-15 DIAGNOSIS — F20.0 PARANOID SCHIZOPHRENIA: ICD-10-CM

## 2024-02-15 DIAGNOSIS — J06.9 VIRAL UPPER RESPIRATORY TRACT INFECTION: Primary | ICD-10-CM

## 2024-02-15 DIAGNOSIS — N31.9 NEUROGENIC BLADDER: ICD-10-CM

## 2024-02-15 DIAGNOSIS — G47.31 COMPLEX SLEEP APNEA SYNDROME: ICD-10-CM

## 2024-02-15 DIAGNOSIS — G40.209 COMPLEX PARTIAL SEIZURE EVOLVING TO GENERALIZED SEIZURE: ICD-10-CM

## 2024-02-15 DIAGNOSIS — E78.2 MIXED HYPERLIPIDEMIA: ICD-10-CM

## 2024-02-15 DIAGNOSIS — G80.8 OTHER CEREBRAL PALSY: ICD-10-CM

## 2024-02-15 LAB
EXPIRATION DATE: ABNORMAL
FLUAV AG UPPER RESP QL IA.RAPID: DETECTED
FLUBV AG UPPER RESP QL IA.RAPID: NOT DETECTED
INTERNAL CONTROL: ABNORMAL
Lab: ABNORMAL
SARS-COV-2 AG UPPER RESP QL IA.RAPID: NOT DETECTED

## 2024-03-01 DIAGNOSIS — F51.01 PRIMARY INSOMNIA: ICD-10-CM

## 2024-03-18 ENCOUNTER — LAB (OUTPATIENT)
Dept: LAB | Facility: HOSPITAL | Age: 44
End: 2024-03-18
Payer: MEDICARE

## 2024-03-18 ENCOUNTER — TRANSCRIBE ORDERS (OUTPATIENT)
Dept: ADMINISTRATIVE | Facility: HOSPITAL | Age: 44
End: 2024-03-18
Payer: MEDICARE

## 2024-03-18 DIAGNOSIS — Z79.899 ENCOUNTER FOR LONG-TERM (CURRENT) USE OF OTHER MEDICATIONS: ICD-10-CM

## 2024-03-18 DIAGNOSIS — N39.0 RECURRENT UTI: ICD-10-CM

## 2024-03-18 DIAGNOSIS — R33.9 URINARY RETENTION: ICD-10-CM

## 2024-03-18 DIAGNOSIS — Z79.899 ENCOUNTER FOR LONG-TERM (CURRENT) USE OF OTHER MEDICATIONS: Primary | ICD-10-CM

## 2024-03-18 LAB
BASOPHILS # BLD AUTO: 0.04 10*3/MM3 (ref 0–0.2)
BASOPHILS NFR BLD AUTO: 0.7 % (ref 0–1.5)
DEPRECATED RDW RBC AUTO: 40.6 FL (ref 37–54)
EOSINOPHIL # BLD AUTO: 0.13 10*3/MM3 (ref 0–0.4)
EOSINOPHIL NFR BLD AUTO: 2.4 % (ref 0.3–6.2)
ERYTHROCYTE [DISTWIDTH] IN BLOOD BY AUTOMATED COUNT: 13.2 % (ref 12.3–15.4)
HBA1C MFR BLD: 5.4 % (ref 4.8–5.6)
HCT VFR BLD AUTO: 43.2 % (ref 37.5–51)
HGB BLD-MCNC: 15.1 G/DL (ref 13–17.7)
IMM GRANULOCYTES # BLD AUTO: 0.01 10*3/MM3 (ref 0–0.05)
IMM GRANULOCYTES NFR BLD AUTO: 0.2 % (ref 0–0.5)
LYMPHOCYTES # BLD AUTO: 2.32 10*3/MM3 (ref 0.7–3.1)
LYMPHOCYTES NFR BLD AUTO: 43 % (ref 19.6–45.3)
MCH RBC QN AUTO: 29.7 PG (ref 26.6–33)
MCHC RBC AUTO-ENTMCNC: 35 G/DL (ref 31.5–35.7)
MCV RBC AUTO: 84.9 FL (ref 79–97)
MONOCYTES # BLD AUTO: 0.34 10*3/MM3 (ref 0.1–0.9)
MONOCYTES NFR BLD AUTO: 6.3 % (ref 5–12)
NEUTROPHILS NFR BLD AUTO: 2.56 10*3/MM3 (ref 1.7–7)
NEUTROPHILS NFR BLD AUTO: 47.4 % (ref 42.7–76)
NRBC BLD AUTO-RTO: 0 /100 WBC (ref 0–0.2)
PLATELET # BLD AUTO: 187 10*3/MM3 (ref 140–450)
PMV BLD AUTO: 12.9 FL (ref 6–12)
RBC # BLD AUTO: 5.09 10*6/MM3 (ref 4.14–5.8)
WBC NRBC COR # BLD AUTO: 5.4 10*3/MM3 (ref 3.4–10.8)

## 2024-03-18 PROCEDURE — 36415 COLL VENOUS BLD VENIPUNCTURE: CPT

## 2024-03-18 PROCEDURE — 83036 HEMOGLOBIN GLYCOSYLATED A1C: CPT

## 2024-03-18 PROCEDURE — 85025 COMPLETE CBC W/AUTO DIFF WBC: CPT

## 2024-04-03 ENCOUNTER — OFFICE VISIT (OUTPATIENT)
Dept: PODIATRY | Facility: CLINIC | Age: 44
End: 2024-04-03
Payer: MEDICARE

## 2024-04-03 VITALS
BODY MASS INDEX: 27.64 KG/M2 | DIASTOLIC BLOOD PRESSURE: 81 MMHG | HEART RATE: 88 BPM | WEIGHT: 172 LBS | OXYGEN SATURATION: 96 % | SYSTOLIC BLOOD PRESSURE: 112 MMHG | TEMPERATURE: 97.3 F | HEIGHT: 66 IN

## 2024-04-03 DIAGNOSIS — M21.371 FOOT DROP, BILATERAL: ICD-10-CM

## 2024-04-03 DIAGNOSIS — R26.2 DIFFICULTY WALKING: ICD-10-CM

## 2024-04-03 DIAGNOSIS — L60.0 ONYCHOCRYPTOSIS: ICD-10-CM

## 2024-04-03 DIAGNOSIS — B35.1 ONYCHOMYCOSIS: Primary | ICD-10-CM

## 2024-04-03 DIAGNOSIS — G62.9 NEUROPATHY: ICD-10-CM

## 2024-04-03 DIAGNOSIS — M21.372 FOOT DROP, BILATERAL: ICD-10-CM

## 2024-04-03 DIAGNOSIS — M79.672 FOOT PAIN, BILATERAL: ICD-10-CM

## 2024-04-03 DIAGNOSIS — M79.671 FOOT PAIN, BILATERAL: ICD-10-CM

## 2024-04-03 NOTE — PROGRESS NOTES
[facility]    Today's Date: 04/03/24    Patient Name: Kang Grey  MRN: [id number]  CSN: [id number]  PCP: Hien Parham MD, Last PCP Visit: 15 February 2024  Referring Provider: Hien Parham MD    SUBJECTIVE     Chief Complaint   Patient presents with    Left Foot - Nail Problem, Follow-up    Right Foot - Nail Problem, Follow-up       HPI: Kang Grey, a 43 y.o.male, comes to clinic.    New, Established, New Problem:  established   Location:  Toenails  Duration:   Greater than five years  Onset:  Gradual  Nature:  sore with palpation.  Stable, worsening, improving:   Stable  Aggravating factors:  Pain with shoe gear and ambulation.  Previous Treatment:  debridement    Medical changes: None    Patient denies any fevers, chills, nausea, vomiting, shortness of breathe, nor any other constitutional signs nor symptoms.     I have reviewed/confirmed previously documented HPI with no changes.       Past Medical History:   Diagnosis Date    Acute upper respiratory infection 02/2022    Age-related osteoporosis without current pathological fracture     Broken toe 11/2022    left great toe    Cerebral palsy     diagnosed as infant    Compression fracture of first lumbar vertebra     Constipation     Disease of pancreas     GERD (gastroesophageal reflux disease)     History of urinary self-catheterization     pt or pt's mother catheterizes 4-6  times a day    Hydrocephalus in diseases classified elsewhere     NO  SHUNT    Inguinal hernia     Injury of back 02/2020    Low back pain     Major depressive disorder     single episode, moderate    Other cerebral palsy     Other fatigue     Other generalized epilepsy and epileptic syndromes, not intractable, without status epilepticus     Other hypertrophic disorders of the skin     Pain in left ankle and joints of left foot     Pain in right toe(s)     Pain in thoracic spine     Pancreatic cyst     Paranoid schizophrenia     Pelvic and  perineal pain     Primary insomnia     Pure hypercholesterolemia     Repeated falls     last fall 11/2022    Schizoaffective disorder, unspecified     Seizures     last seizure was approx 2018, medication controlled    Sleep apnea     uses cpap machine    Somnolence     TIA (transient ischemic attack)     UNSURE OF  DATE    Vitamin D deficiency     Wedge compression fracture of fourth thoracic vertebra, subsequent encounter for fracture with routine healing      Past Surgical History:   Procedure Laterality Date    CYST REMOVAL Right 11/11/2022    Procedure: Excision of subcutaneous mass from right forearm;  Surgeon: Oni Greenberg MD;  Location: [facility];  Service: General;  Laterality: Right;    HAMSTRING REPAIR Bilateral     age 10    INGUINAL HERNIA REPAIR Right 11/8/2023    Procedure: INGUINAL HERNIA REPAIR LAPAROSCOPIC WITH DAVINCI ROBOT;  Surgeon: Mayur Quintana MD;  Location: [facility] [place];  Service: Robotics - DaVinci;  Laterality: Right;    OTHER SURGICAL HISTORY      hyposadius repair    TOE FUSION Right     great toe at age 21     Family History   Problem Relation Age of Onset    Hypothyroidism Mother     No Known Problems Father     Ulcers Brother     Malig Hyperthermia Neg Hx      Social History     Socioeconomic History    Marital status: Single   Tobacco Use    Smoking status: Never     Passive exposure: Never    Smokeless tobacco: Never   Vaping Use    Vaping status: Never Used   Substance and Sexual Activity    Alcohol use: Never    Drug use: Never    Sexual activity: Defer     Allergies   Allergen Reactions    Valproic Acid Irritability     DEPAKOTE CAUSES DIZZINESS AND IRRITABILITY       Current Outpatient Medications   Medication Sig Dispense Refill    alclometasone (ACLOVATE) 0.05 % cream       ascorbic acid (VITAMIN C) 1000 MG tablet Take 1 tablet by mouth Daily.      aspirin 81 MG EC tablet Take 1 tablet by mouth Daily.      busPIRone (BUSPAR) 10 MG tablet Take 1 tablet by mouth 3  (Three) Times a Day As Needed.      Calcium Carbonate 1500 (600 Ca) MG tablet Take 1 tablet by mouth 2 (two) times a day.      cholecalciferol 25 MCG tablet TAKE TWO TABLETS BY MOUTH ONCE DAILY 60 tablet 1    Diclofenac Sodium (VOLTAREN) 1 % gel gel Apply 1 g topically to the appropriate area as directed As Needed (LBP). 100 g 2    docusate sodium (COLACE) 100 MG capsule TAKE 1 CAPSULE BY MOUTH DAILY. 30 capsule 1    famotidine (PEPCID) 20 MG tablet TAKE 1 TABLET BY MOUTH DAILY. 90 tablet 1    folic acid (FOLVITE) 800 MCG tablet Take 1 tablet by mouth Daily.      guaiFENesin (Mucinex) 600 MG 12 hr tablet Take 2 tablets by mouth 2 (Two) Times a Day. 30 tablet 0    HM Melatonin 10 MG sublingual tablet TAKE 1 TABLET BY MOUTH EVERY NIGHT AT BEDTIME. 30 tablet 0    HM TRUEplus Fiber 2 g chewable tablet CHEW 1 TABLET DAILY. 90 tablet 1    Invega Sustenna 234 MG/1.5ML suspension prefilled syringe IM injection Inject 1.5 mL into the appropriate muscle as directed by prescriber Every 30 (Thirty) Days.      Keppra 500 MG tablet Take 3 tablets by mouth Every 12 (Twelve) Hours.      ketoconazole (NIZORAL) 2 % shampoo       lamoTRIgine (LaMICtal) 200 MG tablet Take 1 tablet by mouth Daily.      methocarbamol (ROBAXIN) 750 MG tablet Take 1 tablet by mouth 4 (Four) Times a Day As Needed for Muscle Spasms. 12 tablet 0    nitrofurantoin (MACRODANTIN) 50 MG capsule Take 1 capsule by mouth Daily.      pravastatin (PRAVACHOL) 20 MG tablet TAKE 1 TABLET BY MOUTH EVERY NIGHT. 30 tablet 1    triamcinolone (KENALOG) 0.025 % cream       venlafaxine XR (EFFEXOR-XR) 150 MG 24 hr capsule Take 1 capsule by mouth Daily.      venlafaxine XR (EFFEXOR-XR) 37.5 MG 24 hr capsule Take 1 capsule by mouth Daily.       No current facility-administered medications for this visit.     Review of Systems   Constitutional: Negative.    Skin:         Painful toenails bilaterally   All other systems reviewed and are negative.      OBJECTIVE     Vitals:     04/03/24 0817   BP: 112/81   Pulse: 88   Temp: 97.3 °F (36.3 °C)   SpO2: 96%         Patient seen in no apparent distress.      PHYSICAL EXAM:     Foot/Ankle Exam    GENERAL  Appearance:  appears stated age and chronically ill  Orientation:  unable to assess  Affect:  inappropriate  Gait:  antalgic  Assistance:  cane use  Right shoe gear: casual shoe ([address] [employer]ace)  Left shoe gear: casual shoe ([employer])    VASCULAR     Right Foot Vascularity   Dorsalis pedis:  2+  Posterior tibial:  2+  Skin temperature:  warm  Edema grading:  None  CFT:  < 3 seconds  Pedal hair growth:  Present  Varicosities:  mild varicosities     Left Foot Vascularity   Dorsalis pedis:  2+  Posterior tibial:  2+  Skin temperature:  warm  Edema grading:  None  CFT:  < 3 seconds  Pedal hair growth:  Present  Varicosities:  mild varicosities     NEUROLOGIC     Right Foot Neurologic   Light touch sensation: diminished  Vibratory sensation: diminished  Hot/Cold sensation: diminished  Protective Sensation using [place] Monofilament:   Sites intact: 3  Sites tested: 10     Left Foot Neurologic   Light touch sensation: diminished  Vibratory sensation: diminished  Hot/Cold sensation:  diminished  Protective Sensation using [place] Monofilament:   Sites intact: 3  Sites tested: 10    MUSCULOSKELETAL     Right Foot Musculoskeletal   [place]:  First toe     Left Foot Musculoskeletal   [place]:  First toe    MUSCLE STRENGTH     Right Foot Muscle Strength   Foot dorsiflexion:  1  Foot plantar flexion:  4-  Foot inversion:  2  Foot eversion:  2     Left Foot Muscle Strength   Foot dorsiflexion:  1  Foot plantar flexion:  4-  Foot inversion:  2  Foot eversion:  2    DERMATOLOGIC      Right Foot Dermatologic   Skin  Right foot skin is intact.   Nails  1.  Positive for elongated, onychomycosis, abnormal thickness, subungual debris and ingrown toenail.  2.  Positive for elongated, onychomycosis, abnormal thickness, subungual debris and  ingrown toenail.  3.  Positive for elongated, onychomycosis, abnormal thickness, subungual debris and ingrown toenail.  4.  Positive for elongated, onychomycosis, abnormal thickness, subungual debris and ingrown toenail.  5.  Positive for elongated, onychomycosis, abnormal thickness, subungual debris and ingrown toenail.     Left Foot Dermatologic   Skin  Left foot skin is intact.   Nails  1.  Positive for elongated, onychomycosis, abnormal thickness, subungual debris and ingrown toenail.  2.  Positive for elongated, onychomycosis, abnormal thickness, subungual debris and ingrown toenail.  3.  Positive for elongated, onychomycosis, abnormal thickness, subungual debris and ingrown toenail.  4.  Positive for elongated, onychomycosis, abnormally thick, subungual debris and ingrown toenail.  5.  Positive for elongated, onychomycosis, abnormally thick, subungual debris and ingrown toenail.  I have reexamined the patient the results are consistent with the previously documented exam.        ASSESSMENT/PLAN     Diagnoses and all orders for this visit:    1. Onychomycosis (Primary)    2. Foot pain, bilateral    3. Foot drop, bilateral    4. Neuropathy    5. Onychocryptosis    6. Difficulty walking        Comprehensive lower extremity examination and evaluation was performed.    Discussed findings and treatment plan including risks, benefits, and treatment options with patient in detail. Patient agreed with treatment plan.    Toenails 1 through 5 bilaterally were debrided in thickness and length and then smoothed with a Dremel Tool.  Tolerated the procedure well without complications.    An After Visit Summary was printed and given to the patient at discharge, including (if requested) any available informative/educational handouts regarding diagnosis, treatment, or medications. All questions were answered to patient/family satisfaction. Should symptoms fail to improve or worsen they agree to call or return to clinic or to go  to the Emergency Department. Discussed the importance of following up with any needed screening tests/labs/specialist appointments and any requested follow-up recommended by me today. Importance of maintaining follow-up discussed and patient accepts that missed appointments can delay diagnosis and potentially lead to worsening of conditions.    Return in about 9 weeks (around 6/5/2024)., or sooner if acute issues arise.  I have reviewed the assessment and plan and verified the accuracy of it. No changes to assessment and plan since the information was documented. Roni Osullivan DPM 04/03/24     I have dictated this note utilizing Dragon Dictation.  Please note that portions of this note were completed with a voice recognition program.  Part of this note may be an electronic transcription/translation of spoken language to printed text using the Dragon Dictation System.        This document has been electronically signed by Roni Osullivan DPM on April 3, 2024 08:38 EDT

## 2024-04-04 DIAGNOSIS — E55.9 VITAMIN D DEFICIENCY: ICD-10-CM

## 2024-04-04 DIAGNOSIS — E78.2 MIXED HYPERLIPIDEMIA: ICD-10-CM

## 2024-04-04 DIAGNOSIS — F51.01 PRIMARY INSOMNIA: ICD-10-CM

## 2024-04-04 RX ORDER — PRAVASTATIN SODIUM 20 MG
20 TABLET ORAL NIGHTLY
Qty: 30 TABLET | Refills: 1 | Status: SHIPPED | OUTPATIENT
Start: 2024-04-04

## 2024-04-04 RX ORDER — CHOLECALCIFEROL (VITAMIN D3) 25 MCG
2000 TABLET ORAL DAILY
Qty: 60 TABLET | Refills: 1 | Status: SHIPPED | OUTPATIENT
Start: 2024-04-04

## 2024-04-10 ENCOUNTER — TELEPHONE (OUTPATIENT)
Dept: UROLOGY | Facility: CLINIC | Age: 44
End: 2024-04-10
Payer: MEDICARE

## 2024-04-10 NOTE — TELEPHONE ENCOUNTER
Spoke to Radha, [employer] rep.  Pt is using [employer] SOFT 12FR 13 INCH.

## 2024-04-11 ENCOUNTER — LAB (OUTPATIENT)
Dept: LAB | Facility: HOSPITAL | Age: 44
End: 2024-04-11
Payer: MEDICARE

## 2024-04-11 DIAGNOSIS — N39.0 RECURRENT UTI: ICD-10-CM

## 2024-04-11 DIAGNOSIS — Z79.899 ENCOUNTER FOR LONG-TERM (CURRENT) USE OF OTHER MEDICATIONS: ICD-10-CM

## 2024-04-11 DIAGNOSIS — R33.9 URINARY RETENTION: ICD-10-CM

## 2024-04-12 ENCOUNTER — OFFICE VISIT (OUTPATIENT)
Dept: UROLOGY | Facility: CLINIC | Age: 44
End: 2024-04-12
Payer: MEDICARE

## 2024-04-12 ENCOUNTER — LAB (OUTPATIENT)
Dept: LAB | Facility: HOSPITAL | Age: 44
End: 2024-04-12
Payer: MEDICARE

## 2024-04-12 VITALS
SYSTOLIC BLOOD PRESSURE: 128 MMHG | HEART RATE: 94 BPM | DIASTOLIC BLOOD PRESSURE: 110 MMHG | WEIGHT: 170.6 LBS | HEIGHT: 66 IN | BODY MASS INDEX: 27.42 KG/M2

## 2024-04-12 DIAGNOSIS — Z79.899 ENCOUNTER FOR LONG-TERM (CURRENT) USE OF OTHER MEDICATIONS: ICD-10-CM

## 2024-04-12 DIAGNOSIS — R33.9 URINARY RETENTION: ICD-10-CM

## 2024-04-12 DIAGNOSIS — N39.0 RECURRENT UTI: ICD-10-CM

## 2024-04-12 DIAGNOSIS — N40.0 BENIGN PROSTATIC HYPERPLASIA, UNSPECIFIED WHETHER LOWER URINARY TRACT SYMPTOMS PRESENT: Primary | ICD-10-CM

## 2024-04-12 LAB
BILIRUB BLD-MCNC: NEGATIVE MG/DL
CLARITY, POC: ABNORMAL
COLOR UR: ABNORMAL
EXPIRATION DATE: ABNORMAL
GLUCOSE UR STRIP-MCNC: NEGATIVE MG/DL
KETONES UR QL: NEGATIVE
LEUKOCYTE EST, POC: ABNORMAL
Lab: ABNORMAL
NITRITE UR-MCNC: NEGATIVE MG/ML
PH UR: 6.5 [PH] (ref 5–8)
PROT UR STRIP-MCNC: ABNORMAL MG/DL
RBC # UR STRIP: ABNORMAL /UL
SP GR UR: 1.02 (ref 1–1.03)
UROBILINOGEN UR QL: ABNORMAL

## 2024-04-12 PROCEDURE — 80061 LIPID PANEL: CPT

## 2024-04-12 PROCEDURE — 80053 COMPREHEN METABOLIC PANEL: CPT

## 2024-04-12 PROCEDURE — 36415 COLL VENOUS BLD VENIPUNCTURE: CPT

## 2024-04-12 PROCEDURE — 84146 ASSAY OF PROLACTIN: CPT

## 2024-04-12 NOTE — PROGRESS NOTES
"Chief Complaint: Benign Prostatic Hypertrophy and Urinary Retention    Subjective         Benign Prostatic Hypertrophy    Urinary Retention      Kang Grey is a 43 y.o. male presents to [facility] to be seen for urinary retention.    Last visit we changed the patient's catheters to ensure he would be comfortable and will catheterize himself to prevent UTIs.    He had CMP repeated yesterday but apparently the blood coagulated and did not get completed.    Cathing but not daily.    He is having more issues getting the cath in.    He states it \"hard to get it over the prostate\"    He does have pain with cathing as well.      Previous:    He states that he is \"not cathing with any regularity\"     Was using magic 3 go but \"these are not made\" anymore.     He is getting non lubricated caths and he is having issues with pain as he is not able to get the lubricant on the cath.     He is on macrodantin for UTI prophylaxis.     No UTI since we last saw him.    GFR 11/2023 was 105.    Patient returns today after having voiding trial performed and we had recommended for him to continue to perform clean intermittent catheterization every 4 hours.    The patient's urine today has a foul odor and does appear infected on in office urine dip.    Had another UTI in August which was Enterococcus faecalis that was resistant to levofloxacin and tetracycline.    He states he is not cathing regularly due to pain.    Patient has been performing clean intermittent catheterization every 4 hours while awake for the last several years.    Previously seen by Dr. Adalid Morris.    The patient was seen in the emergency department on 5/17/2023 for exacerbation of major depression disorder and was not caring for himself.    He had a Roldan catheter placed which has remained in place since 5/17/2023.    Patient states the indwelling Roldan catheter is causing a significant amount of pain.    Would like to have catheter " removed and states that he will resume clean intermittent catheterization.    The use [employer] for catheter supplier.    Objective     Past Medical History:   Diagnosis Date    Acute upper respiratory infection 02/2022    Age-related osteoporosis without current pathological fracture     Broken toe 11/2022    left great toe    Cerebral palsy     diagnosed as infant    Compression fracture of first lumbar vertebra     Constipation     Disease of pancreas     GERD (gastroesophageal reflux disease)     History of urinary self-catheterization     pt or pt's mother catheterizes 4-6  times a day    Hydrocephalus in diseases classified elsewhere     NO  SHUNT    Inguinal hernia     Injury of back 02/2020    Low back pain     Major depressive disorder     single episode, moderate    Other cerebral palsy     Other fatigue     Other generalized epilepsy and epileptic syndromes, not intractable, without status epilepticus     Other hypertrophic disorders of the skin     Pain in left ankle and joints of left foot     Pain in right toe(s)     Pain in thoracic spine     Pancreatic cyst     Paranoid schizophrenia     Pelvic and perineal pain     Primary insomnia     Pure hypercholesterolemia     Repeated falls     last fall 11/2022    Schizoaffective disorder, unspecified     Seizures     last seizure was approx 2018, medication controlled    Sleep apnea     uses cpap machine    Somnolence     TIA (transient ischemic attack)     UNSURE OF  DATE    Vitamin D deficiency     Wedge compression fracture of fourth thoracic vertebra, subsequent encounter for fracture with routine healing        Past Surgical History:   Procedure Laterality Date    CYST REMOVAL Right 11/11/2022    Procedure: Excision of subcutaneous mass from right forearm;  Surgeon: Oni Greenberg MD;  [facility]: [facility] OR [facility] [place];  Service: General;  Laterality: Right;    HAMSTRING REPAIR Bilateral     age 10    INGUINAL HERNIA REPAIR Right 11/8/2023    Procedure:  INGUINAL HERNIA REPAIR LAPAROSCOPIC WITH DAVINCI ROBOT;  Surgeon: Mayur Quintana MD;  [place] [facility] [place] [facility] [place]  Service: Robotics - DaVinci;  Laterality: Right;    OTHER SURGICAL HISTORY      hyposadius repair    TOE FUSION Right     great toe at age 21         Current Outpatient Medications:     alclometasone (ACLOVATE) 0.05 % cream, , Disp: , Rfl:     ascorbic acid (VITAMIN C) 1000 MG tablet, Take 1 tablet by mouth Daily., Disp: , Rfl:     aspirin 81 MG EC tablet, Take 1 tablet by mouth Daily., Disp: , Rfl:     busPIRone (BUSPAR) 10 MG tablet, Take 1 tablet by mouth 3 (Three) Times a Day As Needed., Disp: , Rfl:     Calcium Carbonate 1500 (600 Ca) MG tablet, Take 1 tablet by mouth 2 (two) times a day., Disp: , Rfl:     cholecalciferol 25 MCG tablet, TAKE TWO TABLETS BY MOUTH ONCE DAILY, Disp: 60 tablet, Rfl: 1    Diclofenac Sodium (VOLTAREN) 1 % gel gel, Apply 1 g topically to the appropriate area as directed As Needed (LBP)., Disp: 100 g, Rfl: 2    docusate sodium (COLACE) 100 MG capsule, TAKE 1 CAPSULE BY MOUTH DAILY., Disp: 30 capsule, Rfl: 1    famotidine (PEPCID) 20 MG tablet, TAKE 1 TABLET BY MOUTH DAILY., Disp: 90 tablet, Rfl: 1    folic acid (FOLVITE) 800 MCG tablet, Take 1 tablet by mouth Daily., Disp: , Rfl:     guaiFENesin (Mucinex) 600 MG 12 hr tablet, Take 2 tablets by mouth 2 (Two) Times a Day., Disp: 30 tablet, Rfl: 0    HM Melatonin 10 MG sublingual tablet, TAKE 1 TABLET BY MOUTH EVERY NIGHT AT BEDTIME., Disp: 30 tablet, Rfl: 0    HM TRUEplus Fiber 2 g chewable tablet, CHEW 1 TABLET DAILY., Disp: 90 tablet, Rfl: 1    Invega Sustenna 234 MG/1.5ML suspension prefilled syringe IM injection, Inject 1.5 mL into the appropriate muscle as directed by prescriber Every 30 (Thirty) Days. Waiting on a appointment for his next injection., Disp: , Rfl:     Keppra 500 MG tablet, Take 3 tablets by mouth Every 12 (Twelve) Hours., Disp: , Rfl:     ketoconazole (NIZORAL) 2 % shampoo, , Disp: , Rfl:      "lamoTRIgine (LaMICtal) 200 MG tablet, Take 1 tablet by mouth Daily., Disp: , Rfl:     methocarbamol (ROBAXIN) 750 MG tablet, Take 1 tablet by mouth 4 (Four) Times a Day As Needed for Muscle Spasms., Disp: 12 tablet, Rfl: 0    nitrofurantoin (MACRODANTIN) 50 MG capsule, Take 1 capsule by mouth Daily., Disp: , Rfl:     pravastatin (PRAVACHOL) 20 MG tablet, TAKE 1 TABLET BY MOUTH EVERY NIGHT., Disp: 30 tablet, Rfl: 1    triamcinolone (KENALOG) 0.025 % cream, , Disp: , Rfl:     venlafaxine XR (EFFEXOR-XR) 150 MG 24 hr capsule, Take 1 capsule by mouth Daily., Disp: , Rfl:     venlafaxine XR (EFFEXOR-XR) 37.5 MG 24 hr capsule, Take 1 capsule by mouth Daily., Disp: , Rfl:     Allergies   Allergen Reactions    Valproic Acid Irritability     DEPAKOTE CAUSES DIZZINESS AND IRRITABILITY          Family History   Problem Relation Age of Onset    Hypothyroidism Mother     No Known Problems Father     Ulcers Brother     Malig Hyperthermia Neg Hx        Social History     Socioeconomic History    Marital status: Single   Tobacco Use    Smoking status: Never     Passive exposure: Never    Smokeless tobacco: Never   Vaping Use    Vaping status: Never Used   Substance and Sexual Activity    Alcohol use: Never    Drug use: Never    Sexual activity: Defer       Vital Signs:   BP (!) 128/110   Pulse 94   Ht 168 cm (66.14\")   Wt 77.4 kg (170 lb 9.6 oz)   BMI 27.42 kg/m²      Physical Exam     Result Review :   The following data was reviewed by: ÁNGELA Quintana on 4/12/2024:  Results for orders placed or performed in visit on 04/12/24   POC Urinalysis Dipstick, Automated    Specimen: Urine   Result Value Ref Range    Color Dark Yellow Yellow, Straw, Dark Yellow, Yanni    Clarity, UA Slightly Cloudy (A) Clear    Specific Gravity  1.020 1.005 - 1.030    pH, Urine 6.5 5.0 - 8.0    Leukocytes Large (3+) (A) Negative    Nitrite, UA Negative Negative    Protein, POC Trace (A) Negative mg/dL    Glucose, UA Negative Negative mg/dL    " Ketones, UA Negative Negative    Urobilinogen, UA 0.2 E.U./dL Normal, 0.2 E.U./dL    Bilirubin Negative Negative    Blood, UA Trace (A) Negative    Lot Number [id number]     Expiration Date 2,025-2                  Procedures        Assessment and Plan    Diagnoses and all orders for this visit:    1. Benign prostatic hyperplasia, unspecified whether lower urinary tract symptoms present (Primary)  -     POC Urinalysis Dipstick, Automated  -     Cystoscopy; Future    2. Urinary retention    3. Recurrent UTI  -     CMV Quant DNA PCR Urine - Urine, Clean Catch; Future        We will send his urine for DNA-based culture today.    We we will have him follow-up with Dr. Morris for cystoscopy to evaluate lower urinary tract.    I did discuss I do believe he still needs to catheterize at least 3 times a day to ensure that he is fully emptying his bladder and preventing UTIs.      I spent 10 minutes caring for Kang on this date of service. This time includes time spent by me in the following activities:reviewing tests, obtaining and/or reviewing a separately obtained history, performing a medically appropriate examination and/or evaluation , counseling and educating the patient/family/caregiver, ordering medications, tests, or procedures, and documenting information in the medical record  Follow Up   Return in about 3 months (around 7/12/2024) for With Dr. Morris For Cystoscopy.  Patient was given instructions and counseling regarding his condition or for health maintenance advice. Please see specific information pulled into the AVS if appropriate.         This document has been electronically signed by ÁNGELA Quintana  April 12, 2024 09:51 EDT

## 2024-04-13 LAB
ALBUMIN SERPL-MCNC: 4.3 G/DL (ref 3.5–5.2)
ALBUMIN/GLOB SERPL: 1.8 G/DL
ALP SERPL-CCNC: 63 U/L (ref 39–117)
ALT SERPL W P-5'-P-CCNC: 21 U/L (ref 1–41)
ANION GAP SERPL CALCULATED.3IONS-SCNC: 11.3 MMOL/L (ref 5–15)
AST SERPL-CCNC: 21 U/L (ref 1–40)
BILIRUB SERPL-MCNC: 0.3 MG/DL (ref 0–1.2)
BUN SERPL-MCNC: 15 MG/DL (ref 6–20)
BUN/CREAT SERPL: 19.2 (ref 7–25)
CALCIUM SPEC-SCNC: 9.7 MG/DL (ref 8.6–10.5)
CHLORIDE SERPL-SCNC: 104 MMOL/L (ref 98–107)
CHOLEST SERPL-MCNC: 158 MG/DL (ref 0–200)
CO2 SERPL-SCNC: 23.7 MMOL/L (ref 22–29)
CREAT SERPL-MCNC: 0.78 MG/DL (ref 0.76–1.27)
EGFRCR SERPLBLD CKD-EPI 2021: 113.5 ML/MIN/1.73
GLOBULIN UR ELPH-MCNC: 2.4 GM/DL
GLUCOSE SERPL-MCNC: 82 MG/DL (ref 65–99)
HDLC SERPL-MCNC: 39 MG/DL (ref 40–60)
LDLC SERPL CALC-MCNC: 104 MG/DL (ref 0–100)
LDLC/HDLC SERPL: 2.64 {RATIO}
POTASSIUM SERPL-SCNC: 3.8 MMOL/L (ref 3.5–5.2)
PROLACTIN SERPL-MCNC: 21.9 NG/ML (ref 4.04–15.2)
PROT SERPL-MCNC: 6.7 G/DL (ref 6–8.5)
SODIUM SERPL-SCNC: 139 MMOL/L (ref 136–145)
TRIGL SERPL-MCNC: 81 MG/DL (ref 0–150)
VLDLC SERPL-MCNC: 15 MG/DL (ref 5–40)

## 2024-04-16 ENCOUNTER — TELEPHONE (OUTPATIENT)
Dept: UROLOGY | Facility: CLINIC | Age: 44
End: 2024-04-16
Payer: MEDICARE

## 2024-04-16 DIAGNOSIS — R39.14 BENIGN PROSTATIC HYPERPLASIA WITH INCOMPLETE BLADDER EMPTYING: ICD-10-CM

## 2024-04-16 DIAGNOSIS — N40.1 BENIGN PROSTATIC HYPERPLASIA WITH INCOMPLETE BLADDER EMPTYING: ICD-10-CM

## 2024-04-16 DIAGNOSIS — R33.9 URINARY RETENTION: Primary | ICD-10-CM

## 2024-04-16 DIAGNOSIS — N40.0 BENIGN PROSTATIC HYPERPLASIA, UNSPECIFIED WHETHER LOWER URINARY TRACT SYMPTOMS PRESENT: ICD-10-CM

## 2024-04-16 DIAGNOSIS — N39.0 RECURRENT UTI: ICD-10-CM

## 2024-04-16 NOTE — TELEPHONE ENCOUNTER
Spoke to pts mom. Kang has not used a home health agency.  They would prefer using [facility] .  I called [facility] at [phone] to get information on how to order home health.  I need to fax lorenza butler, last office note.  Fax number [phone].  They will review the order and call us back.

## 2024-04-16 NOTE — TELEPHONE ENCOUNTER
----- Message from ÁNGELA Quintana sent at 4/16/2024  8:04 AM EDT -----  Can we call the patient's mother and see if he is ever had home health.  He will require IV antibiotics for at least 10 days.  ----- Message -----  From: Lucinda Yin MA  Sent: 4/12/2024   9:20 AM EDT  To: ÁNGELA Quintana

## 2024-04-19 ENCOUNTER — HOSPITAL ENCOUNTER (OUTPATIENT)
Dept: INFUSION THERAPY | Facility: HOSPITAL | Age: 44
Discharge: HOME | End: 2024-04-19
Payer: MEDICARE

## 2024-04-19 ENCOUNTER — TRANSCRIBE ORDERS (OUTPATIENT)
Dept: ADMINISTRATIVE | Facility: HOSPITAL | Age: 44
End: 2024-04-19
Payer: MEDICARE

## 2024-04-19 ENCOUNTER — PREP FOR SURGERY (OUTPATIENT)
Dept: OTHER | Facility: HOSPITAL | Age: 44
End: 2024-04-19
Payer: MEDICARE

## 2024-04-19 VITALS
TEMPERATURE: 98.2 F | RESPIRATION RATE: 20 BRPM | OXYGEN SATURATION: 96 % | HEART RATE: 102 BPM | SYSTOLIC BLOOD PRESSURE: 147 MMHG | DIASTOLIC BLOOD PRESSURE: 81 MMHG

## 2024-04-19 DIAGNOSIS — N39.0 RECURRENT UTI: Primary | ICD-10-CM

## 2024-04-19 DIAGNOSIS — N40.1 BENIGN PROSTATIC HYPERPLASIA WITH INCOMPLETE BLADDER EMPTYING: ICD-10-CM

## 2024-04-19 DIAGNOSIS — N40.0 BENIGN PROSTATIC HYPERPLASIA, UNSPECIFIED WHETHER LOWER URINARY TRACT SYMPTOMS PRESENT: ICD-10-CM

## 2024-04-19 DIAGNOSIS — R39.14 BENIGN PROSTATIC HYPERPLASIA WITH INCOMPLETE BLADDER EMPTYING: ICD-10-CM

## 2024-04-19 DIAGNOSIS — R33.9 URINARY RETENTION: ICD-10-CM

## 2024-04-19 PROCEDURE — 36410 VNPNXR 3YR/> PHY/QHP DX/THER: CPT

## 2024-04-19 PROCEDURE — C1751 CATH, INF, PER/CENT/MIDLINE: HCPCS

## 2024-04-19 NOTE — CONSULTS
Midline Line Insertion Procedure Note    Procedure: Insertion of #20G/10CM PowerGlide    Indications:  Home IV therapy; Merrem x 7 days    Procedure Details   Sterile technique was used including antiseptics, gloves, hand hygiene, mask, and sheet.    #20G/10CM PowerGlide inserted to the R Basilic vein per hospital protocol.   Blood return:  yes    Findings:  Catheter inserted to 10 cm, with 0 cm. Exposed. Catheter was flushed with 20 cc NS. Patient did tolerate procedure well.    LOT: [id number]  Expiration date: 2025-02-28    Recommendations:  Midline Brochure given to patient with teaching instruction.

## 2024-04-22 ENCOUNTER — TELEPHONE (OUTPATIENT)
Dept: UROLOGY | Facility: CLINIC | Age: 44
End: 2024-04-22
Payer: MEDICARE

## 2024-04-22 NOTE — TELEPHONE ENCOUNTER
CAITLYN WITH [facility] CALLED TO LET YOU KNOW THAT THE REFERRAL FOR THE IV ANTIBIOTICS HAS BEEN APPROVED, COVERED 100%. THEY WILL BE DELIVERED TONIGHT AND A NURSE WILL GO OUT TOMORROW TO START THE TEACHING FOR THE 7 DAYS OF INFUSIONS.

## 2024-04-23 ENCOUNTER — TELEPHONE (OUTPATIENT)
Dept: UROLOGY | Facility: CLINIC | Age: 44
End: 2024-04-23
Payer: MEDICARE

## 2024-04-23 NOTE — TELEPHONE ENCOUNTER
[facility] called back.  [facility]  The state that would need to be drawn up under a nurse.  They suggested pt would need to go to ED if he has any symptoms of reaction.  Zeina has instructed pt and Mom on what to look for and they are of understanding.

## 2024-04-23 NOTE — TELEPHONE ENCOUNTER
Zeina with [facility] is on the phone. She is asking for a verbal order for Anaphylaxis kit, just in case pt has a reaction. Pt was given a dose today. No rash, no SOB, No fainting, no dizziness BP manual was 98/62.   Monica responded with YES.  They will send us an order to be signed of on.

## 2024-04-30 ENCOUNTER — TELEPHONE (OUTPATIENT)
Dept: FAMILY MEDICINE CLINIC | Age: 44
End: 2024-04-30
Payer: MEDICARE

## 2024-04-30 NOTE — TELEPHONE ENCOUNTER
Please check a CBC to see where his white blood cell count is with his current infection.  I am okay if his pulse runs less than 110, I do not think we need to adjust his medication unless it gets higher than that or is irregular.  Dr. Parham

## 2024-04-30 NOTE — TELEPHONE ENCOUNTER
Zeina with [facility] called, they are seeing patient for IV antibiotics prescribed by urology. Zeina states patient HR has been running around 102-110. Please advise and thank you

## 2024-05-02 ENCOUNTER — LAB REQUISITION (OUTPATIENT)
Dept: LAB | Facility: HOSPITAL | Age: 44
End: 2024-05-02
Payer: MEDICARE

## 2024-05-02 DIAGNOSIS — R00.0 TACHYCARDIA, UNSPECIFIED: ICD-10-CM

## 2024-05-02 LAB
BASOPHILS # BLD AUTO: 0.03 10*3/MM3 (ref 0–0.2)
BASOPHILS NFR BLD AUTO: 0.6 % (ref 0–1.5)
DEPRECATED RDW RBC AUTO: 37.8 FL (ref 37–54)
EOSINOPHIL # BLD AUTO: 0.1 10*3/MM3 (ref 0–0.4)
EOSINOPHIL NFR BLD AUTO: 2 % (ref 0.3–6.2)
ERYTHROCYTE [DISTWIDTH] IN BLOOD BY AUTOMATED COUNT: 12.6 % (ref 12.3–15.4)
HCT VFR BLD AUTO: 44.4 % (ref 37.5–51)
HGB BLD-MCNC: 15.7 G/DL (ref 13–17.7)
IMM GRANULOCYTES # BLD AUTO: 0.01 10*3/MM3 (ref 0–0.05)
IMM GRANULOCYTES NFR BLD AUTO: 0.2 % (ref 0–0.5)
LYMPHOCYTES # BLD AUTO: 1.98 10*3/MM3 (ref 0.7–3.1)
LYMPHOCYTES NFR BLD AUTO: 38.9 % (ref 19.6–45.3)
MCH RBC QN AUTO: 29.7 PG (ref 26.6–33)
MCHC RBC AUTO-ENTMCNC: 35.4 G/DL (ref 31.5–35.7)
MCV RBC AUTO: 84.1 FL (ref 79–97)
MONOCYTES # BLD AUTO: 0.37 10*3/MM3 (ref 0.1–0.9)
MONOCYTES NFR BLD AUTO: 7.3 % (ref 5–12)
NEUTROPHILS NFR BLD AUTO: 2.6 10*3/MM3 (ref 1.7–7)
NEUTROPHILS NFR BLD AUTO: 51 % (ref 42.7–76)
NRBC BLD AUTO-RTO: 0 /100 WBC (ref 0–0.2)
PLATELET # BLD AUTO: 205 10*3/MM3 (ref 140–450)
PMV BLD AUTO: 11.1 FL (ref 6–12)
RBC # BLD AUTO: 5.28 10*6/MM3 (ref 4.14–5.8)
WBC NRBC COR # BLD AUTO: 5.09 10*3/MM3 (ref 3.4–10.8)

## 2024-05-02 PROCEDURE — 85025 COMPLETE CBC W/AUTO DIFF WBC: CPT | Performed by: FAMILY MEDICINE

## 2024-05-03 DIAGNOSIS — F51.01 PRIMARY INSOMNIA: ICD-10-CM

## 2024-05-04 PROBLEM — N39.0 RECURRENT URINARY TRACT INFECTION: Status: ACTIVE | Noted: 2024-05-04

## 2024-05-04 PROBLEM — N40.1 BENIGN PROSTATIC HYPERPLASIA WITH LOWER URINARY TRACT SYMPTOMS: Status: ACTIVE | Noted: 2024-05-04

## 2024-05-07 ENCOUNTER — OFFICE VISIT (OUTPATIENT)
Dept: UROLOGY | Facility: CLINIC | Age: 44
End: 2024-05-07
Payer: MEDICARE

## 2024-05-07 VITALS — WEIGHT: 170 LBS | HEIGHT: 66 IN | BODY MASS INDEX: 27.32 KG/M2

## 2024-05-07 DIAGNOSIS — N40.0 BENIGN PROSTATIC HYPERPLASIA, UNSPECIFIED WHETHER LOWER URINARY TRACT SYMPTOMS PRESENT: ICD-10-CM

## 2024-05-07 DIAGNOSIS — N31.9 NEUROGENIC BLADDER: ICD-10-CM

## 2024-05-07 DIAGNOSIS — N40.1 BENIGN PROSTATIC HYPERPLASIA WITH LOWER URINARY TRACT SYMPTOMS, SYMPTOM DETAILS UNSPECIFIED: Primary | ICD-10-CM

## 2024-05-07 DIAGNOSIS — N39.0 RECURRENT URINARY TRACT INFECTION: ICD-10-CM

## 2024-05-07 DIAGNOSIS — R33.9 URINARY RETENTION: ICD-10-CM

## 2024-05-09 ENCOUNTER — TELEPHONE (OUTPATIENT)
Dept: UROLOGY | Facility: CLINIC | Age: 44
End: 2024-05-09
Payer: MEDICARE

## 2024-05-16 ENCOUNTER — HOSPITAL ENCOUNTER (EMERGENCY)
Facility: HOSPITAL | Age: 44
Discharge: HOME | End: 2024-05-16
Attending: EMERGENCY MEDICINE
Payer: MEDICARE

## 2024-05-16 VITALS
WEIGHT: 169.97 LBS | DIASTOLIC BLOOD PRESSURE: 94 MMHG | OXYGEN SATURATION: 95 % | HEART RATE: 101 BPM | SYSTOLIC BLOOD PRESSURE: 132 MMHG | HEIGHT: 66 IN | TEMPERATURE: 98.3 F | BODY MASS INDEX: 27.32 KG/M2 | RESPIRATION RATE: 18 BRPM

## 2024-05-16 DIAGNOSIS — R33.9 URINARY RETENTION: Primary | ICD-10-CM

## 2024-05-16 PROCEDURE — 53600 DILATE URETHRA STRICTURE: CPT | Performed by: UROLOGY

## 2024-05-16 PROCEDURE — 51703 INSERT BLADDER CATH COMPLEX: CPT

## 2024-05-16 PROCEDURE — 51703 INSERT BLADDER CATH COMPLEX: CPT | Performed by: UROLOGY

## 2024-05-16 PROCEDURE — 99282 EMERGENCY DEPT VISIT SF MDM: CPT

## 2024-05-16 RX ORDER — CEPHALEXIN 500 MG/1
500 CAPSULE ORAL 4 TIMES DAILY
Qty: 20 CAPSULE | Refills: 0 | Status: SHIPPED | OUTPATIENT
Start: 2024-05-16 | End: 2024-05-23 | Stop reason: ALTCHOICE

## 2024-05-17 ENCOUNTER — TELEPHONE (OUTPATIENT)
Dept: UROLOGY | Facility: CLINIC | Age: 44
End: 2024-05-17
Payer: MEDICARE

## 2024-05-17 NOTE — ED NOTES
Attempted to place bills cath in patient, pt screamed out in severe pain during placement, unable to advance catheter in bladder and pt had a moderate amount of blood coming out at insertion site. Catheter removed and MD notified.

## 2024-05-17 NOTE — TELEPHONE ENCOUNTER
PATIENT HAD INDWELLING CATHETER PLACED A COUPLE DAYS AGO.  IT CAME OUT YESTERDAY.  SHE SAID HE IS UNSURE IF IT CAME OUT OR IF HE PULLED IT OUT.  THERE WAS BLOOD EVERY  AND IT WAS POURING OUT.  HE WAS COVERED IN BLOOD BY THE TIME THEY GOT TO [facility] ER.    THEY PLACED A CATHETER.  THERE IS A LOT OF BLOOD COMING OUT OF THE PENIS AROUND WHERE THE CATHETER GOES IN.  URINE IS STILL GOING IN THE BAG..

## 2024-05-17 NOTE — TELEPHONE ENCOUNTER
Notified pt mother of Dr Morris response and recommendations. She verbalized understanding and they will go to an ER if pt has any issues over the weekend.

## 2024-05-17 NOTE — ED PROVIDER NOTES
Time: 9:39 PM EDT  Date of encounter:  5/16/2024  Independent Historian/Clinical History and Information was obtained by:   Patient    History is limited by: N/A    Chief Complaint: Hematuria      History of Present Illness:  Patient is a 43 y.o. year old male who presents to the emergency department for evaluation of hematuria.  The patient reports that he actually pulled out his catheter with the balloon inflated.  Patient denies chest pain or shortness of breath.  Patient has no cough hemoptysis.  Patient denies pain.    HPI    Patient Care Team  Primary Care Provider: Hien Parham MD    Past Medical History:     Allergies   Allergen Reactions    Valproic Acid Irritability     DEPAKOTE CAUSES DIZZINESS AND IRRITABILITY       Past Medical History:   Diagnosis Date    Acute upper respiratory infection 02/2022    Age-related osteoporosis without current pathological fracture     Broken toe 11/2022    left great toe    Cerebral palsy     diagnosed as infant    Compression fracture of first lumbar vertebra     Constipation     Disease of pancreas     GERD (gastroesophageal reflux disease)     History of urinary self-catheterization     pt or pt's mother catheterizes 4-6  times a day    Hydrocephalus in diseases classified elsewhere     NO  SHUNT    Inguinal hernia     Injury of back 02/2020    Low back pain     Major depressive disorder     single episode, moderate    Other cerebral palsy     Other fatigue     Other generalized epilepsy and epileptic syndromes, not intractable, without status epilepticus     Other hypertrophic disorders of the skin     Pain in left ankle and joints of left foot     Pain in right toe(s)     Pain in thoracic spine     Pancreatic cyst     Paranoid schizophrenia     Pelvic and perineal pain     Primary insomnia     Pure hypercholesterolemia     Repeated falls     last fall 11/2022    Schizoaffective disorder, unspecified     Seizures     last seizure was approx 2018, medication  controlled    Sleep apnea     uses cpap machine    Somnolence     TIA (transient ischemic attack)     UNSURE OF  DATE    Vitamin D deficiency     Wedge compression fracture of fourth thoracic vertebra, subsequent encounter for fracture with routine healing      Past Surgical History:   Procedure Laterality Date    CYST REMOVAL Right 11/11/2022    Procedure: Excision of subcutaneous mass from right forearm;  Surgeon: Oni Greenberg MD;  Location: [facility] [place];  Service: General;  Laterality: Right;    HAMSTRING REPAIR Bilateral     age 10    INGUINAL HERNIA REPAIR Right 11/8/2023    Procedure: INGUINAL HERNIA REPAIR LAPAROSCOPIC WITH DAVINCI ROBOT;  Surgeon: Mayur Quintana MD;  Location: [facility] [place];  Service: Robotics - DaVinci;  Laterality: Right;    OTHER SURGICAL HISTORY      hyposadius repair    TOE FUSION Right     great toe at age 21     Family History   Problem Relation Age of Onset    Hypothyroidism Mother     No Known Problems Father     Ulcers Brother     Malig Hyperthermia Neg Hx        Home Medications:  Prior to Admission medications    Medication Sig Start Date End Date Taking? Authorizing Provider   alclometasone (ACLOVATE) 0.05 % cream  3/15/23   Francisca Woodard MD   ascorbic acid (VITAMIN C) 1000 MG tablet Take 1 tablet by mouth Daily.    Francisca Woodard MD   aspirin 81 MG EC tablet Take 1 tablet by mouth Daily.    Francisca Woodard MD   busPIRone (BUSPAR) 10 MG tablet Take 1 tablet by mouth 3 (Three) Times a Day As Needed. 6/2/21   Francisca Woodard MD   Calcium Carbonate 1500 (600 Ca) MG tablet Take 1 tablet by mouth 2 (two) times a day. 6/2/21   Francisca Woodard MD   cholecalciferol 25 MCG tablet TAKE TWO TABLETS BY MOUTH ONCE DAILY 4/4/24   Hien Parham MD   Diclofenac Sodium (VOLTAREN) 1 % gel gel Apply 1 g topically to the appropriate area as directed As Needed (LBP). 11/14/23   Hien Parham MD   docusate sodium (COLACE) 100 MG capsule TAKE 1  CAPSULE BY MOUTH DAILY. 1/30/23   Hien Parham MD   famotidine (PEPCID) 20 MG tablet TAKE 1 TABLET BY MOUTH DAILY. 1/5/23   Hien Parham MD   folic acid (FOLVITE) 800 MCG tablet Take 1 tablet by mouth Daily.    Francisca Woodard MD   guaiFENesin (Mucinex) 600 MG 12 hr tablet Take 2 tablets by mouth 2 (Two) Times a Day. 2/6/24   Thu Contreras APRN HM Melatonin 10 MG sublingual tablet TAKE 1 TABLET BY MOUTH EVERY NIGHT AT BEDTIME. 5/3/24   Hien Parham MD HM TRUEplus Fiber 2 g chewable tablet CHEW 1 TABLET DAILY. 6/14/23   Hien Parham MD   Invega Sustenna 234 MG/1.5ML suspension prefilled syringe IM injection Inject 1.5 mL into the appropriate muscle as directed by prescriber Every 30 (Thirty) Days. Waiting on a appointment for his next injection. 5/13/21   Francisca Woodard MD   Keppra 500 MG tablet Take 3 tablets by mouth Every 12 (Twelve) Hours. 6/2/21   Francisca Woodard MD   ketoconazole (NIZORAL) 2 % shampoo  3/9/23   Francisca Woodard MD   lamoTRIgine (LaMICtal) 200 MG tablet Take 1 tablet by mouth Daily. 6/2/21   Francisca Woodard MD   methocarbamol (ROBAXIN) 750 MG tablet Take 1 tablet by mouth 4 (Four) Times a Day As Needed for Muscle Spasms. 10/4/23   Landy Joe APRN   nitrofurantoin (MACRODANTIN) 50 MG capsule Take 1 capsule by mouth Daily. 2/1/24   Francisca Woodard MD   pravastatin (PRAVACHOL) 20 MG tablet TAKE 1 TABLET BY MOUTH EVERY NIGHT. 4/4/24   Hien Parham MD   triamcinolone (KENALOG) 0.025 % cream  7/31/23   Francisca Woodard MD   venlafaxine XR (EFFEXOR-XR) 150 MG 24 hr capsule Take 1 capsule by mouth Daily. 10/10/22   Francisca Woodard MD   venlafaxine XR (EFFEXOR-XR) 37.5 MG 24 hr capsule Take 1 capsule by mouth Daily. 2/28/23   Francisca Woodard MD        Social History:   Social History     Tobacco Use    Smoking status: Never     Passive exposure: Never    Smokeless tobacco: Never   Vaping Use    Vaping  "status: Never Used   Substance Use Topics    Alcohol use: Never    Drug use: Never         Review of Systems:  Review of Systems   Constitutional:  Negative for chills and fever.   HENT:  Negative for congestion, rhinorrhea and sore throat.    Eyes:  Negative for pain and visual disturbance.   Respiratory:  Negative for apnea, cough, chest tightness and shortness of breath.    Cardiovascular:  Negative for chest pain and palpitations.   Gastrointestinal:  Negative for abdominal pain, diarrhea, nausea and vomiting.   Genitourinary:  Positive for hematuria. Negative for difficulty urinating and dysuria.   Musculoskeletal:  Negative for joint swelling and myalgias.   Skin:  Negative for color change.   Neurological:  Negative for seizures and headaches.   Psychiatric/Behavioral: Negative.     All other systems reviewed and are negative.       Physical Exam:  /87   Pulse 118   Temp 98 °F (36.7 °C) (Oral)   Resp 18   Ht 167.6 cm (66\")   Wt 77.1 kg (169 lb 15.6 oz)   SpO2 94%   BMI 27.43 kg/m²     Physical Exam  Vitals and nursing note reviewed.   Constitutional:       General: He is not in acute distress.     Appearance: Normal appearance. He is not toxic-appearing.   HENT:      Head: Normocephalic and atraumatic.      Jaw: There is normal jaw occlusion.   Eyes:      General: Lids are normal.      Extraocular Movements: Extraocular movements intact.      Conjunctiva/sclera: Conjunctivae normal.      Pupils: Pupils are equal, round, and reactive to light.   Cardiovascular:      Rate and Rhythm: Normal rate and regular rhythm.      Pulses: Normal pulses.      Heart sounds: Normal heart sounds.   Pulmonary:      Effort: Pulmonary effort is normal. No respiratory distress.      Breath sounds: Normal breath sounds. No wheezing or rhonchi.   Abdominal:      General: Abdomen is flat.      Palpations: Abdomen is soft.      Tenderness: There is no abdominal tenderness. There is no guarding or rebound. "   Musculoskeletal:         General: Normal range of motion.      Cervical back: Normal range of motion and neck supple.      Right lower leg: No edema.      Left lower leg: No edema.   Skin:     General: Skin is warm and dry.   Neurological:      Mental Status: He is alert and oriented to person, place, and time. Mental status is at baseline.   Psychiatric:         Mood and Affect: Mood normal.                  Procedures:  Procedures      Medical Decision Making:      Comorbidities that affect care:    Cerebral palsy    External Notes reviewed:    Previous Clinic Note: Patient was last seen in clinic on the seventh for a catheter placement.      The following orders were placed and all results were independently analyzed by me:  Orders Placed This Encounter   Procedures    Insert Indwelling Urinary Catheter    Assess Need for Indwelling Urinary Catheter - Follow Removal Protocol    Urinary Catheter Care       Medications Given in the Emergency Department:  Medications - No data to display     ED Course:         Labs:    Lab Results (last 24 hours)       ** No results found for the last 24 hours. **             Imaging:    No Radiology Exams Resulted Within Past 24 Hours      Differential Diagnosis and Discussion:    Hematuria: Differential diagnosis includes but is not limited to medications, coagulopathy, glomerulonephritis, nephritis, neoplasm, vascular abnormalities, cystitis, urethritis, neoplasms of the bladder, and autoimmune disorders.        [facility]     Nursing staff was unable to place the catheter in the emergency department.  Dr. Moore was paged and she came to the emergency department to evaluate the patient.  Catheter was placed with no difficulty via Dr. Moore.          Patient Care Considerations:    I considered performing a CT scan, however the patient is nontender.      Consultants/Shared Management Plan:    Case was discussed with Dr. Moore who came to emergency department to evaluate the  patient.    Social Determinants of Health:    Patient is independent, reliable, and has access to care.       Disposition and Care Coordination:    Discharged: The patient is suitable and stable for discharge with no need for consideration of admission.    I have explained the patient´s condition, diagnoses and treatment plan based on the information available to me at this time. I have answered questions and addressed any concerns. The patient has a good  understanding of the patient´s diagnosis, condition, and treatment plan as can be expected at this point. The vital signs have been stable. The patient´s condition is stable and appropriate for discharge from the emergency department.      The patient will pursue further outpatient evaluation with the primary care physician or other designated or consulting physician as outlined in the discharge instructions. They are agreeable to this plan of care and follow-up instructions have been explained in detail. The patient has received these instructions in written format and has expressed an understanding of the discharge instructions. The patient is aware that any significant change in condition or worsening of symptoms should prompt an immediate return to this or the closest emergency department or call to 911.  I have explained discharge medications and the need for follow up with the patient/caretakers. This was also printed in the discharge instructions. Patient was discharged with the following medications and follow up:      Medication List        New Prescriptions      cephalexin 500 MG capsule  Commonly known as: KEFLEX  Take 1 capsule by mouth 4 (Four) Times a Day.               Where to Get Your Medications        These medications were sent to [facility] - [place], [place] - [address] - [phone] [facility] [phone]   [address] [facility] [address]      Phone: [phone]   cephalexin 500 MG capsule      Hien Parham  MD Ang  [address] [facility] [address]  [facility] [address]  [phone]    In 2 days         Final diagnoses:   Urinary retention        ED Disposition       ED Disposition   Discharge    Condition   Stable    Comment   --               This medical record created using voice recognition software.             Barney Archer MD  05/16/24 [id number]

## 2024-05-17 NOTE — OP NOTE
Procedure note:    After over verbal consent obtained, I attempted to place a coudé catheter into the bladder under sterile technique.  There was a false passage and I was unable to pass the catheter into the bladder.    At this point I used a [place] difficult [place] urologist tray and passed a wire into the bladder.  I then dilated just once using the smallest dilator over the wire.  At this point I was able to pass the [place] tip [place] catheter over the wire and into the bladder.  There was return of clear yellow urine.  Patient tolerated well.    He will follow back up with ÁNGELA Parekh and Adalid Morris MD as scheduled.    Electronically signed by Alisson Torres MD, 05/16/24, 9:56 PM EDT.

## 2024-05-21 ENCOUNTER — OFFICE VISIT (OUTPATIENT)
Dept: FAMILY MEDICINE CLINIC | Age: 44
End: 2024-05-21
Payer: MEDICARE

## 2024-05-21 VITALS
HEART RATE: 109 BPM | BODY MASS INDEX: 28.19 KG/M2 | HEIGHT: 66 IN | SYSTOLIC BLOOD PRESSURE: 114 MMHG | TEMPERATURE: 97.7 F | WEIGHT: 175.4 LBS | OXYGEN SATURATION: 94 % | DIASTOLIC BLOOD PRESSURE: 76 MMHG

## 2024-05-21 DIAGNOSIS — R82.90 CLOUDY URINE: ICD-10-CM

## 2024-05-21 DIAGNOSIS — T83.9XXD URINARY CATHETER COMPLICATION, SUBSEQUENT ENCOUNTER: ICD-10-CM

## 2024-05-21 DIAGNOSIS — N30.91 CYSTITIS WITH HEMATURIA: ICD-10-CM

## 2024-05-21 DIAGNOSIS — F20.0 PARANOID SCHIZOPHRENIA: ICD-10-CM

## 2024-05-21 DIAGNOSIS — K40.90 RIGHT INGUINAL HERNIA: ICD-10-CM

## 2024-05-21 DIAGNOSIS — R31.0 GROSS HEMATURIA: Primary | ICD-10-CM

## 2024-05-21 DIAGNOSIS — G40.909 NONINTRACTABLE EPILEPSY WITHOUT STATUS EPILEPTICUS, UNSPECIFIED EPILEPSY TYPE: ICD-10-CM

## 2024-05-21 DIAGNOSIS — K59.09 OTHER CONSTIPATION: ICD-10-CM

## 2024-05-21 DIAGNOSIS — N31.9 NEUROGENIC BLADDER: ICD-10-CM

## 2024-05-21 DIAGNOSIS — E78.2 MIXED HYPERLIPIDEMIA: ICD-10-CM

## 2024-05-21 LAB
BACTERIA UR QL AUTO: ABNORMAL /HPF
BILIRUB UR QL STRIP: NEGATIVE
CLARITY UR: ABNORMAL
COD CRY URNS QL: ABNORMAL /HPF
COLOR UR: YELLOW
GLUCOSE UR STRIP-MCNC: NEGATIVE MG/DL
HGB UR QL STRIP.AUTO: NEGATIVE
KETONES UR QL STRIP: NEGATIVE
LEUKOCYTE ESTERASE UR QL STRIP.AUTO: ABNORMAL
MUCOUS THREADS URNS QL MICRO: ABNORMAL /HPF
NITRITE UR QL STRIP: POSITIVE
PH UR STRIP.AUTO: 5.5 [PH] (ref 5–8)
PROT UR QL STRIP: ABNORMAL
RBC # UR STRIP: ABNORMAL /HPF
REF LAB TEST METHOD: ABNORMAL
SP GR UR STRIP: >=1.03 (ref 1–1.03)
SQUAMOUS #/AREA URNS HPF: ABNORMAL /HPF
UROBILINOGEN UR QL STRIP: ABNORMAL
WBC # UR STRIP: ABNORMAL /HPF

## 2024-05-21 PROCEDURE — G2211 COMPLEX E/M VISIT ADD ON: HCPCS | Performed by: FAMILY MEDICINE

## 2024-05-21 PROCEDURE — 1160F RVW MEDS BY RX/DR IN RCRD: CPT | Performed by: FAMILY MEDICINE

## 2024-05-21 PROCEDURE — 1126F AMNT PAIN NOTED NONE PRSNT: CPT | Performed by: FAMILY MEDICINE

## 2024-05-21 PROCEDURE — 87181 SC STD AGAR DILUTION PER AGT: CPT | Performed by: FAMILY MEDICINE

## 2024-05-21 PROCEDURE — 87077 CULTURE AEROBIC IDENTIFY: CPT | Performed by: FAMILY MEDICINE

## 2024-05-21 PROCEDURE — 99214 OFFICE O/P EST MOD 30 MIN: CPT | Performed by: FAMILY MEDICINE

## 2024-05-21 PROCEDURE — 87086 URINE CULTURE/COLONY COUNT: CPT | Performed by: FAMILY MEDICINE

## 2024-05-21 PROCEDURE — 1159F MED LIST DOCD IN RCRD: CPT | Performed by: FAMILY MEDICINE

## 2024-05-21 PROCEDURE — 81001 URINALYSIS AUTO W/SCOPE: CPT | Performed by: FAMILY MEDICINE

## 2024-05-21 PROCEDURE — 87186 SC STD MICRODIL/AGAR DIL: CPT | Performed by: FAMILY MEDICINE

## 2024-05-21 RX ORDER — SODIUM CHLORIDE 0.9 % (FLUSH) 0.9 %
SYRINGE (ML) INJECTION
COMMUNITY

## 2024-05-21 NOTE — PROGRESS NOTES
Kang Grey presents to [facility].    Chief Complaint: hematuria, inguinal hernia is coming back    Subjective     History of Present Illness:  Hyperlipidemia  Pertinent negatives include no chest pain or shortness of breath.       Kang pulled out his cathetor and he's not sure how, on 5/16/24, the balloon was still inflated and he started bleeding badly, he went to [facility] and a new cath was placed and bleeding stopped Friday the 17th.  He has no pain.  He is also under treatment for a UTI.     His mom reports he stays in bed all the time, he is not bathing as often as he should.  He is depressed and overwhelmed over his urinary cathetor situation.  He is seeing psychiatry for paranoid schizophrenia with depression, and has a follow up with his psychiatrist next Tuesday.  He does admit he is depressed, denies crying spells  but states his emotions are frozen.  He states he has no SI/HI and would never hurt himself.  Sleep is ok. He is on lamictal, invega injections, effexor and buspar as well as folic acid.    S/p R inguinal hernia repair on 11/8/23 and his hernia is coming back     He is having BM most days on the dulcolax bid regimen and 2 fiber gummies.     He has sleep apnea and is on cpap and is still not tolerating, only wears it about half the night.      He presents with chronic pure hypercholesterolemia, current treatment includes a lipid lowering agent, pravastatin.  Tolerates medication well.  Cholesterol from April shows good control.  Compliance with treatment has been good.  He denies experiencing any hypercholesterolemia related symptoms.  He needs med refills today and is due for labs      He has seizures and wants a service animal for his seizure disorder, I advised he discuss with Dr Mccoy.  He is on Keppra, seizures are under control, he tolerates meds well.           Result Review   The following data was reviewed by Hien Parham MD on  "05/21/2024.  Lab Results   Component Value Date    WBC 5.09 05/02/2024    HGB 15.7 05/02/2024    HCT 44.4 05/02/2024    MCV 84.1 05/02/2024     05/02/2024     Lab Results   Component Value Date    GLUCOSE 82 04/12/2024    BUN 15 04/12/2024    CREATININE 0.78 04/12/2024    EGFR 113.5 04/12/2024    BCR 19.2 04/12/2024    K 3.8 04/12/2024    CO2 23.7 04/12/2024    CALCIUM 9.7 04/12/2024    ALBUMIN 4.3 04/12/2024    BILITOT 0.3 04/12/2024    AST 21 04/12/2024    ALT 21 04/12/2024     Lab Results   Component Value Date    CHOL 158 04/12/2024    CHLPL 174 05/06/2021    TRIG 81 04/12/2024    HDL 39 (L) 04/12/2024     (H) 04/12/2024     Lab Results   Component Value Date    TSH 1.730 11/16/2023     Lab Results   Component Value Date    HGBA1C 5.40 03/18/2024     No results found for: \"PSA\"      Lab Results   Component Value Date    NACO34ZD 44.1 12/07/2021               Assessment and Plan:   Diagnoses and all orders for this visit:    1. Gross hematuria (Primary)  Comments:  Catheter injury from catheter pulling out w/Roldan still inflated.New cath placed.He is to f/u with urology as directed.Hematuria resolved.Denies pain today    2. Urinary catheter complication, subsequent encounter  -     Urinalysis With Culture If Indicated - Urine, Clean Catch  -     Urinalysis, Microscopic Only - Urine, Clean Catch  -     Urine Culture - Urine, Urine, Clean Catch    3. Cystitis with hematuria  Comments:  On treatment with Augmentin.  Urine is still cloudy so I will recheck  a UA with micro    4. Nonintractable epilepsy without status epilepticus, unspecified epilepsy type  Comments:  Seizures are stable and under control with Keppra 500 mg daily.  Follow-up with neurology as directed    5. Mixed hyperlipidemia  Comments:  Stable on pravastatin.  Tolerates meds well.  Will continue to check labs every 6 months    6. Other constipation  Comments:  Stable and well-controlled on current treatment plan    7. Neurogenic " bladder    8. Paranoid schizophrenia  Comments:  depression is worse, he is to f/u with psychiatry for reeval next week and continue current meds.  No SI/HI    9. Right inguinal hernia  Comments:  Recurrent, no pain on exam.  Will watch for now.    10. Cloudy urine  Comments:  Urinalysis performed              Objective     Medications:  Current Outpatient Medications   Medication Instructions    alclometasone (ACLOVATE) 0.05 % cream No dose, route, or frequency recorded.    ascorbic acid (VITAMIN C) 1000 MG tablet 1 tablet, Oral, Daily    aspirin 81 mg, Oral, Daily    busPIRone (BUSPAR) 10 MG tablet 1 tablet, Oral, 3 Times Daily PRN    Calcium Carbonate 1500 (600 Ca) MG tablet 1 tablet, Oral, 2 times daily    cephalexin (KEFLEX) 500 mg, Oral, 4 Times Daily    cholecalciferol (VITAMIN D3) 2,000 Units, Oral, Daily    Diclofenac Sodium (VOLTAREN) 1 g, Topical, As Needed    docusate sodium (COLACE) 100 mg, Oral, Daily    famotidine (PEPCID) 20 mg, Oral, Daily    folic acid (FOLVITE) 800 MCG tablet 1 tablet, Oral, Daily    guaiFENesin (MUCINEX) 1,200 mg, Oral, 2 Times Daily    HM Melatonin 10 mg, Oral, Every Night at Bedtime    HM TRUEplus Fiber 2 g chewable tablet 1 tablet, Oral, Daily    Invega Sustenna 234 MG/1.5ML suspension prefilled syringe IM injection 1.5 mL, Intramuscular, Every 30 Days, Waiting on a appointment for his next injection.    Keppra 500 MG tablet 3 tablets, Oral, Every 12 Hours    ketoconazole (NIZORAL) 2 % shampoo No dose, route, or frequency recorded.    lamoTRIgine (LaMICtal) 200 MG tablet 1 tablet, Oral, Daily    methocarbamol (ROBAXIN) 750 mg, Oral, 4 Times Daily PRN    nitrofurantoin (MACRODANTIN) 50 mg, Oral, Daily    pravastatin (PRAVACHOL) 20 mg, Oral, Nightly    sodium chloride 0.9 % flush     triamcinolone (KENALOG) 0.025 % cream     venlafaxine XR (EFFEXOR-XR) 150 mg, Oral, Daily    venlafaxine XR (EFFEXOR-XR) 37.5 mg, Oral, Daily        Vital Signs:   /76 (BP Location: Right arm,  "Patient Position: Sitting)   Pulse 109   Temp 97.7 °F (36.5 °C) (Oral)   Ht 167.6 cm (65.98\")   Wt 79.6 kg (175 lb 6.4 oz)   SpO2 94%   BMI 28.32 kg/m²             Physical Exam:  Physical Exam  Vitals and nursing note reviewed.   Constitutional:       General: He is not in acute distress.     Appearance: Normal appearance. He is not ill-appearing, toxic-appearing or diaphoretic.      Comments: He is disheveled, he has bad breath and is malodorous.  He is with his mother today   HENT:      Head: Normocephalic and atraumatic.      Right Ear: Tympanic membrane, ear canal and external ear normal.      Left Ear: Tympanic membrane, ear canal and external ear normal.      Nose: No congestion or rhinorrhea.      Mouth/Throat:      Mouth: Mucous membranes are moist.      Pharynx: Oropharynx is clear. No oropharyngeal exudate or posterior oropharyngeal erythema.   Eyes:      Extraocular Movements: Extraocular movements intact.      Conjunctiva/sclera: Conjunctivae normal.      Pupils: Pupils are equal, round, and reactive to light.   Cardiovascular:      Rate and Rhythm: Normal rate and regular rhythm.      Heart sounds: Normal heart sounds.   Pulmonary:      Effort: Pulmonary effort is normal.      Breath sounds: Normal breath sounds. No wheezing, rhonchi or rales.   Abdominal:      General: Abdomen is flat.      Palpations: Abdomen is soft. There is no mass.      Tenderness: There is no abdominal tenderness.      Hernia: No hernia is present. There is no hernia in the left inguinal area or right inguinal area.   Genitourinary:     Penis: Normal.       Epididymis:      Right: Normal.      Left: Normal.          Comments: Urine is clear in his bag.    Area at opening of urethra with Roldan is normal-appearing without signs of infection or drainage  Musculoskeletal:      Cervical back: Neck supple. No rigidity.      Right lower leg: No edema.      Left lower leg: No edema.   Lymphadenopathy:      Cervical: No cervical " adenopathy.      Lower Body: No right inguinal adenopathy. No left inguinal adenopathy.   Skin:     General: Skin is warm and dry.   Neurological:      General: No focal deficit present.      Mental Status: He is alert and oriented to person, place, and time. Mental status is at baseline.   Psychiatric:         Mood and Affect: Mood normal.         Behavior: Behavior normal.         Thought Content: Thought content normal.         Judgment: Judgment normal.           Review of Systems:  Review of Systems   Constitutional:  Negative for chills, fatigue and fever.   HENT:  Negative for congestion, ear discharge and sore throat.    Respiratory:  Negative for cough, shortness of breath and wheezing.    Cardiovascular:  Negative for chest pain, palpitations and leg swelling.   Gastrointestinal:  Negative for abdominal pain, constipation, diarrhea, nausea, vomiting and GERD.   Genitourinary:  Negative for dysuria, flank pain, penile swelling, scrotal swelling and testicular pain.   Neurological:  Negative for dizziness and headache.   Psychiatric/Behavioral:  Positive for depressed mood. Negative for sleep disturbance and suicidal ideas. The patient is nervous/anxious.               Follow Up   Return in about 6 months (around 11/21/2024) for Recheck.    Part of this note may be an electronic transcription/translation of spoken language to printed   text using the Dragon Dictation System.            Medical History:  There are no discontinued medications.   Past Medical History:    Acute upper respiratory infection    Age-related osteoporosis without current pathological fracture    Broken toe    left great toe    Cerebral palsy    diagnosed as infant    Compression fracture of first lumbar vertebra    Constipation    Disease of pancreas    GERD (gastroesophageal reflux disease)    History of urinary self-catheterization    pt or pt's mother catheterizes 4-6  times a day    Hydrocephalus in diseases classified elsewhere     NO  SHUNT    Inguinal hernia    Injury of back    Low back pain    Major depressive disorder    single episode, moderate    Other cerebral palsy    Other fatigue    Other generalized epilepsy and epileptic syndromes, not intractable, without status epilepticus    Other hypertrophic disorders of the skin    Pain in left ankle and joints of left foot    Pain in right toe(s)    Pain in thoracic spine    Pancreatic cyst    Paranoid schizophrenia    Pelvic and perineal pain    Primary insomnia    Pure hypercholesterolemia    Repeated falls    last fall 11/2022    Schizoaffective disorder, unspecified    Seizures    last seizure was approx 2018, medication controlled    Sleep apnea    uses cpap machine    Somnolence    TIA (transient ischemic attack)    UNSURE OF  DATE    Vitamin D deficiency    Wedge compression fracture of fourth thoracic vertebra, subsequent encounter for fracture with routine healing     Past Surgical History:    CYST REMOVAL    Procedure: Excision of subcutaneous mass from right forearm;  Surgeon: Oni Greenberg MD;  Location: [facility];  Service: General;  Laterality: Right;    HAMSTRING REPAIR    age 10    INGUINAL HERNIA REPAIR    Procedure: INGUINAL HERNIA REPAIR LAPAROSCOPIC WITH DAVINCI ROBOT;  Surgeon: Mayur Quintana MD;  Location: [facility] OR [facility];  Service: Robotics - DaVinci;  Laterality: Right;    OTHER SURGICAL HISTORY    hyposadius repair    TOE FUSION    great toe at age 21      Family History   Problem Relation Age of Onset    Hypothyroidism Mother     No Known Problems Father     Ulcers Brother     Malig Hyperthermia Neg Hx      Social History     Tobacco Use    Smoking status: Never     Passive exposure: Never    Smokeless tobacco: Never   Substance Use Topics    Alcohol use: Never       Health Maintenance Due   Topic Date Due    ANNUAL WELLNESS VISIT  05/12/2023    DXA SCAN  06/20/2024        Immunization History   Administered Date(s) Administered    31-influenza Vac  Quardvalent Preservativ 10/14/2014, 01/20/2016, 10/07/2016, 12/07/2021, 10/19/2022    COVID-19 (MODERNA) 1st,2nd,3rd Dose Monovalent 03/25/2021, 04/22/2021, 11/19/2021    COVID-19 (PFIZER) BIVALENT 12+YRS 10/19/2022    COVID-19 F23 (PFIZER) 12YRS+ (COMIRNATY) 11/16/2023    Covid-19 (Pfizer) Gray Cap Monovalent 06/20/2022    Fluzone (or Fluarix & Flulaval for VFC) >6mos 10/07/2016, 12/07/2021, 10/19/2022, 11/16/2023    Influenza Quad Vaccine (Inpatient) 10/01/2013    Influenza Seasonal Injectable 10/05/2011    Influenza, Unspecified 10/01/2013, 10/07/2016, 12/07/2021, 10/19/2022    Tdap 05/05/2016       Allergies   Allergen Reactions    Valproic Acid Irritability     DEPAKOTE CAUSES DIZZINESS AND IRRITABILITY

## 2024-05-23 RX ORDER — AMOXICILLIN AND CLAVULANATE POTASSIUM 875; 125 MG/1; MG/1
1 TABLET, FILM COATED ORAL 2 TIMES DAILY
Qty: 10 TABLET | Refills: 0 | Status: SHIPPED | OUTPATIENT
Start: 2024-05-23 | End: 2024-05-28

## 2024-05-25 LAB — BACTERIA SPEC AEROBE CULT: ABNORMAL

## 2024-05-30 DIAGNOSIS — E78.2 MIXED HYPERLIPIDEMIA: ICD-10-CM

## 2024-05-30 DIAGNOSIS — E55.9 VITAMIN D DEFICIENCY: ICD-10-CM

## 2024-05-30 DIAGNOSIS — F51.01 PRIMARY INSOMNIA: ICD-10-CM

## 2024-05-30 RX ORDER — CHOLECALCIFEROL (VITAMIN D3) 25 MCG
2000 TABLET ORAL DAILY
Qty: 60 TABLET | Refills: 1 | Status: SHIPPED | OUTPATIENT
Start: 2024-05-30

## 2024-05-30 RX ORDER — PRAVASTATIN SODIUM 20 MG
20 TABLET ORAL NIGHTLY
Qty: 30 TABLET | Refills: 1 | Status: SHIPPED | OUTPATIENT
Start: 2024-05-30

## 2024-06-19 ENCOUNTER — CLINICAL SUPPORT (OUTPATIENT)
Dept: UROLOGY | Facility: CLINIC | Age: 44
End: 2024-06-19
Payer: MEDICARE

## 2024-06-19 DIAGNOSIS — N31.9 NEUROGENIC BLADDER: ICD-10-CM

## 2024-06-19 DIAGNOSIS — R33.9 URINARY RETENTION: Primary | ICD-10-CM

## 2024-06-19 PROCEDURE — 87086 URINE CULTURE/COLONY COUNT: CPT | Performed by: UROLOGY

## 2024-06-19 NOTE — PROGRESS NOTES
Procedure   Cath Change, Simple    Date/Time: 6/19/2024 9:59 AM    Performed by: Ruby Zepeda RN  Authorized by: Adalid Morris MD  Preparation: Patient was prepped and draped in the usual sterile fashion.  Local anesthesia used: no    Anesthesia:  Local anesthesia used: no    Sedation:  Patient sedated: no    Patient tolerance: patient tolerated the procedure well with no immediate complications  Comments: Patient presented to office for a catheter change.  Patient in supine position.  Deflated balloon on existing catheter and removed without concerns.  Using sterile technique cleansed genitalia, inserted a 18 Egyptian coude, instilled 5-10 cc of sterile water into balloon, urine return collected for specimen per patients request. The catheter bag was then attached. Patient tolerated well.

## 2024-06-20 LAB — BACTERIA SPEC AEROBE CULT: NORMAL

## 2024-06-27 ENCOUNTER — OFFICE VISIT (OUTPATIENT)
Dept: PODIATRY | Facility: CLINIC | Age: 44
End: 2024-06-27
Payer: MEDICARE

## 2024-06-27 VITALS
SYSTOLIC BLOOD PRESSURE: 134 MMHG | HEIGHT: 66 IN | TEMPERATURE: 98 F | OXYGEN SATURATION: 96 % | WEIGHT: 175 LBS | DIASTOLIC BLOOD PRESSURE: 81 MMHG | BODY MASS INDEX: 28.12 KG/M2 | HEART RATE: 89 BPM

## 2024-06-27 DIAGNOSIS — M21.371 FOOT DROP, BILATERAL: ICD-10-CM

## 2024-06-27 DIAGNOSIS — L60.0 ONYCHOCRYPTOSIS: ICD-10-CM

## 2024-06-27 DIAGNOSIS — M21.372 FOOT DROP, BILATERAL: ICD-10-CM

## 2024-06-27 DIAGNOSIS — B35.1 ONYCHOMYCOSIS: Primary | ICD-10-CM

## 2024-06-27 DIAGNOSIS — M79.671 FOOT PAIN, BILATERAL: ICD-10-CM

## 2024-06-27 DIAGNOSIS — G62.9 NEUROPATHY: ICD-10-CM

## 2024-06-27 DIAGNOSIS — M79.672 FOOT PAIN, BILATERAL: ICD-10-CM

## 2024-06-27 DIAGNOSIS — R26.2 DIFFICULTY WALKING: ICD-10-CM

## 2024-06-27 NOTE — PROGRESS NOTES
[facility] - PODIATRY    Today's Date: 06/27/24    Patient Name: Kang Grey  MRN: [id number]  CSN: [id number]  PCP: Hien Parham MD, Last PCP Visit: 1 May 2024  Referring Provider: No ref. provider found    SUBJECTIVE     Chief Complaint   Patient presents with    Left Foot - Follow-up, Nail Problem    Right Foot - Follow-up, Nail Problem       HPI: Kang Grey, a 44 y.o.male, comes to clinic.    New, Established, New Problem:  established   Location:  Toenails  Duration:   Greater than five years  Onset:  Gradual  Nature:  sore with palpation.  Stable, worsening, improving:   Stable  Aggravating factors:  Pain with shoe gear and ambulation.  Previous Treatment:  debridement    Medical changes: Recently diagnosed with neurogenic bladder.  Patient states his father passed away 2 weeks ago.    Patient denies any fevers, chills, nausea, vomiting, shortness of breathe, nor any other constitutional signs nor symptoms.       I have reviewed/confirmed previously documented HPI with no changes.       Past Medical History:   Diagnosis Date    Acute upper respiratory infection 02/2022    Age-related osteoporosis without current pathological fracture     Broken toe 11/2022    left great toe    Cerebral palsy     diagnosed as infant    Compression fracture of first lumbar vertebra     Constipation     Disease of pancreas     GERD (gastroesophageal reflux disease)     History of urinary self-catheterization     pt or pt's mother catheterizes 4-6  times a day    Hydrocephalus in diseases classified elsewhere     NO  SHUNT    Inguinal hernia     Injury of back 02/2020    Low back pain     Major depressive disorder     single episode, moderate    Other cerebral palsy     Other fatigue     Other generalized epilepsy and epileptic syndromes, not intractable, without status epilepticus     Other hypertrophic disorders of the skin     Pain in left ankle and joints of left foot     Pain in right toe(s)      Pain in thoracic spine     Pancreatic cyst     Paranoid schizophrenia     Pelvic and perineal pain     Primary insomnia     Pure hypercholesterolemia     Repeated falls     last fall 11/2022    Schizoaffective disorder, unspecified     Seizures     last seizure was approx 2018, medication controlled    Sleep apnea     uses cpap machine    Somnolence     TIA (transient ischemic attack)     UNSURE OF  DATE    Vitamin D deficiency     Wedge compression fracture of fourth thoracic vertebra, subsequent encounter for fracture with routine healing      Past Surgical History:   Procedure Laterality Date    CYST REMOVAL Right 11/11/2022    Procedure: Excision of subcutaneous mass from right forearm;  Surgeon: Oni Greenberg MD;  [place] [facility] [place] [facility] [place]  Service: General;  Laterality: Right;    HAMSTRING REPAIR Bilateral     age 10    INGUINAL HERNIA REPAIR Right 11/8/2023    Procedure: INGUINAL HERNIA REPAIR LAPAROSCOPIC WITH DAVINCI ROBOT;  Surgeon: Mayur Quintana MD;  Location: [facility] [place] [facility] [place];  Service: Robotics - DaVinci;  Laterality: Right;    OTHER SURGICAL HISTORY      hyposadius repair    TOE FUSION Right     great toe at age 21     Family History   Problem Relation Age of Onset    Hypothyroidism Mother     No Known Problems Father     Ulcers Brother     Malig Hyperthermia Neg Hx      Social History     Socioeconomic History    Marital status: Single   Tobacco Use    Smoking status: Never     Passive exposure: Never    Smokeless tobacco: Never   Vaping Use    Vaping status: Never Used   Substance and Sexual Activity    Alcohol use: Never    Drug use: Never    Sexual activity: Defer     Allergies   Allergen Reactions    Valproic Acid Irritability     DEPAKOTE CAUSES DIZZINESS AND IRRITABILITY       Current Outpatient Medications   Medication Sig Dispense Refill    alclometasone (ACLOVATE) 0.05 % cream       ascorbic acid (VITAMIN C) 1000 MG tablet Take 1 tablet by mouth Daily.      aspirin 81 MG EC tablet Take 1  tablet by mouth Daily.      busPIRone (BUSPAR) 10 MG tablet Take 1 tablet by mouth 3 (Three) Times a Day As Needed.      Calcium Carbonate 1500 (600 Ca) MG tablet Take 1 tablet by mouth 2 (two) times a day.      cholecalciferol 25 MCG tablet TAKE TWO TABLETS BY MOUTH ONCE DAILY 60 tablet 1    Diclofenac Sodium (VOLTAREN) 1 % gel gel Apply 1 g topically to the appropriate area as directed As Needed (LBP). 100 g 2    docusate sodium (COLACE) 100 MG capsule TAKE 1 CAPSULE BY MOUTH DAILY. 30 capsule 1    famotidine (PEPCID) 20 MG tablet TAKE 1 TABLET BY MOUTH DAILY. 90 tablet 1    folic acid (FOLVITE) 800 MCG tablet Take 1 tablet by mouth Daily.      guaiFENesin (Mucinex) 600 MG 12 hr tablet Take 2 tablets by mouth 2 (Two) Times a Day. 30 tablet 0    HM Melatonin 10 MG sublingual tablet TAKE 1 TABLET BY MOUTH EVERY NIGHT AT BEDTIME. 30 tablet 0    HM TRUEplus Fiber 2 g chewable tablet CHEW 1 TABLET DAILY. 90 tablet 1    Invega Sustenna 234 MG/1.5ML suspension prefilled syringe IM injection Inject 1.5 mL into the appropriate muscle as directed by prescriber Every 30 (Thirty) Days. Waiting on a appointment for his next injection.      Keppra 500 MG tablet Take 3 tablets by mouth Every 12 (Twelve) Hours.      ketoconazole (NIZORAL) 2 % shampoo       lamoTRIgine (LaMICtal) 200 MG tablet Take 1 tablet by mouth Daily.      methocarbamol (ROBAXIN) 750 MG tablet Take 1 tablet by mouth 4 (Four) Times a Day As Needed for Muscle Spasms. 12 tablet 0    nitrofurantoin (MACRODANTIN) 50 MG capsule Take 1 capsule by mouth Daily.      pravastatin (PRAVACHOL) 20 MG tablet TAKE 1 TABLET BY MOUTH EVERY NIGHT. 30 tablet 1    sodium chloride 0.9 % flush       triamcinolone (KENALOG) 0.025 % cream       venlafaxine XR (EFFEXOR-XR) 150 MG 24 hr capsule Take 1 capsule by mouth Daily.      venlafaxine XR (EFFEXOR-XR) 37.5 MG 24 hr capsule Take 1 capsule by mouth Daily.       No current facility-administered medications for this visit.     Review  of Systems   Constitutional: Negative.    Skin:         Painful toenails bilaterally   All other systems reviewed and are negative.      OBJECTIVE     Vitals:    06/27/24 0810   BP: 134/81   Pulse: 89   Temp: 98 °F (36.7 °C)   SpO2: 96%         Patient seen in no apparent distress.      PHYSICAL EXAM:     Foot/Ankle Exam    GENERAL  Appearance:  appears stated age and chronically ill  Orientation:  unable to assess  Affect:  inappropriate  Gait:  antalgic  Assistance:  cane use  Right shoe gear: casual shoe ([employer])  Left shoe gear: casual shoe ([employer])    VASCULAR     Right Foot Vascularity   Dorsalis pedis:  2+  Posterior tibial:  2+  Skin temperature:  warm  Edema grading:  None  CFT:  < 3 seconds  Pedal hair growth:  Present  Varicosities:  mild varicosities     Left Foot Vascularity   Dorsalis pedis:  2+  Posterior tibial:  2+  Skin temperature:  warm  Edema grading:  None  CFT:  < 3 seconds  Pedal hair growth:  Present  Varicosities:  mild varicosities     NEUROLOGIC     Right Foot Neurologic   Light touch sensation: diminished  Vibratory sensation: diminished  Hot/Cold sensation: diminished  Protective Sensation using [place] Monofilament:   Sites intact: 3  Sites tested: 10     Left Foot Neurologic   Light touch sensation: diminished  Vibratory sensation: diminished  Hot/Cold sensation:  diminished  Protective Sensation using [place] Monofilament:   Sites intact: 3  Sites tested: 10    MUSCULOSKELETAL     Right Foot Musculoskeletal   [place]:  First toe     Left Foot Musculoskeletal   [place]:  First toe    MUSCLE STRENGTH     Right Foot Muscle Strength   Foot dorsiflexion:  1  Foot plantar flexion:  4-  Foot inversion:  2  Foot eversion:  2     Left Foot Muscle Strength   Foot dorsiflexion:  1  Foot plantar flexion:  4-  Foot inversion:  2  Foot eversion:  2    DERMATOLOGIC      Right Foot Dermatologic   Skin  Right foot skin is intact.   Nails  1.  Positive for  elongated, onychomycosis, abnormal thickness, subungual debris and ingrown toenail.  2.  Positive for elongated, onychomycosis, abnormal thickness, subungual debris and ingrown toenail.  3.  Positive for elongated, onychomycosis, abnormal thickness, subungual debris and ingrown toenail.  4.  Positive for elongated, onychomycosis, abnormal thickness, subungual debris and ingrown toenail.  5.  Positive for elongated, onychomycosis, abnormal thickness, subungual debris and ingrown toenail.     Left Foot Dermatologic   Skin  Left foot skin is intact.   Nails  1.  Positive for elongated, onychomycosis, abnormal thickness, subungual debris and ingrown toenail.  2.  Positive for elongated, onychomycosis, abnormal thickness, subungual debris and ingrown toenail.  3.  Positive for elongated, onychomycosis, abnormal thickness, subungual debris and ingrown toenail.  4.  Positive for elongated, onychomycosis, abnormally thick, subungual debris and ingrown toenail.  5.  Positive for elongated, onychomycosis, abnormally thick, subungual debris and ingrown toenail.    I have reexamined the patient the results are consistent with the previously documented exam.    ASSESSMENT/PLAN     Diagnoses and all orders for this visit:    1. Onychomycosis (Primary)    2. Foot pain, bilateral    3. Foot drop, bilateral    4. Neuropathy    5. Onychocryptosis    6. Difficulty walking        Comprehensive lower extremity examination and evaluation was performed.    Discussed findings and treatment plan including risks, benefits, and treatment options with patient in detail. Patient agreed with treatment plan.    Toenails 1 through 5 bilaterally were debrided in thickness and length and then smoothed with a Dremel Tool.  Tolerated the procedure well without complications.    An After Visit Summary was printed and given to the patient at discharge, including (if requested) any available informative/educational handouts regarding diagnosis,  treatment, or medications. All questions were answered to patient/family satisfaction. Should symptoms fail to improve or worsen they agree to call or return to clinic or to go to the Emergency Department. Discussed the importance of following up with any needed screening tests/labs/specialist appointments and any requested follow-up recommended by me today. Importance of maintaining follow-up discussed and patient accepts that missed appointments can delay diagnosis and potentially lead to worsening of conditions.    Return in about 9 weeks (around 8/29/2024) for Toenail Care., or sooner if acute issues arise.    I have reviewed the assessment and plan and verified the accuracy of it. No changes to assessment and plan since the information was documented. Roni Osullivan DPM 06/27/24     I have dictated this note utilizing Dragon Dictation.  Please note that portions of this note were completed with a voice recognition program.  Part of this note may be an electronic transcription/translation of spoken language to printed text using the Dragon Dictation System.      This document has been electronically signed by Roni Osullivan DPM on June 27, 2024 08:51 EDT

## 2024-06-28 DIAGNOSIS — F51.01 PRIMARY INSOMNIA: ICD-10-CM

## 2024-07-16 DIAGNOSIS — E78.2 MIXED HYPERLIPIDEMIA: ICD-10-CM

## 2024-07-16 DIAGNOSIS — F51.01 PRIMARY INSOMNIA: ICD-10-CM

## 2024-07-16 DIAGNOSIS — E55.9 VITAMIN D DEFICIENCY: ICD-10-CM

## 2024-07-16 RX ORDER — PRAVASTATIN SODIUM 20 MG
20 TABLET ORAL NIGHTLY
Qty: 30 TABLET | Refills: 1 | Status: SHIPPED | OUTPATIENT
Start: 2024-07-16

## 2024-07-16 RX ORDER — CHOLECALCIFEROL (VITAMIN D3) 25 MCG
2000 TABLET ORAL DAILY
Qty: 60 TABLET | Refills: 1 | Status: SHIPPED | OUTPATIENT
Start: 2024-07-16

## 2024-07-18 ENCOUNTER — CLINICAL SUPPORT (OUTPATIENT)
Dept: UROLOGY | Facility: CLINIC | Age: 44
End: 2024-07-18
Payer: MEDICARE

## 2024-07-18 ENCOUNTER — PROCEDURE VISIT (OUTPATIENT)
Dept: UROLOGY | Facility: CLINIC | Age: 44
End: 2024-07-18
Payer: MEDICARE

## 2024-07-18 DIAGNOSIS — N31.9 NEUROGENIC BLADDER: Primary | ICD-10-CM

## 2024-07-18 DIAGNOSIS — R33.9 URINARY RETENTION: ICD-10-CM

## 2024-07-18 NOTE — PROGRESS NOTES
Procedure   Cath Change, Simple    Date/Time: 7/18/2024 9:39 AM    Performed by: Ruby Zepeda RN  Authorized by: Adalid Morris MD  Preparation: Patient was prepped and draped in the usual sterile fashion.  Local anesthesia used: no    Anesthesia:  Local anesthesia used: no    Sedation:  Patient sedated: no    Patient tolerance: patient tolerated the procedure well with no immediate complications  Comments: Patient in supine position.  Deflated balloon and removed catheter.  Patient tolerated well.  Pt wanted to try CIC, this was unsuccessful. We attempted to place another bills and this was unsuccessful as well. Patient placed on Dr. Ham schedule for cystoscopy with catheter placement.

## 2024-07-20 NOTE — PROGRESS NOTES
Preprocedure diagnosis  Urinary retention, difficult catheter placement    Postprocedure diagnosis  Same as above    Procedure  Flexible Cystourethroscopy, catheter insertion over a wire    Attending surgeon  Marianela Joel MD    Anesthesia  2% lidocaine jelly intraurethrally    Complications  None    Indications  44 y.o. male undergoing a flexible cystoscopy for the above mentioned indications.  Presented to office for routine catheter exchange; staff unable to place upon reinsertion attempts.    Informed consent was obtained.      Findings  Patent meatus and urethra; no significant false passage identified; no prostatic urethral bleeding noted; cystoscope passed into the bladder; wire passed  Catheter placed over wire; balloon inflated and catheter secured    Procedure  The patient was placed in supine position and prepped and draped in sterile fashion with lidocaine jelly per urethra for anesthesia.  A timeout was performed.  The 14F flexible cystoscope was lubricated and gently placed through the urethra; some prostatomegaly noted; no significant false passage or bleeding noted; cystoscope passed into the bladder.  Once confirming passage into the bladder, stiff wire was inserted through the cystoscope and cystoscope reduced.  18 [place] tip catheter was then placed without difficulty over the wire.  Balloon inflated and catheter secured.    The patient tolerated the procedure well.        Plan:  Tolerated procedure well.  Instructions provided.  [place] catheter successfully placed   Catheter care per routine   Follow-up with Dr. Morris  all questions addressed

## 2024-07-22 ENCOUNTER — TELEPHONE (OUTPATIENT)
Dept: FAMILY MEDICINE CLINIC | Age: 44
End: 2024-07-22
Payer: MEDICARE

## 2024-07-22 NOTE — TELEPHONE ENCOUNTER
Pt's mother denies any fevers or feeling bad , I made him an appt for tomorrow but advised  er precautions .

## 2024-07-22 NOTE — TELEPHONE ENCOUNTER
Caller: EMIR CROCKETT    Relationship to patient: Emergency Contact    Best call back number: [phone]    Patient is needing: PATIENT'S SISTER CALLED IN AND PATIENT'S MOTHER GOT ON LINE AT END OF CALL AND STATED PATIENT HAS A SWOLLEN RIGHT BREAST AS WELL AS SWELLING OF LEFT GROIN. PATIENT'S MOTHER IS REQUESTING A CALL BACK WITH NEXT BEST STEPS PLEASE.

## 2024-07-23 ENCOUNTER — OFFICE VISIT (OUTPATIENT)
Dept: FAMILY MEDICINE CLINIC | Age: 44
End: 2024-07-23
Payer: MEDICARE

## 2024-07-23 VITALS
OXYGEN SATURATION: 97 % | DIASTOLIC BLOOD PRESSURE: 92 MMHG | TEMPERATURE: 97.9 F | BODY MASS INDEX: 28.7 KG/M2 | HEART RATE: 84 BPM | SYSTOLIC BLOOD PRESSURE: 129 MMHG | HEIGHT: 66 IN | WEIGHT: 178.6 LBS

## 2024-07-23 DIAGNOSIS — N62 GYNECOMASTIA, MALE: Primary | ICD-10-CM

## 2024-07-23 DIAGNOSIS — R22.9 LOCALIZED SKIN MASS, LUMP, OR SWELLING: ICD-10-CM

## 2024-07-23 PROCEDURE — 1126F AMNT PAIN NOTED NONE PRSNT: CPT

## 2024-07-23 PROCEDURE — 1160F RVW MEDS BY RX/DR IN RCRD: CPT

## 2024-07-23 PROCEDURE — 1159F MED LIST DOCD IN RCRD: CPT

## 2024-07-23 PROCEDURE — 99213 OFFICE O/P EST LOW 20 MIN: CPT

## 2024-07-23 NOTE — PROGRESS NOTES
"Subjective     CHIEF COMPLAINT    Chief Complaint   Patient presents with    Breast Pain     (R), pt mother noticed this yesterday, states it is \"enlarged\"  Pt states it is not painful.     Rectal Pain     (L), x 1 week. Pt mother states there is a \"knot\"      History of Present Illness  Patient is a 44-year-old male, presenting to the clinic today with his mother.  He is here today with complaints of right breast enlargement.  His mother just noticed this yesterday.  He states that is not painful but his mom reports he \"does not feel pain.\"  There is no specific lumps or masses noted.  No discharge from the nipple.    Additionally, his mom is concerned regarding a lump to his left buttock.  They state this has been present for about a week.  It is not painful.  It is not mobile and there is no redness noted.  He has not had any fever or chills.      Review of Systems   Constitutional:  Negative for chills and fever.   Genitourinary:         +right breast enlargement   Skin:         +left buttock mass     Past Medical History:   Diagnosis Date    Acute upper respiratory infection 02/2022    Age-related osteoporosis without current pathological fracture     Broken toe 11/2022    left great toe    Cerebral palsy     diagnosed as infant    Compression fracture of first lumbar vertebra     Constipation     Disease of pancreas     GERD (gastroesophageal reflux disease)     History of urinary self-catheterization     pt or pt's mother catheterizes 4-6  times a day    Hydrocephalus in diseases classified elsewhere     NO  SHUNT    Inguinal hernia     Injury of back 02/2020    Low back pain     Major depressive disorder     single episode, moderate    Other cerebral palsy     Other fatigue     Other generalized epilepsy and epileptic syndromes, not intractable, without status epilepticus     Other hypertrophic disorders of the skin     Pain in left ankle and joints of left foot     Pain in right toe(s)     Pain in thoracic " spine     Pancreatic cyst     Paranoid schizophrenia     Pelvic and perineal pain     Primary insomnia     Pure hypercholesterolemia     Repeated falls     last fall 11/2022    Schizoaffective disorder, unspecified     Seizures     last seizure was approx 2018, medication controlled    Sleep apnea     uses cpap machine    Somnolence     TIA (transient ischemic attack)     UNSURE OF  DATE    Vitamin D deficiency     Wedge compression fracture of fourth thoracic vertebra, subsequent encounter for fracture with routine healing        Past Surgical History:   Procedure Laterality Date    CYST REMOVAL Right 11/11/2022    Procedure: Excision of subcutaneous mass from right forearm;  Surgeon: Oni Greenberg MD;  [place]: [facility] [place];  Service: General;  Laterality: Right;    HAMSTRING REPAIR Bilateral     age 10    INGUINAL HERNIA REPAIR Right 11/8/2023    Procedure: INGUINAL HERNIA REPAIR LAPAROSCOPIC WITH DAVINCI ROBOT;  Surgeon: Mayur Quintana MD;  Location: [facility] [place];  Service: Robotics - DaVinci;  Laterality: Right;    OTHER SURGICAL HISTORY      hyposadius repair    TOE FUSION Right     great toe at age 21       Family History   Problem Relation Age of Onset    Hypothyroidism Mother     No Known Problems Father     Ulcers Brother     Malig Hyperthermia Neg Hx        Social History     Socioeconomic History    Marital status: Single   Tobacco Use    Smoking status: Never     Passive exposure: Never    Smokeless tobacco: Never   Vaping Use    Vaping status: Never Used   Substance and Sexual Activity    Alcohol use: Never    Drug use: Never    Sexual activity: Defer       Allergies   Allergen Reactions    Valproic Acid Irritability     DEPAKOTE CAUSES DIZZINESS AND IRRITABILITY            Current Outpatient Medications on File Prior to Visit   Medication Sig Dispense Refill    alclometasone (ACLOVATE) 0.05 % cream       ascorbic acid (VITAMIN C) 1000 MG tablet Take 1 tablet by mouth Daily.      aspirin 81 MG  EC tablet Take 1 tablet by mouth Daily.      busPIRone (BUSPAR) 10 MG tablet Take 1 tablet by mouth 3 (Three) Times a Day As Needed.      Calcium Carbonate 1500 (600 Ca) MG tablet Take 1 tablet by mouth 2 (two) times a day.      cholecalciferol 25 MCG tablet TAKE TWO TABLETS BY MOUTH ONCE DAILY 60 tablet 1    Diclofenac Sodium (VOLTAREN) 1 % gel gel Apply 1 g topically to the appropriate area as directed As Needed (LBP). 100 g 2    docusate sodium (COLACE) 100 MG capsule TAKE 1 CAPSULE BY MOUTH DAILY. 30 capsule 1    folic acid (FOLVITE) 800 MCG tablet Take 1 tablet by mouth Daily.      guaiFENesin (Mucinex) 600 MG 12 hr tablet Take 2 tablets by mouth 2 (Two) Times a Day. 30 tablet 0    HM Melatonin 10 MG sublingual tablet TAKE 1 TABLET BY MOUTH EVERY NIGHT AT BEDTIME. 30 tablet 0    HM TRUEplus Fiber 2 g chewable tablet CHEW 1 TABLET DAILY. 90 tablet 1    Invega Sustenna 234 MG/1.5ML suspension prefilled syringe IM injection Inject 1.5 mL into the appropriate muscle as directed by prescriber Every 30 (Thirty) Days. Waiting on a appointment for his next injection.      Keppra 500 MG tablet Take 3 tablets by mouth Every 12 (Twelve) Hours.      ketoconazole (NIZORAL) 2 % shampoo       lamoTRIgine (LaMICtal) 200 MG tablet Take 1 tablet by mouth Daily.      methocarbamol (ROBAXIN) 750 MG tablet Take 1 tablet by mouth 4 (Four) Times a Day As Needed for Muscle Spasms. 12 tablet 0    nitrofurantoin (MACRODANTIN) 50 MG capsule Take 1 capsule by mouth Daily.      pravastatin (PRAVACHOL) 20 MG tablet TAKE 1 TABLET BY MOUTH EVERY NIGHT. 30 tablet 1    sodium chloride 0.9 % flush       triamcinolone (KENALOG) 0.025 % cream       venlafaxine XR (EFFEXOR-XR) 150 MG 24 hr capsule Take 1 capsule by mouth Daily.      venlafaxine XR (EFFEXOR-XR) 37.5 MG 24 hr capsule Take 1 capsule by mouth Daily.      [DISCONTINUED] famotidine (PEPCID) 20 MG tablet TAKE 1 TABLET BY MOUTH DAILY. (Patient not taking: Reported on 7/23/2024) 90 tablet 1  "    No current facility-administered medications on file prior to visit.       /92   Pulse 84   Temp 97.9 °F (36.6 °C) (Oral)   Ht 167.6 cm (65.98\")   Wt 81 kg (178 lb 9.6 oz)   SpO2 97% Comment: room air  BMI 28.84 kg/m²     Objective     Physical Exam  Vitals and nursing note reviewed.   Constitutional:       General: He is not in acute distress.     Appearance: Normal appearance. He is not ill-appearing.   HENT:      Head: Normocephalic.      Mouth/Throat:      Lips: Pink.   Pulmonary:      Effort: Pulmonary effort is normal. No accessory muscle usage or respiratory distress.   Chest:   Breasts:     Right: No bleeding, inverted nipple, mass, nipple discharge, skin change or tenderness.      Left: Normal.      Comments: No breast enlargement appreciated on exam.   Skin:     General: Skin is warm and dry.      Capillary Refill: Capillary refill takes less than 2 seconds.      Comments: Round, soft, nonmobile mass located to left buttock. No surround erythema or ecchymosis. No drainage or bleeding.    Neurological:      General: No focal deficit present.      Mental Status: He is alert. Mental status is at baseline.   Psychiatric:         Mood and Affect: Mood normal.         Behavior: Behavior normal.       Assessment & Plan  Gynecomastia, male  Discussed with on-call provider, Dr. Guerrero.  Suspect may be due to medications, possibly antiseizure meds or antipsychotics.  However we will move forward with testing of testosterone and prolactin levels.  Educated to get these labs drawn in the morning for most accuracy.  Localized skin mass, lump, or swelling  Area to left buttock does not appear concerning at this time, possible lipoma?  Discussed referral to general surgery versus ultrasound.  Will move forward with ultrasound first and then consider referral to general surgery pending results.    Orders Placed This Encounter   Procedures    US Soft Tissue    Testosterone, Free, Total    Prolactin      "     Follow up:  Return if symptoms worsen or fail to improve.  Patient was given instructions and counseling regarding his condition or for health maintenance advice. Please see specific information pulled into the AVS if appropriate.

## 2024-07-25 ENCOUNTER — LAB (OUTPATIENT)
Dept: LAB | Facility: HOSPITAL | Age: 44
End: 2024-07-25
Payer: MEDICARE

## 2024-07-25 DIAGNOSIS — N62 GYNECOMASTIA, MALE: ICD-10-CM

## 2024-07-25 LAB — PROLACTIN SERPL-MCNC: 21.8 NG/ML (ref 4.04–15.2)

## 2024-07-25 PROCEDURE — 84402 ASSAY OF FREE TESTOSTERONE: CPT

## 2024-07-25 PROCEDURE — 84403 ASSAY OF TOTAL TESTOSTERONE: CPT

## 2024-07-25 PROCEDURE — 84146 ASSAY OF PROLACTIN: CPT

## 2024-07-25 PROCEDURE — 36415 COLL VENOUS BLD VENIPUNCTURE: CPT

## 2024-07-26 DIAGNOSIS — F51.01 PRIMARY INSOMNIA: ICD-10-CM

## 2024-07-28 LAB
TESTOST FREE SERPL-MCNC: 7.1 PG/ML (ref 6.8–21.5)
TESTOST SERPL-MCNC: 370 NG/DL (ref 264–916)

## 2024-07-30 ENCOUNTER — TELEPHONE (OUTPATIENT)
Dept: SURGERY | Facility: CLINIC | Age: 44
End: 2024-07-30
Payer: MEDICARE

## 2024-07-30 NOTE — TELEPHONE ENCOUNTER
Patient mother wants to stop by the office and get a regular cathter bag, if that's ok. Current leg bag is twisted and upside down and leaking.  CB# [phone]

## 2024-08-06 ENCOUNTER — HOSPITAL ENCOUNTER (OUTPATIENT)
Dept: ULTRASOUND IMAGING | Facility: HOSPITAL | Age: 44
Discharge: HOME | End: 2024-08-06
Payer: MEDICARE

## 2024-08-06 DIAGNOSIS — R22.9 LOCALIZED SKIN MASS, LUMP, OR SWELLING: ICD-10-CM

## 2024-08-06 PROCEDURE — 76999 ECHO EXAMINATION PROCEDURE: CPT

## 2024-08-09 DIAGNOSIS — R22.9 LOCALIZED SKIN MASS, LUMP, OR SWELLING: Primary | ICD-10-CM

## 2024-08-12 ENCOUNTER — OFFICE VISIT (OUTPATIENT)
Dept: SURGERY | Facility: CLINIC | Age: 44
End: 2024-08-12
Payer: MEDICARE

## 2024-08-12 VITALS
BODY MASS INDEX: 29.41 KG/M2 | SYSTOLIC BLOOD PRESSURE: 139 MMHG | WEIGHT: 183 LBS | HEIGHT: 66 IN | HEART RATE: 101 BPM | DIASTOLIC BLOOD PRESSURE: 93 MMHG

## 2024-08-12 DIAGNOSIS — R22.2 SUBCUTANEOUS MASS OF BACK: ICD-10-CM

## 2024-08-12 DIAGNOSIS — D17.1 LIPOMA OF BUTTOCK: Primary | ICD-10-CM

## 2024-08-12 PROCEDURE — 1160F RVW MEDS BY RX/DR IN RCRD: CPT | Performed by: SURGERY

## 2024-08-12 PROCEDURE — 1159F MED LIST DOCD IN RCRD: CPT | Performed by: SURGERY

## 2024-08-12 PROCEDURE — 99213 OFFICE O/P EST LOW 20 MIN: CPT | Performed by: SURGERY

## 2024-08-12 NOTE — PROGRESS NOTES
Chief Complaint:  NEW PATIENT     Primary Care Provider: Hien Parham MD    Referring Provider: Lucinda Echeverria APRN    History of Present Illness  Kang Grey is a 44 y.o. male referred by ÁNGELA Koroma for a subcutaneous lesion of the patient's left buttock.  The area was evaluated with a soft tissue ultrasound.  See below.  Also, the patient has a subcutaneous lesion at his upper back.  He wants me to take a look at both of the lesions today.  He has been having any pain or problems at either of the areas.  His main concern is wanting to know whether either of the lesions could be cancer related.  Patient is know to me as I removed a mass from the patient's right proximal forearm on 11/11/2022.  Following is a copy of the findings from my operative note.  Approximately 2 cm diameter cystic structure removed from the subcutaneous tissue of the right proximal forearm.  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    Soft Tissue U/S, 8/6/24:  Impression:  In the area of concern in the left buttock, there is a 3.4 cm mass.  The echotexture is suggestive of lipoma. Minimal internal vascularity is atypical for lipoma. Benign process favored. Correlate with history and examination. If exam is not consistent   with lipoma, then consider further imaging.      Allergies: Valproic acid    Outpatient Medications Marked as Taking for the 8/12/24 encounter (Office Visit) with Oni Greenberg MD   Medication Sig Dispense Refill    alclometasone (ACLOVATE) 0.05 % cream       ascorbic acid (VITAMIN C) 1000 MG tablet Take 1 tablet by mouth Daily.      aspirin 81 MG EC tablet Take 1 tablet by mouth Daily.      busPIRone (BUSPAR) 10 MG tablet Take 1 tablet by mouth 3 (Three) Times a Day As Needed.      Calcium Carbonate 1500 (600 Ca) MG tablet Take 1 tablet by mouth 2 (two) times a day.      cholecalciferol 25 MCG tablet TAKE TWO TABLETS BY MOUTH ONCE DAILY 60 tablet 1     Diclofenac Sodium (VOLTAREN) 1 % gel gel Apply 1 g topically to the appropriate area as directed As Needed (LBP). 100 g 2    docusate sodium (COLACE) 100 MG capsule TAKE 1 CAPSULE BY MOUTH DAILY. 30 capsule 1    folic acid (FOLVITE) 800 MCG tablet Take 1 tablet by mouth Daily.      HM TRUEplus Fiber 2 g chewable tablet CHEW 1 TABLET DAILY. 90 tablet 1    Invega Sustenna 234 MG/1.5ML suspension prefilled syringe IM injection Inject 1.5 mL into the appropriate muscle as directed by prescriber Every 30 (Thirty) Days. Waiting on a appointment for his next injection.      Keppra 500 MG tablet Take 3 tablets by mouth Every 12 (Twelve) Hours.      ketoconazole (NIZORAL) 2 % shampoo       lamoTRIgine (LaMICtal) 200 MG tablet Take 1 tablet by mouth Daily.      Melatonin ( Melatonin) 10 MG sublingual tablet TAKE 1 TABLET BY MOUTH EVERY NIGHT AT BEDTIME. 90 tablet 0    methocarbamol (ROBAXIN) 750 MG tablet Take 1 tablet by mouth 4 (Four) Times a Day As Needed for Muscle Spasms. 12 tablet 0    nitrofurantoin (MACRODANTIN) 50 MG capsule Take 1 capsule by mouth Daily.      pravastatin (PRAVACHOL) 20 MG tablet TAKE 1 TABLET BY MOUTH EVERY NIGHT. 30 tablet 1    triamcinolone (KENALOG) 0.025 % cream       venlafaxine XR (EFFEXOR-XR) 150 MG 24 hr capsule Take 1 capsule by mouth Daily.      venlafaxine XR (EFFEXOR-XR) 37.5 MG 24 hr capsule Take 1 capsule by mouth Daily.         Past Medical History:    Acute upper respiratory infection    Age-related osteoporosis without current pathological fracture    Broken toe    left great toe    Cerebral palsy    diagnosed as infant    Compression fracture of first lumbar vertebra    Constipation    Disease of pancreas    GERD (gastroesophageal reflux disease)    History of urinary self-catheterization    pt or pt's mother catheterizes 4-6  times a day    Hydrocephalus in diseases classified elsewhere    NO  SHUNT    Inguinal hernia    Injury of back    Low back pain    Major depressive  "disorder    single episode, moderate    Other cerebral palsy    Other fatigue    Other generalized epilepsy and epileptic syndromes, not intractable, without status epilepticus    Other hypertrophic disorders of the skin    Pain in left ankle and joints of left foot    Pain in right toe(s)    Pain in thoracic spine    Pancreatic cyst    Paranoid schizophrenia    Pelvic and perineal pain    Primary insomnia    Pure hypercholesterolemia    Repeated falls    last fall 11/2022    Schizoaffective disorder, unspecified    Seizures    last seizure was approx 2018, medication controlled    Sleep apnea    uses cpap machine    Somnolence    TIA (transient ischemic attack)    UNSURE OF  DATE    Vitamin D deficiency    Wedge compression fracture of fourth thoracic vertebra, subsequent encounter for fracture with routine healing        Past Surgical History:    CYST REMOVAL    Procedure: Excision of subcutaneous mass from right forearm;  Surgeon: Oni Greenberg MD;  Location: [facility] OR [facility];  Service: General;  Laterality: Right;    HAMSTRING REPAIR    age 10    INGUINAL HERNIA REPAIR    Procedure: INGUINAL HERNIA REPAIR LAPAROSCOPIC WITH DAVINCI ROBOT;  Surgeon: Mayur Quintana MD;  Location: [facility] OR [facility];  Service: Robotics - DaVinci;  Laterality: Right;    OTHER SURGICAL HISTORY    hyposadius repair    TOE FUSION    great toe at age 21       Family History:   Family History   Problem Relation Age of Onset    Hypothyroidism Mother     No Known Problems Father     Ulcers Brother     Malig Hyperthermia Neg Hx         Social History:  Social History     Tobacco Use    Smoking status: Never     Passive exposure: Never    Smokeless tobacco: Never   Substance Use Topics    Alcohol use: Never       Objective     Vital Signs:  /93 (BP Location: Right arm, Patient Position: Sitting, Cuff Size: Large Adult)   Pulse 101   Ht 167.6 cm (65.98\")   Wt 83 kg (183 lb)   BMI 29.55 kg/m²   Patient appears comfortable, mother is " present in the room  Respiratory:  breathing not labored, respiratory effort appears normal  Cardiovascular:  heart regular rate  Skin and subcutaneous tissue: At the upper back area, there is a smooth bordered well-circumscribed soft and mobile subcutaneous mass that is about 2 cm in diameter.  At the patient's left lower buttock, there is a smooth bordered well-circumscribed soft and mobile subcutaneous mass that is about 3 cm in diameter.  Skin overlying the back and the left buttock at the areas of concern is normal appearing.    Assessment:  1. Lipoma of left buttock  2. Subcutaneous mass of back - lipoma vs. sebaceous cyst    Plan:  My assessment for both of the subcutaneous lesions was discussed with the patient.  I am confident both lesions are not cancer related.  Given that both of the lesions are asymptomatic, and the patient's main concern was knowing they are not cancer related, we decided together to proceed with watchful observation.  If something changes then the patient will schedule an appointment to see me again.      Oni Greenberg MD  08/12/2024    Electronically signed by Oni Greenberg MD, 08/12/24, 7:04 PM EDT.

## 2024-08-12 NOTE — LETTER
August 12, 2024     ÁNGELA Koroma  [address]  [address]  [place] [address]    Patient: Kang Grey   YOB: 1980   Date of Visit: 8/12/2024     Dear ÁNGELA Koroma:       Thank you for referring Kang Grey to me for evaluation.  I saw the patient in my office today.  Please see the assessment and plan section at the bottom of my note included below for feedback.  Feel free to call me on my cell phone at [phone] with any questions.  Thank you.    Sincerely,        Oni Greenberg MD        CC: No Recipients    Oni Greenberg MD  08/12/24 8207  Sign when Signing Visit  Chief Complaint:  NEW PATIENT     Primary Care Provider: Hien Parham MD    Referring Provider: Lucinda Echeverria APRN    History of Present Illness  Kang Grey is a 44 y.o. male referred by ÁNGELA Koroma for a subcutaneous lesion of the patient's left buttock.  The area was evaluated with a soft tissue ultrasound.  See below.  Also, the patient has a subcutaneous lesion at his upper back.  He wants me to take a look at both of the lesions today.  He has been having any pain or problems at either of the areas.  His main concern is wanting to know whether either of the lesions could be cancer related.  Patient is know to me as I removed a mass from the patient's right proximal forearm on 11/11/2022.  Following is a copy of the findings from my operative note.  Approximately 2 cm diameter cystic structure removed from the subcutaneous tissue of the right proximal forearm.  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    Soft Tissue U/S, 8/6/24:  Impression:  In the area of concern in the left buttock, there is a 3.4 cm mass.  The echotexture is suggestive of lipoma. Minimal internal vascularity is atypical for lipoma. Benign process favored. Correlate with history and examination. If exam is not consistent   with lipoma, then consider further  imaging.      Allergies: Valproic acid    Outpatient Medications Marked as Taking for the 8/12/24 encounter (Office Visit) with Oni Greenberg MD   Medication Sig Dispense Refill   • alclometasone (ACLOVATE) 0.05 % cream      • ascorbic acid (VITAMIN C) 1000 MG tablet Take 1 tablet by mouth Daily.     • aspirin 81 MG EC tablet Take 1 tablet by mouth Daily.     • busPIRone (BUSPAR) 10 MG tablet Take 1 tablet by mouth 3 (Three) Times a Day As Needed.     • Calcium Carbonate 1500 (600 Ca) MG tablet Take 1 tablet by mouth 2 (two) times a day.     • cholecalciferol 25 MCG tablet TAKE TWO TABLETS BY MOUTH ONCE DAILY 60 tablet 1   • Diclofenac Sodium (VOLTAREN) 1 % gel gel Apply 1 g topically to the appropriate area as directed As Needed (LBP). 100 g 2   • docusate sodium (COLACE) 100 MG capsule TAKE 1 CAPSULE BY MOUTH DAILY. 30 capsule 1   • folic acid (FOLVITE) 800 MCG tablet Take 1 tablet by mouth Daily.     •  TRUEplus Fiber 2 g chewable tablet CHEW 1 TABLET DAILY. 90 tablet 1   • Invega Sustenna 234 MG/1.5ML suspension prefilled syringe IM injection Inject 1.5 mL into the appropriate muscle as directed by prescriber Every 30 (Thirty) Days. Waiting on a appointment for his next injection.     • Keppra 500 MG tablet Take 3 tablets by mouth Every 12 (Twelve) Hours.     • ketoconazole (NIZORAL) 2 % shampoo      • lamoTRIgine (LaMICtal) 200 MG tablet Take 1 tablet by mouth Daily.     • Melatonin ( Melatonin) 10 MG sublingual tablet TAKE 1 TABLET BY MOUTH EVERY NIGHT AT BEDTIME. 90 tablet 0   • methocarbamol (ROBAXIN) 750 MG tablet Take 1 tablet by mouth 4 (Four) Times a Day As Needed for Muscle Spasms. 12 tablet 0   • nitrofurantoin (MACRODANTIN) 50 MG capsule Take 1 capsule by mouth Daily.     • pravastatin (PRAVACHOL) 20 MG tablet TAKE 1 TABLET BY MOUTH EVERY NIGHT. 30 tablet 1   • triamcinolone (KENALOG) 0.025 % cream      • venlafaxine XR (EFFEXOR-XR) 150 MG 24 hr capsule Take 1 capsule by mouth Daily.     •  venlafaxine XR (EFFEXOR-XR) 37.5 MG 24 hr capsule Take 1 capsule by mouth Daily.         Past Medical History:   • Acute upper respiratory infection   • Age-related osteoporosis without current pathological fracture   • Broken toe    left great toe   • Cerebral palsy    diagnosed as infant   • Compression fracture of first lumbar vertebra   • Constipation   • Disease of pancreas   • GERD (gastroesophageal reflux disease)   • History of urinary self-catheterization    pt or pt's mother catheterizes 4-6  times a day   • Hydrocephalus in diseases classified elsewhere    NO  SHUNT   • Inguinal hernia   • Injury of back   • Low back pain   • Major depressive disorder    single episode, moderate   • Other cerebral palsy   • Other fatigue   • Other generalized epilepsy and epileptic syndromes, not intractable, without status epilepticus   • Other hypertrophic disorders of the skin   • Pain in left ankle and joints of left foot   • Pain in right toe(s)   • Pain in thoracic spine   • Pancreatic cyst   • Paranoid schizophrenia   • Pelvic and perineal pain   • Primary insomnia   • Pure hypercholesterolemia   • Repeated falls    last fall 11/2022   • Schizoaffective disorder, unspecified   • Seizures    last seizure was approx 2018, medication controlled   • Sleep apnea    uses cpap machine   • Somnolence   • TIA (transient ischemic attack)    UNSURE OF  DATE   • Vitamin D deficiency   • Wedge compression fracture of fourth thoracic vertebra, subsequent encounter for fracture with routine healing        Past Surgical History:   • CYST REMOVAL    Procedure: Excision of subcutaneous mass from right forearm;  Surgeon: Oni Greenberg MD;  Location: [facility] OR [facility] [place];  Service: General;  Laterality: Right;   • HAMSTRING REPAIR    age 10   • INGUINAL HERNIA REPAIR    Procedure: INGUINAL HERNIA REPAIR LAPAROSCOPIC WITH DAVINCI ROBOT;  Surgeon: Mayur Quintana MD;  Location: [facility] OR [facility] [place];  Service: Robotics - DaVinci;  Laterality:  "Right;   • OTHER SURGICAL HISTORY    hyposadius repair   • TOE FUSION    great toe at age 21       Family History:   Family History   Problem Relation Age of Onset   • Hypothyroidism Mother    • No Known Problems Father    • Ulcers Brother    • Malig Hyperthermia Neg Hx         Social History:  Social History     Tobacco Use   • Smoking status: Never     Passive exposure: Never   • Smokeless tobacco: Never   Substance Use Topics   • Alcohol use: Never       Objective    Vital Signs:  /93 (BP Location: Right arm, Patient Position: Sitting, Cuff Size: Large Adult)   Pulse 101   Ht 167.6 cm (65.98\")   Wt 83 kg (183 lb)   BMI 29.55 kg/m²   Patient appears comfortable, mother is present in the room  Respiratory:  breathing not labored, respiratory effort appears normal  Cardiovascular:  heart regular rate  Skin and subcutaneous tissue: At the upper back area, there is a smooth bordered well-circumscribed soft and mobile subcutaneous mass that is about 2 cm in diameter.  At the patient's left lower buttock, there is a smooth bordered well-circumscribed soft and mobile subcutaneous mass that is about 3 cm in diameter.  Skin overlying the back and the left buttock at the areas of concern is normal appearing.    Assessment:  1. Lipoma of left buttock  2. Subcutaneous mass of back - lipoma vs. sebaceous cyst    Plan:  My assessment for both of the subcutaneous lesions was discussed with the patient.  I am confident both lesions are not cancer related.  Given that both of the lesions are asymptomatic, and the patient's main concern was knowing they are not cancer related, we decided together to proceed with watchful observation.  If something changes then the patient will schedule an appointment to see me again.      Oni Greenberg MD  08/12/2024    Electronically signed by Oni Greenberg MD, 08/12/24, 7:04 PM EDT.        "

## 2024-08-15 ENCOUNTER — OFFICE VISIT (OUTPATIENT)
Dept: FAMILY MEDICINE CLINIC | Age: 44
End: 2024-08-15
Payer: MEDICARE

## 2024-08-15 VITALS
SYSTOLIC BLOOD PRESSURE: 110 MMHG | OXYGEN SATURATION: 97 % | DIASTOLIC BLOOD PRESSURE: 69 MMHG | HEART RATE: 115 BPM | TEMPERATURE: 98.6 F | WEIGHT: 186.4 LBS | HEIGHT: 66 IN | BODY MASS INDEX: 29.96 KG/M2

## 2024-08-15 DIAGNOSIS — E78.2 MIXED HYPERLIPIDEMIA: ICD-10-CM

## 2024-08-15 DIAGNOSIS — R06.7 SNEEZING: ICD-10-CM

## 2024-08-15 DIAGNOSIS — H91.90 HEARING LOSS, UNSPECIFIED HEARING LOSS TYPE, UNSPECIFIED LATERALITY: ICD-10-CM

## 2024-08-15 DIAGNOSIS — J11.1 FLU: ICD-10-CM

## 2024-08-15 DIAGNOSIS — N31.9 NEUROGENIC BLADDER: ICD-10-CM

## 2024-08-15 DIAGNOSIS — K59.09 OTHER CONSTIPATION: ICD-10-CM

## 2024-08-15 DIAGNOSIS — F51.01 PRIMARY INSOMNIA: ICD-10-CM

## 2024-08-15 DIAGNOSIS — Z91.81 AT RISK FOR FALLS: ICD-10-CM

## 2024-08-15 DIAGNOSIS — Z00.00 ENCOUNTER FOR ANNUAL WELLNESS EXAM IN MEDICARE PATIENT: Primary | ICD-10-CM

## 2024-08-15 DIAGNOSIS — Z12.11 COLON CANCER SCREENING: ICD-10-CM

## 2024-08-15 DIAGNOSIS — Z23 ENCOUNTER FOR IMMUNIZATION: ICD-10-CM

## 2024-08-15 DIAGNOSIS — G40.909 NONINTRACTABLE EPILEPSY WITHOUT STATUS EPILEPTICUS, UNSPECIFIED EPILEPSY TYPE: ICD-10-CM

## 2024-08-15 DIAGNOSIS — K40.90 RIGHT INGUINAL HERNIA: ICD-10-CM

## 2024-08-15 DIAGNOSIS — F20.0 PARANOID SCHIZOPHRENIA: ICD-10-CM

## 2024-08-15 DIAGNOSIS — Z12.5 PROSTATE CANCER SCREENING: ICD-10-CM

## 2024-08-15 DIAGNOSIS — E55.9 VITAMIN D DEFICIENCY: ICD-10-CM

## 2024-08-15 DIAGNOSIS — M54.50 CHRONIC BILATERAL LOW BACK PAIN WITHOUT SCIATICA: ICD-10-CM

## 2024-08-15 DIAGNOSIS — G89.29 CHRONIC BILATERAL LOW BACK PAIN WITHOUT SCIATICA: ICD-10-CM

## 2024-08-15 RX ORDER — OSELTAMIVIR PHOSPHATE 75 MG/1
75 CAPSULE ORAL 2 TIMES DAILY
Qty: 10 CAPSULE | Refills: 0 | Status: SHIPPED | OUTPATIENT
Start: 2024-08-15 | End: 2024-08-20

## 2024-08-15 NOTE — PROGRESS NOTES
Kang Grey presents to [facility].    Chief Complaint: med re eval    Subjective     History of Present Illness:  Blood in Urine  Pertinent negatives include no abdominal pain, chills, fever, flank pain, nausea or vomiting.     Kang presents with a chronic indwelling cathetor and he is tolerating it well.  He can't do self caths very well.  He sees Dr Morris.  He has no pain. He is on low dose macrobid for frequent UTIs    He presents with depression, grieving over loss of his father, he feels frozen inside.  He saw his therapist yesterday.  He is unable to cry. He just wants to sleep all the time.  He has gained 10 # in past month  He states he has no SI/HI and would never hurt himself.   He is on lamictal, invega injections, effexor and buspar as well as folic acid.     S/p R inguinal hernia repair on 11/8/23 and his hernia is coming back and he saw Dr Greenberg and he was told will not treat at this time     He is having constipation and he is on stool softener twice a day, can be difficult and hard to push out.  He is also on 2 fiber gummies.  He likes prune juice, will restart this     He has sleep apnea and is on cpap and is still not tolerating and still only wears it about half the night.      He presents with chronic pure hypercholesterolemia, current treatment includes a lipid lowering agent, pravastatin.  Tolerates medication well.  Cholesterol from April shows good control.  Compliance with treatment has been good.  He denies experiencing any hypercholesterolemia related symptoms.  He needs med refills today and is due for labs     He has seizures disorder, stable on current meds, neurologist is Dr Mccoy.  He is on Keppra, he tolerates meds well.     He is sneezing more at home, not on allergy meds, no other symptoms, no fever or cough       Result Review   The following data was reviewed by Hien Parham MD on 08/15/2024.  Lab Results   Component Value Date     "WBC 5.09 05/02/2024    HGB 15.7 05/02/2024    HCT 44.4 05/02/2024    MCV 84.1 05/02/2024     05/02/2024     Lab Results   Component Value Date    GLUCOSE 82 04/12/2024    BUN 15 04/12/2024    CREATININE 0.78 04/12/2024     04/12/2024    K 3.8 04/12/2024     04/12/2024    CALCIUM 9.7 04/12/2024    PROTEINTOT 6.7 04/12/2024    ALBUMIN 4.3 04/12/2024    ALT 21 04/12/2024    AST 21 04/12/2024    ALKPHOS 63 04/12/2024    BILITOT 0.3 04/12/2024    GLOB 2.4 04/12/2024    AGRATIO 1.8 04/12/2024    BCR 19.2 04/12/2024    ANIONGAP 11.3 04/12/2024    EGFR 113.5 04/12/2024     Lab Results   Component Value Date    CHOL 158 04/12/2024    CHLPL 174 05/06/2021    TRIG 81 04/12/2024    HDL 39 (L) 04/12/2024     (H) 04/12/2024     Lab Results   Component Value Date    TSH 1.730 11/16/2023     Lab Results   Component Value Date    HGBA1C 5.40 03/18/2024     No results found for: \"PSA\"      Lab Results   Component Value Date    RJUV03SS 44.1 12/07/2021               Assessment and Plan:   There are no diagnoses linked to this encounter.    {Time Spent (Optional):81093}      Objective     Medications:  Current Outpatient Medications   Medication Instructions    alclometasone (ACLOVATE) 0.05 % cream No dose, route, or frequency recorded.    ascorbic acid (VITAMIN C) 1000 MG tablet 1 tablet, Oral, Daily    aspirin 81 mg, Oral, Daily    busPIRone (BUSPAR) 10 MG tablet 1 tablet, Oral, 3 Times Daily PRN    Calcium Carbonate 1500 (600 Ca) MG tablet 1 tablet, Oral, 2 times daily    cholecalciferol (VITAMIN D3) 2,000 Units, Oral, Daily    Diclofenac Sodium (VOLTAREN) 1 g, Topical, As Needed    docusate sodium (COLACE) 100 mg, Oral, Daily    folic acid (FOLVITE) 800 MCG tablet 1 tablet, Oral, Daily    guaiFENesin (MUCINEX) 1,200 mg, Oral, 2 Times Daily    HM Melatonin 10 mg, Oral, Every Night at Bedtime     TRUEplus Fiber 2 g chewable tablet 1 tablet, Oral, Daily    Invega Sustenna 234 MG/1.5ML suspension prefilled " "syringe IM injection 1.5 mL, Intramuscular, Every 30 Days, Waiting on a appointment for his next injection.    Keppra 500 MG tablet 3 tablets, Oral, Every 12 Hours    ketoconazole (NIZORAL) 2 % shampoo No dose, route, or frequency recorded.    lamoTRIgine (LaMICtal) 200 MG tablet 1 tablet, Oral, Daily    methocarbamol (ROBAXIN) 750 mg, Oral, 4 Times Daily PRN    nitrofurantoin (MACRODANTIN) 50 mg, Oral, Daily    pravastatin (PRAVACHOL) 20 mg, Oral, Nightly    sodium chloride 0.9 % flush     triamcinolone (KENALOG) 0.025 % cream     venlafaxine XR (EFFEXOR-XR) 150 mg, Oral, Daily    venlafaxine XR (EFFEXOR-XR) 37.5 mg, Oral, Daily        Vital Signs:   /69 (BP Location: Right arm, Patient Position: Sitting, Cuff Size: Adult)   Pulse 115   Temp 98.6 °F (37 °C) (Oral)   Ht 167.6 cm (65.98\")   Wt 84.6 kg (186 lb 6.4 oz)   SpO2 97%   BMI 30.10 kg/m²             Physical Exam:  Physical Exam  Vitals and nursing note reviewed.   Constitutional:       General: He is not in acute distress.     Appearance: Normal appearance. He is not ill-appearing, toxic-appearing or diaphoretic.   HENT:      Head: Normocephalic and atraumatic.      Right Ear: Tympanic membrane, ear canal and external ear normal.      Left Ear: Tympanic membrane, ear canal and external ear normal.      Nose: Nose normal. No congestion or rhinorrhea.      Mouth/Throat:      Pharynx: Oropharynx is clear. No oropharyngeal exudate or posterior oropharyngeal erythema.   Eyes:      Conjunctiva/sclera: Conjunctivae normal.   Cardiovascular:      Rate and Rhythm: Normal rate.      Pulses: Normal pulses.   Pulmonary:      Effort: Pulmonary effort is normal. No respiratory distress.      Breath sounds: Normal breath sounds. No stridor. No wheezing, rhonchi or rales.   Musculoskeletal:         General: Normal range of motion.      Cervical back: Normal range of motion and neck supple. No rigidity.   Lymphadenopathy:      Cervical: No cervical adenopathy. "   Skin:     General: Skin is warm and dry.      Capillary Refill: Capillary refill takes less than 2 seconds.   Neurological:      Mental Status: He is alert and oriented to person, place, and time.   Psychiatric:         Attention and Perception: Attention normal.         Mood and Affect: Affect is flat.         Speech: Speech normal.         Behavior: Behavior normal.         Thought Content: Thought content is delusional.           Review of Systems:  Review of Systems   Constitutional:  Negative for chills, fatigue and fever.   HENT:  Negative for congestion, ear discharge and sore throat.    Respiratory:  Negative for shortness of breath.    Cardiovascular:  Negative for chest pain.   Gastrointestinal:  Negative for abdominal pain, constipation, diarrhea, nausea, vomiting and GERD.   Genitourinary:  Positive for hematuria. Negative for flank pain.   Neurological:  Negative for dizziness and headache.   Psychiatric/Behavioral:  Negative for depressed mood.               Follow Up   No follow-ups on file.    Part of this note may be an electronic transcription/translation of spoken language to printed   text using the Dragon Dictation System.            Medical History:  There are no discontinued medications.   Past Medical History:    Acute upper respiratory infection    Age-related osteoporosis without current pathological fracture    Broken toe    left great toe    Cerebral palsy    diagnosed as infant    Compression fracture of first lumbar vertebra    Constipation    Disease of pancreas    GERD (gastroesophageal reflux disease)    History of urinary self-catheterization    pt or pt's mother catheterizes 4-6  times a day    Hydrocephalus in diseases classified elsewhere    NO  SHUNT    Inguinal hernia    Injury of back    Low back pain    Major depressive disorder    single episode, moderate    Other cerebral palsy    Other fatigue    Other generalized epilepsy and epileptic syndromes, not intractable,  without status epilepticus    Other hypertrophic disorders of the skin    Pain in left ankle and joints of left foot    Pain in right toe(s)    Pain in thoracic spine    Pancreatic cyst    Paranoid schizophrenia    Pelvic and perineal pain    Primary insomnia    Pure hypercholesterolemia    Repeated falls    last fall 11/2022    Schizoaffective disorder, unspecified    Seizures    last seizure was approx 2018, medication controlled    Sleep apnea    uses cpap machine    Somnolence    TIA (transient ischemic attack)    UNSURE OF  DATE    Vitamin D deficiency    Wedge compression fracture of fourth thoracic vertebra, subsequent encounter for fracture with routine healing     Past Surgical History:    CYST REMOVAL    Procedure: Excision of subcutaneous mass from right forearm;  Surgeon: Oni Greenberg MD;  Location: [facility] OR [facility];  Service: General;  Laterality: Right;    HAMSTRING REPAIR    age 10    INGUINAL HERNIA REPAIR    Procedure: INGUINAL HERNIA REPAIR LAPAROSCOPIC WITH DAVINCI ROBOT;  Surgeon: Mayur Quintana MD;  Location: [facility] OR [facility];  Service: Robotics - DaVinci;  Laterality: Right;    OTHER SURGICAL HISTORY    hyposadius repair    TOE FUSION    great toe at age 21      Family History   Problem Relation Age of Onset    Hypothyroidism Mother     No Known Problems Father     Ulcers Brother     Malig Hyperthermia Neg Hx      Social History     Tobacco Use    Smoking status: Never     Passive exposure: Never    Smokeless tobacco: Never   Substance Use Topics    Alcohol use: Never       Health Maintenance Due   Topic Date Due    ANNUAL WELLNESS VISIT  05/12/2023    DXA SCAN  06/20/2024    INFLUENZA VACCINE  08/01/2024        Immunization History   Administered Date(s) Administered    31-influenza Vac Quardvalent Preservativ 10/14/2014, 01/20/2016, 10/07/2016, 12/07/2021, 10/19/2022    COVID-19 (MODERNA) 1st,2nd,3rd Dose Monovalent 03/25/2021, 04/22/2021, 11/19/2021    COVID-19 (MODERNA) 6-11 YRS Monovalent  03/25/2021, 04/22/2021    COVID-19 (PFIZER) BIVALENT 12+YRS 10/19/2022    COVID-19 F23 (PFIZER) 12YRS+ (COMIRNATY) 11/16/2023    Covid-19 (Pfizer) Gray Cap Monovalent 06/20/2022    Fluzone  >6mos 10/01/2013    Fluzone (or Fluarix & Flulaval for VFC) >6mos 10/07/2016, 12/07/2021, 10/19/2022, 11/16/2023    Influenza Seasonal Injectable 10/05/2011    Influenza, Unspecified 10/01/2013, 10/07/2016, 12/07/2021, 10/19/2022    Tdap 05/05/2016       Allergies   Allergen Reactions    Valproic Acid Irritability     DEPAKOTE CAUSES DIZZINESS AND IRRITABILITY

## 2024-08-15 NOTE — PROGRESS NOTES
The ABCs of the Annual Wellness Visit  Medicare Wellness Visit    Subjective    Kang Grey is a 44 y.o. male who presents for a Medicare Wellness Visit.    The following portions of the patient's history were reviewed and   updated as appropriate: allergies, current medications, past family history, past medical history, past social history, past surgical history, and problem list.    Compared to one year ago, the patient feels his physical   health is worse.    Compared to one year ago, the patient feels his mental   health is worse.    Recent Hospitalizations:  He was NOT admitted within the past 365 days.       Advance Care Planning  Advance Directive is not on file.  ACP discussion was held with the patient during this visit. Patient has an advance directive (not in EMR), copy requested.    Does the patient have evidence of cognitive impairment? Yes    Aspirin is on active medication list. Aspirin use is indicated based on review of current medical condition/s. Pros and cons of this therapy have been discussed today. Benefits of this medication outweigh potential harm.  Patient has been encouraged to continue taking this medication.  .H/O TIA      Patient Active Problem List   Diagnosis   • Ankle pain, left   • Cerebral palsy   • Closed fracture of bone   • Complex partial seizure evolving to generalized seizure   • Epilepsy   • Foot pain, right   • Hammertoe   • Ingrown toenail   • Headache   • Hyperlipemia   • Maltracking of right patella   • Meningitis   • Seizures   • Complex sleep apnea syndrome   • Benign prostatic hyperplasia with urinary frequency   • Neurogenic bladder   • Urinary retention   • Body mass index (BMI) of 27.0-27.9 in adult   • Chronic bilateral low back pain without sciatica   • Encounter for general adult medical examination with abnormal findings   • Insomnia   • Recurrent major depressive disorder   • Schizoaffective disorder, bipolar type   • Vitamin D deficiency   • Routine  "health maintenance   • Aspirin long-term use   • Osteopenia   • Transient ischemic attack (TIA)   • Paranoid schizophrenia   • Abnormal urine color   • Groin swelling   • Constipation   • GERD (gastroesophageal reflux disease)   • Migraine without aura and without status migrainosus, not intractable   • Right inguinal hernia   • Lumbosacral spondylosis without myelopathy   • Benign prostatic hyperplasia with lower urinary tract symptoms   • Recurrent urinary tract infection       Current Medical Providers:  Patient Care Team:  Hien Parham MD as PCP - General (Family Medicine)  Adalid Morris MD as Consulting Physician (Urology)  Roni Osullivan DPM as Consulting Physician (Podiatry)  Jaylyn Adhikari APRN (Nurse Practitioner)  Solomon Mccoy MD as Consulting Physician (Neurology)  Irving Sams MD as Surgeon (Neurosurgery)  Jimbo Paz MD as Consulting Physician (Sleep Medicine)  Monica Parrish APRN as Nurse Practitioner (Urology)  Oni Greenberg MD as Consulting Physician (General Surgery)    No opioid medication identified on active medication list. I have reviewed chart for other potential  high risk medication/s and harmful drug interactions in the elderly.             Objective    Vitals:    08/15/24 1319   BP: 110/69   BP Location: Right arm   Patient Position: Sitting   Cuff Size: Adult   Pulse: 115   Temp: 98.6 °F (37 °C)   TempSrc: Oral   SpO2: 97%   Weight: 84.6 kg (186 lb 6.4 oz)   Height: 167.6 cm (65.98\")     Estimated body mass index is 30.1 kg/m² as calculated from the following:    Height as of this encounter: 167.6 cm (65.98\").    Weight as of this encounter: 84.6 kg (186 lb 6.4 oz).    BMI is >= 30 and <35. (Class 1 Obesity). The following options were offered after discussion;: exercise counseling/recommendations and nutrition counseling/recommendations      Gait and Balance Evaluation: Loss of Balance, Needs Assistance, and Walker          HEALTH RISK " ASSESSMENT    Smoking Status:  Social History     Tobacco Use   Smoking Status Never   • Passive exposure: Never   Smokeless Tobacco Never     Alcohol Consumption:  Social History     Substance and Sexual Activity   Alcohol Use Never     Fall Risk Screen:    STEADI Fall Risk Assessment was completed, and patient is at HIGH risk for falls. Assessment completed on:8/15/2024    Depression Screenin/15/2024     2:00 PM   PHQ-2/PHQ-9 Depression Screening   Little Interest or Pleasure in Doing Things 3-->nearly every day   Feeling Down, Depressed or Hopeless 3-->nearly every day   Trouble Falling or Staying Asleep, or Sleeping Too Much 3-->nearly every day   Feeling Tired or Having Little Energy 3-->nearly every day   Poor Appetite or Overeating 3-->nearly every day   Feeling Bad about Yourself - or that You are a Failure or Have Let Yourself or Your Family Down 1-->several days   Trouble Concentrating on Things, Such as Reading the Newspaper or Watching Television 1-->several days   Moving or Speaking So Slowly that Other People Could Have Noticed? Or the Opposite - Being So Fidgety 1-->several days   Thoughts that You Would be Better Off Dead or of Hurting Yourself in Some Way 0-->not at all   PHQ-9: Brief Depression Severity Measure Score 18   If You Checked Off Any Problems, How Difficult Have These Problems Made It For You to Do Your Work, Take Care of Things at Home, or Get Along with Other People? very difficult       Health Habits and Functional and Cognitive Screenin/15/2024     2:00 PM   Functional & Cognitive Status   Do you have difficulty preparing food and eating? Yes   Do you have difficulty bathing yourself, getting dressed or grooming yourself? No   Do you have difficulty using the toilet? Yes   Do you have difficulty moving around from place to place? Yes   Do you have trouble with steps or getting out of a bed or a chair? Yes   Current Diet Limited Junk Food   Dental Exam Up to date    Eye Exam Up to date   Exercise (times per week) 0 times per week   Do you need help using the phone?  No   Are you deaf or do you have serious difficulty hearing?  Yes   Do you need help to go to places out of walking distance? Yes   Do you need help shopping? Yes   Do you need help preparing meals?  Yes   Do you need help with housework?  Yes   Do you need help with laundry? Yes   Do you need help taking your medications? Yes   Do you need help managing money? Yes   Do you ever drive or ride in a car without wearing a seat belt? No   Have you felt unusual stress, anger or loneliness in the last month? Yes   Who do you live with? Other   If you need help, do you have trouble finding someone available to you? No   Have you been bothered in the last four weeks by sexual problems? No   Do you have difficulty concentrating, remembering or making decisions? Yes       Visual Acuity:             Age-appropriate Screening Schedule:  Refer to the list below for future screening recommendations based on patient's age, sex and/or medical conditions. Orders for these recommended tests are listed in the plan section. The patient has been provided with a written plan.    Health Maintenance   Topic Date Due   • DXA SCAN  06/20/2024   • INFLUENZA VACCINE  08/01/2024   • LIPID PANEL  04/12/2025   • ANNUAL WELLNESS VISIT  08/15/2025   • BMI FOLLOWUP  08/15/2025   • TDAP/TD VACCINES (2 - Td or Tdap) 05/05/2026   • HEPATITIS C SCREENING  Completed   • COVID-19 Vaccine  Completed   • Pneumococcal Vaccine 0-64  Aged Out              Outpatient Medications Prior to Visit   Medication Sig Dispense Refill   • alclometasone (ACLOVATE) 0.05 % cream      • ascorbic acid (VITAMIN C) 1000 MG tablet Take 1 tablet by mouth Daily.     • aspirin 81 MG EC tablet Take 1 tablet by mouth Daily.     • busPIRone (BUSPAR) 10 MG tablet Take 1 tablet by mouth 3 (Three) Times a Day As Needed.     • Calcium Carbonate 1500 (600 Ca) MG tablet Take 1 tablet by  mouth 2 (two) times a day.     • cholecalciferol 25 MCG tablet TAKE TWO TABLETS BY MOUTH ONCE DAILY 60 tablet 1   • Diclofenac Sodium (VOLTAREN) 1 % gel gel Apply 1 g topically to the appropriate area as directed As Needed (LBP). 100 g 2   • docusate sodium (COLACE) 100 MG capsule TAKE 1 CAPSULE BY MOUTH DAILY. 30 capsule 1   • folic acid (FOLVITE) 800 MCG tablet Take 1 tablet by mouth Daily.     • guaiFENesin (Mucinex) 600 MG 12 hr tablet Take 2 tablets by mouth 2 (Two) Times a Day. 30 tablet 0   • HM TRUEplus Fiber 2 g chewable tablet CHEW 1 TABLET DAILY. 90 tablet 1   • Invega Sustenna 234 MG/1.5ML suspension prefilled syringe IM injection Inject 1.5 mL into the appropriate muscle as directed by prescriber Every 30 (Thirty) Days. Waiting on a appointment for his next injection.     • Keppra 500 MG tablet Take 3 tablets by mouth Every 12 (Twelve) Hours.     • ketoconazole (NIZORAL) 2 % shampoo      • lamoTRIgine (LaMICtal) 200 MG tablet Take 1 tablet by mouth Daily.     • Melatonin ( Melatonin) 10 MG sublingual tablet TAKE 1 TABLET BY MOUTH EVERY NIGHT AT BEDTIME. 90 tablet 0   • methocarbamol (ROBAXIN) 750 MG tablet Take 1 tablet by mouth 4 (Four) Times a Day As Needed for Muscle Spasms. 12 tablet 0   • nitrofurantoin (MACRODANTIN) 50 MG capsule Take 1 capsule by mouth Daily.     • pravastatin (PRAVACHOL) 20 MG tablet TAKE 1 TABLET BY MOUTH EVERY NIGHT. 30 tablet 1   • sodium chloride 0.9 % flush      • triamcinolone (KENALOG) 0.025 % cream      • venlafaxine XR (EFFEXOR-XR) 150 MG 24 hr capsule Take 1 capsule by mouth Daily.     • venlafaxine XR (EFFEXOR-XR) 37.5 MG 24 hr capsule Take 1 capsule by mouth Daily.       No facility-administered medications prior to visit.       CMS Preventative Services Quick Reference  Risk Factors Identified During Encounter  Chronic Pain: OTC analgesics as needed. Proper dosing schedule discussed.   Depression/Dysphoria: Referral to Mental Health specialist  Fall Risk-High or  Moderate: Discussed Fall Prevention in the home  Glaucoma or Family History of Glaucoma:  Optometry Appointment Recommended  Immunizations Discussed/Encouraged: Influenza and COVID19  Inactivity/Sedentary: Patient was advised to exercise at least 150 minutes a week per CDC recommendations.  Urinary Incontinence: Referred to Urology and HE USES AN INDWELLING CATHETOR  Dental Screening Recommended  Vision Screening Recommended  The above risks/problems have been discussed with the patient.  Pertinent information has been shared with the patient in the After Visit Summary.  An After Visit Summary and PPPS were made available to the patient.    Follow Up:   Next Medicare Wellness visit to be scheduled in 1 year.       Additional E&M Note during same encounter follows:  Patient has multiple medical problems which are significant and separately identifiable that require additional work above and beyond the Medicare Wellness Visit.         Kang Grey presents to [facility].    Chief Complaint: med re eval    Subjective     History of Present Illness:  Blood in Urine  Pertinent negatives include no abdominal pain, chills, fever, flank pain, nausea or vomiting.     Kang presents with a chronic indwelling cathetor and he is tolerating it well.  He can't do self caths very well.  He sees Dr Morris.  He has no pain. He is on low dose macrobid for frequent UTIs    He presents with depression, grieving over loss of his father, he feels frozen inside.  He saw his therapist yesterday.  He is unable to cry. He just wants to sleep all the time.  He has gained 10 # in past month  He states he has no SI/HI and would never hurt himself.   He is on lamictal, invega injections, effexor and buspar as well as folic acid.     S/p R inguinal hernia repair on 11/8/23 and his hernia is coming back and he saw Dr Greenberg and he was told will not treat at this time     He is having constipation and he is on  "stool softener twice a day, can be difficult and hard to push out.  He is also on 2 fiber gummies.  He likes prune juice, will restart this     He has sleep apnea and is on cpap and is still not tolerating and still only wears it about half the night.      He presents with chronic pure hypercholesterolemia, current treatment includes a lipid lowering agent, pravastatin.  Tolerates medication well.  Cholesterol from April shows good control.  Compliance with treatment has been good.  He denies experiencing any hypercholesterolemia related symptoms.  He needs med refills today and is due for labs     He has seizures disorder, stable on current meds, neurologist is Dr Mccoy.  He is on Keppra, he tolerates meds well.     He is sneezing more at home, not on allergy meds, no other symptoms, no fever or cough            Result Review   The following data was reviewed by Hien Parham MD on 08/15/2024.  Lab Results   Component Value Date    WBC 5.09 05/02/2024    HGB 15.7 05/02/2024    HCT 44.4 05/02/2024    MCV 84.1 05/02/2024     05/02/2024     Lab Results   Component Value Date    GLUCOSE 82 04/12/2024    BUN 15 04/12/2024    CREATININE 0.78 04/12/2024     04/12/2024    K 3.8 04/12/2024     04/12/2024    CALCIUM 9.7 04/12/2024    PROTEINTOT 6.7 04/12/2024    ALBUMIN 4.3 04/12/2024    ALT 21 04/12/2024    AST 21 04/12/2024    ALKPHOS 63 04/12/2024    BILITOT 0.3 04/12/2024    GLOB 2.4 04/12/2024    AGRATIO 1.8 04/12/2024    BCR 19.2 04/12/2024    ANIONGAP 11.3 04/12/2024    EGFR 113.5 04/12/2024     Lab Results   Component Value Date    CHOL 158 04/12/2024    CHLPL 174 05/06/2021    TRIG 81 04/12/2024    HDL 39 (L) 04/12/2024     (H) 04/12/2024     Lab Results   Component Value Date    TSH 1.730 11/16/2023     Lab Results   Component Value Date    HGBA1C 5.40 03/18/2024     No results found for: \"PSA\"      Lab Results   Component Value Date    IFTU29HW 44.1 12/07/2021             Assessment " and Plan:   Diagnoses and all orders for this visit:    1. Encounter for annual wellness exam in Medicare patient (Primary)    2. Colon cancer screening  Comments:  Will start colonoscopies at 45 years old    3. Prostate cancer screening  Comments:  Will start checking PSAs at 50 years old    4. Encounter for immunization  Comments:  Recommend COVID and flu vaccine in October    5. Primary insomnia  Comments:  He has insomnia and needs to wear his CPAP to help him get a better night sleep    6. Neurogenic bladder  Comments:  He has neurogenic bladder and he is stable with an indwelling catheter.  He is to follow-up with urology as directed    7. Mixed hyperlipidemia  Comments:  Cholesterol is stable on pravastatin and he is due for labs.  Will check labs and adjust treatment plan pending results  Orders:  -     Comprehensive Metabolic Panel; Future  -     CBC (No Diff); Future  -     Lipid Panel; Future    8. Vitamin D deficiency  Comments:  He is on vitamin D supplementation.  I will check labs and adjust treatment plan pending results  Orders:  -     Vitamin D,25-Hydroxy; Future    9. Other constipation  Comments:  Continue stool softeners/fiber Gummies and will restart prune juice    10. Right inguinal hernia  Comments:  He followed up with his general surgeon who told him at this stage he would not do another surgical repair of the hernia but will follow closely    11. Paranoid schizophrenia  Comments:  F/U psychiatry and cont current meds, tolerates meds well, no SI/HI, he is grieving over death of his father    12. Nonintractable epilepsy without status epilepticus, unspecified epilepsy type  Comments:  Follow-up with Dr. Mccoy and continue his Keppra for his underlying seizure disorder.STABLE    13. Chronic bilateral low back pain without sciatica    14. Hearing loss, unspecified hearing loss type, unspecified laterality  Comments:  Will set him up for evaluation with [facility]  Orders:  -      Ambulatory Referral to Audiology    15. At risk for falls  Comments:  He needs to be using his walker or cane at all times    16. Sneezing  -     POCT SARS-CoV-2 Antigen TERESA + Flu    17. Flu  -     oseltamivir (Tamiflu) 75 MG capsule; Take 1 capsule by mouth 2 (Two) Times a Day for 5 days.  Dispense: 10 capsule; Refill: 0                    Medications:      Current Outpatient Medications:   •  alclometasone (ACLOVATE) 0.05 % cream, , Disp: , Rfl:   •  ascorbic acid (VITAMIN C) 1000 MG tablet, Take 1 tablet by mouth Daily., Disp: , Rfl:   •  aspirin 81 MG EC tablet, Take 1 tablet by mouth Daily., Disp: , Rfl:   •  busPIRone (BUSPAR) 10 MG tablet, Take 1 tablet by mouth 3 (Three) Times a Day As Needed., Disp: , Rfl:   •  Calcium Carbonate 1500 (600 Ca) MG tablet, Take 1 tablet by mouth 2 (two) times a day., Disp: , Rfl:   •  cholecalciferol 25 MCG tablet, TAKE TWO TABLETS BY MOUTH ONCE DAILY, Disp: 60 tablet, Rfl: 1  •  Diclofenac Sodium (VOLTAREN) 1 % gel gel, Apply 1 g topically to the appropriate area as directed As Needed (LBP)., Disp: 100 g, Rfl: 2  •  docusate sodium (COLACE) 100 MG capsule, TAKE 1 CAPSULE BY MOUTH DAILY., Disp: 30 capsule, Rfl: 1  •  folic acid (FOLVITE) 800 MCG tablet, Take 1 tablet by mouth Daily., Disp: , Rfl:   •  guaiFENesin (Mucinex) 600 MG 12 hr tablet, Take 2 tablets by mouth 2 (Two) Times a Day., Disp: 30 tablet, Rfl: 0  •  HM TRUEplus Fiber 2 g chewable tablet, CHEW 1 TABLET DAILY., Disp: 90 tablet, Rfl: 1  •  Invega Sustenna 234 MG/1.5ML suspension prefilled syringe IM injection, Inject 1.5 mL into the appropriate muscle as directed by prescriber Every 30 (Thirty) Days. Waiting on a appointment for his next injection., Disp: , Rfl:   •  Keppra 500 MG tablet, Take 3 tablets by mouth Every 12 (Twelve) Hours., Disp: , Rfl:   •  ketoconazole (NIZORAL) 2 % shampoo, , Disp: , Rfl:   •  lamoTRIgine (LaMICtal) 200 MG tablet, Take 1 tablet by mouth Daily., Disp: , Rfl:   •  Melatonin (HM  "Melatonin) 10 MG sublingual tablet, TAKE 1 TABLET BY MOUTH EVERY NIGHT AT BEDTIME., Disp: 90 tablet, Rfl: 0  •  methocarbamol (ROBAXIN) 750 MG tablet, Take 1 tablet by mouth 4 (Four) Times a Day As Needed for Muscle Spasms., Disp: 12 tablet, Rfl: 0  •  nitrofurantoin (MACRODANTIN) 50 MG capsule, Take 1 capsule by mouth Daily., Disp: , Rfl:   •  pravastatin (PRAVACHOL) 20 MG tablet, TAKE 1 TABLET BY MOUTH EVERY NIGHT., Disp: 30 tablet, Rfl: 1  •  sodium chloride 0.9 % flush, , Disp: , Rfl:   •  triamcinolone (KENALOG) 0.025 % cream, , Disp: , Rfl:   •  venlafaxine XR (EFFEXOR-XR) 150 MG 24 hr capsule, Take 1 capsule by mouth Daily., Disp: , Rfl:   •  venlafaxine XR (EFFEXOR-XR) 37.5 MG 24 hr capsule, Take 1 capsule by mouth Daily., Disp: , Rfl:   •  oseltamivir (Tamiflu) 75 MG capsule, Take 1 capsule by mouth 2 (Two) Times a Day for 5 days., Disp: 10 capsule, Rfl: 0       Objective   Vital Signs:   /69 (BP Location: Right arm, Patient Position: Sitting, Cuff Size: Adult)   Pulse 115   Temp 98.6 °F (37 °C) (Oral)   Ht 167.6 cm (65.98\")   Wt 84.6 kg (186 lb 6.4 oz)   SpO2 97%   BMI 30.10 kg/m²       Physical Exam:  Physical Exam  Vitals and nursing note reviewed.   Constitutional:       General: He is not in acute distress.     Appearance: Normal appearance. He is well-developed and well-groomed. He is not ill-appearing.   HENT:      Head: Normocephalic and atraumatic.      Right Ear: Tympanic membrane, ear canal and external ear normal.      Left Ear: Tympanic membrane, ear canal and external ear normal.      Nose: Nose normal. No congestion or rhinorrhea.      Mouth/Throat:      Mouth: Mucous membranes are moist.   Eyes:      Extraocular Movements: Extraocular movements intact.      Pupils: Pupils are equal, round, and reactive to light.   Cardiovascular:      Rate and Rhythm: Normal rate and regular rhythm.      Pulses: Normal pulses.      Heart sounds: Normal heart sounds. No murmur heard.     No " friction rub. No gallop.   Pulmonary:      Effort: Pulmonary effort is normal. No respiratory distress.      Breath sounds: Normal breath sounds. No wheezing, rhonchi or rales.   Abdominal:      General: Bowel sounds are normal. There is no distension.      Palpations: There is no mass.      Tenderness: There is no abdominal tenderness. There is no guarding.      Hernia: No hernia is present.   Musculoskeletal:         General: No swelling or tenderness. Normal range of motion.      Cervical back: Normal range of motion and neck supple. No rigidity or tenderness.   Skin:     General: Skin is warm.      Capillary Refill: Capillary refill takes less than 2 seconds.      Findings: No rash.   Neurological:      General: No focal deficit present.      Mental Status: He is alert and oriented to person, place, and time.      Cranial Nerves: No cranial nerve deficit.      Sensory: No sensory deficit.      Motor: No weakness.      Gait: Gait normal.      Deep Tendon Reflexes: Reflexes normal.   Psychiatric:         Mood and Affect: Mood normal.         Behavior: Behavior normal.         Thought Content: Thought content normal.         Judgment: Judgment normal.                     Review of Systems:  Review of Systems   Constitutional:  Negative for chills, fatigue and fever.   HENT:  Negative for congestion, ear discharge and sore throat.    Respiratory:  Negative for cough, shortness of breath and wheezing.    Cardiovascular:  Negative for chest pain, palpitations and leg swelling.   Gastrointestinal:  Positive for constipation. Negative for abdominal pain, diarrhea, nausea, vomiting and GERD.   Genitourinary:  Positive for urinary incontinence. Negative for flank pain.   Neurological:  Negative for dizziness and headache.   Psychiatric/Behavioral:  Positive for sleep disturbance and depressed mood. Negative for suicidal ideas. The patient is nervous/anxious.             Follow Up   Return in about 6 months (around  2/15/2025) for Recheck.    Part of this note may be an electronic transcription/translation of spoken language to printed   text using the Dragon Dictation System.            Medical History:  Past Medical History:   • Acute upper respiratory infection   • Age-related osteoporosis without current pathological fracture   • Broken toe    left great toe   • Cerebral palsy    diagnosed as infant   • Compression fracture of first lumbar vertebra   • Constipation   • Disease of pancreas   • GERD (gastroesophageal reflux disease)   • History of urinary self-catheterization    pt or pt's mother catheterizes 4-6  times a day   • Hydrocephalus in diseases classified elsewhere    NO  SHUNT   • Inguinal hernia   • Injury of back   • Low back pain   • Major depressive disorder    single episode, moderate   • Other cerebral palsy   • Other fatigue   • Other generalized epilepsy and epileptic syndromes, not intractable, without status epilepticus   • Other hypertrophic disorders of the skin   • Pain in left ankle and joints of left foot   • Pain in right toe(s)   • Pain in thoracic spine   • Pancreatic cyst   • Paranoid schizophrenia   • Pelvic and perineal pain   • Primary insomnia   • Pure hypercholesterolemia   • Repeated falls    last fall 11/2022   • Schizoaffective disorder, unspecified   • Seizures    last seizure was approx 2018, medication controlled   • Sleep apnea    uses cpap machine   • Somnolence   • TIA (transient ischemic attack)    UNSURE OF  DATE   • Vitamin D deficiency   • Wedge compression fracture of fourth thoracic vertebra, subsequent encounter for fracture with routine healing     Past Surgical History:   • CYST REMOVAL    Procedure: Excision of subcutaneous mass from right forearm;  Surgeon: Oni Greenberg MD;  [place] [facility] [place] [facility] [place]: General;  Laterality: Right;   • HAMSTRING REPAIR    age 10   • INGUINAL HERNIA REPAIR    Procedure: INGUINAL HERNIA REPAIR LAPAROSCOPIC WITH DAVINCI ROBOT;   Surgeon: Mayur Quintana MD;  [facility]: [facility] [place];  Service: Robotics - DaVinci;  Laterality: Right;   • OTHER SURGICAL HISTORY    hyposadius repair   • TOE FUSION    great toe at age 21      Family History   Problem Relation Age of Onset   • Hypothyroidism Mother    • No Known Problems Father    • Ulcers Brother    • Malig Hyperthermia Neg Hx      Social History     Tobacco Use   • Smoking status: Never     Passive exposure: Never   • Smokeless tobacco: Never   Substance Use Topics   • Alcohol use: Never       Health Maintenance Due   Topic Date Due   • DXA SCAN  06/20/2024   • INFLUENZA VACCINE  08/01/2024        Immunization History   Administered Date(s) Administered   • 31-influenza Vac Quardvalent Preservativ 10/14/2014, 01/20/2016, 10/07/2016, 12/07/2021, 10/19/2022   • COVID-19 (MODERNA) 1st,2nd,3rd Dose Monovalent 03/25/2021, 04/22/2021, 11/19/2021   • COVID-19 (MODERNA) 6-11 YRS Monovalent 03/25/2021, 04/22/2021   • COVID-19 (PFIZER) BIVALENT 12+YRS 10/19/2022   • COVID-19 F23 (PFIZER) 12YRS+ (COMIRNATY) 11/16/2023   • Covid-19 (Pfizer) Gray Cap Monovalent 06/20/2022   • Fluzone  >6mos 10/01/2013   • Fluzone (or Fluarix & Flulaval for VFC) >6mos 10/07/2016, 12/07/2021, 10/19/2022, 11/16/2023   • Influenza Seasonal Injectable 10/05/2011   • Influenza, Unspecified 10/01/2013, 10/07/2016, 12/07/2021, 10/19/2022   • Tdap 05/05/2016       Allergies   Allergen Reactions   • Valproic Acid Irritability     DEPAKOTE CAUSES DIZZINESS AND IRRITABILITY

## 2024-08-15 NOTE — PROGRESS NOTES
Chief Complaint: Urologic complaint    Subjective         History of Present Illness  Kang Grey is a 44 y.o. male     BPH  Neurogenic bladder  History of hypospadias repair      Patient is moderately bothered by his catheter.          7/18/2024 cystoscopy cath placement over a wire - OTravis -nursing could not get catheter placed      Could not do CIC at home, was not having left of the catheter in.      PVR    3/22   > 999      No cardiopulmonary history.  Patient does not smoke. ASA 81        history of hypospadias.  Status post repair.     No previous abdominal surgery.      7/22 cystoscopy-2 cm prostate.  Large bladder no pathology.  No trabeculation     6/9/2022 UDS-bladder capacity 2300, no sensation, large bladder capacity.  No involuntary contractions.  No incontinence.  No detrusor contraction.  Could not void without abdominal straining.  PVR 1049      Previous      Some pain with cathing, this is why he does not always do it.    Not always cathing consistently.  Mom states cathing a time or 2 daily    Cannot void on his own at times. No straining per pt.    Getting a lot out when he caths.    4/24 0.7,     4/24 NP-cathing but not every day, hard to get catheter and sometimes.  Pain with cathing    On nitrofurantoin 50 mg nightly    Patient  just finished round of IV antibiotics.    8/23 Enterococcus  6/23 Citrobacter  4/23 coag negative staph  9/22 coag negative staph      Patient has been treated for 2 UTIs in the last several months    No gross hematuria, voiding diary today shows they are getting out around 200-400 a.m. cath - 500-12 100 on the evening cath.      1/22 0.9, GFR 93    1/22 MRI abdomen with and without - stable 28 cm cyst in the pancreatic head.  Unchanged for 2 years.  Stable Marked distention of the urinary bladder, stable simple cyst left kidney 6 cm      3/20 CT abdomen with and without - 2.3 cm cyst head of pancreas.  Distention of the urinary bladder.  Similar to  previous in 3/20.     No history of kidney  stone.    No urologic family history                Past Surgical History:   Procedure Laterality Date    CYST REMOVAL Right 11/11/2022    Procedure: Excision of subcutaneous mass from right forearm;  Surgeon: Oni Greenberg MD;  Location: [facility] [place];  Service: General;  Laterality: Right;    HAMSTRING REPAIR Bilateral     age 10    INGUINAL HERNIA REPAIR Right 11/8/2023    Procedure: INGUINAL HERNIA REPAIR LAPAROSCOPIC WITH DAVINCI ROBOT;  Surgeon: Mayur Quintana MD;  Location: [facility] OR [place];  Service: Robotics - DaVinci;  Laterality: Right;    OTHER SURGICAL HISTORY      hyposadius repair    TOE FUSION Right     great toe at age 21             Vital Signs:   There were no vitals taken for this visit.                   Assessment and Plan    Diagnoses and all orders for this visit:    1. Neurogenic bladder (Primary)    2. Urinary retention          Neurogenic bladder      Patient unable to do CIC.    Catheter switched over a wire under sterile technique.        Patient cannot do CIC, after discussion we will get him scheduled for cystoscopy suprapubic tube placement.  Risks and benefits were discussed including bleeding, infection and damage to the urinary system.  We also discussed the risk of anesthesia up to and including death.  Patient voiced understanding and would like to proceed.    Discussed implications of this with the family.  Patient understands he will have to have indwelling catheter will increase his risk of UTI.  They understand that there is no other way at this time as he has a completely flaccid neurogenic bladder.  And he cannot do CIC patient voiced understanding      Patient will need antibiotics before procedure, we will have to see what the culture comes back as before we proceed he has been very resistant in the past with ESBL    46 minutes used in counseling coordination of care

## 2024-08-19 ENCOUNTER — OFFICE VISIT (OUTPATIENT)
Dept: UROLOGY | Facility: CLINIC | Age: 44
End: 2024-08-19
Payer: MEDICARE

## 2024-08-19 ENCOUNTER — PREP FOR SURGERY (OUTPATIENT)
Dept: OTHER | Facility: HOSPITAL | Age: 44
End: 2024-08-19
Payer: MEDICARE

## 2024-08-19 VITALS — RESPIRATION RATE: 16 BRPM | HEIGHT: 65 IN | BODY MASS INDEX: 30.99 KG/M2 | WEIGHT: 186 LBS

## 2024-08-19 DIAGNOSIS — N31.9 NEUROGENIC BLADDER: Primary | ICD-10-CM

## 2024-08-19 DIAGNOSIS — R33.9 URINARY RETENTION: ICD-10-CM

## 2024-08-19 PROCEDURE — 87081 CULTURE SCREEN ONLY: CPT | Performed by: UROLOGY

## 2024-08-19 PROCEDURE — 87086 URINE CULTURE/COLONY COUNT: CPT | Performed by: UROLOGY

## 2024-08-19 PROCEDURE — 87186 SC STD MICRODIL/AGAR DIL: CPT | Performed by: UROLOGY

## 2024-08-19 PROCEDURE — 87077 CULTURE AEROBIC IDENTIFY: CPT | Performed by: UROLOGY

## 2024-08-19 PROCEDURE — 99215 OFFICE O/P EST HI 40 MIN: CPT | Performed by: UROLOGY

## 2024-08-19 PROCEDURE — 1159F MED LIST DOCD IN RCRD: CPT | Performed by: UROLOGY

## 2024-08-19 PROCEDURE — 1160F RVW MEDS BY RX/DR IN RCRD: CPT | Performed by: UROLOGY

## 2024-08-19 RX ORDER — SODIUM CHLORIDE 0.9 % (FLUSH) 0.9 %
10 SYRINGE (ML) INJECTION AS NEEDED
OUTPATIENT
Start: 2024-08-19

## 2024-08-19 RX ORDER — SODIUM CHLORIDE 0.9 % (FLUSH) 0.9 %
3 SYRINGE (ML) INJECTION EVERY 12 HOURS SCHEDULED
OUTPATIENT
Start: 2024-08-19

## 2024-08-19 RX ORDER — SODIUM CHLORIDE 9 MG/ML
100 INJECTION, SOLUTION INTRAVENOUS CONTINUOUS
OUTPATIENT
Start: 2024-08-19

## 2024-08-19 RX ORDER — LEVOFLOXACIN 5 MG/ML
500 INJECTION, SOLUTION INTRAVENOUS ONCE
OUTPATIENT
Start: 2024-08-19 | End: 2024-08-19

## 2024-08-19 RX ORDER — SODIUM CHLORIDE 9 MG/ML
40 INJECTION, SOLUTION INTRAVENOUS AS NEEDED
OUTPATIENT
Start: 2024-08-19

## 2024-08-23 LAB — BACTERIA SPEC AEROBE CULT: ABNORMAL

## 2024-08-27 ENCOUNTER — TELEPHONE (OUTPATIENT)
Dept: UROLOGY | Facility: CLINIC | Age: 44
End: 2024-08-27
Payer: MEDICARE

## 2024-08-27 DIAGNOSIS — N39.0 RECURRENT UTI: Primary | ICD-10-CM

## 2024-08-27 RX ORDER — ERTAPENEM 1 G/1
1 INJECTION, POWDER, LYOPHILIZED, FOR SOLUTION INTRAMUSCULAR; INTRAVENOUS EVERY 24 HOURS
OUTPATIENT
Start: 2024-10-05

## 2024-08-27 NOTE — TELEPHONE ENCOUNTER
Spoke to pt's mother Jenni regarding need for abx injection x3 days leading up to surgery. She is understandable and agrees to scheduling these injection visits.     9/15/24: 8:30am  9/16/24: 8:00am  9/17/24: 8:00am  9/18/24: surgery as scheduled    Pt mother notified of appts. She verbalized understanding of all instruction via teach back        ----- Message from Adalid Morris sent at 8/27/2024  8:18 AM EDT -----  Regarding: RE: abx's before suprapubic tube  Plan placed, please make sure they start on 9/15/2024  ----- Message -----  From: Callum Rucker RN  Sent: 8/27/2024   8:13 AM EDT  To: Adalid Morris MD  Subject: RE: abx's before suprapubic tube                 Therapy plan please  ----- Message -----  From: Adalid Morris MD  Sent: 8/23/2024   2:40 PM EDT  To: Callum Rucker RN  Subject: abx's before suprapubic tube                     Invanz 1 g daily x 3 days leading up to suprapubic tube  ----- Message -----  From: Lab, Background User  Sent: 8/20/2024  11:38 AM EDT  To: Adalid Morris MD

## 2024-09-06 DIAGNOSIS — N39.0 URINARY TRACT INFECTION WITHOUT HEMATURIA, SITE UNSPECIFIED: Primary | ICD-10-CM

## 2024-09-15 ENCOUNTER — HOSPITAL ENCOUNTER (OUTPATIENT)
Dept: INFUSION THERAPY | Facility: HOSPITAL | Age: 44
Discharge: HOME | End: 2024-09-15
Admitting: UROLOGY
Payer: MEDICARE

## 2024-09-15 VITALS
TEMPERATURE: 97.7 F | HEART RATE: 95 BPM | DIASTOLIC BLOOD PRESSURE: 82 MMHG | RESPIRATION RATE: 20 BRPM | OXYGEN SATURATION: 96 % | SYSTOLIC BLOOD PRESSURE: 132 MMHG

## 2024-09-15 DIAGNOSIS — N30.00 ACUTE CYSTITIS WITHOUT HEMATURIA: Primary | ICD-10-CM

## 2024-09-15 PROCEDURE — 25010000002 GENTAMICIN PER 80 MG: Performed by: UROLOGY

## 2024-09-15 PROCEDURE — 96365 THER/PROPH/DIAG IV INF INIT: CPT

## 2024-09-15 RX ADMIN — GENTAMICIN SULFATE 350 MG: 40 INJECTION, SOLUTION INTRAMUSCULAR; INTRAVENOUS at 09:15

## 2024-09-16 ENCOUNTER — HOSPITAL ENCOUNTER (OUTPATIENT)
Dept: INFUSION THERAPY | Facility: HOSPITAL | Age: 44
Discharge: HOME | End: 2024-09-16
Admitting: UROLOGY
Payer: MEDICARE

## 2024-09-16 VITALS
WEIGHT: 177.69 LBS | DIASTOLIC BLOOD PRESSURE: 82 MMHG | TEMPERATURE: 97.7 F | BODY MASS INDEX: 29.61 KG/M2 | HEIGHT: 65 IN | HEART RATE: 92 BPM | RESPIRATION RATE: 20 BRPM | OXYGEN SATURATION: 95 % | SYSTOLIC BLOOD PRESSURE: 139 MMHG

## 2024-09-16 DIAGNOSIS — N30.00 ACUTE CYSTITIS WITHOUT HEMATURIA: Primary | ICD-10-CM

## 2024-09-16 PROCEDURE — 25010000002 GENTAMICIN PER 80 MG: Performed by: UROLOGY

## 2024-09-16 PROCEDURE — 96365 THER/PROPH/DIAG IV INF INIT: CPT

## 2024-09-16 RX ADMIN — GENTAMICIN SULFATE 350 MG: 40 INJECTION, SOLUTION INTRAMUSCULAR; INTRAVENOUS at 08:45

## 2024-09-17 ENCOUNTER — HOSPITAL ENCOUNTER (OUTPATIENT)
Dept: INFUSION THERAPY | Facility: HOSPITAL | Age: 44
Discharge: HOME | End: 2024-09-17
Admitting: UROLOGY
Payer: MEDICARE

## 2024-09-17 VITALS
HEART RATE: 82 BPM | DIASTOLIC BLOOD PRESSURE: 88 MMHG | RESPIRATION RATE: 20 BRPM | OXYGEN SATURATION: 95 % | SYSTOLIC BLOOD PRESSURE: 117 MMHG | TEMPERATURE: 98.2 F

## 2024-09-17 DIAGNOSIS — N30.00 ACUTE CYSTITIS WITHOUT HEMATURIA: Primary | ICD-10-CM

## 2024-09-17 LAB
ANION GAP SERPL CALCULATED.3IONS-SCNC: 10.3 MMOL/L (ref 5–15)
BUN SERPL-MCNC: 11 MG/DL (ref 6–20)
BUN/CREAT SERPL: 12.9 (ref 7–25)
CALCIUM SPEC-SCNC: 9.6 MG/DL (ref 8.6–10.5)
CHLORIDE SERPL-SCNC: 105 MMOL/L (ref 98–107)
CO2 SERPL-SCNC: 24.7 MMOL/L (ref 22–29)
CREAT SERPL-MCNC: 0.85 MG/DL (ref 0.76–1.27)
EGFRCR SERPLBLD CKD-EPI 2021: 109.9 ML/MIN/1.73
GENTAMICIN TROUGH SERPL-MCNC: <0.3 MCG/ML (ref 0.5–2)
GLUCOSE SERPL-MCNC: 117 MG/DL (ref 65–99)
POTASSIUM SERPL-SCNC: 3.6 MMOL/L (ref 3.5–5.2)
SODIUM SERPL-SCNC: 140 MMOL/L (ref 136–145)

## 2024-09-17 PROCEDURE — 36415 COLL VENOUS BLD VENIPUNCTURE: CPT

## 2024-09-17 PROCEDURE — 80170 ASSAY OF GENTAMICIN: CPT

## 2024-09-17 PROCEDURE — 25010000002 GENTAMICIN PER 80 MG: Performed by: UROLOGY

## 2024-09-17 PROCEDURE — 96365 THER/PROPH/DIAG IV INF INIT: CPT

## 2024-09-17 PROCEDURE — 80048 BASIC METABOLIC PNL TOTAL CA: CPT

## 2024-09-17 RX ADMIN — GENTAMICIN SULFATE 350 MG: 40 INJECTION, SOLUTION INTRAMUSCULAR; INTRAVENOUS at 17:10

## 2024-09-18 ENCOUNTER — HOSPITAL ENCOUNTER (OUTPATIENT)
Facility: HOSPITAL | Age: 44
Discharge: HOME | End: 2024-09-18
Attending: UROLOGY | Admitting: UROLOGY
Payer: MEDICARE

## 2024-09-18 ENCOUNTER — ANESTHESIA EVENT (OUTPATIENT)
Dept: PERIOP | Facility: HOSPITAL | Age: 44
End: 2024-09-18
Payer: MEDICARE

## 2024-09-18 ENCOUNTER — ANESTHESIA (OUTPATIENT)
Dept: PERIOP | Facility: HOSPITAL | Age: 44
End: 2024-09-18
Payer: MEDICARE

## 2024-09-18 VITALS
TEMPERATURE: 98 F | HEIGHT: 69 IN | WEIGHT: 179.68 LBS | SYSTOLIC BLOOD PRESSURE: 136 MMHG | HEART RATE: 94 BPM | DIASTOLIC BLOOD PRESSURE: 84 MMHG | BODY MASS INDEX: 26.61 KG/M2 | OXYGEN SATURATION: 95 % | RESPIRATION RATE: 16 BRPM

## 2024-09-18 DIAGNOSIS — N31.9 NEUROGENIC BLADDER: ICD-10-CM

## 2024-09-18 DIAGNOSIS — N39.0 RECURRENT URINARY TRACT INFECTION: Primary | ICD-10-CM

## 2024-09-18 PROCEDURE — A9270 NON-COVERED ITEM OR SERVICE: HCPCS | Performed by: ANESTHESIOLOGY

## 2024-09-18 PROCEDURE — 25010000002 ONDANSETRON PER 1 MG: Performed by: NURSE ANESTHETIST, CERTIFIED REGISTERED

## 2024-09-18 PROCEDURE — 25010000002 FENTANYL CITRATE (PF) 50 MCG/ML SOLUTION: Performed by: NURSE ANESTHETIST, CERTIFIED REGISTERED

## 2024-09-18 PROCEDURE — 63710000001 ACETAMINOPHEN EXTRA STRENGTH 500 MG TABLET: Performed by: ANESTHESIOLOGY

## 2024-09-18 PROCEDURE — 25010000002 PROPOFOL 200 MG/20ML EMULSION: Performed by: NURSE ANESTHETIST, CERTIFIED REGISTERED

## 2024-09-18 PROCEDURE — 25010000002 DEXAMETHASONE PER 1 MG: Performed by: NURSE ANESTHETIST, CERTIFIED REGISTERED

## 2024-09-18 PROCEDURE — 25010000002 ESMOLOL 100 MG/10ML SOLUTION: Performed by: NURSE ANESTHETIST, CERTIFIED REGISTERED

## 2024-09-18 PROCEDURE — 25010000002 MIDAZOLAM PER 1MG: Performed by: ANESTHESIOLOGY

## 2024-09-18 PROCEDURE — 25810000003 LACTATED RINGERS PER 1000 ML: Performed by: ANESTHESIOLOGY

## 2024-09-18 PROCEDURE — 25010000002 LEVOFLOXACIN PER 250 MG: Performed by: UROLOGY

## 2024-09-18 PROCEDURE — 51102 DRAIN BL W/CATH INSERTION: CPT | Performed by: UROLOGY

## 2024-09-18 RX ORDER — SODIUM CHLORIDE 0.9 % (FLUSH) 0.9 %
3 SYRINGE (ML) INJECTION EVERY 12 HOURS SCHEDULED
Status: DISCONTINUED | OUTPATIENT
Start: 2024-09-18 | End: 2024-09-18 | Stop reason: HOSPADM

## 2024-09-18 RX ORDER — ROCURONIUM BROMIDE 10 MG/ML
INJECTION, SOLUTION INTRAVENOUS AS NEEDED
Status: DISCONTINUED | OUTPATIENT
Start: 2024-09-18 | End: 2024-09-18 | Stop reason: SURG

## 2024-09-18 RX ORDER — SODIUM CHLORIDE, SODIUM LACTATE, POTASSIUM CHLORIDE, CALCIUM CHLORIDE 600; 310; 30; 20 MG/100ML; MG/100ML; MG/100ML; MG/100ML
9 INJECTION, SOLUTION INTRAVENOUS CONTINUOUS PRN
Status: DISCONTINUED | OUTPATIENT
Start: 2024-09-18 | End: 2024-09-18 | Stop reason: HOSPADM

## 2024-09-18 RX ORDER — PROMETHAZINE HYDROCHLORIDE 12.5 MG/1
25 TABLET ORAL ONCE AS NEEDED
Status: DISCONTINUED | OUTPATIENT
Start: 2024-09-18 | End: 2024-09-18 | Stop reason: HOSPADM

## 2024-09-18 RX ORDER — SODIUM CHLORIDE 9 MG/ML
40 INJECTION, SOLUTION INTRAVENOUS AS NEEDED
Status: DISCONTINUED | OUTPATIENT
Start: 2024-09-18 | End: 2024-09-18 | Stop reason: HOSPADM

## 2024-09-18 RX ORDER — HYDROCODONE BITARTRATE AND ACETAMINOPHEN 5; 325 MG/1; MG/1
1 TABLET ORAL ONCE AS NEEDED
Status: DISCONTINUED | OUTPATIENT
Start: 2024-09-18 | End: 2024-09-18 | Stop reason: HOSPADM

## 2024-09-18 RX ORDER — ESMOLOL HYDROCHLORIDE 10 MG/ML
INJECTION INTRAVENOUS AS NEEDED
Status: DISCONTINUED | OUTPATIENT
Start: 2024-09-18 | End: 2024-09-18 | Stop reason: SURG

## 2024-09-18 RX ORDER — ACETAMINOPHEN 500 MG
1000 TABLET ORAL ONCE
Status: COMPLETED | OUTPATIENT
Start: 2024-09-18 | End: 2024-09-18

## 2024-09-18 RX ORDER — ONDANSETRON 4 MG/1
4 TABLET, ORALLY DISINTEGRATING ORAL ONCE AS NEEDED
Status: DISCONTINUED | OUTPATIENT
Start: 2024-09-18 | End: 2024-09-18 | Stop reason: HOSPADM

## 2024-09-18 RX ORDER — LEVOFLOXACIN 5 MG/ML
500 INJECTION, SOLUTION INTRAVENOUS ONCE
Status: COMPLETED | OUTPATIENT
Start: 2024-09-18 | End: 2024-09-18

## 2024-09-18 RX ORDER — MIDAZOLAM HYDROCHLORIDE 2 MG/2ML
2 INJECTION, SOLUTION INTRAMUSCULAR; INTRAVENOUS ONCE
Status: COMPLETED | OUTPATIENT
Start: 2024-09-18 | End: 2024-09-18

## 2024-09-18 RX ORDER — OXYCODONE HYDROCHLORIDE 5 MG/1
5 TABLET ORAL
Status: DISCONTINUED | OUTPATIENT
Start: 2024-09-18 | End: 2024-09-18 | Stop reason: HOSPADM

## 2024-09-18 RX ORDER — MAGNESIUM HYDROXIDE 1200 MG/15ML
LIQUID ORAL AS NEEDED
Status: DISCONTINUED | OUTPATIENT
Start: 2024-09-18 | End: 2024-09-18 | Stop reason: HOSPADM

## 2024-09-18 RX ORDER — PROPOFOL 10 MG/ML
INJECTION, EMULSION INTRAVENOUS AS NEEDED
Status: DISCONTINUED | OUTPATIENT
Start: 2024-09-18 | End: 2024-09-18 | Stop reason: SURG

## 2024-09-18 RX ORDER — ONDANSETRON 2 MG/ML
4 INJECTION INTRAMUSCULAR; INTRAVENOUS ONCE AS NEEDED
Status: DISCONTINUED | OUTPATIENT
Start: 2024-09-18 | End: 2024-09-18 | Stop reason: HOSPADM

## 2024-09-18 RX ORDER — ONDANSETRON 2 MG/ML
INJECTION INTRAMUSCULAR; INTRAVENOUS AS NEEDED
Status: DISCONTINUED | OUTPATIENT
Start: 2024-09-18 | End: 2024-09-18 | Stop reason: SURG

## 2024-09-18 RX ORDER — SODIUM CHLORIDE 0.9 % (FLUSH) 0.9 %
10 SYRINGE (ML) INJECTION AS NEEDED
Status: DISCONTINUED | OUTPATIENT
Start: 2024-09-18 | End: 2024-09-18 | Stop reason: HOSPADM

## 2024-09-18 RX ORDER — DEXAMETHASONE SODIUM PHOSPHATE 4 MG/ML
INJECTION, SOLUTION INTRA-ARTICULAR; INTRALESIONAL; INTRAMUSCULAR; INTRAVENOUS; SOFT TISSUE AS NEEDED
Status: DISCONTINUED | OUTPATIENT
Start: 2024-09-18 | End: 2024-09-18 | Stop reason: SURG

## 2024-09-18 RX ORDER — ACETAMINOPHEN 325 MG/1
650 TABLET ORAL ONCE
Status: DISCONTINUED | OUTPATIENT
Start: 2024-09-18 | End: 2024-09-18 | Stop reason: HOSPADM

## 2024-09-18 RX ORDER — FENTANYL CITRATE 50 UG/ML
INJECTION, SOLUTION INTRAMUSCULAR; INTRAVENOUS AS NEEDED
Status: DISCONTINUED | OUTPATIENT
Start: 2024-09-18 | End: 2024-09-18 | Stop reason: SURG

## 2024-09-18 RX ORDER — HYDROCODONE BITARTRATE AND ACETAMINOPHEN 5; 325 MG/1; MG/1
1 TABLET ORAL EVERY 6 HOURS PRN
Qty: 12 TABLET | Refills: 0 | Status: SHIPPED | OUTPATIENT
Start: 2024-09-18

## 2024-09-18 RX ORDER — PROMETHAZINE HYDROCHLORIDE 25 MG/1
25 SUPPOSITORY RECTAL ONCE AS NEEDED
Status: DISCONTINUED | OUTPATIENT
Start: 2024-09-18 | End: 2024-09-18 | Stop reason: HOSPADM

## 2024-09-18 RX ORDER — LIDOCAINE HYDROCHLORIDE 20 MG/ML
INJECTION, SOLUTION EPIDURAL; INFILTRATION; INTRACAUDAL; PERINEURAL AS NEEDED
Status: DISCONTINUED | OUTPATIENT
Start: 2024-09-18 | End: 2024-09-18 | Stop reason: SURG

## 2024-09-18 RX ORDER — SODIUM CHLORIDE 9 MG/ML
100 INJECTION, SOLUTION INTRAVENOUS CONTINUOUS
Status: DISCONTINUED | OUTPATIENT
Start: 2024-09-18 | End: 2024-09-18 | Stop reason: HOSPADM

## 2024-09-18 RX ORDER — PROMETHAZINE HYDROCHLORIDE 12.5 MG/1
12.5 TABLET ORAL ONCE AS NEEDED
Status: DISCONTINUED | OUTPATIENT
Start: 2024-09-18 | End: 2024-09-18 | Stop reason: HOSPADM

## 2024-09-18 RX ADMIN — DEXAMETHASONE SODIUM PHOSPHATE 4 MG: 4 INJECTION, SOLUTION INTRAMUSCULAR; INTRAVENOUS at 10:57

## 2024-09-18 RX ADMIN — PROPOFOL 150 MG: 10 INJECTION, EMULSION INTRAVENOUS at 10:51

## 2024-09-18 RX ADMIN — FENTANYL CITRATE 50 MCG: 50 INJECTION, SOLUTION INTRAMUSCULAR; INTRAVENOUS at 11:13

## 2024-09-18 RX ADMIN — MIDAZOLAM HYDROCHLORIDE 2 MG: 1 INJECTION, SOLUTION INTRAMUSCULAR; INTRAVENOUS at 10:31

## 2024-09-18 RX ADMIN — LIDOCAINE HYDROCHLORIDE 100 MG: 20 INJECTION, SOLUTION EPIDURAL; INFILTRATION; INTRACAUDAL; PERINEURAL at 10:51

## 2024-09-18 RX ADMIN — ROCURONIUM BROMIDE 50 MG: 10 INJECTION, SOLUTION INTRAVENOUS at 10:51

## 2024-09-18 RX ADMIN — ROCURONIUM BROMIDE 200 MG: 10 INJECTION, SOLUTION INTRAVENOUS at 11:17

## 2024-09-18 RX ADMIN — LEVOFLOXACIN 500 MG: 5 INJECTION, SOLUTION INTRAVENOUS at 10:45

## 2024-09-18 RX ADMIN — SODIUM CHLORIDE, POTASSIUM CHLORIDE, SODIUM LACTATE AND CALCIUM CHLORIDE 9 ML/HR: 600; 310; 30; 20 INJECTION, SOLUTION INTRAVENOUS at 09:52

## 2024-09-18 RX ADMIN — ONDANSETRON 4 MG: 2 INJECTION INTRAMUSCULAR; INTRAVENOUS at 10:57

## 2024-09-18 RX ADMIN — FENTANYL CITRATE 50 MCG: 50 INJECTION, SOLUTION INTRAMUSCULAR; INTRAVENOUS at 10:51

## 2024-09-18 RX ADMIN — ESMOLOL HYDROCHLORIDE 20 MG: 100 INJECTION, SOLUTION INTRAVENOUS at 11:06

## 2024-09-18 RX ADMIN — ACETAMINOPHEN 1000 MG: 500 TABLET ORAL at 09:52

## 2024-09-27 ENCOUNTER — TELEPHONE (OUTPATIENT)
Dept: UROLOGY | Facility: CLINIC | Age: 44
End: 2024-09-27
Payer: MEDICARE

## 2024-09-27 DIAGNOSIS — N31.9 NEUROGENIC BLADDER: Primary | ICD-10-CM

## 2024-09-27 RX ORDER — CEPHALEXIN 500 MG/1
500 CAPSULE ORAL 2 TIMES DAILY
Qty: 10 CAPSULE | Refills: 0 | Status: SHIPPED | OUTPATIENT
Start: 2024-09-27 | End: 2024-10-02

## 2024-09-30 ENCOUNTER — TELEPHONE (OUTPATIENT)
Dept: FAMILY MEDICINE CLINIC | Age: 44
End: 2024-09-30
Payer: MEDICARE

## 2024-10-02 DIAGNOSIS — E55.9 VITAMIN D DEFICIENCY: ICD-10-CM

## 2024-10-03 DIAGNOSIS — E78.2 MIXED HYPERLIPIDEMIA: ICD-10-CM

## 2024-10-03 RX ORDER — PRAVASTATIN SODIUM 20 MG
20 TABLET ORAL NIGHTLY
Qty: 30 TABLET | Refills: 1 | Status: SHIPPED | OUTPATIENT
Start: 2024-10-03

## 2024-10-07 ENCOUNTER — LAB (OUTPATIENT)
Dept: LAB | Facility: HOSPITAL | Age: 44
End: 2024-10-07
Payer: MEDICARE

## 2024-10-07 DIAGNOSIS — E55.9 VITAMIN D DEFICIENCY: ICD-10-CM

## 2024-10-07 DIAGNOSIS — E78.2 MIXED HYPERLIPIDEMIA: ICD-10-CM

## 2024-10-07 LAB
25(OH)D3 SERPL-MCNC: 50.9 NG/ML (ref 30–100)
ALBUMIN SERPL-MCNC: 4.2 G/DL (ref 3.5–5.2)
ALBUMIN/GLOB SERPL: 1.6 G/DL
ALP SERPL-CCNC: 85 U/L (ref 39–117)
ALT SERPL W P-5'-P-CCNC: 23 U/L (ref 1–41)
ANION GAP SERPL CALCULATED.3IONS-SCNC: 10 MMOL/L (ref 5–15)
AST SERPL-CCNC: 20 U/L (ref 1–40)
BILIRUB SERPL-MCNC: 0.3 MG/DL (ref 0–1.2)
BUN SERPL-MCNC: 11 MG/DL (ref 6–20)
BUN/CREAT SERPL: 13.4 (ref 7–25)
CALCIUM SPEC-SCNC: 10 MG/DL (ref 8.6–10.5)
CHLORIDE SERPL-SCNC: 103 MMOL/L (ref 98–107)
CHOLEST SERPL-MCNC: 179 MG/DL (ref 0–200)
CO2 SERPL-SCNC: 25 MMOL/L (ref 22–29)
CREAT SERPL-MCNC: 0.82 MG/DL (ref 0.76–1.27)
DEPRECATED RDW RBC AUTO: 42.8 FL (ref 37–54)
EGFRCR SERPLBLD CKD-EPI 2021: 111.1 ML/MIN/1.73
ERYTHROCYTE [DISTWIDTH] IN BLOOD BY AUTOMATED COUNT: 13.7 % (ref 12.3–15.4)
GLOBULIN UR ELPH-MCNC: 2.7 GM/DL
GLUCOSE SERPL-MCNC: 90 MG/DL (ref 65–99)
HCT VFR BLD AUTO: 44.6 % (ref 37.5–51)
HDLC SERPL-MCNC: 47 MG/DL (ref 40–60)
HGB BLD-MCNC: 14.8 G/DL (ref 13–17.7)
LDLC SERPL CALC-MCNC: 120 MG/DL (ref 0–100)
LDLC/HDLC SERPL: 2.54 {RATIO}
MCH RBC QN AUTO: 28.4 PG (ref 26.6–33)
MCHC RBC AUTO-ENTMCNC: 33.2 G/DL (ref 31.5–35.7)
MCV RBC AUTO: 85.6 FL (ref 79–97)
PLATELET # BLD AUTO: 213 10*3/MM3 (ref 140–450)
PMV BLD AUTO: 10.6 FL (ref 6–12)
POTASSIUM SERPL-SCNC: 4 MMOL/L (ref 3.5–5.2)
PROT SERPL-MCNC: 6.9 G/DL (ref 6–8.5)
RBC # BLD AUTO: 5.21 10*6/MM3 (ref 4.14–5.8)
SODIUM SERPL-SCNC: 138 MMOL/L (ref 136–145)
TRIGL SERPL-MCNC: 62 MG/DL (ref 0–150)
VLDLC SERPL-MCNC: 12 MG/DL (ref 5–40)
WBC NRBC COR # BLD AUTO: 4.67 10*3/MM3 (ref 3.4–10.8)

## 2024-10-07 PROCEDURE — 80061 LIPID PANEL: CPT

## 2024-10-07 PROCEDURE — 80053 COMPREHEN METABOLIC PANEL: CPT

## 2024-10-07 PROCEDURE — 36415 COLL VENOUS BLD VENIPUNCTURE: CPT

## 2024-10-07 PROCEDURE — 85027 COMPLETE CBC AUTOMATED: CPT

## 2024-10-07 PROCEDURE — 82306 VITAMIN D 25 HYDROXY: CPT

## 2024-10-08 RX ORDER — CHOLECALCIFEROL (VITAMIN D3) 25 MCG
2000 TABLET ORAL DAILY
Qty: 60 TABLET | Refills: 1 | Status: SHIPPED | OUTPATIENT
Start: 2024-10-08

## 2024-10-14 ENCOUNTER — OFFICE VISIT (OUTPATIENT)
Dept: SLEEP MEDICINE | Facility: HOSPITAL | Age: 44
End: 2024-10-14
Payer: MEDICARE

## 2024-10-14 VITALS
HEART RATE: 108 BPM | WEIGHT: 181.1 LBS | DIASTOLIC BLOOD PRESSURE: 96 MMHG | HEIGHT: 68 IN | SYSTOLIC BLOOD PRESSURE: 121 MMHG | OXYGEN SATURATION: 96 % | BODY MASS INDEX: 27.45 KG/M2

## 2024-10-14 DIAGNOSIS — G47.39 COMPLEX SLEEP APNEA SYNDROME: Primary | ICD-10-CM

## 2024-10-14 DIAGNOSIS — G40.909 NONINTRACTABLE EPILEPSY WITHOUT STATUS EPILEPTICUS, UNSPECIFIED EPILEPSY TYPE: ICD-10-CM

## 2024-10-14 PROCEDURE — 1159F MED LIST DOCD IN RCRD: CPT | Performed by: INTERNAL MEDICINE

## 2024-10-14 PROCEDURE — 1160F RVW MEDS BY RX/DR IN RCRD: CPT | Performed by: INTERNAL MEDICINE

## 2024-10-14 PROCEDURE — G0463 HOSPITAL OUTPT CLINIC VISIT: HCPCS

## 2024-10-14 PROCEDURE — 99213 OFFICE O/P EST LOW 20 MIN: CPT | Performed by: INTERNAL MEDICINE

## 2024-10-14 NOTE — TELEPHONE ENCOUNTER
MOM STATES SHE IS UNABLE TO GET PATIENT OUT OF BED.  CXL TODAY.   
Detail Level: None
Urine Pregnancy Test Result: negative
Performed By: IDA Washington

## 2024-10-18 NOTE — PROGRESS NOTES
Chief Complaint: Urologic complaint    Subjective         History of Present Illness  Kang Grey is a 44 y.o. male     BPH  Neurogenic bladder  History of hypospadias repair        9/18/2024 SPT      No fevers   some pain      PVR    3/22   > 999      No cardiopulmonary history.  Patient does not smoke. ASA 81      unable to do CIC.        Previous        7/18/2024 cystoscopy cath placement over a wire - OJanetOdilon -nursing could not get catheter placed      Could not do CIC at home, was not having left of the catheter in    history of hypospadias.  Status post repair.     No previous abdominal surgery.      7/22 cystoscopy-2 cm prostate.  Large bladder no pathology.  No trabeculation     6/9/2022 UDS-bladder capacity 2300, no sensation, large bladder capacity.  No involuntary contractions.  No incontinence.  No detrusor contraction.  Could not void without abdominal straining.  PVR 1049      Some pain with cathing, this is why he does not always do it.    Not always cathing consistently.  Mom states cathing a time or 2 daily    Cannot void on his own at times. No straining per pt.    Getting a lot out when he caths.    4/24 0.7,     4/24 NP-cathing but not every day, hard to get catheter and sometimes.  Pain with cathing    On nitrofurantoin 50 mg nightly    Patient  just finished round of IV antibiotics.    8/23 Enterococcus  6/23 Citrobacter  4/23 coag negative staph  9/22 coag negative staph      Patient has been treated for 2 UTIs in the last several months    No gross hematuria, voiding diary today shows they are getting out around 200-400 a.m. cath - 500-12 100 on the evening cath.      1/22 0.9, GFR 93    1/22 MRI abdomen with and without - stable 28 cm cyst in the pancreatic head.  Unchanged for 2 years.  Stable Marked distention of the urinary bladder, stable simple cyst left kidney 6 cm      3/20 CT abdomen with and without - 2.3 cm cyst head of pancreas.  Distention of the urinary bladder.   Similar to previous in 3/20.     No history of kidney  stone.    No urologic family history                Past Surgical History:   Procedure Laterality Date    CYST REMOVAL Right 11/11/2022    Procedure: Excision of subcutaneous mass from right forearm;  Surgeon: Oni Greenberg MD;  [place] [facility] [place] [facility] [place]  Service: General;  Laterality: Right;    HAMSTRING REPAIR Bilateral     age 10    INGUINAL HERNIA REPAIR Right 11/08/2023    Procedure: INGUINAL HERNIA REPAIR LAPAROSCOPIC WITH DAVINCI ROBOT;  Surgeon: Mayur Quintana MD;  [place] [facility] [place] [facility] [place]  Service: Robotics - DaVinci;  Laterality: Right;    OTHER SURGICAL HISTORY      hyposadius repair    SUPRAPUBIC TUBE PLACEMENT N/A 9/18/2024    Procedure: Cystoscopy with SUPRAPUBIC CATHETER INSERTION;  Surgeon: Adalid Morris MD;  Location: [facility] MAIN OR;  Service: Urology;  Laterality: N/A;    TOE FUSION Right     great toe at age 21    WISDOM TOOTH EXTRACTION               Vital Signs:   There were no vitals taken for this visit.                   Assessment and Plan    Diagnoses and all orders for this visit:    1. Neurogenic bladder (Primary)          Neurogenic bladder      Catheter changed today under sterile technique Home health change catheter monthly    Discussed with patient and mother today that he needs to make 2.5 to 3 L of urine daily.    We discussed indications to be checked and treated for UTI    We did discuss he will have a lot of trouble with resistance over the coming years and he will need to be treated if symptomatic.  He needs to be very proactive about drinking plenty of fluids    Follow-up with NP in 6 months.

## 2024-10-21 ENCOUNTER — OFFICE VISIT (OUTPATIENT)
Dept: UROLOGY | Facility: CLINIC | Age: 44
End: 2024-10-21
Payer: MEDICARE

## 2024-10-21 VITALS — WEIGHT: 181 LBS | HEIGHT: 67 IN | RESPIRATION RATE: 16 BRPM | BODY MASS INDEX: 28.41 KG/M2

## 2024-10-21 DIAGNOSIS — N31.9 NEUROGENIC BLADDER: Primary | ICD-10-CM

## 2024-10-21 PROCEDURE — 99213 OFFICE O/P EST LOW 20 MIN: CPT | Performed by: UROLOGY

## 2024-10-21 PROCEDURE — 1159F MED LIST DOCD IN RCRD: CPT | Performed by: UROLOGY

## 2024-10-21 PROCEDURE — 1160F RVW MEDS BY RX/DR IN RCRD: CPT | Performed by: UROLOGY

## 2024-10-21 PROCEDURE — 51705 CHANGE OF BLADDER TUBE: CPT | Performed by: UROLOGY

## 2024-10-22 ENCOUNTER — TELEPHONE (OUTPATIENT)
Dept: UROLOGY | Facility: CLINIC | Age: 44
End: 2024-10-22
Payer: MEDICARE

## 2024-10-22 NOTE — TELEPHONE ENCOUNTER
Spoke to armin informed her that 18 Latvian will be okay for the SP tube. She verbalized understanding of information.

## 2024-10-22 NOTE — TELEPHONE ENCOUNTER
CAITLYN WITH [facility] CALLED. THEY GOT ORDERS FOR THIS PATIENT FOR A MONTHLY SP TUBE CHANGE. THE ORDER WAS FOR 18 Maltese, THE MOM SAID THAT HE USUALLY USES 14 Maltese AND THAT 18 FR WILL BE TOO SMALL. PLEASE ADVISE. HER NUMBER IS IN THE ENCOUNTER.

## 2024-10-24 ENCOUNTER — OFFICE VISIT (OUTPATIENT)
Dept: FAMILY MEDICINE CLINIC | Age: 44
End: 2024-10-24
Payer: MEDICARE

## 2024-10-24 ENCOUNTER — HOSPITAL ENCOUNTER (OUTPATIENT)
Dept: GENERAL RADIOLOGY | Facility: HOSPITAL | Age: 44
Discharge: HOME | End: 2024-10-24
Admitting: FAMILY MEDICINE
Payer: MEDICARE

## 2024-10-24 VITALS
BODY MASS INDEX: 29.16 KG/M2 | HEART RATE: 106 BPM | DIASTOLIC BLOOD PRESSURE: 89 MMHG | WEIGHT: 185.8 LBS | SYSTOLIC BLOOD PRESSURE: 129 MMHG | OXYGEN SATURATION: 98 % | HEIGHT: 67 IN

## 2024-10-24 DIAGNOSIS — M85.80 OSTEOPENIA, UNSPECIFIED LOCATION: Primary | ICD-10-CM

## 2024-10-24 DIAGNOSIS — M54.9 MID BACK PAIN ON RIGHT SIDE: ICD-10-CM

## 2024-10-24 DIAGNOSIS — Z23 ENCOUNTER FOR IMMUNIZATION: ICD-10-CM
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
## 2024-10-24 DIAGNOSIS — R39.89 ABNORMAL URINE COLOR: ICD-10-CM

## 2024-10-24 DIAGNOSIS — M54.50 ACUTE BILATERAL LOW BACK PAIN WITHOUT SCIATICA: ICD-10-CM

## 2024-10-24 LAB
BACTERIA UR QL AUTO: ABNORMAL /HPF
BILIRUB UR QL STRIP: NEGATIVE
CLARITY UR: ABNORMAL
COLOR UR: ABNORMAL
GLUCOSE UR STRIP-MCNC: NEGATIVE MG/DL
HGB UR QL STRIP.AUTO: ABNORMAL
HOLD SPECIMEN: NORMAL
HYALINE CASTS UR QL AUTO: ABNORMAL /LPF
KETONES UR QL STRIP: NEGATIVE
LEUKOCYTE ESTERASE UR QL STRIP.AUTO: ABNORMAL
NITRITE UR QL STRIP: NEGATIVE
PH UR STRIP.AUTO: 8 [PH] (ref 5–8)
PROT UR QL STRIP: ABNORMAL
RBC # UR STRIP: ABNORMAL /HPF
REF LAB TEST METHOD: ABNORMAL
SP GR UR STRIP: 1.02 (ref 1–1.03)
SQUAMOUS #/AREA URNS HPF: ABNORMAL /HPF
UROBILINOGEN UR QL STRIP: ABNORMAL
WBC # UR STRIP: ABNORMAL /HPF
WBC CLUMPS # UR AUTO: PRESENT /HPF

## 2024-10-24 PROCEDURE — 72070 X-RAY EXAM THORAC SPINE 2VWS: CPT

## 2024-10-24 PROCEDURE — 87086 URINE CULTURE/COLONY COUNT: CPT | Performed by: FAMILY MEDICINE

## 2024-10-24 PROCEDURE — 87186 SC STD MICRODIL/AGAR DIL: CPT | Performed by: FAMILY MEDICINE

## 2024-10-24 PROCEDURE — 90480 ADMN SARSCOV2 VAC 1/ONLY CMP: CPT | Performed by: FAMILY MEDICINE

## 2024-10-24 PROCEDURE — 90656 IIV3 VACC NO PRSV 0.5 ML IM: CPT | Performed by: FAMILY MEDICINE

## 2024-10-24 PROCEDURE — G0008 ADMIN INFLUENZA VIRUS VAC: HCPCS | Performed by: FAMILY MEDICINE

## 2024-10-24 PROCEDURE — 91320 SARSCV2 VAC 30MCG TRS-SUC IM: CPT | Performed by: FAMILY MEDICINE

## 2024-10-24 PROCEDURE — 71101 X-RAY EXAM UNILAT RIBS/CHEST: CPT

## 2024-10-24 PROCEDURE — 99214 OFFICE O/P EST MOD 30 MIN: CPT | Performed by: FAMILY MEDICINE

## 2024-10-24 PROCEDURE — 81001 URINALYSIS AUTO W/SCOPE: CPT | Performed by: FAMILY MEDICINE

## 2024-10-24 PROCEDURE — 87077 CULTURE AEROBIC IDENTIFY: CPT | Performed by: FAMILY MEDICINE

## 2024-10-24 PROCEDURE — 1126F AMNT PAIN NOTED NONE PRSNT: CPT | Performed by: FAMILY MEDICINE

## 2024-10-24 RX ORDER — METHOCARBAMOL 750 MG/1
750 TABLET, FILM COATED ORAL 3 TIMES DAILY PRN
Qty: 40 TABLET | Refills: 0 | Status: SHIPPED | OUTPATIENT
Start: 2024-10-24

## 2024-10-24 NOTE — PROGRESS NOTES
Kang Grey presents to [facility].    Chief Complaint:  fall yesterday    Subjective     History of Present Illness:  HPI    Kang is not sure what happened but he was [facility] yesterday for chiropracter treatment and he fell, not sure how he fell, but he landed on his back and now he is in moderate pain.  He has osteoporosis.  Pain is worse  with movement, described as aching.    Sees Dr Link for complex sleep apnea, using positive airway pressure therapy with ASV and the symptoms of sleep apnea have improved significantly on the therapy. Normally patient goes to bed at 10 PM  and wakes up at 8 AM .  He is not compliant with his CPAP    Kang presents with a chronic indwelling cathetor and he is tolerating it well.  He can't do self caths very well.  He sees Dr Morris.  He has no pain. He is on low dose macrobid for frequent UTIs     He presents with ongoing depression and grief reaction with the loss of his father.  His mother says he is stable and coping better.  He has therapy and see psychiatry and current treatment plan includes lamictal, invega injections, effexor and buspar as well as folic acid.  He denies SI or HI his mom says he stays in bed a lot.    His constipation is stable with stool softener      He presents with chronic pure hypercholesterolemia, current treatment includes a lipid lowering agent, pravastatin.  Tolerates medication well.  Cholesterol from April shows good control.  Compliance with treatment has been good.  He denies experiencing any hypercholesterolemia related symptoms.  He needs med refills today and is due for labs     He has seizures disorder, stable on current meds, neurologist is Dr Mccoy.  He is on Keppra, he tolerates meds well.     He recently had a urostomy placed.  Urine is cloudy today so I will check a urinalysis                      Result Review   The following data was reviewed by Hien Parham MD on  "10/24/2024.  Lab Results   Component Value Date    WBC 4.67 10/07/2024    HGB 14.8 10/07/2024    HCT 44.6 10/07/2024    MCV 85.6 10/07/2024     10/07/2024     Lab Results   Component Value Date    GLUCOSE 90 10/07/2024    BUN 11 10/07/2024    CREATININE 0.82 10/07/2024     10/07/2024    K 4.0 10/07/2024     10/07/2024    CALCIUM 10.0 10/07/2024    PROTEINTOT 6.9 10/07/2024    ALBUMIN 4.2 10/07/2024    ALT 23 10/07/2024    AST 20 10/07/2024    ALKPHOS 85 10/07/2024    BILITOT 0.3 10/07/2024    GLOB 2.7 10/07/2024    AGRATIO 1.6 10/07/2024    BCR 13.4 10/07/2024    ANIONGAP 10.0 10/07/2024    EGFR 111.1 10/07/2024     Lab Results   Component Value Date    CHOL 179 10/07/2024    CHLPL 174 05/06/2021    TRIG 62 10/07/2024    HDL 47 10/07/2024     (H) 10/07/2024     Lab Results   Component Value Date    TSH 1.730 11/16/2023     Lab Results   Component Value Date    HGBA1C 5.40 03/18/2024     No results found for: \"PSA\"      Lab Results   Component Value Date    YGTV57UO 50.9 10/07/2024               Assessment and Plan:   Diagnoses and all orders for this visit:    1. Osteopenia, unspecified location (Primary)  -     DEXA Bone Density Axial; Future    2. Acute bilateral low back pain without sciatica  Comments:  Rest, alternate ice and heat, F/U if no improvement for possible MRI  Orders:  -     methocarbamol (ROBAXIN) 750 MG tablet; Take 1 tablet by mouth 3 (Three) Times a Day As Needed for Muscle Spasms (back pain).  Dispense: 40 tablet; Refill: 0    3. Abnormal urine color  -     Urinalysis With Culture If Indicated -; Future  -     Urinalysis With Culture If Indicated - Urine, Clean Catch    4. Fall, initial encounter    5. Mid back pain on right side  -     XR Ribs Right With PA Chest; Future  -     XR Spine Thoracic 2 View; Future    6. Encounter for immunization  -     Fluzone >6mos  -     COVID-19 (Pfizer) 12yrs+ (COMIRNATY)      Will check a urine to rule out UTI given the abnormal " "color of the urine in his catheter.  Will also send him for x-rays of his mid back and ribs due to recent fall with trauma.        Objective     Medications:  Current Outpatient Medications   Medication Instructions    alclometasone (ACLOVATE) 0.05 % cream No dose, route, or frequency recorded.    ascorbic acid (VITAMIN C) 1000 MG tablet 1 tablet, Daily    aspirin 81 mg, Daily    busPIRone (BUSPAR) 10 MG tablet 1 tablet, 3 Times Daily PRN    Calcium Carbonate 1500 (600 Ca) MG tablet 1 tablet, 2 times daily    cholecalciferol (VITAMIN D3) 2,000 Units, Oral, Daily    Diclofenac Sodium (VOLTAREN) 1 g, Topical, As Needed    docusate sodium (COLACE) 100 mg, Oral, Daily    folic acid (FOLVITE) 800 MCG tablet 1 tablet, Daily    HM Melatonin 10 mg, Oral, Every Night at Bedtime    HM TRUEplus Fiber 2 g chewable tablet 1 tablet, Oral, Daily    HYDROcodone-acetaminophen (NORCO) 5-325 MG per tablet 1 tablet, Oral, Every 6 Hours PRN    Invega Sustenna 234 MG/1.5ML suspension prefilled syringe IM injection 1.5 mL, Every 30 Days    Keppra 500 MG tablet 3 tablets, Every 12 Hours    ketoconazole (NIZORAL) 2 % shampoo No dose, route, or frequency recorded.    lamoTRIgine (LaMICtal) 200 MG tablet 1 tablet, Daily    methocarbamol (ROBAXIN) 750 mg, Oral, 3 Times Daily PRN    pravastatin (PRAVACHOL) 20 mg, Oral, Nightly    triamcinolone (KENALOG) 0.025 % cream     venlafaxine XR (EFFEXOR-XR) 150 mg, Daily    venlafaxine XR (EFFEXOR-XR) 37.5 mg, Daily        Vital Signs:   /89 (BP Location: Left arm, Patient Position: Sitting, Cuff Size: Adult)   Pulse 106   Ht 170.2 cm (67.01\")   Wt 84.3 kg (185 lb 12.8 oz)   SpO2 98%   BMI 29.09 kg/m²             Physical Exam:  Physical Exam  Vitals and nursing note reviewed.   Constitutional:       General: He is not in acute distress.     Appearance: Normal appearance. He is not ill-appearing, toxic-appearing or diaphoretic.      Comments: He is unkempt today   HENT:      Head: " Normocephalic and atraumatic.      Right Ear: Tympanic membrane, ear canal and external ear normal.      Left Ear: Tympanic membrane, ear canal and external ear normal.      Nose: No congestion or rhinorrhea.      Mouth/Throat:      Mouth: Mucous membranes are moist.      Pharynx: Oropharynx is clear. No oropharyngeal exudate or posterior oropharyngeal erythema.   Eyes:      Extraocular Movements: Extraocular movements intact.      Conjunctiva/sclera: Conjunctivae normal.      Pupils: Pupils are equal, round, and reactive to light.   Cardiovascular:      Rate and Rhythm: Normal rate and regular rhythm.      Heart sounds: Normal heart sounds.   Pulmonary:      Effort: Pulmonary effort is normal.      Breath sounds: Normal breath sounds. No wheezing, rhonchi or rales.   Abdominal:      General: Abdomen is flat.      Palpations: Abdomen is soft. There is no mass.      Tenderness: There is no abdominal tenderness.      Hernia: No hernia is present.       Musculoskeletal:      Cervical back: Neck supple. No rigidity.      Right lower leg: No edema.      Left lower leg: No edema.   Lymphadenopathy:      Cervical: No cervical adenopathy.   Skin:     General: Skin is warm and dry.   Neurological:      General: No focal deficit present.      Mental Status: He is alert and oriented to person, place, and time. Mental status is at baseline.   Psychiatric:         Mood and Affect: Mood normal.         Behavior: Behavior normal. Behavior is cooperative.         Thought Content: Thought content normal.         Judgment: Judgment normal.           Review of Systems:  Review of Systems   Constitutional:  Negative for chills and fever.   HENT:  Negative for congestion, ear discharge and sore throat.    Respiratory:  Negative for shortness of breath.    Cardiovascular:  Negative for chest pain.   Gastrointestinal:  Positive for constipation. Negative for abdominal pain, diarrhea, nausea, vomiting and GERD.   Genitourinary:  Negative  for flank pain.   Musculoskeletal:  Positive for back pain.   Skin:  Negative for rash.   Neurological:  Negative for dizziness and headache.              Follow Up   Return if symptoms worsen or fail to improve.    Part of this note may be an electronic transcription/translation of spoken language to printed   text using the Dragon Dictation System.            Medical History:  Medications Discontinued During This Encounter   Medication Reason    guaiFENesin (Mucinex) 600 MG 12 hr tablet *Therapy completed    methocarbamol (ROBAXIN) 750 MG tablet Reorder    nitrofurantoin (MACRODANTIN) 50 MG capsule *Therapy completed      Past Medical History:    Acute upper respiratory infection    Age-related osteoporosis without current pathological fracture    Broken toe    left great toe    Cerebral palsy    diagnosed as infant    Compression fracture of first lumbar vertebra    Constipation    Disease of pancreas    GERD (gastroesophageal reflux disease)    History of urinary self-catheterization    pt or pt's mother catheterizes 4-6  times a day    Hydrocephalus in diseases classified elsewhere    NO  SHUNT    Inguinal hernia    Injury of back    Low back pain    Major depressive disorder    single episode, moderate    Other cerebral palsy    Other fatigue    Other generalized epilepsy and epileptic syndromes, not intractable, without status epilepticus    Other hypertrophic disorders of the skin    Pain in left ankle and joints of left foot    Pain in right toe(s)    Pain in thoracic spine    Pancreatic cyst    Paranoid schizophrenia    Pelvic and perineal pain    Primary insomnia    Pure hypercholesterolemia    Repeated falls    last fall 11/2022    Schizoaffective disorder, unspecified    Seizures    LAST SZ OVER 1 YEAR    Sleep apnea    uses cpap machine    Somnolence    TIA (transient ischemic attack)    UNSURE OF  DATE    Vitamin D deficiency    Wedge compression fracture of fourth thoracic vertebra, subsequent  encounter for fracture with routine healing     Past Surgical History:    CATHETERIZATION URETHRAL    CYST REMOVAL    Procedure: Excision of subcutaneous mass from right forearm;  Surgeon: Oni Greenberg MD;  [place] [facility] [place] [facility] [place]  Service: General;  Laterality: Right;    HAMSTRING REPAIR    age 10    INGUINAL HERNIA REPAIR    Procedure: INGUINAL HERNIA REPAIR LAPAROSCOPIC WITH DAVINCI ROBOT;  Surgeon: Mayur Quintana MD;  [place] [facility] [place] [facility] [place]  Service: Robotics - DaVinci;  Laterality: Right;    OTHER SURGICAL HISTORY    hyposadius repair    SUPRAPUBIC TUBE PLACEMENT    Procedure: Cystoscopy with SUPRAPUBIC CATHETER INSERTION;  Surgeon: Adalid Morris MD;  Location: [facility] MAIN OR;  Service: Urology;  Laterality: N/A;    TOE FUSION    great toe at age 21    WISDOM TOOTH EXTRACTION      Family History   Problem Relation Age of Onset    Hypothyroidism Mother     No Known Problems Father     Ulcers Brother     Malig Hyperthermia Neg Hx      Social History     Tobacco Use    Smoking status: Never     Passive exposure: Never    Smokeless tobacco: Never   Substance Use Topics    Alcohol use: Never       Health Maintenance Due   Topic Date Due    DXA SCAN  06/20/2024        Immunization History   Administered Date(s) Administered    31-influenza Vac Quardvalent Preservativ 10/14/2014, 01/20/2016, 10/07/2016, 12/07/2021, 10/19/2022    COVID-19 (MODERNA) 1st,2nd,3rd Dose Monovalent 03/25/2021, 04/22/2021, 11/19/2021    COVID-19 (MODERNA) 6-11 YRS Monovalent 03/25/2021, 04/22/2021    COVID-19 (PFIZER) 12YRS+ (COMIRNATY) 11/16/2023, 10/24/2024    COVID-19 (PFIZER) BIVALENT 12+YRS 10/19/2022    Covid-19 (Pfizer) Gray Cap Monovalent 06/20/2022    Fluzone  >6mos 10/01/2013, 10/24/2024    Fluzone (or Fluarix & Flulaval for VFC) >6mos 10/07/2016, 12/07/2021, 10/19/2022, 11/16/2023    Influenza Seasonal Injectable 10/05/2011    Influenza, Unspecified 10/01/2013, 10/07/2016, 12/07/2021, 10/19/2022    Tdap 05/05/2016        Allergies   Allergen Reactions    Valproic Acid Irritability     DEPAKOTE CAUSES DIZZINESS AND IRRITABILITY

## 2024-10-28 LAB — BACTERIA SPEC AEROBE CULT: ABNORMAL

## 2024-10-29 ENCOUNTER — TELEPHONE (OUTPATIENT)
Dept: UROLOGY | Age: 44
End: 2024-10-29
Payer: MEDICARE

## 2024-10-29 DIAGNOSIS — F51.01 PRIMARY INSOMNIA: ICD-10-CM

## 2024-10-29 NOTE — TELEPHONE ENCOUNTER
----- Message from Adalid Morris sent at 10/29/2024  6:31 AM EDT -----  Regarding: ?  Do not treat unless having severe pain or fevers.  Has indwelling catheter  ----- Message -----  From: Lori Osuna  Sent: 10/28/2024   4:46 PM EDT  To: Adalid Morris MD; ÁNGELA Quintana      ----- Message -----  From: Hien Parham MD  Sent: 10/28/2024   4:32 PM EDT  To: [facility]    Please contact his urologist about this finding and see if they think we should treat.  I personally do not think we should treat.  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  Dr. Parham

## 2024-10-29 NOTE — TELEPHONE ENCOUNTER
Spoke to patients mother and advised her of Dr Morris and Dr Edmond suggestion of not treating.  She verbalized understanding.

## 2024-11-06 ENCOUNTER — TELEPHONE (OUTPATIENT)
Dept: FAMILY MEDICINE CLINIC | Age: 44
End: 2024-11-06
Payer: MEDICARE

## 2024-11-06 NOTE — TELEPHONE ENCOUNTER
Caller: LUZ MARINA URENA WITH [facility] call back number:     [phone]       What was the call regarding: [facility] STATES THAT THEY HAVE COMPLETED THE PATIENT'S HEALTH RISK ASSESSMENT AND THAT IT IS AVAILABLE IN THE PROVIDER PORTAL.

## 2024-11-07 DIAGNOSIS — N39.0 RECURRENT URINARY TRACT INFECTION: ICD-10-CM

## 2024-11-07 RX ORDER — HYDROCODONE BITARTRATE AND ACETAMINOPHEN 5; 325 MG/1; MG/1
1 TABLET ORAL EVERY 6 HOURS PRN
Qty: 12 TABLET | Refills: 0 | Status: SHIPPED | OUTPATIENT
Start: 2024-11-07

## 2024-11-15 ENCOUNTER — HOSPITAL ENCOUNTER (OUTPATIENT)
Dept: BONE DENSITY | Facility: HOSPITAL | Age: 44
Discharge: HOME | End: 2024-11-15
Payer: MEDICARE

## 2024-11-15 DIAGNOSIS — M85.80 OSTEOPENIA, UNSPECIFIED LOCATION: ICD-10-CM

## 2024-11-15 PROCEDURE — 77080 DXA BONE DENSITY AXIAL: CPT

## 2024-11-27 DIAGNOSIS — E78.2 MIXED HYPERLIPIDEMIA: ICD-10-CM

## 2024-11-27 DIAGNOSIS — F51.01 PRIMARY INSOMNIA: ICD-10-CM

## 2024-11-27 RX ORDER — PRAVASTATIN SODIUM 20 MG
20 TABLET ORAL NIGHTLY
Qty: 30 TABLET | Refills: 1 | Status: SHIPPED | OUTPATIENT
Start: 2024-11-27

## 2024-12-16 ENCOUNTER — OFFICE VISIT (OUTPATIENT)
Dept: PODIATRY | Facility: CLINIC | Age: 44
End: 2024-12-16
Payer: MEDICARE

## 2024-12-16 VITALS
BODY MASS INDEX: 29.03 KG/M2 | HEART RATE: 101 BPM | TEMPERATURE: 97.5 F | SYSTOLIC BLOOD PRESSURE: 116 MMHG | WEIGHT: 185 LBS | HEIGHT: 67 IN | OXYGEN SATURATION: 94 % | DIASTOLIC BLOOD PRESSURE: 77 MMHG

## 2024-12-16 DIAGNOSIS — M21.371 FOOT DROP, BILATERAL: ICD-10-CM

## 2024-12-16 DIAGNOSIS — M21.372 FOOT DROP, BILATERAL: ICD-10-CM

## 2024-12-16 DIAGNOSIS — R26.2 DIFFICULTY WALKING: ICD-10-CM

## 2024-12-16 DIAGNOSIS — B35.1 ONYCHOMYCOSIS: Primary | ICD-10-CM

## 2024-12-16 DIAGNOSIS — G62.9 NEUROPATHY: ICD-10-CM

## 2024-12-16 DIAGNOSIS — M79.672 FOOT PAIN, BILATERAL: ICD-10-CM

## 2024-12-16 DIAGNOSIS — M79.671 FOOT PAIN, BILATERAL: ICD-10-CM

## 2024-12-16 DIAGNOSIS — L60.0 ONYCHOCRYPTOSIS: ICD-10-CM

## 2024-12-16 PROCEDURE — 1160F RVW MEDS BY RX/DR IN RCRD: CPT | Performed by: PODIATRIST

## 2024-12-16 PROCEDURE — 1159F MED LIST DOCD IN RCRD: CPT | Performed by: PODIATRIST

## 2024-12-16 PROCEDURE — 11721 DEBRIDE NAIL 6 OR MORE: CPT | Performed by: PODIATRIST

## 2024-12-16 NOTE — PROGRESS NOTES
[facility] - PODIATRY    Today's Date: 12/16/24    Patient Name: Kang Grey  MRN: [id number]  CSN: [id number]  PCP: Hien Parham MD, Last PCP Visit: 10/24/2024  Referring Provider: No ref. provider found    SUBJECTIVE     Chief Complaint   Patient presents with    Left Foot - Follow-up, Nail Problem    Right Foot - Follow-up, Nail Problem       HPI: Kang Grey, a 44 y.o.male, comes to clinic.    New, Established, New Problem:  established   Location:  Toenails  Duration:   Greater than five years  Onset:  Gradual  Nature:  sore with palpation.  Stable, worsening, improving:   Stable  Aggravating factors:  Pain with shoe gear and ambulation.  Previous Treatment:  debridement    Medical changes: none    Patient denies any fevers, chills, nausea, vomiting, shortness of breathe, nor any other constitutional signs nor symptoms.       I have reviewed/confirmed previously documented HPI with no changes.       Past Medical History:   Diagnosis Date    Acute upper respiratory infection 02/2022    Age-related osteoporosis without current pathological fracture     Broken toe 11/2022    left great toe    Cerebral palsy     diagnosed as infant    Compression fracture of first lumbar vertebra     Constipation     Disease of pancreas     GERD (gastroesophageal reflux disease)     History of urinary self-catheterization     pt or pt's mother catheterizes 4-6  times a day    Hydrocephalus in diseases classified elsewhere     NO  SHUNT    Inguinal hernia     Injury of back 02/2020    Low back pain     Major depressive disorder     single episode, moderate    Other cerebral palsy     Other fatigue     Other generalized epilepsy and epileptic syndromes, not intractable, without status epilepticus     Other hypertrophic disorders of the skin     Pain in left ankle and joints of left foot     Pain in right toe(s)     Pain in thoracic spine     Pancreatic cyst     Paranoid schizophrenia     Pelvic and  perineal pain     Primary insomnia     Pure hypercholesterolemia     Repeated falls     last fall 11/2022    Schizoaffective disorder, unspecified     Seizures     LAST SZ OVER 1 YEAR    Sleep apnea     uses cpap machine    Somnolence     TIA (transient ischemic attack)     UNSURE OF  DATE    Vitamin D deficiency     Wedge compression fracture of fourth thoracic vertebra, subsequent encounter for fracture with routine healing      Past Surgical History:   Procedure Laterality Date    CATHETERIZATION URETHRAL      CYST REMOVAL Right 11/11/2022    Procedure: Excision of subcutaneous mass from right forearm;  Surgeon: Oni Greenberg MD;  Location: [facility];  Service: General;  Laterality: Right;    HAMSTRING REPAIR Bilateral     age 10    INGUINAL HERNIA REPAIR Right 11/08/2023    Procedure: INGUINAL HERNIA REPAIR LAPAROSCOPIC WITH DAVINCI ROBOT;  Surgeon: Mayur Quintana MD;  Location: [facility];  Service: Robotics - DaVinci;  Laterality: Right;    OTHER SURGICAL HISTORY      hyposadius repair    SUPRAPUBIC TUBE PLACEMENT N/A 09/18/2024    Procedure: Cystoscopy with SUPRAPUBIC CATHETER INSERTION;  Surgeon: Adalid Morris MD;  Location: [facility] MAIN OR;  Service: Urology;  Laterality: N/A;    TOE FUSION Right     great toe at age 21    WISDOM TOOTH EXTRACTION       Family History   Problem Relation Age of Onset    Hypothyroidism Mother     No Known Problems Father     Ulcers Brother     Malig Hyperthermia Neg Hx      Social History     Socioeconomic History    Marital status: Single   Tobacco Use    Smoking status: Never     Passive exposure: Never    Smokeless tobacco: Never   Vaping Use    Vaping status: Never Used   Substance and Sexual Activity    Alcohol use: Never    Drug use: Never    Sexual activity: Defer     Allergies   Allergen Reactions    Valproic Acid Irritability     DEPAKOTE CAUSES DIZZINESS AND IRRITABILITY       Current Outpatient Medications   Medication Sig Dispense Refill     alclometasone (ACLOVATE) 0.05 % cream       ascorbic acid (VITAMIN C) 1000 MG tablet Take 1 tablet by mouth Daily.      aspirin 81 MG EC tablet Take 1 tablet by mouth Daily.      busPIRone (BUSPAR) 10 MG tablet Take 1 tablet by mouth 3 (Three) Times a Day As Needed.      Calcium Carbonate 1500 (600 Ca) MG tablet Take 1 tablet by mouth 2 (two) times a day.      cholecalciferol 25 MCG tablet TAKE TWO TABLETS BY MOUTH ONCE DAILY 60 tablet 1    Diclofenac Sodium (VOLTAREN) 1 % gel gel Apply 1 g topically to the appropriate area as directed As Needed (LBP). 100 g 2    docusate sodium (COLACE) 100 MG capsule TAKE 1 CAPSULE BY MOUTH DAILY. 30 capsule 1    folic acid (FOLVITE) 800 MCG tablet Take 1 tablet by mouth Daily.      HM Melatonin 10 MG sublingual tablet TAKE 1 TABLET BY MOUTH EVERY NIGHT AT BEDTIME. 30 tablet 0    HM TRUEplus Fiber 2 g chewable tablet CHEW 1 TABLET DAILY. 90 tablet 1    HYDROcodone-acetaminophen (NORCO) 5-325 MG per tablet Take 1 tablet by mouth Every 6 (Six) Hours As Needed for Moderate Pain (Pain). 12 tablet 0    Invega Sustenna 234 MG/1.5ML suspension prefilled syringe IM injection Inject 1.5 mL into the appropriate muscle as directed by prescriber Every 30 (Thirty) Days. Waiting on a appointment for his next injection.      Keppra 500 MG tablet Take 3 tablets by mouth Every 12 (Twelve) Hours.      ketoconazole (NIZORAL) 2 % shampoo       lamoTRIgine (LaMICtal) 200 MG tablet Take 1 tablet by mouth Daily.      methocarbamol (ROBAXIN) 750 MG tablet Take 1 tablet by mouth 3 (Three) Times a Day As Needed for Muscle Spasms (back pain). 40 tablet 0    pravastatin (PRAVACHOL) 20 MG tablet TAKE 1 TABLET BY MOUTH EVERY NIGHT. 30 tablet 1    triamcinolone (KENALOG) 0.025 % cream       venlafaxine XR (EFFEXOR-XR) 150 MG 24 hr capsule Take 1 capsule by mouth Daily.      venlafaxine XR (EFFEXOR-XR) 37.5 MG 24 hr capsule Take 1 capsule by mouth Daily.       No current facility-administered medications for  this visit.     Review of Systems   Constitutional: Negative.    Skin:         Painful toenails bilaterally   All other systems reviewed and are negative.      OBJECTIVE     Vitals:    12/16/24 0750   BP: 116/77   Pulse: 101   Temp: 97.5 °F (36.4 °C)   SpO2: 94%         Patient seen in no apparent distress.      PHYSICAL EXAM:     Foot/Ankle Exam    GENERAL  Appearance:  appears stated age and chronically ill  Orientation:  unable to assess  Affect:  inappropriate  Gait:  antalgic  Assistance:  cane use  Right shoe gear: casual shoe ([place] [employer]ace)  Left shoe gear: casual shoe ([employer] Brace)    VASCULAR     Right Foot Vascularity   Dorsalis pedis:  2+  Posterior tibial:  2+  Skin temperature:  warm  Edema grading:  None  CFT:  < 3 seconds  Pedal hair growth:  Present  Varicosities:  mild varicosities     Left Foot Vascularity   Dorsalis pedis:  2+  Posterior tibial:  2+  Skin temperature:  warm  Edema grading:  None  CFT:  < 3 seconds  Pedal hair growth:  Present  Varicosities:  mild varicosities     NEUROLOGIC     Right Foot Neurologic   Light touch sensation: diminished  Vibratory sensation: diminished  Hot/Cold sensation: diminished  Protective Sensation using Framingham-Manpreet Monofilament:   Sites intact: 3  Sites tested: 10     Left Foot Neurologic   Light touch sensation: diminished  Vibratory sensation: diminished  Hot/Cold sensation:  diminished  Protective Sensation using Framingham-Manpreet Monofilament:   Sites intact: 3  Sites tested: 10    MUSCULOSKELETAL     Right Foot Musculoskeletal   [place]:  First toe     Left Foot Musculoskeletal   Hamm[place]oe:  First toe    MUSCLE STRENGTH     Right Foot Muscle Strength   Foot dorsiflexion:  1  Foot plantar flexion:  4-  Foot inversion:  2  Foot eversion:  2     Left Foot Muscle Strength   Foot dorsiflexion:  1  Foot plantar flexion:  4-  Foot inversion:  2  Foot eversion:  2    DERMATOLOGIC      Right Foot Dermatologic   Skin  Right foot skin is intact.    Nails  1.  Positive for elongated, onychomycosis, abnormal thickness, subungual debris and ingrown toenail.  2.  Positive for elongated, onychomycosis, abnormal thickness, subungual debris and ingrown toenail.  3.  Positive for elongated, onychomycosis, abnormal thickness, subungual debris and ingrown toenail.  4.  Positive for elongated, onychomycosis, abnormal thickness, subungual debris and ingrown toenail.  5.  Positive for elongated, onychomycosis, abnormal thickness, subungual debris and ingrown toenail.     Left Foot Dermatologic   Skin  Left foot skin is intact.   Nails  1.  Positive for elongated, onychomycosis, abnormal thickness, subungual debris and ingrown toenail.  2.  Positive for elongated, onychomycosis, abnormal thickness, subungual debris and ingrown toenail.  3.  Positive for elongated, onychomycosis, abnormal thickness, subungual debris and ingrown toenail.  4.  Positive for elongated, onychomycosis, abnormally thick, subungual debris and ingrown toenail.  5.  Positive for elongated, onychomycosis, abnormally thick, subungual debris and ingrown toenail.    I have reexamined the patient the results are consistent with the previously documented exam.    ASSESSMENT/PLAN     Diagnoses and all orders for this visit:    1. Onychomycosis (Primary)    2. Onychocryptosis    3. Foot pain, bilateral    4. Neuropathy    5. Difficulty walking    6. Foot drop, bilateral    Comprehensive lower extremity examination and evaluation was performed.    Discussed findings and treatment plan including risks, benefits, and treatment options with patient in detail. Patient agreed with treatment plan.    Toenails 1 through 5 bilaterally were debrided in thickness and length and then smoothed with a Dremel Tool.  Tolerated the procedure well without complications.    An After Visit Summary was printed and given to the patient at discharge, including (if requested) any available informative/educational handouts  regarding diagnosis, treatment, or medications. All questions were answered to patient/family satisfaction. Should symptoms fail to improve or worsen they agree to call or return to clinic or to go to the Emergency Department. Discussed the importance of following up with any needed screening tests/labs/specialist appointments and any requested follow-up recommended by me today. Importance of maintaining follow-up discussed and patient accepts that missed appointments can delay diagnosis and potentially lead to worsening of conditions.    Return in about 9 weeks (around 2/17/2025) for Toenail Care., or sooner if acute issues arise.    I have reviewed the assessment and plan and verified the accuracy of it. No changes to assessment and plan since the information was documented. Roni Osullivan DPM 12/16/24     I have dictated this note utilizing Dragon Dictation.  Please note that portions of this note were completed with a voice recognition program.  Part of this note may be an electronic transcription/translation of spoken language to printed text using the Dragon Dictation System.      This document has been electronically signed by Roni Osullivan DPM on December 16, 2024 08:01 EST

## 2024-12-20 ENCOUNTER — TELEPHONE (OUTPATIENT)
Dept: UROLOGY | Age: 44
End: 2024-12-20
Payer: MEDICARE

## 2024-12-20 ENCOUNTER — LAB REQUISITION (OUTPATIENT)
Dept: LAB | Facility: HOSPITAL | Age: 44
End: 2024-12-20
Payer: MEDICARE

## 2024-12-20 DIAGNOSIS — R30.0 DYSURIA: ICD-10-CM

## 2024-12-20 PROCEDURE — 87086 URINE CULTURE/COLONY COUNT: CPT | Performed by: UROLOGY

## 2024-12-20 PROCEDURE — 87186 SC STD MICRODIL/AGAR DIL: CPT | Performed by: UROLOGY

## 2024-12-20 PROCEDURE — 87077 CULTURE AEROBIC IDENTIFY: CPT | Performed by: UROLOGY

## 2024-12-20 NOTE — TELEPHONE ENCOUNTER
MATILDE WITH HOME HEALTH CALLED TO SEE ABOUT GETTING A URINE CULTURE. PATIENT HAS AN SP TUBE AND SHE SAID THE URINE IS CLOUDY AND HAS A FOULD ODOR. PER MICHAEL A VERBAL ORDER WAS GIVEN.

## 2024-12-22 LAB — BACTERIA SPEC AEROBE CULT: ABNORMAL

## 2024-12-26 DIAGNOSIS — E55.9 VITAMIN D DEFICIENCY: ICD-10-CM

## 2024-12-26 DIAGNOSIS — F51.01 PRIMARY INSOMNIA: ICD-10-CM

## 2024-12-26 RX ORDER — CHOLECALCIFEROL (VITAMIN D3) 25 MCG
2000 TABLET ORAL DAILY
Qty: 60 TABLET | Refills: 1 | Status: SHIPPED | OUTPATIENT
Start: 2024-12-26

## 2024-12-31 ENCOUNTER — TELEPHONE (OUTPATIENT)
Dept: UROLOGY | Age: 44
End: 2024-12-31
Payer: MEDICARE

## 2024-12-31 NOTE — TELEPHONE ENCOUNTER
----- Message from Adalid Morris sent at 12/29/2024  2:31 PM EST -----  Regarding: urine  Patient with a suprapubic tube, urine is very resistant, only needs to be treated if having severe symptoms otherwise just need to increase his fluid intake.  can let me know we can do IV or IM antibiotics if we need to but do not want to unless we absolutely necessary as he is very young and already very resistant  ----- Message -----  From: Lab, Background User  Sent: 12/21/2024   1:14 PM EST  To: Adalid Morris MD

## 2024-12-31 NOTE — PROGRESS NOTES
"  [facility]  Sleep medicine  [address] [place] [address]  Phone: [phone]  Fax: [phone]      SLEEP CLINIC FOLLOW UP PROGRESS NOTE.    Kang Grey  [id number]   1980  44 y.o.  male      PCP: Hien Parham MD      Date of visit: 10/14/2024    Chief Complaint   Patient presents with    Sleep Apnea       HPI:  This is a 44 y.o. years old patient is here for the management of complex sleep apnea.  Patient is using positive airway pressure therapy with ASV and the symptoms of sleep apnea have improved significantly on the therapy. Normally patient goes to bed at 10 PM  and wakes up at 8 AM .  The patient wakes up 2 time(s) during the night and has no problem going back to sleep.  He is accompanied by his mother.    Medications and allergies are reviewed by me and documented in the encounter.     SOCIAL (habits pertaining to sleep medicine)  History tobacco use:No   History of alcohol use: 0 per week  Caffeine use: 3    REVIEW OF SYSTEMS:   Pertaining positive symptoms are:  [place] Sleepiness Scale :Total score: 2   Nasal congestion      PHYSICAL EXAMINATION:  CONSTITUTIONAL:  Vitals:    10/14/24 0900   BP: 121/96   Pulse: 108   SpO2: 96%   Weight: 82.1 kg (181 lb 1.6 oz)   Height: 172.7 cm (67.99\")    Body mass index is 27.54 kg/m².   NOSE: nasal passages are clear, No deformities noted   RESP SYSTEM: Not in any respiratory distress, no chest deformities noted,   CARDIOVASULAR: No edema noted  NEURO: Oriented x 3, gait normal,  Mood and affect appeared appropriate      Data reviewed:  The Smart card downloaded on 10/14/2024 has been reviewed independently by me for compliance and discussed the data with the patient.   Compliance 70%  Average use is 5 hours and 16 minutes   Rresidual AHI is 4.2/h  Mask type: Fullface mask, he likes to try a mask which goes under the nose  Device: DreamStation ASV  DME: [employer]      ASSESSMENT AND PLAN:  Complex sleep " Order has been printed.  Please fax to the desired location.   apnea ( G 47.33).  The symptoms of sleep apnea have improved with the device and the treatment.  Patient's compliance with the device is excellent for treatment of sleep apnea.  I have independently reviewed the smart card down load and discussed with the patient the download data and encouarged the patient to continue to use the device.The residual AHI is acceptable. The device is benefiting the patient and the device is medically necessary.  Without proper control of sleep apnea and good compliance there is a increased risk for hypertension, diabetes mellitus and nonrestorative sleep with hypersomnia which can increase risk for motor vehicle accidents.  Untreated sleep apnea is also a risk factor for development of atrial fibrillation, pulmonary hypertension and stroke. The patient is also instructed to get the supplies from the DME company and and change them on a regular basis.  A prescription for supplies has been sent to the [employer] company. .    History of hydrocephalus  History of seizures  Return in about 1 year (around 10/14/2025) for with smart card down load. . Patient's questions were answered.    10/14/2024  Jimbo Paz MD  Sleep Medicine.  Medical Director,   [facility] sleep centers.

## 2025-01-24 DIAGNOSIS — E78.2 MIXED HYPERLIPIDEMIA: ICD-10-CM

## 2025-01-24 DIAGNOSIS — F51.01 PRIMARY INSOMNIA: ICD-10-CM

## 2025-01-24 RX ORDER — PRAVASTATIN SODIUM 20 MG
20 TABLET ORAL NIGHTLY
Qty: 30 TABLET | Refills: 1 | Status: SHIPPED | OUTPATIENT
Start: 2025-01-24

## 2025-02-20 DIAGNOSIS — E55.9 VITAMIN D DEFICIENCY: ICD-10-CM

## 2025-02-20 RX ORDER — CHOLECALCIFEROL (VITAMIN D3) 25 MCG
2000 TABLET ORAL DAILY
Qty: 60 TABLET | Refills: 2 | Status: SHIPPED | OUTPATIENT
Start: 2025-02-20

## 2025-02-24 ENCOUNTER — TELEPHONE (OUTPATIENT)
Dept: UROLOGY | Age: 45
End: 2025-02-24
Payer: MEDICARE

## 2025-02-24 NOTE — TELEPHONE ENCOUNTER
MARLIN CALLED FROM [employer] AT HOME TO F/U ON ORD [id number].  IT WS FAXED 12/31/24, 01/05/25, AND AGAIN TODAY.    CB#[phone]  FAX#[phone]

## 2025-03-07 ENCOUNTER — OFFICE VISIT (OUTPATIENT)
Dept: PODIATRY | Facility: CLINIC | Age: 45
End: 2025-03-07
Payer: MEDICARE

## 2025-03-07 VITALS
HEIGHT: 67 IN | DIASTOLIC BLOOD PRESSURE: 69 MMHG | WEIGHT: 177 LBS | HEART RATE: 96 BPM | SYSTOLIC BLOOD PRESSURE: 103 MMHG | BODY MASS INDEX: 27.78 KG/M2 | OXYGEN SATURATION: 96 %

## 2025-03-07 DIAGNOSIS — L60.0 ONYCHOCRYPTOSIS: ICD-10-CM

## 2025-03-07 DIAGNOSIS — M79.672 FOOT PAIN, BILATERAL: ICD-10-CM

## 2025-03-07 DIAGNOSIS — M21.371 FOOT DROP, BILATERAL: ICD-10-CM

## 2025-03-07 DIAGNOSIS — B35.1 ONYCHOMYCOSIS: ICD-10-CM

## 2025-03-07 DIAGNOSIS — M79.671 FOOT PAIN, BILATERAL: ICD-10-CM

## 2025-03-07 DIAGNOSIS — M21.372 FOOT DROP, BILATERAL: ICD-10-CM

## 2025-03-07 DIAGNOSIS — G62.9 NEUROPATHY: Primary | ICD-10-CM

## 2025-03-07 DIAGNOSIS — R26.2 DIFFICULTY WALKING: ICD-10-CM

## 2025-03-07 NOTE — PROGRESS NOTES
[facility] - PODIATRY    Today's Date: 03/07/25    Patient Name: Kang Grey  MRN: [id number]  CSN: [id number]  PCP: Hien Parham MD, Last PCP Visit: 10/24/2024  Referring Provider: No ref. provider found    SUBJECTIVE     Chief Complaint   Patient presents with    Left Foot - Follow-up, Nail Problem    Right Foot - Follow-up, Nail Problem       HPI: Knag Grey, a 44 y.o.male, comes to clinic.    New, Established, New Problem:  established   Location:  Toenails  Duration:   Greater than five years  Onset:  Gradual  Nature:  sore with palpation.  Stable, worsening, improving:   Stable  Aggravating factors:  Pain with shoe gear and ambulation.  Previous Treatment:  debridement    Medical changes: no changes    Patient denies any fevers, chills, nausea, vomiting, shortness of breathe, nor any other constitutional signs nor symptoms.       I have reviewed/confirmed previously documented HPI with no changes.       Past Medical History:   Diagnosis Date    Acute upper respiratory infection 02/2022    Age-related osteoporosis without current pathological fracture     Broken toe 11/2022    left great toe    Cerebral palsy     diagnosed as infant    Compression fracture of first lumbar vertebra     Constipation     Disease of pancreas     GERD (gastroesophageal reflux disease)     History of urinary self-catheterization     pt or pt's mother catheterizes 4-6  times a day    Hydrocephalus in diseases classified elsewhere     NO  SHUNT    Inguinal hernia     Injury of back 02/2020    Low back pain     Major depressive disorder     single episode, moderate    Other cerebral palsy     Other fatigue     Other generalized epilepsy and epileptic syndromes, not intractable, without status epilepticus     Other hypertrophic disorders of the skin     Pain in left ankle and joints of left foot     Pain in right toe(s)     Pain in thoracic spine     Pancreatic cyst     Paranoid schizophrenia     Pelvic and  perineal pain     Primary insomnia     Pure hypercholesterolemia     Repeated falls     last fall 11/2022    Schizoaffective disorder, unspecified     Seizures     LAST SZ OVER 1 YEAR    Sleep apnea     uses cpap machine    Somnolence     TIA (transient ischemic attack)     UNSURE OF  DATE    Vitamin D deficiency     Wedge compression fracture of fourth thoracic vertebra, subsequent encounter for fracture with routine healing      Past Surgical History:   Procedure Laterality Date    CATHETERIZATION URETHRAL      CYST REMOVAL Right 11/11/2022    Procedure: Excision of subcutaneous mass from right forearm;  Surgeon: Oni Greenberg MD;  Location: [facility] OR [facility] [place];  Service: General;  Laterality: Right;    HAMSTRING REPAIR Bilateral     age 10    INGUINAL HERNIA REPAIR Right 11/08/2023    Procedure: INGUINAL HERNIA REPAIR LAPAROSCOPIC WITH DAVINCI ROBOT;  Surgeon: Mayur Quintana MD;  Location: [facility] OR [facility] [place];  Service: Robotics - DaVinci;  Laterality: Right;    OTHER SURGICAL HISTORY      hyposadius repair    SUPRAPUBIC TUBE PLACEMENT N/A 09/18/2024    Procedure: Cystoscopy with SUPRAPUBIC CATHETER INSERTION;  Surgeon: Adalid Morris MD;  Location: [facility] MAIN OR;  Service: Urology;  Laterality: N/A;    TOE FUSION Right     great toe at age 21    WISDOM TOOTH EXTRACTION       Family History   Problem Relation Age of Onset    Hypothyroidism Mother     No Known Problems Father     Ulcers Brother     Malig Hyperthermia Neg Hx      Social History     Socioeconomic History    Marital status: Single   Tobacco Use    Smoking status: Never     Passive exposure: Never    Smokeless tobacco: Never   Vaping Use    Vaping status: Never Used   Substance and Sexual Activity    Alcohol use: Never    Drug use: Never    Sexual activity: Defer     Allergies   Allergen Reactions    Valproic Acid Irritability     DEPAKOTE CAUSES DIZZINESS AND IRRITABILITY       Current Outpatient Medications   Medication Sig Dispense Refill     alclometasone (ACLOVATE) 0.05 % cream       ascorbic acid (VITAMIN C) 1000 MG tablet Take 1 tablet by mouth Daily.      aspirin 81 MG EC tablet Take 1 tablet by mouth Daily.      busPIRone (BUSPAR) 10 MG tablet Take 1 tablet by mouth 3 (Three) Times a Day As Needed.      Calcium Carbonate 1500 (600 Ca) MG tablet Take 1 tablet by mouth 2 (two) times a day.      cholecalciferol (VITAMIN D3) 25 MCG (1000 UT) tablet TAKE TWO TABLETS BY MOUTH ONCE DAILY 60 tablet 2    Diclofenac Sodium (VOLTAREN) 1 % gel gel Apply 1 g topically to the appropriate area as directed As Needed (LBP). 100 g 2    docusate sodium (COLACE) 100 MG capsule TAKE 1 CAPSULE BY MOUTH DAILY. 30 capsule 1    folic acid (FOLVITE) 800 MCG tablet Take 1 tablet by mouth Daily.      HM Melatonin 10 MG sublingual tablet TAKE 1 TABLET BY MOUTH EVERY NIGHT AT BEDTIME. 30 tablet 0    HM TRUEplus Fiber 2 g chewable tablet CHEW 1 TABLET DAILY. 90 tablet 1    HYDROcodone-acetaminophen (NORCO) 5-325 MG per tablet Take 1 tablet by mouth Every 6 (Six) Hours As Needed for Moderate Pain (Pain). 12 tablet 0    Invega Sustenna 234 MG/1.5ML suspension prefilled syringe IM injection Inject 1.5 mL into the appropriate muscle as directed by prescriber Every 30 (Thirty) Days. Waiting on a appointment for his next injection.      Keppra 500 MG tablet Take 3 tablets by mouth Every 12 (Twelve) Hours.      ketoconazole (NIZORAL) 2 % shampoo       lamoTRIgine (LaMICtal) 200 MG tablet Take 1 tablet by mouth Daily.      methocarbamol (ROBAXIN) 750 MG tablet Take 1 tablet by mouth 3 (Three) Times a Day As Needed for Muscle Spasms (back pain). 40 tablet 0    pravastatin (PRAVACHOL) 20 MG tablet TAKE 1 TABLET BY MOUTH EVERY NIGHT. 30 tablet 1    triamcinolone (KENALOG) 0.025 % cream       venlafaxine XR (EFFEXOR-XR) 150 MG 24 hr capsule Take 1 capsule by mouth Daily.      venlafaxine XR (EFFEXOR-XR) 37.5 MG 24 hr capsule Take 1 capsule by mouth Daily.       No current  facility-administered medications for this visit.     Review of Systems   Constitutional: Negative.    Skin:         Painful toenails bilaterally   All other systems reviewed and are negative.      OBJECTIVE     Vitals:    03/07/25 0905   BP: 103/69   Pulse: 96   SpO2: 96%         Patient seen in no apparent distress.      PHYSICAL EXAM:     Foot/Ankle Exam    GENERAL  Appearance:  appears stated age and chronically ill  Orientation:  unable to assess  Affect:  inappropriate  Gait:  antalgic  Assistance:  cane use  Right shoe gear: casual shoe (Allison Brace)  Left shoe gear: casual shoe (Allison Brace)    VASCULAR     Right Foot Vascularity   Dorsalis pedis:  2+  Posterior tibial:  2+  Skin temperature:  warm  Edema grading:  None  CFT:  < 3 seconds  Pedal hair growth:  Present  Varicosities:  mild varicosities     Left Foot Vascularity   Dorsalis pedis:  2+  Posterior tibial:  2+  Skin temperature:  warm  Edema grading:  None  CFT:  < 3 seconds  Pedal hair growth:  Present  Varicosities:  mild varicosities     NEUROLOGIC     Right Foot Neurologic   Light touch sensation: diminished  Vibratory sensation: diminished  Hot/Cold sensation: diminished  Protective Sensation using Camargo-Manpreet Monofilament:   Sites intact: 3  Sites tested: 10     Left Foot Neurologic   Light touch sensation: diminished  Vibratory sensation: diminished  Hot/Cold sensation:  diminished  Protective Sensation using Camargo-Manpreet Monofilament:   Sites intact: 3  Sites tested: 10    MUSCULOSKELETAL     Right Foot Musculoskeletal   Hammertoe:  First toe     Left Foot Musculoskeletal   Hammertoe:  First toe    MUSCLE STRENGTH     Right Foot Muscle Strength   Foot dorsiflexion:  1  Foot plantar flexion:  4-  Foot inversion:  2  Foot eversion:  2     Left Foot Muscle Strength   Foot dorsiflexion:  1  Foot plantar flexion:  4-  Foot inversion:  2  Foot eversion:  2    DERMATOLOGIC      Right Foot Dermatologic   Skin  Right foot skin is  intact.   Nails  1.  Positive for elongated, onychomycosis, abnormal thickness, subungual debris and ingrown toenail.  2.  Positive for elongated, onychomycosis, abnormal thickness, subungual debris and ingrown toenail.  3.  Positive for elongated, onychomycosis, abnormal thickness, subungual debris and ingrown toenail.  4.  Positive for elongated, onychomycosis, abnormal thickness, subungual debris and ingrown toenail.  5.  Positive for elongated, onychomycosis, abnormal thickness, subungual debris and ingrown toenail.     Left Foot Dermatologic   Skin  Left foot skin is intact.   Nails  1.  Positive for elongated, onychomycosis, abnormal thickness, subungual debris and ingrown toenail.  2.  Positive for elongated, onychomycosis, abnormal thickness, subungual debris and ingrown toenail.  3.  Positive for elongated, onychomycosis, abnormal thickness, subungual debris and ingrown toenail.  4.  Positive for elongated, onychomycosis, abnormally thick, subungual debris and ingrown toenail.  5.  Positive for elongated, onychomycosis, abnormally thick, subungual debris and ingrown toenail.    I have reexamined the patient the results are consistent with the previously documented exam.    ASSESSMENT/PLAN     Diagnoses and all orders for this visit:    1. Neuropathy (Primary)    2. Foot pain, bilateral    3. Onychocryptosis    4. Foot drop, bilateral    5. Difficulty walking    6. Onychomycosis    Comprehensive lower extremity examination and evaluation was performed.    Discussed findings and treatment plan including risks, benefits, and treatment options with patient in detail. Patient agreed with treatment plan.    Toenails 1 through 5 bilaterally were debrided in thickness and length and then smoothed with a Dremel Tool.  Tolerated the procedure well without complications.    An After Visit Summary was printed and given to the patient at discharge, including (if requested) any available informative/educational handouts  regarding diagnosis, treatment, or medications. All questions were answered to patient/family satisfaction. Should symptoms fail to improve or worsen they agree to call or return to clinic or to go to the Emergency Department. Discussed the importance of following up with any needed screening tests/labs/specialist appointments and any requested follow-up recommended by me today. Importance of maintaining follow-up discussed and patient accepts that missed appointments can delay diagnosis and potentially lead to worsening of conditions.    Return in about 9 weeks (around 5/9/2025) for Toenail Care., or sooner if acute issues arise.    I have reviewed the assessment and plan and verified the accuracy of it. No changes to assessment and plan since the information was documented. Roni Osullivan DPM 03/07/25     I have dictated this note utilizing Dragon Dictation.  Please note that portions of this note were completed with a voice recognition program.  Part of this note may be an electronic transcription/translation of spoken language to printed text using the Dragon Dictation System.      This document has been electronically signed by Roni Osullivan DPM on March 7, 2025 09:25 EST

## 2025-03-15 ENCOUNTER — HOSPITAL ENCOUNTER (EMERGENCY)
Facility: HOSPITAL | Age: 45
Discharge: HOME | End: 2025-03-15
Attending: EMERGENCY MEDICINE
Payer: MEDICARE

## 2025-03-15 VITALS
BODY MASS INDEX: 26.46 KG/M2 | TEMPERATURE: 97.9 F | RESPIRATION RATE: 18 BRPM | HEART RATE: 110 BPM | HEIGHT: 68 IN | SYSTOLIC BLOOD PRESSURE: 123 MMHG | OXYGEN SATURATION: 95 % | DIASTOLIC BLOOD PRESSURE: 90 MMHG | WEIGHT: 174.6 LBS

## 2025-03-15 DIAGNOSIS — Z43.5 ENCOUNTER FOR SUPRAPUBIC CATHETER CARE: Primary | ICD-10-CM

## 2025-03-15 PROCEDURE — 99282 EMERGENCY DEPT VISIT SF MDM: CPT

## 2025-03-16 NOTE — ED TRIAGE NOTES
Pt comes to the ER tonight for a catheter problem. Pt states that the connecting part needs to be replaced.

## 2025-03-16 NOTE — ED PROVIDER NOTES
Time: 8:26 PM EDT  Date of encounter:  3/15/2025  Independent Historian/Clinical History and Information was obtained by:   Patient and friend    History is limited by: N/A    Chief Complaint: Catheter problem      History of Present Illness:  Patient is a 44 y.o. year old male who presents to the emergency department for evaluation of suprapubic catheter problem.  Patient states of catheter got hooked on something got pulled out.    Patient Care Team  Primary Care Provider: Hien Parham MD    Past Medical History:     Allergies   Allergen Reactions    Valproic Acid Irritability     DEPAKOTE CAUSES DIZZINESS AND IRRITABILITY       Past Medical History:   Diagnosis Date    Acute upper respiratory infection 02/2022    Age-related osteoporosis without current pathological fracture     Broken toe 11/2022    left great toe    Cerebral palsy     diagnosed as infant    Compression fracture of first lumbar vertebra     Constipation     Disease of pancreas     GERD (gastroesophageal reflux disease)     History of urinary self-catheterization     pt or pt's mother catheterizes 4-6  times a day    Hydrocephalus in diseases classified elsewhere     NO  SHUNT    Inguinal hernia     Injury of back 02/2020    Low back pain     Major depressive disorder     single episode, moderate    Other cerebral palsy     Other fatigue     Other generalized epilepsy and epileptic syndromes, not intractable, without status epilepticus     Other hypertrophic disorders of the skin     Pain in left ankle and joints of left foot     Pain in right toe(s)     Pain in thoracic spine     Pancreatic cyst     Paranoid schizophrenia     Pelvic and perineal pain     Primary insomnia     Pure hypercholesterolemia     Repeated falls     last fall 11/2022    Schizoaffective disorder, unspecified     Seizures     LAST SZ OVER 1 YEAR    Sleep apnea     uses cpap machine    Somnolence     TIA (transient ischemic attack)     UNSURE OF  DATE    Vitamin D  deficiency     Wedge compression fracture of fourth thoracic vertebra, subsequent encounter for fracture with routine healing      Past Surgical History:   Procedure Laterality Date    CATHETERIZATION URETHRAL      CYST REMOVAL Right 11/11/2022    Procedure: Excision of subcutaneous mass from right forearm;  Surgeon: Oni Greenberg MD;  Location: [facility] OR [facility] [place];  Service: General;  Laterality: Right;    HAMSTRING REPAIR Bilateral     age 10    INGUINAL HERNIA REPAIR Right 11/08/2023    Procedure: INGUINAL HERNIA REPAIR LAPAROSCOPIC WITH DAVINCI ROBOT;  Surgeon: Mayur Quintana MD;  Location: [facility] OR [facility] [place];  Service: Robotics - DaVinci;  Laterality: Right;    OTHER SURGICAL HISTORY      hyposadius repair    SUPRAPUBIC TUBE PLACEMENT N/A 09/18/2024    Procedure: Cystoscopy with SUPRAPUBIC CATHETER INSERTION;  Surgeon: Adalid Morris MD;  Location: [facility] MAIN OR;  Service: Urology;  Laterality: N/A;    TOE FUSION Right     great toe at age 21    WISDOM TOOTH EXTRACTION       Family History   Problem Relation Age of Onset    Hypothyroidism Mother     No Known Problems Father     Ulcers Brother     Malig Hyperthermia Neg Hx        Home Medications:  Prior to Admission medications    Medication Sig Start Date End Date Taking? Authorizing Provider   alclometasone (ACLOVATE) 0.05 % cream  3/15/23   Francisac Woodard MD   ascorbic acid (VITAMIN C) 1000 MG tablet Take 1 tablet by mouth Daily.    Francisca Woodard MD   aspirin 81 MG EC tablet Take 1 tablet by mouth Daily.    Francisca Woodard MD   busPIRone (BUSPAR) 10 MG tablet Take 1 tablet by mouth 3 (Three) Times a Day As Needed. 6/2/21   Francisca Woodard MD   Calcium Carbonate 1500 (600 Ca) MG tablet Take 1 tablet by mouth 2 (two) times a day. 6/2/21   Francisca Woodard MD   cholecalciferol (VITAMIN D3) 25 MCG (1000 UT) tablet TAKE TWO TABLETS BY MOUTH ONCE DAILY 2/20/25   Hien Parham MD   Diclofenac Sodium (VOLTAREN) 1 % gel gel  Apply 1 g topically to the appropriate area as directed As Needed (LBP). 11/14/23   Hien Parham MD   docusate sodium (COLACE) 100 MG capsule TAKE 1 CAPSULE BY MOUTH DAILY. 1/30/23   Hien Parham MD   folic acid (FOLVITE) 800 MCG tablet Take 1 tablet by mouth Daily.    Francisca Woodard MD HM Melatonin 10 MG sublingual tablet TAKE 1 TABLET BY MOUTH EVERY NIGHT AT BEDTIME. 1/26/25   Hien Parham MD HM TRUEplus Fiber 2 g chewable tablet CHEW 1 TABLET DAILY. 6/14/23   Hien Parham MD   HYDROcodone-acetaminophen (NORCO) 5-325 MG per tablet Take 1 tablet by mouth Every 6 (Six) Hours As Needed for Moderate Pain (Pain). 11/7/24   Hien Parham MD   Invega Sustenna 234 MG/1.5ML suspension prefilled syringe IM injection Inject 1.5 mL into the appropriate muscle as directed by prescriber Every 30 (Thirty) Days. Waiting on a appointment for his next injection. 5/13/21   Francisca Woodard MD   Keppra 500 MG tablet Take 3 tablets by mouth Every 12 (Twelve) Hours. 6/2/21   Francisca Woodard MD   ketoconazole (NIZORAL) 2 % shampoo  3/9/23   Francisca Woodard MD   lamoTRIgine (LaMICtal) 200 MG tablet Take 1 tablet by mouth Daily. 6/2/21   Francisca Woodard MD   methocarbamol (ROBAXIN) 750 MG tablet Take 1 tablet by mouth 3 (Three) Times a Day As Needed for Muscle Spasms (back pain). 10/24/24   Hien Parham MD   pravastatin (PRAVACHOL) 20 MG tablet TAKE 1 TABLET BY MOUTH EVERY NIGHT. 1/24/25   Hien Parham MD   triamcinolone (KENALOG) 0.025 % cream  7/31/23   Francisca Woodard MD   venlafaxine XR (EFFEXOR-XR) 150 MG 24 hr capsule Take 1 capsule by mouth Daily. 10/10/22   Francisca Woodard MD   venlafaxine XR (EFFEXOR-XR) 37.5 MG 24 hr capsule Take 1 capsule by mouth Daily. 2/28/23   Francisca Woodard MD        Social History:   Social History     Tobacco Use    Smoking status: Never     Passive exposure: Never    Smokeless tobacco: Never  "  Vaping Use    Vaping status: Never Used   Substance Use Topics    Alcohol use: Never    Drug use: Never         Review of Systems:  Review of Systems   I performed a 10 point review of systems which was all negative, except for the positives found in the HPI above.  Physical Exam:  /90 (BP Location: Left arm, Patient Position: Lying)   Pulse 110   Temp 97.9 °F (36.6 °C) (Oral)   Resp 18   Ht 172.7 cm (68\")   Wt 79.2 kg (174 lb 9.7 oz)   SpO2 95%   BMI 26.55 kg/m²     Physical Exam     General: Awake alert and in no acute distress     HEENT: Head normocephalic atraumatic, eyes PERRLA EOMI, nose normal, oropharynx normal.     Neck: Supple full range of motion, no meningismus, no lymphadenopathy     Heart: Regular rate and rhythm, no murmurs or rubs, 2+ radial pulses bilaterally     Lungs: Clear to auscultation bilaterally without wheezes or crackles, no respiratory distress     Abdomen: Soft, nontender, nondistended, no rebound or guarding, suprapubic catheter insertion site without catheter present     Back exam:  No L-spine tenderness.  No rash.     Skin: Warm, dry, no rash     Musculoskeletal: Normal range of motion, no lower extremity edema     Neurologic: Oriented x3, no motor deficits no sensory deficits     Psychiatric: Mood appears stable, no psychosis            Medical Decision Making:      Comorbidities that affect care:    None    External Notes reviewed:    None      The following orders were placed and all results were independently analyzed by me:  Orders Placed This Encounter   Procedures    Bladder Catheterization       Medications Given in the Emergency Department:  Medications - No data to display     ED Course:         Labs:    Lab Results (last 24 hours)       ** No results found for the last 24 hours. **             Imaging:    No Radiology Exams Resulted Within Past 24 Hours      Differential Diagnosis and Discussion:    Catheter placement    PROCEDURES:        No orders to " display       Bladder Catheterization    Date/Time: 3/15/2025 8:57 PM    Performed by: Tracey Navarro APRN  Authorized by: Ángel Kramer MD    Consent:     Consent obtained:  Verbal    Consent given by:  Patient    Risks, benefits, and alternatives were discussed: yes      Risks discussed:  False passage, infection, incomplete procedure and pain    Alternatives discussed:  No treatment and delayed treatment  Universal protocol:     Procedure explained and questions answered to patient or proxy's satisfaction: yes      Relevant documents present and verified: yes      Patient identity confirmed:  Verbally with patient and arm band  Pre-procedure details:     Procedure purpose:  Therapeutic    Preparation: Patient was prepped and draped in usual sterile fashion    Anesthesia:     Anesthesia method:  None  Procedure details:     Provider performed due to:  Nurse unavailable    Catheter insertion:  Indwelling    Catheter type:  Coude    Catheter size:  18 Fr    Bladder irrigation: no      Number of attempts:  1    Urine characteristics:  Clear and yellow  Post-procedure details:     Procedure completion:  Tolerated well, no immediate complications      MDM  Number of Diagnoses or Management Options  Diagnosis management comments: I will place his for suprapubic catheter since it was absent upon arrival.  Patient tolerated it well.  Catheter is draining clear yellow urine. I do not believe that the patient has an acute emergency medical condition requiring additional emergency management at this time. The patient is currently stable for outpatient treatment and continuation of care. Important signs and symptoms that would warrant return to the emergency department were reviewed. The patient was provided the opportunity to ask questions. All questions were addressed and the patient was discharged from the ED. The patient demonstrated understanding and agreed to plan      Risk of Complications, Morbidity, and/or  Mortality  Presenting problems: minimal  Diagnostic procedures: minimal  Management options: minimal    Patient Progress  Patient progress: improved                       Patient Care Considerations:    LABS: I considered ordering labs, however no concern for systemic illness      Consultants/Shared Management Plan:        Social Determinants of Health:    Patient has presented with family members who are responsible, reliable and will ensure follow up care.      Disposition and Care Coordination:    Discharged: The patient is suitable and stable for discharge with no need for consideration of admission.    I have explained discharge medications and the need for follow up with the patient/caretakers. This was also printed in the discharge instructions. Patient was discharged with the following medications and follow up:      Medication List      No changes were made to your prescriptions during this visit.      Hien Parham MD  [address] [place] [id number] [phone]    Call   As needed       Final diagnoses:   Encounter for suprapubic catheter care        ED Disposition       ED Disposition   Discharge    Condition   Stable    Comment   --               This medical record created using voice recognition software.             Tracey Navarro, ÁNGELA  03/15/25 [id number]

## 2025-03-21 DIAGNOSIS — F51.01 PRIMARY INSOMNIA: ICD-10-CM

## 2025-03-21 DIAGNOSIS — E78.2 MIXED HYPERLIPIDEMIA: ICD-10-CM

## 2025-03-21 RX ORDER — PRAVASTATIN SODIUM 20 MG
20 TABLET ORAL NIGHTLY
Qty: 30 TABLET | Refills: 5 | Status: SHIPPED | OUTPATIENT
Start: 2025-03-21

## 2025-03-31 ENCOUNTER — OFFICE VISIT (OUTPATIENT)
Dept: FAMILY MEDICINE CLINIC | Age: 45
End: 2025-03-31
Payer: MEDICARE

## 2025-03-31 VITALS
SYSTOLIC BLOOD PRESSURE: 130 MMHG | DIASTOLIC BLOOD PRESSURE: 89 MMHG | WEIGHT: 172 LBS | HEART RATE: 114 BPM | BODY MASS INDEX: 26.07 KG/M2 | HEIGHT: 68 IN | OXYGEN SATURATION: 95 %

## 2025-03-31 DIAGNOSIS — F20.0 PARANOID SCHIZOPHRENIA: ICD-10-CM

## 2025-03-31 DIAGNOSIS — R00.0 TACHYCARDIA: ICD-10-CM

## 2025-03-31 DIAGNOSIS — E53.8 B12 DEFICIENCY: ICD-10-CM

## 2025-03-31 DIAGNOSIS — N31.9 NEUROGENIC BLADDER: ICD-10-CM

## 2025-03-31 DIAGNOSIS — R53.83 FATIGUE, UNSPECIFIED TYPE: ICD-10-CM

## 2025-03-31 DIAGNOSIS — Z13.6 ENCOUNTER FOR SCREENING FOR CARDIOVASCULAR DISORDERS: ICD-10-CM

## 2025-03-31 DIAGNOSIS — E55.9 VITAMIN D DEFICIENCY: ICD-10-CM

## 2025-03-31 DIAGNOSIS — R29.6 FALLS: ICD-10-CM

## 2025-03-31 DIAGNOSIS — M21.962 FOOT DEFORMITY, BILATERAL: ICD-10-CM

## 2025-03-31 DIAGNOSIS — E78.2 MIXED HYPERLIPIDEMIA: ICD-10-CM

## 2025-03-31 DIAGNOSIS — R29.898 LEG WEAKNESS, BILATERAL: Primary | ICD-10-CM

## 2025-03-31 DIAGNOSIS — M21.961 FOOT DEFORMITY, BILATERAL: ICD-10-CM

## 2025-03-31 DIAGNOSIS — M21.969 ANKLE JOINT DEFORMITY, UNSPECIFIED LATERALITY: ICD-10-CM

## 2025-03-31 DIAGNOSIS — K59.09 OTHER CONSTIPATION: ICD-10-CM

## 2025-03-31 RX ORDER — CEPHALEXIN 500 MG/1
CAPSULE ORAL
COMMUNITY
Start: 2025-03-07

## 2025-03-31 NOTE — ASSESSMENT & PLAN NOTE
Current treatment is pravastatin he tolerates well.  Will check labs and adjust Tx plan pending results    Orders:    Lipid Panel; Future

## 2025-03-31 NOTE — ASSESSMENT & PLAN NOTE
Will check vitamin D levels to see if he needs to be on vitamin D supplementation  Orders:    Vitamin D,25-Hydroxy; Future

## 2025-03-31 NOTE — ASSESSMENT & PLAN NOTE
He is getting worse.  He has psychiatry needs follow-up to reeval labs.  His mother is thinking he may need some kind of psychiatric placement because he will not take care of himself, he will not bathe.  He lays in bed all day.  He is also occasionally aggressive and irritable

## 2025-03-31 NOTE — PROGRESS NOTES
Kang Grey presents to [facility].    Chief Complaint: lip infection    Subjective     History of Present Illness:  HPI    Recent fall from tripping and he puntured his lip, it got infected and he was placed on antibiotics.  His lip looks good today and no signs of infection    He keeps falling and is high risk for fx due to osteoporosis.  Has back pain from falls.  He trips over his feet and has LE weakness, legs cross over each other, his energy is low so he is sleeping in bed all day.   He is not bathing daily again, he is not taking his meds.  He is irritated more.     He presents with chronic complex sleep apnea, using positive airway pressure therapy with ASV and the symptoms of sleep apnea have improved significantly on the therapy. Normally patient goes to bed at 10 PM  and wakes up at 8 AM .  He is not compliant with his CPAP.  Sees Sees Dr Link    He presents with a chronic indwelling cathetor for chronic urinary incontinence and he is tolerating it well.  He can't do self caths very well.  He sees Dr Morris.  He accidentally pulled out his cathetor on March 15th and went to ER and this was replaced. He is on low dose macrobid for frequent UTIs     He presents with ongoing depression and grief reaction with the loss of his father.  His mother says he is not coping well, he is irritable and yells.  He is not getting out of bed all day, says he has low energy.  He has therapy and see psychiatry and current treatment plan includes lamictal, invega injections, effexor and buspar as well as folic acid.  He denies SI or HI his mom says he stays in bed a lot.    His constipation is stable with stool softener      He presents with chronic hypercholesterolemia, current treatment includes a lipid lowering agent, pravastatin.  Tolerates medication well. Compliance with treatment has been good.  He denies experiencing any hypercholesterolemia related symptoms.  He needs med  "refills today and is due for labs     He has seizures disorder, stable on current meds, neurologist is Dr Mccoy.  He is on Keppra, he tolerates meds well. No seizures in past year       Result Review   The following data was reviewed by Hien Parham MD on 03/31/2025.  Lab Results   Component Value Date    WBC 4.67 10/07/2024    HGB 14.8 10/07/2024    HCT 44.6 10/07/2024    MCV 85.6 10/07/2024     10/07/2024     Lab Results   Component Value Date    GLUCOSE 90 10/07/2024    BUN 11 10/07/2024    CREATININE 0.82 10/07/2024     10/07/2024    K 4.0 10/07/2024     10/07/2024    CALCIUM 10.0 10/07/2024    PROTEINTOT 6.9 10/07/2024    ALBUMIN 4.2 10/07/2024    ALT 23 10/07/2024    AST 20 10/07/2024    ALKPHOS 85 10/07/2024    BILITOT 0.3 10/07/2024    GLOB 2.7 10/07/2024    AGRATIO 1.6 10/07/2024    BCR 13.4 10/07/2024    ANIONGAP 10.0 10/07/2024    EGFR 111.1 10/07/2024     Lab Results   Component Value Date    CHOL 179 10/07/2024    CHLPL 174 05/06/2021    TRIG 62 10/07/2024    HDL 47 10/07/2024     (H) 10/07/2024     Lab Results   Component Value Date    TSH 1.730 11/16/2023     Lab Results   Component Value Date    HGBA1C 5.40 03/18/2024     No results found for: \"PSA\"  No results found for: \"IRON\"   Lab Results   Component Value Date    JGSQ91YQ 50.9 10/07/2024               Assessment and Plan:   Assessment & Plan  Leg weakness, bilateral    Orders:    Ambulatory Referral to Physical Therapy for Evaluation & Treatment    Ambulatory Referral to Podiatry    Falls    Orders:    Ambulatory Referral to Physical Therapy for Evaluation & Treatment    Ambulatory Referral to Podiatry    Foot deformity, bilateral    Orders:    Ambulatory Referral to Podiatry    Ankle joint deformity, unspecified laterality    Orders:    Ambulatory Referral to Podiatry    Tachycardia  Due to deconditioning.  EKG looks good and was in normal sinus rhythm  Orders:    ECG 12 Lead    Ambulatory Referral to " Podiatry    Vitamin D deficiency  Will check vitamin D levels to see if he needs to be on vitamin D supplementation  Orders:    Vitamin D,25-Hydroxy; Future    Mixed hyperlipidemia  Current treatment is pravastatin he tolerates well.  Will check labs and adjust Tx plan pending results    Orders:    Lipid Panel; Future    Paranoid schizophrenia  He is getting worse.  He has psychiatry needs follow-up to reeval labs.  His mother is thinking he may need some kind of psychiatric placement because he will not take care of himself, he will not bathe.  He lays in bed all day.  He is also occasionally aggressive and irritable         Neurogenic bladder  Catheter is in place with no signs of acute issues, follow-up with urology as directed       Other constipation  Currently on Colace and stable.       Encounter for screening for cardiovascular disorders    Orders:    Comprehensive Metabolic Panel; Future    CBC (No Diff); Future    Fatigue, unspecified type  Will check labs but I suspect this is psychiatric  Orders:    CBC (No Diff); Future    TSH+Free T4; Future    B12 deficiency  Will check B12 lab and adjust Tx plan pending results  Orders:    Vitamin B12; Future               Objective     Medications:  Current Outpatient Medications   Medication Instructions    alclometasone (ACLOVATE) 0.05 % cream No dose, route, or frequency recorded.    ascorbic acid (VITAMIN C) 1000 MG tablet 1 tablet, Daily    aspirin 81 mg, Daily    busPIRone (BUSPAR) 10 MG tablet 1 tablet, 3 Times Daily PRN    Calcium Carbonate 1500 (600 Ca) MG tablet 1 tablet, 2 times daily    cephalexin (KEFLEX) 500 MG capsule     cholecalciferol (VITAMIN D3) 2,000 Units, Oral, Daily    Diclofenac Sodium (VOLTAREN) 1 g, Topical, As Needed    docusate sodium (COLACE) 100 mg, Oral, Daily    folic acid (FOLVITE) 800 MCG tablet 1 tablet, Daily    HM Melatonin 10 mg, Oral, Every Night at Bedtime    HM TRUEplus Fiber 2 g chewable tablet 1 tablet, Oral, Daily     "HYDROcodone-acetaminophen (NORCO) 5-325 MG per tablet 1 tablet, Oral, Every 6 Hours PRN    Invega Sustenna 234 MG/1.5ML suspension prefilled syringe IM injection 1.5 mL, Every 30 Days    Keppra 500 MG tablet 3 tablets, Every 12 Hours    ketoconazole (NIZORAL) 2 % shampoo No dose, route, or frequency recorded.    lamoTRIgine (LaMICtal) 200 MG tablet 1 tablet, Daily    methocarbamol (ROBAXIN) 750 mg, Oral, 3 Times Daily PRN    pravastatin (PRAVACHOL) 20 mg, Oral, Nightly    triamcinolone (KENALOG) 0.025 % cream     venlafaxine XR (EFFEXOR-XR) 150 mg, Daily    venlafaxine XR (EFFEXOR-XR) 37.5 mg, Daily        Vital Signs:   /89 (BP Location: Right arm, Patient Position: Sitting, Cuff Size: Adult)   Pulse 114   Ht 172.7 cm (67.99\")   Wt 78 kg (172 lb)   SpO2 95%   BMI 26.16 kg/m²           BP Readings from Last 3 Encounters:   03/31/25 130/89   03/15/25 123/90   03/07/25 103/69      Wt Readings from Last 3 Encounters:   03/31/25 78 kg (172 lb)   03/15/25 79.2 kg (174 lb 9.7 oz)   03/07/25 80.3 kg (177 lb)        Physical Exam:  Physical Exam  Vitals and nursing note reviewed.   Constitutional:       General: He is not in acute distress.     Appearance: Normal appearance. He is not ill-appearing, toxic-appearing or diaphoretic.      Comments: Disheveled, malodorous   HENT:      Head: Normocephalic and atraumatic.      Right Ear: Tympanic membrane, ear canal and external ear normal.      Left Ear: Tympanic membrane, ear canal and external ear normal.      Nose: No congestion or rhinorrhea.      Mouth/Throat:      Mouth: Mucous membranes are moist.      Pharynx: Oropharynx is clear. No oropharyngeal exudate or posterior oropharyngeal erythema.   Eyes:      Extraocular Movements: Extraocular movements intact.      Conjunctiva/sclera: Conjunctivae normal.      Pupils: Pupils are equal, round, and reactive to light.   Cardiovascular:      Rate and Rhythm: Normal rate and regular rhythm.      Heart sounds: Normal " heart sounds. No murmur heard.  Pulmonary:      Effort: Pulmonary effort is normal.      Breath sounds: Normal breath sounds. No wheezing, rhonchi or rales.   Abdominal:      General: Abdomen is flat.      Palpations: Abdomen is soft. There is no mass.      Tenderness: There is no abdominal tenderness.      Hernia: No hernia is present.   Musculoskeletal:      Cervical back: Neck supple. No rigidity.      Right lower leg: No edema.      Left lower leg: No edema.   Lymphadenopathy:      Cervical: No cervical adenopathy.   Skin:     General: Skin is warm and dry.   Neurological:      General: No focal deficit present.      Mental Status: He is alert and oriented to person, place, and time. Mental status is at baseline.   Psychiatric:         Mood and Affect: Mood normal.         Behavior: Behavior normal. Behavior is cooperative.         Thought Content: Thought content normal.         Judgment: Judgment normal.           Review of Systems:  Review of Systems   Constitutional:  Positive for fatigue. Negative for chills and fever.   HENT:  Negative for congestion, ear discharge and sore throat.    Respiratory:  Negative for cough, shortness of breath and wheezing.    Cardiovascular:  Negative for chest pain, palpitations and leg swelling.   Gastrointestinal:  Positive for constipation. Negative for abdominal pain, diarrhea, nausea, vomiting and GERD.   Genitourinary:  Negative for flank pain.   Musculoskeletal:  Positive for back pain.   Neurological:  Positive for weakness. Negative for dizziness and headache.   Psychiatric/Behavioral:  Positive for sleep disturbance and depressed mood. Negative for suicidal ideas. The patient is nervous/anxious.           ECG 12 Lead    Date/Time: 3/31/2025 3:40 PM  Performed by: Hien Parham MD    Authorized by: Hien Parham MD  Comparison: not compared with previous ECG   Rhythm: sinus rhythm  Rate: normal  Conduction: conduction normal  ST Segments: ST segments  normal  QRS axis: normal  Other: no other findings    Clinical impression: normal ECG  Comments: Normal sinus rhythm.  Dr. Parham           Follow Up   Return in about 3 months (around 6/30/2025) for Recheck.    Part of this note may be an electronic transcription/translation of spoken language to printed   text using the Dragon Dictation System.              Health Maintenance   Topic Date Due    ANNUAL WELLNESS VISIT  08/15/2025    LIPID PANEL  10/07/2025    TDAP/TD VACCINES (2 - Td or Tdap) 05/05/2026    HEPATITIS C SCREENING  Completed    COVID-19 Vaccine  Completed    INFLUENZA VACCINE  Completed    Pneumococcal Vaccine 0-49  Aged Out          Medical History:  There are no discontinued medications.   Past Medical History:    Acute upper respiratory infection    Age-related osteoporosis without current pathological fracture    Broken toe    left great toe    Cerebral palsy    diagnosed as infant    Compression fracture of first lumbar vertebra    Constipation    Disease of pancreas    GERD (gastroesophageal reflux disease)    History of urinary self-catheterization    pt or pt's mother catheterizes 4-6  times a day    Hydrocephalus in diseases classified elsewhere    NO  SHUNT    Inguinal hernia    Injury of back    Low back pain    Major depressive disorder    single episode, moderate    Other cerebral palsy    Other fatigue    Other generalized epilepsy and epileptic syndromes, not intractable, without status epilepticus    Other hypertrophic disorders of the skin    Pain in left ankle and joints of left foot    Pain in right toe(s)    Pain in thoracic spine    Pancreatic cyst    Paranoid schizophrenia    Pelvic and perineal pain    Primary insomnia    Pure hypercholesterolemia    Repeated falls    last fall 11/2022    Schizoaffective disorder, unspecified    Seizures    LAST SZ OVER 1 YEAR    Sleep apnea    uses cpap machine    Somnolence    TIA (transient ischemic attack)    UNSURE OF  DATE    Vitamin D  deficiency    Wedge compression fracture of fourth thoracic vertebra, subsequent encounter for fracture with routine healing     Past Surgical History:    CATHETERIZATION URETHRAL    CYST REMOVAL    Procedure: Excision of subcutaneous mass from right forearm;  Surgeon: Oni Greenberg MD;  Location: [facility] OR [facility];  Service: General;  Laterality: Right;    HAMSTRING REPAIR    age 10    INGUINAL HERNIA REPAIR    Procedure: INGUINAL HERNIA REPAIR LAPAROSCOPIC WITH DAVINCI ROBOT;  Surgeon: Mayur Quintana MD;  Location: [facility] OR [facility];  Service: Robotics - DaVinci;  Laterality: Right;    OTHER SURGICAL HISTORY    hyposadius repair    SUPRAPUBIC TUBE PLACEMENT    Procedure: Cystoscopy with SUPRAPUBIC CATHETER INSERTION;  Surgeon: Adalid Morris MD;  Location: [facility] MAIN OR;  Service: Urology;  Laterality: N/A;    TOE FUSION    great toe at age 21    WISDOM TOOTH EXTRACTION      Family History   Problem Relation Age of Onset    Hypothyroidism Mother     No Known Problems Father     Ulcers Brother     Malig Hyperthermia Neg Hx      Social History     Tobacco Use    Smoking status: Never     Passive exposure: Never    Smokeless tobacco: Never   Substance Use Topics    Alcohol use: Never       There are no preventive care reminders to display for this patient.     Immunization History   Administered Date(s) Administered    31-influenza Vac Quardvalent Preservativ 10/14/2014, 01/20/2016, 10/07/2016, 12/07/2021, 10/19/2022    COVID-19 (MODERNA) 1st,2nd,3rd Dose Monovalent 03/25/2021, 04/22/2021, 11/19/2021    COVID-19 (MODERNA) 6-11 YRS Monovalent 03/25/2021, 04/22/2021    COVID-19 (PFIZER) 12YRS+ (COMIRNATY) 11/16/2023, 10/24/2024    COVID-19 (PFIZER) BIVALENT 12+YRS 10/19/2022    Covid-19 (Pfizer) Gray Cap Monovalent 06/20/2022    Fluzone  >6mos 10/01/2013, 10/24/2024    Fluzone (or Fluarix & Flulaval for VFC) >6mos 10/07/2016, 12/07/2021, 10/19/2022, 11/16/2023    Influenza Seasonal Injectable 10/05/2011     Influenza, Unspecified 10/01/2013, 10/07/2016, 12/07/2021, 10/19/2022    Tdap 05/05/2016       Allergies   Allergen Reactions    Valproic Acid Irritability     DEPAKOTE CAUSES DIZZINESS AND IRRITABILITY

## 2025-03-31 NOTE — PATIENT INSTRUCTIONS
"Exercises to do While Sitting    Exercises that you do while sitting (chair exercises) can give you many of the same benefits as full exercise. Benefits include strengthening your heart, burning calories, and keeping muscles and joints healthy. Exercise can also improve your mood and help with depression and anxiety.  You may benefit from chair exercises if you are unable to do standing exercises due to:  Diabetic foot pain.  Obesity.  Illness.  Arthritis.  Recovery from surgery or injury.  Breathing problems.  Balance problems.  Another type of disability.  Before starting chair exercises, check with your health care provider or a physical therapist to find out how much exercise you can tolerate and which exercises are safe for you. If your health care provider approves:  Start out slowly and build up over time. Aim to work up to about 10-20 minutes for each exercise session.  Make exercise part of your daily routine.  Drink water when you exercise. Do not wait until you are thirsty. Drink every 10-15 minutes.  Stop exercising right away if you have pain, nausea, shortness of breath, or dizziness.  If you are exercising in a wheelchair, make sure to lock the wheels.  Ask your health care provider whether you can do melania chi or yoga. Many positions in these mind-body exercises can be modified to do while seated.  Warm-up  Before starting other exercises:  Sit up as straight as you can. Have your knees bent at 90 degrees, which is the shape of the capital letter \"L.\" Keep your feet flat on the floor.  Sit at the front edge of your chair, if you can.  Pull in (tighten) the muscles in your abdomen and stretch your spine and neck as straight as you can. Hold this position for a few minutes.  Breathe in and out evenly. Try to concentrate on your breathing, and relax your mind.  Stretching  Exercise A: Arm stretch  Hold your arms out straight in front of your body.  Bend your hands at the wrist with your fingers pointing " "up, as if signaling someone to stop. Notice the slight tension in your forearms as you hold the position.  Keeping your arms out and your hands bent, rotate your hands outward as far as you can and hold this stretch. Aim to have your thumbs pointing up and your pinkie fingers pointing down.  Slowly repeat arm stretches for one minute as tolerated.  Exercise B: Leg stretch  If you can move your legs, try to \"draw\" letters on the floor with the toes of your foot. Write your name with one foot.  Write your name with the toes of your other foot.  Slowly repeat the movements for one minute as tolerated.  Exercise C: Reach for the drake  Reach your hands as far over your head as you can to stretch your spine.  Move your hands and arms as if you are climbing a rope.  Slowly repeat the movements for one minute as tolerated.  Range of motion exercises  Exercise A: Shoulder roll  Let your arms hang loosely at your sides.  Lift just your shoulders up toward your ears, then let them relax back down.  When your shoulders feel loose, rotate your shoulders in backward and forward circles.  Do shoulder rolls slowly for one minute as tolerated.  Exercise B: March in place  As if you are marching, pump your arms and lift your legs up and down. Lift your knees as high as you can.  If you are unable to lift your knees, just pump your arms and move your ankles and feet up and down.  March in place for one minute as tolerated.  Exercise C: Seated jumping jacks  Let your arms hang down straight.  Keeping your arms straight, lift them up over your head. Aim to point your fingers to the ceiling.  While you lift your arms, straighten your legs and slide your heels along the floor to your sides, as wide as you can.  As you bring your arms back down to your sides, slide your legs back together.  If you are unable to use your legs, just move your arms.  Slowly repeat seated jumping jacks for one minute as tolerated.  Strengthening " exercises  Exercise A: Shoulder squeeze  Hold your arms straight out from your body to your sides, with your elbows bent and your fists pointed at the ceiling.  Keeping your arms in the bent position, move them forward so your elbows and forearms meet in front of your face.  Open your arms back out as wide as you can with your elbows still bent, until you feel your shoulder blades squeezing together. Hold for 5 seconds.  Slowly repeat the movements forward and backward for one minute as tolerated.  Contact a health care provider if:  You have to stop exercising due to any of the following:  Pain.  Nausea.  Shortness of breath.  Dizziness.  Fatigue.  You have significant pain or soreness after exercising.  Get help right away if:  You have chest pain.  You have difficulty breathing.  These symptoms may represent a serious problem that is an emergency. Do not wait to see if the symptoms will go away. Get medical help right away. Call your local emergency services (911 in the [place] Do not drive yourself to the hospital.  Summary  Exercises that you do while sitting (chair exercises) can strengthen your heart, burn calories, and keep muscles and joints healthy.  You may benefit from chair exercises if you are unable to do standing exercises due to diabetic foot pain, obesity, recovery from surgery or injury, or other conditions.  Before starting chair exercises, check with your health care provider or a physical therapist to find out how much exercise you can tolerate and which exercises are safe for you.  This information is not intended to replace advice given to you by your health care provider. Make sure you discuss any questions you have with your health care provider.  Document Revised: 02/13/2022 Document Reviewed: 02/13/2022  Elsevier Patient Education © 2023 [employer]

## 2025-04-08 ENCOUNTER — TELEPHONE (OUTPATIENT)
Dept: PHYSICAL THERAPY | Facility: OTHER | Age: 45
End: 2025-04-08
Payer: MEDICARE

## 2025-04-08 NOTE — TELEPHONE ENCOUNTER
Caller: Kang Grey    Relationship: Self    What was the call regarding: PATIENT CANCELLED APPT TODAY BECAUSE THEY CANT MAKE IT

## 2025-04-21 ENCOUNTER — TELEPHONE (OUTPATIENT)
Dept: UROLOGY | Age: 45
End: 2025-04-21
Payer: MEDICARE

## 2025-04-21 ENCOUNTER — TELEPHONE (OUTPATIENT)
Dept: FAMILY MEDICINE CLINIC | Age: 45
End: 2025-04-21
Payer: MEDICARE

## 2025-04-21 NOTE — TELEPHONE ENCOUNTER
Mom called to report that she is having issues with the SP tube/bag leaking. He has an appt on Thursday but she is asking for suggestions. She said that it was last changed on March 15. She is aware that Monica is out  of the office.

## 2025-04-22 NOTE — TELEPHONE ENCOUNTER
I spoke with the patient, I was trying to speak with his mom but she is still sleeping he will have her call me back when she wakes up. He was saying that the bag is leaking and it's not the catheter. I told him he still needs it changed since it's been six weeks.

## 2025-04-24 ENCOUNTER — LAB (OUTPATIENT)
Dept: LAB | Facility: HOSPITAL | Age: 45
End: 2025-04-24
Payer: MEDICARE

## 2025-04-24 ENCOUNTER — OFFICE VISIT (OUTPATIENT)
Dept: UROLOGY | Age: 45
End: 2025-04-24
Payer: MEDICARE

## 2025-04-24 VITALS — HEIGHT: 68 IN | BODY MASS INDEX: 26.07 KG/M2 | WEIGHT: 172 LBS

## 2025-04-24 DIAGNOSIS — R53.83 FATIGUE, UNSPECIFIED TYPE: ICD-10-CM

## 2025-04-24 DIAGNOSIS — L03.90 CELLULITIS, UNSPECIFIED CELLULITIS SITE: ICD-10-CM

## 2025-04-24 DIAGNOSIS — Z13.6 ENCOUNTER FOR SCREENING FOR CARDIOVASCULAR DISORDERS: ICD-10-CM

## 2025-04-24 DIAGNOSIS — E78.2 MIXED HYPERLIPIDEMIA: ICD-10-CM

## 2025-04-24 DIAGNOSIS — R33.9 URINARY RETENTION: ICD-10-CM

## 2025-04-24 DIAGNOSIS — N31.9 NEUROGENIC BLADDER: ICD-10-CM

## 2025-04-24 DIAGNOSIS — N40.0 BENIGN PROSTATIC HYPERPLASIA, UNSPECIFIED WHETHER LOWER URINARY TRACT SYMPTOMS PRESENT: ICD-10-CM

## 2025-04-24 DIAGNOSIS — N39.0 RECURRENT UTI: Primary | ICD-10-CM

## 2025-04-24 DIAGNOSIS — E55.9 VITAMIN D DEFICIENCY: ICD-10-CM

## 2025-04-24 DIAGNOSIS — E53.8 B12 DEFICIENCY: ICD-10-CM

## 2025-04-24 PROBLEM — M79.671 FOOT PAIN, RIGHT: Status: RESOLVED | Noted: 2020-02-06 | Resolved: 2025-04-24

## 2025-04-24 PROBLEM — T14.8XXA CLOSED FRACTURE OF BONE: Status: RESOLVED | Noted: 2021-06-25 | Resolved: 2025-04-24

## 2025-04-24 PROBLEM — R19.09 GROIN SWELLING: Status: RESOLVED | Noted: 2023-04-04 | Resolved: 2025-04-24

## 2025-04-24 PROBLEM — R51.9 HEADACHE: Status: RESOLVED | Noted: 2017-09-29 | Resolved: 2025-04-24

## 2025-04-24 PROBLEM — R39.89 ABNORMAL URINE COLOR: Status: RESOLVED | Noted: 2023-04-04 | Resolved: 2025-04-24

## 2025-04-24 PROBLEM — Z00.00 ROUTINE HEALTH MAINTENANCE: Status: RESOLVED | Noted: 2022-05-12 | Resolved: 2025-04-24

## 2025-04-24 PROBLEM — N40.1 BENIGN PROSTATIC HYPERPLASIA WITH URINARY FREQUENCY: Status: RESOLVED | Noted: 2022-03-23 | Resolved: 2025-04-24

## 2025-04-24 PROBLEM — L60.0 INGROWN TOENAIL: Status: RESOLVED | Noted: 2021-06-25 | Resolved: 2025-04-24

## 2025-04-24 PROBLEM — Z00.01 ENCOUNTER FOR GENERAL ADULT MEDICAL EXAMINATION WITH ABNORMAL FINDINGS: Status: RESOLVED | Noted: 2022-01-31 | Resolved: 2025-04-24

## 2025-04-24 PROBLEM — R35.0 BENIGN PROSTATIC HYPERPLASIA WITH URINARY FREQUENCY: Status: RESOLVED | Noted: 2022-03-23 | Resolved: 2025-04-24

## 2025-04-24 PROBLEM — M25.572 ANKLE PAIN, LEFT: Status: RESOLVED | Noted: 2021-06-25 | Resolved: 2025-04-24

## 2025-04-24 PROBLEM — G03.9 MENINGITIS: Status: RESOLVED | Noted: 2021-06-25 | Resolved: 2025-04-24

## 2025-04-24 PROBLEM — M20.40 HAMMERTOE: Status: RESOLVED | Noted: 2021-06-25 | Resolved: 2025-04-24

## 2025-04-24 LAB
25(OH)D3 SERPL-MCNC: 48 NG/ML (ref 30–100)
ALBUMIN SERPL-MCNC: 4.1 G/DL (ref 3.5–5.2)
ALBUMIN/GLOB SERPL: 1.3 G/DL
ALP SERPL-CCNC: 77 U/L (ref 39–117)
ALT SERPL W P-5'-P-CCNC: 14 U/L (ref 1–41)
ANION GAP SERPL CALCULATED.3IONS-SCNC: 12 MMOL/L (ref 5–15)
AST SERPL-CCNC: 16 U/L (ref 1–40)
BILIRUB SERPL-MCNC: 0.3 MG/DL (ref 0–1.2)
BUN SERPL-MCNC: 12 MG/DL (ref 6–20)
BUN/CREAT SERPL: 14.6 (ref 7–25)
CALCIUM SPEC-SCNC: 9.8 MG/DL (ref 8.6–10.5)
CHLORIDE SERPL-SCNC: 103 MMOL/L (ref 98–107)
CHOLEST SERPL-MCNC: 140 MG/DL (ref 0–200)
CO2 SERPL-SCNC: 24 MMOL/L (ref 22–29)
CREAT SERPL-MCNC: 0.82 MG/DL (ref 0.76–1.27)
DEPRECATED RDW RBC AUTO: 40.3 FL (ref 37–54)
EGFRCR SERPLBLD CKD-EPI 2021: 111.1 ML/MIN/1.73
ERYTHROCYTE [DISTWIDTH] IN BLOOD BY AUTOMATED COUNT: 12.8 % (ref 12.3–15.4)
GLOBULIN UR ELPH-MCNC: 3.1 GM/DL
GLUCOSE SERPL-MCNC: 106 MG/DL (ref 65–99)
HCT VFR BLD AUTO: 43.7 % (ref 37.5–51)
HDLC SERPL-MCNC: 33 MG/DL (ref 40–60)
HGB BLD-MCNC: 14.8 G/DL (ref 13–17.7)
LDLC SERPL CALC-MCNC: 84 MG/DL (ref 0–100)
LDLC/HDLC SERPL: 2.48 {RATIO}
MCH RBC QN AUTO: 28.8 PG (ref 26.6–33)
MCHC RBC AUTO-ENTMCNC: 33.9 G/DL (ref 31.5–35.7)
MCV RBC AUTO: 85.2 FL (ref 79–97)
PLATELET # BLD AUTO: 233 10*3/MM3 (ref 140–450)
PMV BLD AUTO: 9.8 FL (ref 6–12)
POTASSIUM SERPL-SCNC: 3.9 MMOL/L (ref 3.5–5.2)
PROT SERPL-MCNC: 7.2 G/DL (ref 6–8.5)
RBC # BLD AUTO: 5.13 10*6/MM3 (ref 4.14–5.8)
SODIUM SERPL-SCNC: 139 MMOL/L (ref 136–145)
T4 FREE SERPL-MCNC: 1.55 NG/DL (ref 0.92–1.68)
TRIGL SERPL-MCNC: 126 MG/DL (ref 0–150)
TSH SERPL DL<=0.05 MIU/L-ACNC: 2 UIU/ML (ref 0.27–4.2)
VIT B12 BLD-MCNC: 807 PG/ML (ref 211–946)
VLDLC SERPL-MCNC: 23 MG/DL (ref 5–40)
WBC NRBC COR # BLD AUTO: 5.39 10*3/MM3 (ref 3.4–10.8)

## 2025-04-24 PROCEDURE — 82607 VITAMIN B-12: CPT

## 2025-04-24 PROCEDURE — 84443 ASSAY THYROID STIM HORMONE: CPT

## 2025-04-24 PROCEDURE — 80061 LIPID PANEL: CPT

## 2025-04-24 PROCEDURE — 82306 VITAMIN D 25 HYDROXY: CPT

## 2025-04-24 PROCEDURE — 80053 COMPREHEN METABOLIC PANEL: CPT

## 2025-04-24 PROCEDURE — 84439 ASSAY OF FREE THYROXINE: CPT

## 2025-04-24 PROCEDURE — 85027 COMPLETE CBC AUTOMATED: CPT

## 2025-04-24 PROCEDURE — 36415 COLL VENOUS BLD VENIPUNCTURE: CPT

## 2025-04-24 RX ORDER — GENTAMICIN 40 MG/ML
80 INJECTION, SOLUTION INTRAMUSCULAR; INTRAVENOUS EVERY 12 HOURS
Status: COMPLETED | OUTPATIENT
Start: 2025-04-24 | End: 2025-04-24

## 2025-04-24 RX ORDER — SULFAMETHOXAZOLE AND TRIMETHOPRIM 800; 160 MG/1; MG/1
1 TABLET ORAL 2 TIMES DAILY
Qty: 20 TABLET | Refills: 0 | Status: SHIPPED | OUTPATIENT
Start: 2025-04-24

## 2025-04-24 RX ADMIN — GENTAMICIN 80 MG: 40 INJECTION, SOLUTION INTRAMUSCULAR; INTRAVENOUS at 09:47

## 2025-04-24 NOTE — PROGRESS NOTES
Chief Complaint: Follow-up (Patient presents today for a follow up on BPH and a neurogenic bladder. He also needs his SP tube changes as it has been almost six weeks and they lost their home health nurse. )    Subjective         History of Present Illness  Kang Grey is a 44 y.o. male presents to [facility] to be seen for f/u bph/ neurogenic bladder.      pt did have home home health changing his cath but they lost this in 2/2025.     His cath was last changed on 3/15/25 in the ED.    He has had some issues with the bag leaking.     Last positive cx in 12/2024 positive for klebsiella Resistant to levofloxacin, Bactrim, intermediate susceptibility to nitrofurantoin and Zosyn.    The patient states that he has some soreness at the SP tube insertion site.    Previous:      9/18/2024 SPT     No cardiopulmonary history.  Patient does not smoke. ASA 81     unable to do CIC.     7/18/2024 cystoscopy cath placement over a wire - MARGARET'Odilon -nursing could not get catheter placed        Could not do CIC at home, was not having left of the catheter in     history of hypospadias.  Status post repair.      No previous abdominal surgery.        7/22 cystoscopy-2 cm prostate.  Large bladder no pathology.  No trabeculation     6/9/2022 UDS-bladder capacity 2300, no sensation, large bladder capacity.  No involuntary contractions.  No incontinence.  No detrusor contraction.  Could not void without abdominal straining.  PVR 1049        Some pain with cathing, this is why he does not always do it.     Not always cathing consistently.  Mom states cathing a time or 2 daily     Cannot void on his own at times. No straining per pt.     Getting a lot out when he caths.     4/24 0.7,      4/24 NP-cathing but not every day, hard to get catheter and sometimes.  Pain with cathing     On nitrofurantoin 50 mg nightly     Patient  just finished round of IV antibiotics.     8/23 Enterococcus  6/23 Citrobacter  4/23  coag negative staph  9/22 coag negative staph        Patient has been treated for 2 UTIs in the last several months     No gross hematuria, voiding diary today shows they are getting out around 200-400 a.m. cath - 500-12 100 on the evening cath.        1/22 0.9, GFR 93    1/22 MRI abdomen with and without - stable 28 cm cyst in the pancreatic head.  Unchanged for 2 years.  Stable Marked distention of the urinary bladder, stable simple cyst left kidney 6 cm       3/20 CT abdomen with and without - 2.3 cm cyst head of pancreas.  Distention of the urinary bladder.  Similar to previous in 3/20.      No history of kidney  stone.     No urologic family history       Objective     Past Medical History:   Diagnosis Date    Acute upper respiratory infection 02/2022    Age-related osteoporosis without current pathological fracture     Broken toe 11/2022    left great toe    Cerebral palsy     diagnosed as infant    Compression fracture of first lumbar vertebra     Constipation     Disease of pancreas     GERD (gastroesophageal reflux disease)     History of urinary self-catheterization     pt or pt's mother catheterizes 4-6  times a day    Hydrocephalus in diseases classified elsewhere     NO  SHUNT    Inguinal hernia     Injury of back 02/2020    Low back pain     Major depressive disorder     single episode, moderate    Other cerebral palsy     Other fatigue     Other generalized epilepsy and epileptic syndromes, not intractable, without status epilepticus     Other hypertrophic disorders of the skin     Pain in left ankle and joints of left foot     Pain in right toe(s)     Pain in thoracic spine     Pancreatic cyst     Paranoid schizophrenia     Pelvic and perineal pain     Primary insomnia     Pure hypercholesterolemia     Repeated falls     last fall 11/2022    Schizoaffective disorder, unspecified     Seizures     LAST SZ OVER 1 YEAR    Sleep apnea     uses cpap machine    Somnolence     TIA (transient ischemic attack)      UNSURE OF  DATE    Vitamin D deficiency     Wedge compression fracture of fourth thoracic vertebra, subsequent encounter for fracture with routine healing        Past Surgical History:   Procedure Laterality Date    CATHETERIZATION URETHRAL      CYST REMOVAL Right 11/11/2022    Procedure: Excision of subcutaneous mass from right forearm;  Surgeon: Oni Greenberg MD;  Location: [facility] OR [facility] [place];  Service: General;  Laterality: Right;    HAMSTRING REPAIR Bilateral     age 10    INGUINAL HERNIA REPAIR Right 11/08/2023    Procedure: INGUINAL HERNIA REPAIR LAPAROSCOPIC WITH DAVINCI ROBOT;  Surgeon: Mayur Quintana MD;  Location: [facility] OR [facility] [place];  Service: Robotics - DaVinci;  Laterality: Right;    OTHER SURGICAL HISTORY      hyposadius repair    SUPRAPUBIC TUBE PLACEMENT N/A 09/18/2024    Procedure: Cystoscopy with SUPRAPUBIC CATHETER INSERTION;  Surgeon: Adalid Morris MD;  Location: [facility] MAIN OR;  Service: Urology;  Laterality: N/A;    TOE FUSION Right     great toe at age 21    WISDOM TOOTH EXTRACTION           Current Outpatient Medications:     alclometasone (ACLOVATE) 0.05 % cream, , Disp: , Rfl:     ascorbic acid (VITAMIN C) 1000 MG tablet, Take 1 tablet by mouth Daily., Disp: , Rfl:     aspirin 81 MG EC tablet, Take 1 tablet by mouth Daily., Disp: , Rfl:     busPIRone (BUSPAR) 10 MG tablet, Take 1 tablet by mouth 3 (Three) Times a Day As Needed., Disp: , Rfl:     Calcium Carbonate 1500 (600 Ca) MG tablet, Take 1 tablet by mouth 2 (two) times a day., Disp: , Rfl:     cholecalciferol (VITAMIN D3) 25 MCG (1000 UT) tablet, TAKE TWO TABLETS BY MOUTH ONCE DAILY, Disp: 60 tablet, Rfl: 2    Diclofenac Sodium (VOLTAREN) 1 % gel gel, Apply 1 g topically to the appropriate area as directed As Needed (LBP)., Disp: 100 g, Rfl: 2    docusate sodium (COLACE) 100 MG capsule, TAKE 1 CAPSULE BY MOUTH DAILY., Disp: 30 capsule, Rfl: 1    folic acid (FOLVITE) 800 MCG tablet, Take 1 tablet by mouth Daily., Disp: , Rfl:     HM  Melatonin 10 MG sublingual tablet, TAKE 1 TABLET BY MOUTH EVERY NIGHT AT BEDTIME., Disp: 30 tablet, Rfl: 5    HM TRUEplus Fiber 2 g chewable tablet, CHEW 1 TABLET DAILY., Disp: 90 tablet, Rfl: 1    HYDROcodone-acetaminophen (NORCO) 5-325 MG per tablet, Take 1 tablet by mouth Every 6 (Six) Hours As Needed for Moderate Pain (Pain)., Disp: 12 tablet, Rfl: 0    Invega Sustenna 234 MG/1.5ML suspension prefilled syringe IM injection, Inject 1.5 mL into the appropriate muscle as directed by prescriber Every 30 (Thirty) Days. Waiting on a appointment for his next injection., Disp: , Rfl:     Keppra 500 MG tablet, Take 3 tablets by mouth Every 12 (Twelve) Hours., Disp: , Rfl:     ketoconazole (NIZORAL) 2 % shampoo, , Disp: , Rfl:     lamoTRIgine (LaMICtal) 200 MG tablet, Take 1 tablet by mouth Daily., Disp: , Rfl:     methocarbamol (ROBAXIN) 750 MG tablet, Take 1 tablet by mouth 3 (Three) Times a Day As Needed for Muscle Spasms (back pain)., Disp: 40 tablet, Rfl: 0    pravastatin (PRAVACHOL) 20 MG tablet, TAKE 1 TABLET BY MOUTH EVERY NIGHT., Disp: 30 tablet, Rfl: 5    triamcinolone (KENALOG) 0.025 % cream, , Disp: , Rfl:     venlafaxine XR (EFFEXOR-XR) 150 MG 24 hr capsule, Take 1 capsule by mouth Daily., Disp: , Rfl:     venlafaxine XR (EFFEXOR-XR) 37.5 MG 24 hr capsule, Take 1 capsule by mouth Daily., Disp: , Rfl:     sulfamethoxazole-trimethoprim (Bactrim DS) 800-160 MG per tablet, Take 1 tablet by mouth 2 (Two) Times a Day., Disp: 20 tablet, Rfl: 0  No current facility-administered medications for this visit.    Allergies   Allergen Reactions    Valproic Acid Irritability     DEPAKOTE CAUSES DIZZINESS AND IRRITABILITY          Family History   Problem Relation Age of Onset    Hypothyroidism Mother     No Known Problems Father     Ulcers Brother     Malig Hyperthermia Neg Hx        Social History     Socioeconomic History    Marital status: Single   Tobacco Use    Smoking status: Never     Passive exposure: Never     "Smokeless tobacco: Never   Vaping Use    Vaping status: Never Used   Substance and Sexual Activity    Alcohol use: Never    Drug use: Never    Sexual activity: Defer       Vital Signs:   Ht 172.7 cm (68\")   Wt 78 kg (172 lb)   BMI 26.15 kg/m²      Physical Exam  Genitourinary:             Result Review :   The following data was reviewed by: ÁNGELA Quintana on 04/24/2025:  Results for orders placed or performed in visit on 12/20/24   Urine Culture - Urine, Urine, Clean Catch    Collection Time: 12/20/24 10:45 AM    Specimen: Urine, Clean Catch   Result Value Ref Range    Urine Culture >100,000 CFU/mL Klebsiella pneumoniae ESBL (A)        Susceptibility    Klebsiella pneumoniae ESBL - DAVID     Ertapenem  Susceptible ug/ml     Gentamicin  Susceptible ug/ml     Levofloxacin  Resistant ug/ml     Meropenem  Susceptible ug/ml     Nitrofurantoin  Intermediate ug/ml     Piperacillin + Tazobactam  Intermediate ug/ml     Trimethoprim + Sulfamethoxazole  Resistant ug/ml            Cath Change, Simple    Date/Time: 4/24/2025 9:42 AM    Performed by: Monica Parrish APRN  Authorized by: Monica Parrish APRN  Preparation: Patient was prepped and draped in the usual sterile fashion.  Local anesthesia used: no    Anesthesia:  Local anesthesia used: no    Sedation:  Patient sedated: no    Patient tolerance: patient tolerated the procedure well with no immediate complications  Comments: Existing indwelling Roldan catheter balloon deflated and catheter removed.  SP tube insertion site cleansed in an outward circular motion x 3 with iodine swab sticks.  Catheter replaced with new sterile 18 Azeri catheter with 10 cc balloon.  Noted immediate return of dark yellow urine.    Catheter was flushed with 80 mg of gentamicin and 20 cc of sterile water.  Catheter was attached to regular bag and secured to leg.              Assessment and Plan    Diagnoses and all orders for this visit:    1. Recurrent UTI (Primary)  -     " gentamicin (GARAMYCIN) injection 80 mg    2. Urinary retention  -     Cath Change, Simple  -     gentamicin (GARAMYCIN) injection 80 mg    3. Benign prostatic hyperplasia, unspecified whether lower urinary tract symptoms present    4. Neurogenic bladder  -     Cath Change, Simple    5. Cellulitis, unspecified cellulitis site  -     sulfamethoxazole-trimethoprim (Bactrim DS) 800-160 MG per tablet; Take 1 tablet by mouth 2 (Two) Times a Day.  Dispense: 20 tablet; Refill: 0          Catheter exchange performed today.    Gentamicin flush performed for UTI prophylaxis/treatment.    Will place patient on Bactrim for cellulitis around SP tube stoma.    Will plan for a follow-up appointment in 1 year with myself and for now we will plan for monthly visits for catheter exchanges until we can get him back in with home health.      I spent 10 minutes caring for Kang on this date of service. This time includes time spent by me in the following activities:reviewing tests, obtaining and/or reviewing a separately obtained history, performing a medically appropriate examination and/or evaluation , counseling and educating the patient/family/caregiver, ordering medications, tests, or procedures, and documenting information in the medical record  Follow Up   Return in about 1 year (around 4/24/2026) for Annual follow-up.  Patient was given instructions and counseling regarding his condition or for health maintenance advice. Please see specific information pulled into the AVS if appropriate.         This document has been electronically signed by ÁNGELA Quintana  April 24, 2025 09:49 EDT

## 2025-05-05 DIAGNOSIS — E55.9 VITAMIN D DEFICIENCY: ICD-10-CM

## 2025-05-05 RX ORDER — CHOLECALCIFEROL (VITAMIN D3) 25 MCG
2000 TABLET ORAL DAILY
Qty: 60 TABLET | Refills: 3 | Status: SHIPPED | OUTPATIENT
Start: 2025-05-05

## 2025-05-08 ENCOUNTER — TELEPHONE (OUTPATIENT)
Dept: UROLOGY | Age: 45
End: 2025-05-08
Payer: MEDICARE

## 2025-05-08 NOTE — TELEPHONE ENCOUNTER
MARLIN CALLED FROM [facility].  SHE IS F/U ON A HOME ARIELLE CERTIFICATION PLAN OF CARE ORDER #[id number]. THEY FAXED TO DR. WHEATLEY ON 12/31/24 AND 01/06/25.  THE FAX WAS 4 PAGES AND A COVER SHEET, AND HAS BEEN FAXED SEVERAL TIMES.    THE ORDER  DAYS OUT.  SHE ASKED FOR IT TO BE FAXED BACK AS SOON AS WE CAN  SO THEY DON'T LOSE THE CLAIM.    SHE IS FAXING AGAIN TODAY [phone].    #[phone]

## 2025-05-23 ENCOUNTER — TELEPHONE (OUTPATIENT)
Dept: UROLOGY | Age: 45
End: 2025-05-23
Payer: MEDICARE

## 2025-06-04 ENCOUNTER — OFFICE VISIT (OUTPATIENT)
Dept: PODIATRY | Facility: CLINIC | Age: 45
End: 2025-06-04
Payer: MEDICARE

## 2025-06-04 VITALS
WEIGHT: 160 LBS | HEART RATE: 87 BPM | HEIGHT: 68 IN | BODY MASS INDEX: 24.25 KG/M2 | DIASTOLIC BLOOD PRESSURE: 93 MMHG | OXYGEN SATURATION: 98 % | SYSTOLIC BLOOD PRESSURE: 144 MMHG

## 2025-06-04 DIAGNOSIS — G62.9 NEUROPATHY: Primary | ICD-10-CM

## 2025-06-04 DIAGNOSIS — L60.0 ONYCHOCRYPTOSIS: ICD-10-CM

## 2025-06-04 DIAGNOSIS — M79.671 FOOT PAIN, BILATERAL: ICD-10-CM

## 2025-06-04 DIAGNOSIS — R26.2 DIFFICULTY WALKING: ICD-10-CM

## 2025-06-04 DIAGNOSIS — M79.672 FOOT PAIN, BILATERAL: ICD-10-CM

## 2025-06-04 DIAGNOSIS — B35.1 ONYCHOMYCOSIS: ICD-10-CM

## 2025-06-04 DIAGNOSIS — M21.372 FOOT DROP, BILATERAL: ICD-10-CM

## 2025-06-04 DIAGNOSIS — M21.371 FOOT DROP, BILATERAL: ICD-10-CM

## 2025-06-04 NOTE — PROGRESS NOTES
[facility] - PODIATRY    Today's Date: 06/04/25    Patient Name: Kang Grey  MRN: [id number]  CSN: [id number]  PCP: Hien Parham MD, Last PCP Visit: 3/31/2025  Referring Provider: Hien Parham MD    SUBJECTIVE     Chief Complaint   Patient presents with    Left Foot - Follow-up, Nail Problem    Right Foot - Follow-up, Nail Problem       HPI: Kang Grey, a 44 y.o.male, comes to clinic.  Patient presents with his mother.    New, Established, New Problem:  established   Location:  Toenails  Duration:   Greater than five years  Onset:  Gradual  Nature:  sore with palpation.  Stable, worsening, improving:   Stable  Aggravating factors:  Pain with shoe gear and ambulation.  Previous Treatment:  debridement    Medical changes: Patient states he has difficulty wearing his bilateral ankle-foot orthotics.  He states these are over 5 years old.    Patient denies any fevers, chills, nausea, vomiting, shortness of breathe, nor any other constitutional signs nor symptoms.       I have reviewed/confirmed previously documented HPI with no changes.       Past Medical History:   Diagnosis Date    Acute upper respiratory infection 02/2022    ADHD (attention deficit hyperactivity disorder) 6/1986    Age-related osteoporosis without current pathological fracture     Allergic     Depacote    Anxiety     Broken toe 11/2022    left great toe    Bunion     Callus     Cerebral palsy     diagnosed as infant    Chronic constipation     Compression fracture of first lumbar vertebra     Constipation     Difficulty walking     Disease of pancreas     Fallen arches     GERD (gastroesophageal reflux disease)     Hammer toe     History of urinary self-catheterization     pt or pt's mother catheterizes 4-6  times a day    Hydrocephalus in diseases classified elsewhere     NO  SHUNT    Ingrown toenail     Inguinal hernia     Injury of back 02/2020    Low back pain     Major depressive disorder     single  episode, moderate    Other cerebral palsy     Other fatigue     Other generalized epilepsy and epileptic syndromes, not intractable, without status epilepticus     Other hypertrophic disorders of the skin     Pain in left ankle and joints of left foot     Pain in right toe(s)     Pain in thoracic spine     Pancreatic cyst     Paranoid schizophrenia     Pelvic and perineal pain     Primary insomnia     Pure hypercholesterolemia     Repeated falls     last fall 11/2022    Schizoaffective disorder, unspecified     Seizures     LAST SZ OVER 1 YEAR    Sleep apnea     uses cpap machine    Somnolence     TIA (transient ischemic attack)     UNSURE OF  DATE    Urinary tract infection     Visual impairment 1982    Vitamin D deficiency     Wedge compression fracture of fourth thoracic vertebra, subsequent encounter for fracture with routine healing      Past Surgical History:   Procedure Laterality Date    CATHETERIZATION URETHRAL      COLONOSCOPY      CORRECTION HAMMER TOE      CYST REMOVAL Right 11/11/2022    Procedure: Excision of subcutaneous mass from right forearm;  Surgeon: Oni Greenberg MD;  [place] [facility] [place] [facility] [place]  Service: General;  Laterality: Right;    HAMSTRING REPAIR Bilateral     age 10    INGUINAL HERNIA REPAIR Right 11/08/2023    Procedure: INGUINAL HERNIA REPAIR LAPAROSCOPIC WITH VASS TechnologiesINCI ROBOT;  Surgeon: Mayur Quintana MD;  [place] [facility] [place] [facility] [place]  Service: Robotics - DaVinci;  Laterality: Right;    OTHER SURGICAL HISTORY      hyposadius repair    SUPRAPUBIC TUBE PLACEMENT N/A 09/18/2024    Procedure: Cystoscopy with SUPRAPUBIC CATHETER INSERTION;  Surgeon: Adalid Morris MD;  Location: [facility] MAIN OR;  Service: Urology;  Laterality: N/A;    TENOTOMY ACHILLES TENDON      TOE FUSION Right     great toe at age 21    TOENAIL EXCISION      WISDOM TOOTH EXTRACTION       Family History   Problem Relation Age of Onset    Hypothyroidism Mother     Diabetes Mother     Hypertension Mother     Thyroid  disease Mother     No Known Problems Father     Ulcers Brother     Cancer Sister         Breast    Diabetes Sister     Hypertension Sister     Severe sprains Sister     Thyroid disease Sister     Cancer Maternal Uncle         Multiple forms    Cancer Maternal Uncle         Rare type leukemia    Malig Hyperthermia Neg Hx      Social History     Socioeconomic History    Marital status: Single   Tobacco Use    Smoking status: Never     Passive exposure: Never    Smokeless tobacco: Never   Vaping Use    Vaping status: Never Used   Substance and Sexual Activity    Alcohol use: Not Currently    Drug use: Never    Sexual activity: Never     Allergies   Allergen Reactions    Valproic Acid Irritability     DEPAKOTE CAUSES DIZZINESS AND IRRITABILITY       Current Outpatient Medications   Medication Sig Dispense Refill    alclometasone (ACLOVATE) 0.05 % cream       ascorbic acid (VITAMIN C) 1000 MG tablet Take 1 tablet by mouth Daily.      aspirin 81 MG EC tablet Take 1 tablet by mouth Daily.      busPIRone (BUSPAR) 10 MG tablet Take 1 tablet by mouth 3 (Three) Times a Day As Needed.      Calcium Carbonate 1500 (600 Ca) MG tablet Take 1 tablet by mouth 2 (two) times a day.      cholecalciferol 25 MCG tablet TAKE TWO TABLETS BY MOUTH ONCE DAILY 60 tablet 3    Diclofenac Sodium (VOLTAREN) 1 % gel gel Apply 1 g topically to the appropriate area as directed As Needed (LBP). 100 g 2    docusate sodium (COLACE) 100 MG capsule TAKE 1 CAPSULE BY MOUTH DAILY. 30 capsule 1    folic acid (FOLVITE) 800 MCG tablet Take 1 tablet by mouth Daily.      HM Melatonin 10 MG sublingual tablet TAKE 1 TABLET BY MOUTH EVERY NIGHT AT BEDTIME. 30 tablet 5    HM TRUEplus Fiber 2 g chewable tablet CHEW 1 TABLET DAILY. 90 tablet 1    Invega Sustenna 234 MG/1.5ML suspension prefilled syringe IM injection Inject 1.5 mL into the appropriate muscle as directed by prescriber Every 30 (Thirty) Days. Waiting on a appointment for his next injection.      Keppra  500 MG tablet Take 3 tablets by mouth Every 12 (Twelve) Hours.      ketoconazole (NIZORAL) 2 % shampoo       pravastatin (PRAVACHOL) 20 MG tablet TAKE 1 TABLET BY MOUTH EVERY NIGHT. 30 tablet 5    triamcinolone (KENALOG) 0.025 % cream       venlafaxine XR (EFFEXOR-XR) 150 MG 24 hr capsule Take 1 capsule by mouth Daily.      venlafaxine XR (EFFEXOR-XR) 37.5 MG 24 hr capsule Take 1 capsule by mouth Daily.       No current facility-administered medications for this visit.     Review of Systems   Constitutional: Negative.    Skin:         Painful toenails bilaterally   All other systems reviewed and are negative.      OBJECTIVE     Vitals:    06/04/25 0754   BP: 144/93   Pulse: 87   SpO2: 98%         Patient seen in no apparent distress.      PHYSICAL EXAM:     Foot/Ankle Exam    GENERAL  Appearance:  appears stated age and chronically ill  Orientation:  unable to assess  Affect:  inappropriate  Gait:  antalgic  Assistance:  cane use  Right shoe gear: casual shoe ([employer] Brace)  Left shoe gear: casual shoe ([place] [employer])    VASCULAR     Right Foot Vascularity   Dorsalis pedis:  2+  Posterior tibial:  2+  Skin temperature:  warm  Edema grading:  None  CFT:  < 3 seconds  Pedal hair growth:  Present  Varicosities:  mild varicosities     Left Foot Vascularity   Dorsalis pedis:  2+  Posterior tibial:  2+  Skin temperature:  warm  Edema grading:  None  CFT:  < 3 seconds  Pedal hair growth:  Present  Varicosities:  mild varicosities     NEUROLOGIC     Right Foot Neurologic   Light touch sensation: diminished  Vibratory sensation: diminished  Hot/Cold sensation: diminished  Protective Sensation using [place]reet Monofilament:   Sites intact: 3  Sites tested: 10     Left Foot Neurologic   Light touch sensation: diminished  Vibratory sensation: diminished  Hot/Cold sensation:  diminished  Protective Sensation using [place]reet Monofilament:   Sites intact: 3  Sites tested: 10    MUSCULOSKELETAL     Right Foot  Musculoskeletal   [place]:  First toe     Left Foot Musculoskeletal   [place]oe:  First toe    MUSCLE STRENGTH     Right Foot Muscle Strength   Foot dorsiflexion:  1  Foot plantar flexion:  4-  Foot inversion:  2  Foot eversion:  2     Left Foot Muscle Strength   Foot dorsiflexion:  1  Foot plantar flexion:  4-  Foot inversion:  2  Foot eversion:  2    DERMATOLOGIC      Right Foot Dermatologic   Skin  Right foot skin is intact.   Nails  1.  Positive for elongated, onychomycosis, abnormal thickness, subungual debris and ingrown toenail.  2.  Positive for elongated, onychomycosis, abnormal thickness, subungual debris and ingrown toenail.  3.  Positive for elongated, onychomycosis, abnormal thickness, subungual debris and ingrown toenail.  4.  Positive for elongated, onychomycosis, abnormal thickness, subungual debris and ingrown toenail.  5.  Positive for elongated, onychomycosis, abnormal thickness, subungual debris and ingrown toenail.     Left Foot Dermatologic   Skin  Left foot skin is intact.   Nails  1.  Positive for elongated, onychomycosis, abnormal thickness, subungual debris and ingrown toenail.  2.  Positive for elongated, onychomycosis, abnormal thickness, subungual debris and ingrown toenail.  3.  Positive for elongated, onychomycosis, abnormal thickness, subungual debris and ingrown toenail.  4.  Positive for elongated, onychomycosis, abnormally thick, subungual debris and ingrown toenail.  5.  Positive for elongated, onychomycosis, abnormally thick, subungual debris and ingrown toenail.    I have reexamined the patient the results are consistent with the previously documented exam.    ASSESSMENT/PLAN     Diagnoses and all orders for this visit:    1. Neuropathy (Primary)    2. Foot pain, bilateral    3. Onychocryptosis    4. Difficulty walking    5. Onychomycosis    6. Foot drop, bilateral  -     Miscellaneous DME    Rx:  AFOs b/l.    Comprehensive lower extremity examination and evaluation was  performed.    Discussed findings and treatment plan including risks, benefits, and treatment options with patient in detail. Patient agreed with treatment plan.    Toenails 1 through 5 bilaterally were debrided in thickness and length and then smoothed with a Dremel Tool.  Tolerated the procedure well without complications.    An After Visit Summary was printed and given to the patient at discharge, including (if requested) any available informative/educational handouts regarding diagnosis, treatment, or medications. All questions were answered to patient/family satisfaction. Should symptoms fail to improve or worsen they agree to call or return to clinic or to go to the Emergency Department. Discussed the importance of following up with any needed screening tests/labs/specialist appointments and any requested follow-up recommended by me today. Importance of maintaining follow-up discussed and patient accepts that missed appointments can delay diagnosis and potentially lead to worsening of conditions.    Return in about 9 weeks (around 8/6/2025) for Toenail Care., or sooner if acute issues arise.    I have reviewed the assessment and plan and verified the accuracy of it. No changes to assessment and plan since the information was documented. Roni Osullivan DPM 06/04/25     I have dictated this note utilizing Dragon Dictation.  Please note that portions of this note were completed with a voice recognition program.  Part of this note may be an electronic transcription/translation of spoken language to printed text using the Dragon Dictation System.      This document has been electronically signed by Roni Osullivan DPM on June 4, 2025 08:14 EDT

## 2025-06-10 ENCOUNTER — TELEPHONE (OUTPATIENT)
Dept: PHYSICAL THERAPY | Facility: CLINIC | Age: 45
End: 2025-06-10

## 2025-06-10 NOTE — TELEPHONE ENCOUNTER
Caller: Kang Grey    Relationship: Self    What was the call regarding: MOTHER CANT GET HIM OUT OF BED

## 2025-06-18 ENCOUNTER — CLINICAL SUPPORT (OUTPATIENT)
Dept: UROLOGY | Age: 45
End: 2025-06-18
Payer: MEDICARE

## 2025-06-18 DIAGNOSIS — R33.9 URINARY RETENTION: Primary | ICD-10-CM

## 2025-06-18 RX ORDER — PALIPERIDONE 3 MG/1
3 TABLET, EXTENDED RELEASE ORAL EVERY MORNING
COMMUNITY
Start: 2025-06-09

## 2025-06-18 RX ORDER — LAMOTRIGINE 200 MG/1
200 TABLET ORAL 2 TIMES DAILY
COMMUNITY
Start: 2025-06-09

## 2025-06-18 NOTE — PROGRESS NOTES
Procedure   Roldan Catheter Insertion / Removal / Change    Date/Time: 6/18/2025 4:12 PM    Performed by: Esthela Donald RN  Authorized by: Monica Parrish APRN  Preparation: Patient was prepped and draped in the usual sterile fashion.  Local anesthesia used: no    Anesthesia:  Local anesthesia used: no    Sedation:  Patient sedated: no    Patient tolerance: patient tolerated the procedure well with no immediate complications  Comments: Patient came in today for a monthly suprapubic catheter change. Patient was placed on the exam table. The catheter balloon was emptied of 10 cc sterile water and removed the catheter with a little resistance noted at the opening, he had developed a scab around the catheter in the stoma. Pt bled a little as I slowly pulled out the old catheter. A small amount of very dark yellow colored urine drained out. Then using aseptic technique, I then cleaned the area around the opening with betadine swabs, applied lube to the 18 Kinyarwanda silicone catheter, inserting it into the opening and then placed 10 cc/ml of sterile water into the balloon.  I then placed 50 ml of normal saline through the catheter into his bladder. straw-yellow water drained into his 2000 ml catheter bag.The catheter was secured with a stat lock to the right side of his abdomen with a securement device. Patient tolerated the procedure well.

## 2025-06-23 ENCOUNTER — TELEPHONE (OUTPATIENT)
Dept: UROLOGY | Age: 45
End: 2025-06-23
Payer: MEDICARE

## 2025-06-23 NOTE — TELEPHONE ENCOUNTER
PATIENT HAD CATHETER CHANGED ON WEDNESDAY. MOM CALLED TO REPORT THAT IT STARTED LEAKING ON FRIDAY. THEY GOT A NEW CATHETER BAG THINKING THAT WAS THE PROBLEM BUT SHE SAID THAT IT IS STILL LEAKING.    Glycopyrrolate Pregnancy And Lactation Text: This medication is Pregnancy Category B and is considered safe during pregnancy. It is unknown if it is excreted breast milk.

## 2025-06-23 NOTE — TELEPHONE ENCOUNTER
Patient just needs to be seen by nurse to have his catheter changed or have someone look at his catheter.

## 2025-06-24 ENCOUNTER — OFFICE VISIT (OUTPATIENT)
Dept: UROLOGY | Age: 45
End: 2025-06-24
Payer: MEDICARE

## 2025-06-24 VITALS — WEIGHT: 160 LBS | HEIGHT: 68 IN | BODY MASS INDEX: 24.25 KG/M2

## 2025-06-24 DIAGNOSIS — N32.89 BLADDER SPASMS: ICD-10-CM

## 2025-06-24 DIAGNOSIS — R33.9 URINARY RETENTION: Primary | ICD-10-CM

## 2025-06-24 PROCEDURE — 99213 OFFICE O/P EST LOW 20 MIN: CPT | Performed by: NURSE PRACTITIONER

## 2025-06-24 PROCEDURE — 1160F RVW MEDS BY RX/DR IN RCRD: CPT | Performed by: NURSE PRACTITIONER

## 2025-06-24 PROCEDURE — 51700 IRRIGATION OF BLADDER: CPT | Performed by: NURSE PRACTITIONER

## 2025-06-24 PROCEDURE — 1159F MED LIST DOCD IN RCRD: CPT | Performed by: NURSE PRACTITIONER

## 2025-06-24 RX ORDER — OXYBUTYNIN CHLORIDE 5 MG/1
5 TABLET ORAL 3 TIMES DAILY
Qty: 270 TABLET | Refills: 0 | Status: SHIPPED | OUTPATIENT
Start: 2025-06-24

## 2025-06-24 NOTE — PROGRESS NOTES
Chief Complaint: Benign Prostatic Hypertrophy (Patient presents for a leaking catheter. )    Subjective         Benign Prostatic Hypertrophy      Kang Grey is a 45 y.o. male presents to [facility] to be seen for f/u bph/ neurogenic bladder.      Patient presents today after having catheter exchanged last week stating that he has been having large volumes of leakage from his catheter.  Unsure if this is from the catheter bag or from the penis.          Previous:       pt did have home home health changing his cath but they lost this in 2/2025.     His cath was last changed on 3/15/25 in the ED.    He has had some issues with the bag leaking.     Last positive cx in 12/2024 positive for klebsiella Resistant to levofloxacin, Bactrim, intermediate susceptibility to nitrofurantoin and Zosyn.    The patient states that he has some soreness at the SP tube insertion site.    Previous:      9/18/2024 SPT     No cardiopulmonary history.  Patient does not smoke. ASA 81     unable to do CIC.     7/18/2024 cystoscopy cath placement over a wire - [facility] -nursing could not get catheter placed        Could not do CIC at home, was not having left of the catheter in     history of hypospadias.  Status post repair.      No previous abdominal surgery.        7/22 cystoscopy-2 cm prostate.  Large bladder no pathology.  No trabeculation     6/9/2022 UDS-bladder capacity 2300, no sensation, large bladder capacity.  No involuntary contractions.  No incontinence.  No detrusor contraction.  Could not void without abdominal straining.  PVR 1049        Some pain with cathing, this is why he does not always do it.     Not always cathing consistently.  Mom states cathing a time or 2 daily     Cannot void on his own at times. No straining per pt.     Getting a lot out when he caths.     4/24 0.7,      4/24 NP-cathing but not every day, hard to get catheter and sometimes.  Pain with cathing     On  nitrofurantoin 50 mg nightly     Patient  just finished round of IV antibiotics.     8/23 Enterococcus  6/23 Citrobacter  4/23 coag negative staph  9/22 coag negative staph        Patient has been treated for 2 UTIs in the last several months     No gross hematuria, voiding diary today shows they are getting out around 200-400 a.m. cath - 500-12 100 on the evening cath.        1/22 0.9, GFR 93    1/22 MRI abdomen with and without - stable 28 cm cyst in the pancreatic head.  Unchanged for 2 years.  Stable Marked distention of the urinary bladder, stable simple cyst left kidney 6 cm       3/20 CT abdomen with and without - 2.3 cm cyst head of pancreas.  Distention of the urinary bladder.  Similar to previous in 3/20.      No history of kidney  stone.     No urologic family history       Objective     Past Medical History:   Diagnosis Date    Acute upper respiratory infection 02/2022    ADHD (attention deficit hyperactivity disorder) 6/1986    Age-related osteoporosis without current pathological fracture     Allergic     Depacote    Anxiety     Broken toe 11/2022    left great toe    Bunion     Callus     Cerebral palsy     diagnosed as infant    Chronic constipation     Compression fracture of first lumbar vertebra     Constipation     Difficulty walking     Disease of pancreas     Fallen arches     GERD (gastroesophageal reflux disease)     Hammer toe     History of urinary self-catheterization     pt or pt's mother catheterizes 4-6  times a day    Hydrocephalus in diseases classified elsewhere     NO  SHUNT    Ingrown toenail     Inguinal hernia     Injury of back 02/2020    Low back pain     Major depressive disorder     single episode, moderate    Other cerebral palsy     Other fatigue     Other generalized epilepsy and epileptic syndromes, not intractable, without status epilepticus     Other hypertrophic disorders of the skin     Pain in left ankle and joints of left foot     Pain in right toe(s)     Pain in  thoracic spine     Pancreatic cyst     Paranoid schizophrenia     Pelvic and perineal pain     Primary insomnia     Pure hypercholesterolemia     Repeated falls     last fall 11/2022    Schizoaffective disorder, unspecified     Seizures     LAST SZ OVER 1 YEAR    Sleep apnea     uses cpap machine    Somnolence     TIA (transient ischemic attack)     UNSURE OF  DATE    Urinary tract infection     Visual impairment 1982    Vitamin D deficiency     Wedge compression fracture of fourth thoracic vertebra, subsequent encounter for fracture with routine healing        Past Surgical History:   Procedure Laterality Date    CATHETERIZATION URETHRAL      COLONOSCOPY      CORRECTION HAMMER TOE      CYST REMOVAL Right 11/11/2022    Procedure: Excision of subcutaneous mass from right forearm;  Surgeon: Oni Greenberg MD;  Location: [facility];  Service: General;  Laterality: Right;    HAMSTRING REPAIR Bilateral     age 10    INGUINAL HERNIA REPAIR Right 11/08/2023    Procedure: INGUINAL HERNIA REPAIR LAPAROSCOPIC WITH [employer] ROBOT;  Surgeon: Mayur Quintana MD;  Location: [facility];  Service: Robotics - DaVinci;  Laterality: Right;    OTHER SURGICAL HISTORY      hyposadius repair    SUPRAPUBIC TUBE PLACEMENT N/A 09/18/2024    Procedure: Cystoscopy with SUPRAPUBIC CATHETER INSERTION;  Surgeon: Adalid Morris MD;  Location: [facility] OR;  Service: Urology;  Laterality: N/A;    TENOTOMY ACHILLES TENDON      TOE FUSION Right     great toe at age 21    TOENAIL EXCISION      WISDOM TOOTH EXTRACTION           Current Outpatient Medications:     alclometasone (ACLOVATE) 0.05 % cream, , Disp: , Rfl:     ascorbic acid (VITAMIN C) 1000 MG tablet, Take 1 tablet by mouth Daily., Disp: , Rfl:     aspirin 81 MG EC tablet, Take 1 tablet by mouth Daily., Disp: , Rfl:     busPIRone (BUSPAR) 10 MG tablet, Take 1 tablet by mouth 3 (Three) Times a Day As Needed., Disp: , Rfl:     Calcium Carbonate 1500 (600 Ca) MG tablet, Take 1  tablet by mouth 2 (two) times a day., Disp: , Rfl:     cholecalciferol 25 MCG tablet, TAKE TWO TABLETS BY MOUTH ONCE DAILY, Disp: 60 tablet, Rfl: 3    Diclofenac Sodium (VOLTAREN) 1 % gel gel, Apply 1 g topically to the appropriate area as directed As Needed (LBP)., Disp: 100 g, Rfl: 2    docusate sodium (COLACE) 100 MG capsule, TAKE 1 CAPSULE BY MOUTH DAILY., Disp: 30 capsule, Rfl: 1    folic acid (FOLVITE) 800 MCG tablet, Take 1 tablet by mouth Daily., Disp: , Rfl:     HM Melatonin 10 MG sublingual tablet, TAKE 1 TABLET BY MOUTH EVERY NIGHT AT BEDTIME., Disp: 30 tablet, Rfl: 5    HM TRUEplus Fiber 2 g chewable tablet, CHEW 1 TABLET DAILY., Disp: 90 tablet, Rfl: 1    Invega Sustenna 234 MG/1.5ML suspension prefilled syringe IM injection, Inject 1.5 mL into the appropriate muscle as directed by prescriber Every 30 (Thirty) Days. Waiting on a appointment for his next injection., Disp: , Rfl:     Keppra 500 MG tablet, Take 3 tablets by mouth Every 12 (Twelve) Hours., Disp: , Rfl:     ketoconazole (NIZORAL) 2 % shampoo, , Disp: , Rfl:     lamoTRIgine (LaMICtal) 200 MG tablet, Take 1 tablet by mouth 2 (Two) Times a Day., Disp: , Rfl:     paliperidone (INVEGA) 3 MG 24 hr tablet, Take 1 tablet by mouth Every Morning., Disp: , Rfl:     pravastatin (PRAVACHOL) 20 MG tablet, TAKE 1 TABLET BY MOUTH EVERY NIGHT., Disp: 30 tablet, Rfl: 5    triamcinolone (KENALOG) 0.025 % cream, , Disp: , Rfl:     venlafaxine XR (EFFEXOR-XR) 150 MG 24 hr capsule, Take 1 capsule by mouth Daily., Disp: , Rfl:     venlafaxine XR (EFFEXOR-XR) 37.5 MG 24 hr capsule, Take 1 capsule by mouth Daily., Disp: , Rfl:     oxybutynin (DITROPAN) 5 MG tablet, Take 1 tablet by mouth 3 (Three) Times a Day., Disp: 270 tablet, Rfl: 0  No current facility-administered medications for this visit.    Allergies   Allergen Reactions    Valproic Acid Irritability     DEPAKOTE CAUSES DIZZINESS AND IRRITABILITY          Family History   Problem Relation Age of Onset     "Hypothyroidism Mother     Diabetes Mother     Hypertension Mother     Thyroid disease Mother     No Known Problems Father     Ulcers Brother     Cancer Sister         Breast    Diabetes Sister     Hypertension Sister     Severe sprains Sister     Thyroid disease Sister     Cancer Maternal Uncle         Multiple forms    Cancer Maternal Uncle         Rare type leukemia    Malig Hyperthermia Neg Hx        Social History     Socioeconomic History    Marital status: Single   Tobacco Use    Smoking status: Never     Passive exposure: Never    Smokeless tobacco: Never   Vaping Use    Vaping status: Never Used   Substance and Sexual Activity    Alcohol use: Not Currently    Drug use: Never    Sexual activity: Never       Vital Signs:   Ht 172.7 cm (68\")   Wt 72.6 kg (160 lb)   BMI 24.33 kg/m²      Physical Exam     Result Review :   The following data was reviewed by: ÁNGELA Quintana on 06/24/2025:  Results for orders placed or performed in visit on 04/24/25   Comprehensive Metabolic Panel    Collection Time: 04/24/25 11:00 AM    Specimen: Blood   Result Value Ref Range    Glucose 106 (H) 65 - 99 mg/dL    BUN 12 6 - 20 mg/dL    Creatinine 0.82 0.76 - 1.27 mg/dL    Sodium 139 136 - 145 mmol/L    Potassium 3.9 3.5 - 5.2 mmol/L    Chloride 103 98 - 107 mmol/L    CO2 24.0 22.0 - 29.0 mmol/L    Calcium 9.8 8.6 - 10.5 mg/dL    Total Protein 7.2 6.0 - 8.5 g/dL    Albumin 4.1 3.5 - 5.2 g/dL    ALT (SGPT) 14 1 - 41 U/L    AST (SGOT) 16 1 - 40 U/L    Alkaline Phosphatase 77 39 - 117 U/L    Total Bilirubin 0.3 0.0 - 1.2 mg/dL    Globulin 3.1 gm/dL    A/G Ratio 1.3 g/dL    BUN/Creatinine Ratio 14.6 7.0 - 25.0    Anion Gap 12.0 5.0 - 15.0 mmol/L    eGFR 111.1 >60.0 mL/min/1.73   CBC (No Diff)    Collection Time: 04/24/25 11:00 AM    Specimen: Blood   Result Value Ref Range    WBC 5.39 3.40 - 10.80 10*3/mm3    RBC 5.13 4.14 - 5.80 10*6/mm3    Hemoglobin 14.8 13.0 - 17.7 g/dL    Hematocrit 43.7 37.5 - 51.0 %    MCV 85.2 79.0 - " 97.0 fL    MCH 28.8 26.6 - 33.0 pg    MCHC 33.9 31.5 - 35.7 g/dL    RDW 12.8 12.3 - 15.4 %    RDW-SD 40.3 37.0 - 54.0 fl    MPV 9.8 6.0 - 12.0 fL    Platelets 233 140 - 450 10*3/mm3   TSH+Free T4    Collection Time: 04/24/25 11:00 AM    Specimen: Blood   Result Value Ref Range    TSH 2.000 0.270 - 4.200 uIU/mL    Free T4 1.55 0.92 - 1.68 ng/dL   Lipid Panel    Collection Time: 04/24/25 11:00 AM    Specimen: Blood   Result Value Ref Range    Total Cholesterol 140 0 - 200 mg/dL    Triglycerides 126 0 - 150 mg/dL    HDL Cholesterol 33 (L) 40 - 60 mg/dL    LDL Cholesterol  84 0 - 100 mg/dL    VLDL Cholesterol 23 5 - 40 mg/dL    LDL/HDL Ratio 2.48    Vitamin D,25-Hydroxy    Collection Time: 04/24/25 11:00 AM    Specimen: Blood   Result Value Ref Range    25 Hydroxy, Vitamin D 48.0 30.0 - 100.0 ng/ml   Vitamin B12    Collection Time: 04/24/25 11:00 AM    Specimen: Blood   Result Value Ref Range    Vitamin B-12 807 211 - 946 pg/mL            Irrigation of Bladder    Date/Time: 6/24/2025 1:33 PM    Performed by: Monica Parrish APRN  Authorized by: Monica Parrish APRN  Preparation: Patient was prepped and draped in the usual sterile fashion.  Local anesthesia used: no    Anesthesia:  Local anesthesia used: no    Sedation:  Patient sedated: no    Patient tolerance: patient tolerated the procedure well with no immediate complications  Comments: Bladder was irrigated with 100 cc of sterile water and an instillation of gentamicin, Solu-Medrol, lidocaine was instilled into the bladder              Assessment and Plan    Diagnoses and all orders for this visit:    1. Urinary retention (Primary)  -     Irrigation of Bladder  -     lidocaine (XYLOCAINE) 2% 20 mL, gentamicin (GARAMYCIN) 80 mg, methylPREDNISolone sodium succinate (SOLU-Medrol) 125 mg irrigation    2. Bladder spasms  -     oxybutynin (DITROPAN) 5 MG tablet; Take 1 tablet by mouth 3 (Three) Times a Day.  Dispense: 270 tablet; Refill: 0  -     lidocaine  (XYLOCAINE) 2% 20 mL, gentamicin (GARAMYCIN) 80 mg, methylPREDNISolone sodium succinate (SOLU-Medrol) 125 mg irrigation      Discussed with the patient and patient's mother that I do believe he is having bladder spasms at this point in time.    Will send in oxybutynin for mitigation of bladder spasms.    Bladder instillation performed today.        I spent 10 minutes caring for Kang on this date of service. This time includes time spent by me in the following activities:reviewing tests, obtaining and/or reviewing a separately obtained history, performing a medically appropriate examination and/or evaluation , counseling and educating the patient/family/caregiver, ordering medications, tests, or procedures, and documenting information in the medical record  Follow Up   No follow-ups on file.  Patient was given instructions and counseling regarding his condition or for health maintenance advice. Please see specific information pulled into the AVS if appropriate.         This document has been electronically signed by ÁNGELA Quintana  June 24, 2025 13:38 EDT

## 2025-07-08 ENCOUNTER — OFFICE VISIT (OUTPATIENT)
Dept: FAMILY MEDICINE CLINIC | Age: 45
End: 2025-07-08
Payer: MEDICARE

## 2025-07-08 ENCOUNTER — TRANSCRIBE ORDERS (OUTPATIENT)
Dept: ADMINISTRATIVE | Facility: HOSPITAL | Age: 45
End: 2025-07-08
Payer: MEDICARE

## 2025-07-08 ENCOUNTER — LAB (OUTPATIENT)
Dept: LAB | Facility: HOSPITAL | Age: 45
End: 2025-07-08
Payer: MEDICARE

## 2025-07-08 VITALS
HEART RATE: 86 BPM | HEIGHT: 68 IN | DIASTOLIC BLOOD PRESSURE: 83 MMHG | WEIGHT: 169.2 LBS | TEMPERATURE: 98 F | BODY MASS INDEX: 25.64 KG/M2 | SYSTOLIC BLOOD PRESSURE: 137 MMHG | OXYGEN SATURATION: 96 %

## 2025-07-08 DIAGNOSIS — G47.39 COMPLEX SLEEP APNEA SYNDROME: ICD-10-CM

## 2025-07-08 DIAGNOSIS — K59.09 OTHER CONSTIPATION: ICD-10-CM

## 2025-07-08 DIAGNOSIS — F25.0 SCHIZOAFFECTIVE DISORDER, BIPOLAR TYPE: ICD-10-CM

## 2025-07-08 DIAGNOSIS — N39.0 RECURRENT URINARY TRACT INFECTION: ICD-10-CM

## 2025-07-08 DIAGNOSIS — R29.898 LEG WEAKNESS, BILATERAL: Primary | ICD-10-CM

## 2025-07-08 DIAGNOSIS — N31.9 NEUROGENIC BLADDER: ICD-10-CM

## 2025-07-08 DIAGNOSIS — E78.2 MIXED HYPERLIPIDEMIA: ICD-10-CM

## 2025-07-08 DIAGNOSIS — G40.209 COMPLEX PARTIAL SEIZURE EVOLVING TO GENERALIZED SEIZURE: ICD-10-CM

## 2025-07-08 DIAGNOSIS — M21.969 ANKLE JOINT DEFORMITY, UNSPECIFIED LATERALITY: ICD-10-CM

## 2025-07-08 DIAGNOSIS — R39.11 BENIGN PROSTATIC HYPERPLASIA WITH URINARY HESITANCY: ICD-10-CM

## 2025-07-08 DIAGNOSIS — N40.1 BENIGN PROSTATIC HYPERPLASIA WITH URINARY HESITANCY: ICD-10-CM

## 2025-07-08 DIAGNOSIS — F51.01 PRIMARY INSOMNIA: ICD-10-CM

## 2025-07-08 DIAGNOSIS — M21.962 FOOT DEFORMITY, BILATERAL: ICD-10-CM

## 2025-07-08 DIAGNOSIS — M21.961 FOOT DEFORMITY, BILATERAL: ICD-10-CM

## 2025-07-08 DIAGNOSIS — E55.9 VITAMIN D DEFICIENCY: ICD-10-CM

## 2025-07-08 DIAGNOSIS — G43.009 MIGRAINE WITHOUT AURA AND WITHOUT STATUS MIGRAINOSUS, NOT INTRACTABLE: ICD-10-CM

## 2025-07-08 DIAGNOSIS — Z79.899 DRUG THERAPY: ICD-10-CM

## 2025-07-08 DIAGNOSIS — K21.9 GASTROESOPHAGEAL REFLUX DISEASE WITHOUT ESOPHAGITIS: ICD-10-CM

## 2025-07-08 DIAGNOSIS — Z79.899 DRUG THERAPY: Primary | ICD-10-CM

## 2025-07-08 DIAGNOSIS — F33.1 MODERATE EPISODE OF RECURRENT MAJOR DEPRESSIVE DISORDER: ICD-10-CM

## 2025-07-08 LAB
ALBUMIN SERPL-MCNC: 4.2 G/DL (ref 3.5–5.2)
ALBUMIN/GLOB SERPL: 1.7 G/DL
ALP SERPL-CCNC: 69 U/L (ref 39–117)
ALT SERPL W P-5'-P-CCNC: 16 U/L (ref 1–41)
ANION GAP SERPL CALCULATED.3IONS-SCNC: 8.8 MMOL/L (ref 5–15)
AST SERPL-CCNC: 19 U/L (ref 1–40)
BASOPHILS # BLD AUTO: 0.03 10*3/MM3 (ref 0–0.2)
BASOPHILS NFR BLD AUTO: 0.3 % (ref 0–1.5)
BILIRUB SERPL-MCNC: 0.2 MG/DL (ref 0–1.2)
BUN SERPL-MCNC: 21 MG/DL (ref 6–20)
BUN/CREAT SERPL: 27.3 (ref 7–25)
CALCIUM SPEC-SCNC: 9.8 MG/DL (ref 8.6–10.5)
CHLORIDE SERPL-SCNC: 104 MMOL/L (ref 98–107)
CHOLEST SERPL-MCNC: 144 MG/DL (ref 0–200)
CO2 SERPL-SCNC: 26.2 MMOL/L (ref 22–29)
CREAT SERPL-MCNC: 0.77 MG/DL (ref 0.76–1.27)
DEPRECATED RDW RBC AUTO: 46.4 FL (ref 37–54)
EGFRCR SERPLBLD CKD-EPI 2021: 112.5 ML/MIN/1.73
EOSINOPHIL # BLD AUTO: 0.14 10*3/MM3 (ref 0–0.4)
EOSINOPHIL NFR BLD AUTO: 1.3 % (ref 0.3–6.2)
ERYTHROCYTE [DISTWIDTH] IN BLOOD BY AUTOMATED COUNT: 13.7 % (ref 12.3–15.4)
GLOBULIN UR ELPH-MCNC: 2.5 GM/DL
GLUCOSE SERPL-MCNC: 67 MG/DL (ref 65–99)
HCT VFR BLD AUTO: 44.7 % (ref 37.5–51)
HDLC SERPL-MCNC: 49 MG/DL (ref 40–60)
HGB BLD-MCNC: 14.6 G/DL (ref 13–17.7)
IMM GRANULOCYTES # BLD AUTO: 0.01 10*3/MM3 (ref 0–0.05)
IMM GRANULOCYTES NFR BLD AUTO: 0.1 % (ref 0–0.5)
LDLC SERPL CALC-MCNC: 80 MG/DL (ref 0–100)
LDLC/HDLC SERPL: 1.63 {RATIO}
LYMPHOCYTES # BLD AUTO: 1.42 10*3/MM3 (ref 0.7–3.1)
LYMPHOCYTES NFR BLD AUTO: 13.5 % (ref 19.6–45.3)
MCH RBC QN AUTO: 29.6 PG (ref 26.6–33)
MCHC RBC AUTO-ENTMCNC: 32.7 G/DL (ref 31.5–35.7)
MCV RBC AUTO: 90.5 FL (ref 79–97)
MONOCYTES # BLD AUTO: 0.59 10*3/MM3 (ref 0.1–0.9)
MONOCYTES NFR BLD AUTO: 5.6 % (ref 5–12)
NEUTROPHILS NFR BLD AUTO: 79.2 % (ref 42.7–76)
NEUTROPHILS NFR BLD AUTO: 8.3 10*3/MM3 (ref 1.7–7)
PLATELET # BLD AUTO: 158 10*3/MM3 (ref 140–450)
PMV BLD AUTO: 11.5 FL (ref 6–12)
POTASSIUM SERPL-SCNC: 4 MMOL/L (ref 3.5–5.2)
PROLACTIN SERPL-MCNC: 14.6 NG/ML (ref 4.04–15.2)
PROT SERPL-MCNC: 6.7 G/DL (ref 6–8.5)
RBC # BLD AUTO: 4.94 10*6/MM3 (ref 4.14–5.8)
SODIUM SERPL-SCNC: 139 MMOL/L (ref 136–145)
TRIGL SERPL-MCNC: 75 MG/DL (ref 0–150)
VLDLC SERPL-MCNC: 15 MG/DL (ref 5–40)
WBC NRBC COR # BLD AUTO: 10.49 10*3/MM3 (ref 3.4–10.8)

## 2025-07-08 PROCEDURE — 36415 COLL VENOUS BLD VENIPUNCTURE: CPT

## 2025-07-08 PROCEDURE — 1126F AMNT PAIN NOTED NONE PRSNT: CPT | Performed by: FAMILY MEDICINE

## 2025-07-08 PROCEDURE — G2211 COMPLEX E/M VISIT ADD ON: HCPCS | Performed by: FAMILY MEDICINE

## 2025-07-08 PROCEDURE — 83036 HEMOGLOBIN GLYCOSYLATED A1C: CPT

## 2025-07-08 PROCEDURE — 99214 OFFICE O/P EST MOD 30 MIN: CPT | Performed by: FAMILY MEDICINE

## 2025-07-08 PROCEDURE — 84146 ASSAY OF PROLACTIN: CPT

## 2025-07-08 PROCEDURE — 85025 COMPLETE CBC W/AUTO DIFF WBC: CPT

## 2025-07-08 PROCEDURE — 80053 COMPREHEN METABOLIC PANEL: CPT

## 2025-07-08 PROCEDURE — 80061 LIPID PANEL: CPT

## 2025-07-08 RX ORDER — AMOXICILLIN 250 MG
1 CAPSULE ORAL DAILY
Qty: 60 TABLET | Refills: 2 | Status: SHIPPED | OUTPATIENT
Start: 2025-07-08

## 2025-07-08 NOTE — ASSESSMENT & PLAN NOTE
He presents with ongoing depression and grief reaction with underlying schizo Fernea disorder.  He has been without some of his psychiatric medications so he is not doing as well as he has been in the past.  He is very argumentative and yells at his sister and mother a lot.  He seems very irritable.  He recently saw his therapist psychiatrist at [facility] and current treatment includes oral Invega, Lamictal, and Effexor as well as BuSpar.  He states he thinks he is doing fine.  He denies HI/SI.  Family does not feel like he is doing fine and that his moods are very labile.  I will reach out to his psychiatrist to let them know what is going on

## 2025-07-08 NOTE — ASSESSMENT & PLAN NOTE
He has follow-up with neurology but he is overall stable with no acute issues of seizures on Keppra, he tolerates med well

## 2025-07-08 NOTE — PROGRESS NOTES
Kang Grey presents to [facility].    Chief Complaint: Routine med eval and refills  Subjective     History of Present Illness:  HPI     He presents with chronic constipation and he is currently on stool softener and is still having issues.        He presents with chronic hypercholesterolemia, current treatment includes a lipid lowering agent, pravastatin and low-cholesterol diet.  He tolerates medication well.   He denies experiencing any hypercholesterolemia related symptoms.  He is due for labs     He presents with chronic underlying seizure disorder, stable on current meds, neurologist is Dr Mccoy.  He is on Keppra, he tolerates meds well.      He recently had a urostomy SPT placed 9/18/24.  Urine is chronically cloudy.  He recently saw Urology Monica Parrish for increasing bladder leakage outside his urostomy and she started him on oxybutynin and then yesterday he actually for the first time was able to urinate out the penis.      He presents with new LE braces and he is walking much better without tripping or any falls.       He has history of history of hypospadias.      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  The MRI also showed stable marked distention of the urinary bladder, stable simple cyst left kidney 6 cm      He has osteoporosis.       He presents with chronic complex sleep apnea and is under management with Dr Link, using positive airway pressure therapy with ASV and the symptoms of sleep apnea have improved significantly on the therapy. Normally patient goes to bed at 10 PM  and wakes up at 8 AM .  He is not compliant with his CPAP.  My impression is he is difficult to arouse in  "the morning and stays in bed most of the day    He presents with ongoing depression and grief reaction with underlying schizo Fernea disorder.  He has been without some of his psychiatric medications so he is not doing as well as he has been in the past.  He is very argumentative and yells at his sister and mother a lot.  He seems very irritable.  He recently saw his therapist psychiatrist at [facility] and current treatment includes oral Invega, Lamictal, and Effexor as well as BuSpar.  He states he thinks he is doing fine.  He denies HI/SI.  Family does not feel like he is doing fine and that his moods are very labile.  I will reach out to his psychiatrist to let them know what is going on                  Result Review   The following data was reviewed by Hien Parham MD on 07/08/2025.  Lab Results   Component Value Date    WBC 10.49 07/08/2025    HGB 14.6 07/08/2025    HCT 44.7 07/08/2025    MCV 90.5 07/08/2025     07/08/2025     Lab Results   Component Value Date    GLUCOSE 106 (H) 04/24/2025    BUN 12 04/24/2025    CREATININE 0.82 04/24/2025     04/24/2025    K 3.9 04/24/2025     04/24/2025    CALCIUM 9.8 04/24/2025    PROTEINTOT 7.2 04/24/2025    ALBUMIN 4.1 04/24/2025    ALT 14 04/24/2025    AST 16 04/24/2025    ALKPHOS 77 04/24/2025    BILITOT 0.3 04/24/2025    GLOB 3.1 04/24/2025    AGRATIO 1.3 04/24/2025    BCR 14.6 04/24/2025    ANIONGAP 12.0 04/24/2025    EGFR 111.1 04/24/2025     Lab Results   Component Value Date    CHOL 140 04/24/2025    CHLPL 174 05/06/2021    TRIG 126 04/24/2025    HDL 33 (L) 04/24/2025    LDL 84 04/24/2025     Lab Results   Component Value Date    TSH 2.000 04/24/2025     Lab Results   Component Value Date    HGBA1C 5.40 03/18/2024     No results found for: \"PSA\"  No results found for: \"IRON\"   Lab Results   Component Value Date    MGUL60XS 48.0 04/24/2025               Assessment and Plan:   Assessment & Plan  Moderate episode of recurrent major " depressive disorder  He presents with ongoing depression and grief reaction with underlying schizo Fernea disorder.  He has been without some of his psychiatric medications so he is not doing as well as he has been in the past.  He is very argumentative and yells at his sister and mother a lot.  He seems very irritable.  He recently saw his therapist psychiatrist at [facility] and current treatment includes oral Invega, Lamictal, and Effexor as well as BuSpar.  He states he thinks he is doing fine.  He denies HI/SI.  Family does not feel like he is doing fine and that his moods are very labile.  I will reach out to his psychiatrist to let them know what is going on         Leg weakness, bilateral  Encouraged daily exercise.  He is doing so much better with his leg braces bilaterally       Foot deformity, bilateral  Encouraged daily exercise.  He is doing so much better with his leg braces bilaterally       Ankle joint deformity, unspecified laterality  Encouraged daily exercise.  He is doing so much better with his leg braces bilaterally       Mixed hyperlipidemia  Cholesterol is well controlled on current medication, pravastatin, and treatment plan, tolerates meds well, no changes are needed to current meds or tx plan, will check appropriate labs today.  Encourage healthy diet and daily exercise         Neurogenic bladder  Follow-up with urology as directed.  He currently has a suprapubic catheter in place.  Incision site is clean dry and intact       Other constipation  Ongoing constipation.  Will add senna S twice a day with this DOS S  Orders:    sennosides-docusate (senna-docusate sodium) 8.6-50 MG per tablet; Take 1 tablet by mouth Daily.    Primary insomnia  He does not have any acute issues sleeping at this time.       Complex partial seizure evolving to generalized seizure  He has follow-up with neurology but he is overall stable with no acute issues of seizures on Keppra, he tolerates med well        Complex sleep apnea syndrome  He sees Dr. Paz, encouraged to use his CPAP as directed       Schizoaffective disorder, bipolar type  He presents with ongoing depression and grief reaction with underlying schizo Fernea disorder.  He has been without some of his psychiatric medications so he is not doing as well as he has been in the past.  He is very argumentative and yells at his sister and mother a lot.  He seems very irritable.  He recently saw his therapist psychiatrist at [facility] and current treatment includes oral Invega, Lamictal, and Effexor as well as BuSpar.  He states he thinks he is doing fine.  He denies HI/SI.  Family does not feel like he is doing fine and that his moods are very labile.  I will reach out to his psychiatrist to let them know what is going on         Vitamin D deficiency  Stable vitamin D supplementation       Gastroesophageal reflux disease without esophagitis  He is avoid spicy and acidic food in his diet.       Migraine without aura and without status migrainosus, not intractable  No acute issues at this time                 Benign prostatic hyperplasia with urinary hesitancy  Follow-up with urology as directed.  He currently has a suprapubic catheter in place.  Incision site is clean dry and intact       Recurrent urinary tract infection  He has follow-up with urology as directed                  Objective     Medications:  Current Outpatient Medications   Medication Instructions    alclometasone (ACLOVATE) 0.05 % cream No dose, route, or frequency recorded.    ascorbic acid (VITAMIN C) 1000 MG tablet 1 tablet, Daily    aspirin 81 mg, Daily    busPIRone (BUSPAR) 10 MG tablet 1 tablet, 3 Times Daily PRN    Calcium Carbonate 1500 (600 Ca) MG tablet 1 tablet, 2 times daily    cholecalciferol (VITAMIN D3) 2,000 Units, Oral, Daily    Diclofenac Sodium (VOLTAREN) 1 g, Topical, As Needed    docusate sodium (COLACE) 100 mg, Oral, Daily    folic acid (FOLVITE) 800 MCG tablet  "1 tablet, Daily    HM Melatonin 10 mg, Oral, Every Night at Bedtime    HM TRUEplus Fiber 2 g chewable tablet 1 tablet, Oral, Daily    Keppra 500 MG tablet 3 tablets, Every 12 Hours    ketoconazole (NIZORAL) 2 % shampoo No dose, route, or frequency recorded.    lamoTRIgine (LAMICTAL) 200 mg, 2 Times Daily    oxybutynin (DITROPAN) 5 mg, Oral, 3 Times Daily    paliperidone (INVEGA) 3 mg, Every Morning    pravastatin (PRAVACHOL) 20 mg, Oral, Nightly    sennosides-docusate (senna-docusate sodium) 8.6-50 MG per tablet 1 tablet, Oral, Daily    triamcinolone (KENALOG) 0.025 % cream     venlafaxine XR (EFFEXOR-XR) 150 mg, Daily    venlafaxine XR (EFFEXOR-XR) 37.5 mg, Daily        Vital Signs:   /83 (BP Location: Left arm, Patient Position: Sitting, Cuff Size: Adult)   Pulse 86   Temp 98 °F (36.7 °C) (Oral)   Ht 172.7 cm (67.99\")   Wt 76.7 kg (169 lb 3.2 oz)   SpO2 96%   BMI 25.73 kg/m²           BP Readings from Last 3 Encounters:   07/08/25 137/83   06/04/25 144/93   03/31/25 130/89      Wt Readings from Last 3 Encounters:   07/08/25 76.7 kg (169 lb 3.2 oz)   06/24/25 72.6 kg (160 lb)   06/04/25 72.6 kg (160 lb)        Physical Exam:  Physical Exam  Vitals and nursing note reviewed.   Constitutional:       General: He is not in acute distress.     Appearance: Normal appearance. He is not ill-appearing, toxic-appearing or diaphoretic.   HENT:      Head: Normocephalic and atraumatic.      Nose: No congestion or rhinorrhea.      Mouth/Throat:      Mouth: Mucous membranes are moist.      Pharynx: Oropharynx is clear. No oropharyngeal exudate or posterior oropharyngeal erythema.   Eyes:      Extraocular Movements: Extraocular movements intact.      Conjunctiva/sclera: Conjunctivae normal.      Pupils: Pupils are equal, round, and reactive to light.   Cardiovascular:      Rate and Rhythm: Normal rate and regular rhythm.      Heart sounds: Normal heart sounds. No murmur heard.  Pulmonary:      Effort: Pulmonary effort " is normal.      Breath sounds: Normal breath sounds. No wheezing, rhonchi or rales.   Abdominal:      General: Abdomen is flat.      Palpations: Abdomen is soft. There is no mass.      Tenderness: There is no abdominal tenderness.      Hernia: No hernia is present.   Musculoskeletal:      Cervical back: Neck supple. No rigidity.      Right lower leg: No edema.      Left lower leg: No edema.   Lymphadenopathy:      Cervical: No cervical adenopathy.   Skin:     General: Skin is warm and dry.   Neurological:      General: No focal deficit present.      Mental Status: He is alert and oriented to person, place, and time. Mental status is at baseline.      Cranial Nerves: No cranial nerve deficit.      Motor: Weakness present.      Gait: Gait abnormal.   Psychiatric:         Thought Content: Thought content normal.         Judgment: Judgment normal.           Review of Systems:  Review of Systems   Constitutional:  Negative for chills, fatigue and fever.   HENT:  Negative for congestion, ear discharge and sore throat.    Respiratory:  Negative for shortness of breath.    Cardiovascular:  Negative for chest pain.   Gastrointestinal:  Negative for abdominal pain, constipation, diarrhea, nausea, vomiting and GERD.   Genitourinary:  Negative for flank pain.   Neurological:  Negative for dizziness and headache.   Psychiatric/Behavioral:  Negative for depressed mood.               Follow Up   Return in about 3 months (around 10/8/2025).    Part of this note may be an electronic transcription/translation of spoken language to printed   text using the Dragon Dictation System.              Health Maintenance   Topic Date Due    COLORECTAL CANCER SCREENING  Never done    ANNUAL WELLNESS VISIT  08/15/2025    INFLUENZA VACCINE  10/01/2025    LIPID PANEL  04/24/2026    TDAP/TD VACCINES (2 - Td or Tdap) 05/05/2026    HEPATITIS C SCREENING  Completed    COVID-19 Vaccine  Completed    Pneumococcal Vaccine 0-49  Aged Out          Medical  History:  Medications Discontinued During This Encounter   Medication Reason    Invega Sustenna 234 MG/1.5ML suspension prefilled syringe IM injection *Therapy completed      Past Medical History:    Acute upper respiratory infection    ADHD (attention deficit hyperactivity disorder)    Age-related osteoporosis without current pathological fracture    Allergic    Depacote    Anxiety    Broken toe    left great toe    Bunion    Callus    Cerebral palsy    diagnosed as infant    Chronic constipation    Compression fracture of first lumbar vertebra    Constipation    Difficulty walking    Disease of pancreas    Fallen arches    GERD (gastroesophageal reflux disease)    Hammer toe    History of urinary self-catheterization    pt or pt's mother catheterizes 4-6  times a day    Hydrocephalus in diseases classified elsewhere    NO  SHUNT    Ingrown toenail    Inguinal hernia    Injury of back    Low back pain    Major depressive disorder    single episode, moderate    Osteopenia    Other cerebral palsy    Other fatigue    Other generalized epilepsy and epileptic syndromes, not intractable, without status epilepticus    Other hypertrophic disorders of the skin    Pain in left ankle and joints of left foot    Pain in right toe(s)    Pain in thoracic spine    Pancreatic cyst    Paranoid schizophrenia    Pelvic and perineal pain    Primary insomnia    Pure hypercholesterolemia    Repeated falls    last fall 11/2022    Schizoaffective disorder, unspecified    Seizures    LAST SZ OVER 1 YEAR    Sleep apnea    uses cpap machine    Somnolence    TIA (transient ischemic attack)    UNSURE OF  DATE    Urinary tract infection    Visual impairment    Vitamin D deficiency    Wedge compression fracture of fourth thoracic vertebra, subsequent encounter for fracture with routine healing     Past Surgical History:    CATHETERIZATION URETHRAL    COLONOSCOPY    CORRECTION HAMMER TOE    CYST REMOVAL    Procedure: Excision of subcutaneous mass  from right forearm;  Surgeon: Oni Greenberg MD;  [place] [facility] [place] [facility] [place]  Service: General;  Laterality: Right;    HAMSTRING REPAIR    age 10    HERNIA REPAIR    INGUINAL HERNIA REPAIR    Procedure: INGUINAL HERNIA REPAIR LAPAROSCOPIC WITH DAVINCI ROBOT;  Surgeon: Mayur Quintana MD;  [place] [facility] [place] [facility] [place]  Service: Robotics - DaVinci;  Laterality: Right;    OTHER SURGICAL HISTORY    hyposadius repair    SUPRAPUBIC TUBE PLACEMENT    Procedure: Cystoscopy with SUPRAPUBIC CATHETER INSERTION;  Surgeon: Adalid Morris MD;  Location: [facility] OR;  Service: Urology;  Laterality: N/A;    TENOTOMY ACHILLES TENDON    TOE FUSION    great toe at age 21    TOENAIL EXCISION    WISDOM TOOTH EXTRACTION      Family History   Problem Relation Age of Onset    Hypothyroidism Mother     Diabetes Mother     Hypertension Mother     Thyroid disease Mother     Arthritis Mother     Depression Mother     Hearing loss Mother     Liver disease Mother     Mental illness Mother     Miscarriages / Stillbirths Mother     No Known Problems Father     Ulcers Brother     Cancer Sister         Breast    Diabetes Sister     Hypertension Sister     Severe sprains Sister     Thyroid disease Sister     Anxiety disorder Sister     Arthritis Sister     Depression Sister     Hearing loss Sister     Learning disabilities Sister     Mental illness Sister     Cancer Maternal Uncle         Multiple forms    Alcohol abuse Maternal Uncle     Cancer Maternal Uncle         Rare type leukemia    Early death Maternal Grandfather         aneurysm    Early death Maternal Grandmother         1968 major car accident    Early death Maternal Aunt         1968 major car accident    Early death Maternal Aunt         1968 major car accident    Early death Maternal Aunt         1968 major car accident    Early death Maternal Uncle         6 months    Mental illness Sister     Malig Hyperthermia Neg Hx      Social History     Tobacco Use    Smoking status: Never      Passive exposure: Never    Smokeless tobacco: Never   Substance Use Topics    Alcohol use: Not Currently       Health Maintenance Due   Topic Date Due    COLORECTAL CANCER SCREENING  Never done    ANNUAL WELLNESS VISIT  08/15/2025        Immunization History   Administered Date(s) Administered    31-influenza Vac Quardvalent Preservativ 10/14/2014, 01/20/2016, 10/07/2016, 12/07/2021, 10/19/2022    COVID-19 (MODERNA) 1st,2nd,3rd Dose Monovalent 03/25/2021, 04/22/2021, 11/19/2021    COVID-19 (MODERNA) 6-11 YRS Monovalent 03/25/2021, 04/22/2021    COVID-19 (PFIZER) 12YRS+ (COMIRNATY) 11/16/2023, 10/24/2024    COVID-19 (PFIZER) BIVALENT 12+YRS 10/19/2022    Covid-19 (Pfizer) Gray Cap Monovalent 06/20/2022    Fluzone  >6mos 10/01/2013, 10/24/2024    Fluzone (or Fluarix & Flulaval for VFC) >6mos 10/07/2016, 12/07/2021, 10/19/2022, 11/16/2023    Influenza Seasonal Injectable 10/05/2011    Influenza, Unspecified 10/01/2013, 10/07/2016, 12/07/2021, 10/19/2022    Tdap 05/05/2016       Allergies   Allergen Reactions    Valproic Acid Irritability     DEPAKOTE CAUSES DIZZINESS AND IRRITABILITY

## 2025-07-08 NOTE — ASSESSMENT & PLAN NOTE
Cholesterol is well controlled on current medication, pravastatin, and treatment plan, tolerates meds well, no changes are needed to current meds or tx plan, will check appropriate labs today.  Encourage healthy diet and daily exercise

## 2025-07-08 NOTE — ASSESSMENT & PLAN NOTE
Follow-up with urology as directed.  He currently has a suprapubic catheter in place.  Incision site is clean dry and intact

## 2025-07-08 NOTE — ASSESSMENT & PLAN NOTE
He presents with ongoing depression and grief reaction with underlying schizo Fernea disorder.  He has been without some of his psychiatric medications so he is not doing as well as he has been in the past.  He is very argumentative and yells at his sister and mother a lot.  He seems very irritable.  He recently saw his therapist psychiatrist at [facility] and current treatment includes oral Invega, Lamictal, and Effexor as well as BuSpar.  He states he thinks he is doing fine.  He denies HI/SI.  Family does not feel like he is doing fine and that his moods are very labile.  I will reach out to his psychiatrist to let them know what is going on

## 2025-07-08 NOTE — ASSESSMENT & PLAN NOTE
Ongoing constipation.  Will add senna S twice a day with this DOS S  Orders:    sennosides-docusate (senna-docusate sodium) 8.6-50 MG per tablet; Take 1 tablet by mouth Daily.

## 2025-07-09 ENCOUNTER — TELEPHONE (OUTPATIENT)
Dept: UROLOGY | Age: 45
End: 2025-07-09
Payer: MEDICARE

## 2025-07-09 LAB — HBA1C MFR BLD: 5.5 % (ref 4.8–5.6)

## 2025-07-09 NOTE — TELEPHONE ENCOUNTER
PATIENT'S MOTHER, EDOUARD ATKINS, CALLED.  SHE IS ON VERBAL RELEASE.  SHE SAID HER SON HAS AN SP CATHETER.  HE IS LEAKING OUT OF THE HOLE IN HIS ABDOMEN AND FROM HIS PENIS.      SHE SAID HE IS STILL TAKING THE OXYBUTYNIN 3 TIMES A DAY.    #[phone]

## 2025-07-09 NOTE — TELEPHONE ENCOUNTER
Called Patients Mother Jenni and scheduled nurse visit for tomorrow am at 0830 to evaluate SP Cath

## 2025-07-10 ENCOUNTER — CLINICAL SUPPORT (OUTPATIENT)
Dept: UROLOGY | Age: 45
End: 2025-07-10
Payer: MEDICARE

## 2025-07-10 VITALS — BODY MASS INDEX: 25.63 KG/M2 | HEIGHT: 68 IN | RESPIRATION RATE: 18 BRPM | WEIGHT: 169.09 LBS

## 2025-07-10 DIAGNOSIS — N39.0 RECURRENT UTI: Primary | ICD-10-CM

## 2025-07-10 RX ORDER — GENTAMICIN 40 MG/ML
80 INJECTION, SOLUTION INTRAMUSCULAR; INTRAVENOUS EVERY 12 HOURS
Status: COMPLETED | OUTPATIENT
Start: 2025-07-10 | End: 2025-07-10

## 2025-07-10 RX ADMIN — GENTAMICIN 80 MG: 40 INJECTION, SOLUTION INTRAMUSCULAR; INTRAVENOUS at 09:41

## 2025-07-10 NOTE — PROGRESS NOTES
Procedure   Cath Change, Complicated    Date/Time: 7/10/2025 9:20 AM    Performed by: Radha Skaggs RN  Authorized by: Monica Parrish APRN  Preparation: Patient was prepped and draped in the usual sterile fashion.  Local anesthesia used: no    Anesthesia:  Local anesthesia used: no    Sedation:  Patient sedated: no    Patient tolerance: patient tolerated the procedure well with no immediate complications  Comments: Patient came in today due to complaints of leaking around his Suprapubic cath and out the penis. After evaluating we did a suprapubic catheter change. Patient was placed on the exam table. The catheter balloon was emptied of 5-10 ml sterile water and removed the catheter with no resistance noted. A small amount of straw-yellow colored urine drained out. Then using aseptic technique, I then cleaned the area around the opening with betadine swabs, applied lube to the 18 [place] catheter, inserting it into the opening and then placed 5-10 ml of sterile water into the balloon. As soon as the catheter was in place cloudy yellow urine returned a total of 950ml of urine was drained from his bladder.  I then placed  80mg/2ml of gentamicin through the catheter followed by 50 ml of normal saline  into his bladder. clear water drained into his leg bag.The catheter was secured to a standard drainage bag to his right leg with a securement device. Patient tolerated the procedure well.       Irrigation of Bladder    Date/Time: 7/10/2025 9:26 AM    Performed by: Radha Skaggs RN  Authorized by: Monica Parrish APRN  Preparation: Patient was prepped and draped in the usual sterile fashion.  Local anesthesia used: no    Anesthesia:  Local anesthesia used: no    Sedation:  Patient sedated: no    Patient tolerance: patient tolerated the procedure well with no immediate complications  Comments: See cath change note     Patient was instructed to stop oxybutynin and was given 4 weeks of Gemtesa samples to start taking  per Monica MOTTA

## 2025-07-14 ENCOUNTER — TELEPHONE (OUTPATIENT)
Dept: FAMILY MEDICINE CLINIC | Age: 45
End: 2025-07-14
Payer: MEDICARE

## 2025-07-14 NOTE — TELEPHONE ENCOUNTER
I called mother and she said that [facility] and left a message

## 2025-08-12 ENCOUNTER — TELEPHONE (OUTPATIENT)
Dept: FAMILY MEDICINE CLINIC | Age: 45
End: 2025-08-12
Payer: MEDICARE

## 2025-08-12 ENCOUNTER — CLINICAL SUPPORT (OUTPATIENT)
Dept: UROLOGY | Age: 45
End: 2025-08-12
Payer: MEDICARE

## 2025-08-12 VITALS — RESPIRATION RATE: 18 BRPM

## 2025-08-12 DIAGNOSIS — N39.0 RECURRENT UTI: ICD-10-CM

## 2025-08-12 DIAGNOSIS — R33.9 URINARY RETENTION: Primary | ICD-10-CM

## 2025-08-12 RX ORDER — GENTAMICIN 40 MG/ML
80 INJECTION, SOLUTION INTRAMUSCULAR; INTRAVENOUS ONCE
Status: COMPLETED | OUTPATIENT
Start: 2025-08-12 | End: 2025-08-12

## 2025-08-12 RX ADMIN — GENTAMICIN 80 MG: 40 INJECTION, SOLUTION INTRAMUSCULAR; INTRAVENOUS at 09:47

## 2025-08-19 ENCOUNTER — TELEPHONE (OUTPATIENT)
Dept: FAMILY MEDICINE CLINIC | Age: 45
End: 2025-08-19

## 2025-08-21 ENCOUNTER — TELEPHONE (OUTPATIENT)
Dept: FAMILY MEDICINE CLINIC | Age: 45
End: 2025-08-21
Payer: MEDICARE

## 2025-08-26 ENCOUNTER — HOSPITAL ENCOUNTER (OUTPATIENT)
Dept: GENERAL RADIOLOGY | Facility: HOSPITAL | Age: 45
Discharge: HOME | End: 2025-08-26
Admitting: FAMILY MEDICINE
Payer: MEDICARE

## 2025-08-26 ENCOUNTER — OFFICE VISIT (OUTPATIENT)
Dept: FAMILY MEDICINE CLINIC | Age: 45
End: 2025-08-26
Payer: MEDICARE

## 2025-08-26 VITALS
DIASTOLIC BLOOD PRESSURE: 77 MMHG | HEART RATE: 104 BPM | BODY MASS INDEX: 24.77 KG/M2 | TEMPERATURE: 98.4 F | SYSTOLIC BLOOD PRESSURE: 130 MMHG | WEIGHT: 163.4 LBS | HEIGHT: 68 IN | OXYGEN SATURATION: 97 %

## 2025-08-26 DIAGNOSIS — R39.11 BENIGN PROSTATIC HYPERPLASIA WITH URINARY HESITANCY: ICD-10-CM

## 2025-08-26 DIAGNOSIS — E55.9 VITAMIN D DEFICIENCY: ICD-10-CM

## 2025-08-26 DIAGNOSIS — N40.1 BENIGN PROSTATIC HYPERPLASIA WITH URINARY HESITANCY: ICD-10-CM

## 2025-08-26 DIAGNOSIS — G80.9 CEREBRAL PALSY, UNSPECIFIED TYPE: Primary | ICD-10-CM

## 2025-08-26 DIAGNOSIS — M21.969 ANKLE JOINT DEFORMITY, UNSPECIFIED LATERALITY: ICD-10-CM

## 2025-08-26 DIAGNOSIS — M54.50 CHRONIC BILATERAL LOW BACK PAIN WITHOUT SCIATICA: ICD-10-CM

## 2025-08-26 DIAGNOSIS — K21.9 GASTROESOPHAGEAL REFLUX DISEASE WITHOUT ESOPHAGITIS: ICD-10-CM

## 2025-08-26 DIAGNOSIS — N39.0 FREQUENT UTI: ICD-10-CM

## 2025-08-26 DIAGNOSIS — G89.29 CHRONIC BILATERAL LOW BACK PAIN WITHOUT SCIATICA: ICD-10-CM

## 2025-08-26 DIAGNOSIS — K59.09 OTHER CONSTIPATION: ICD-10-CM

## 2025-08-26 DIAGNOSIS — L21.9 SEBORRHEIC DERMATITIS: ICD-10-CM

## 2025-08-26 DIAGNOSIS — G40.209 COMPLEX PARTIAL SEIZURE EVOLVING TO GENERALIZED SEIZURE: ICD-10-CM

## 2025-08-26 DIAGNOSIS — R29.6 FREQUENT FALLS: ICD-10-CM

## 2025-08-26 DIAGNOSIS — F25.0 SCHIZOAFFECTIVE DISORDER, BIPOLAR TYPE: ICD-10-CM

## 2025-08-26 DIAGNOSIS — G47.39 COMPLEX SLEEP APNEA SYNDROME: ICD-10-CM

## 2025-08-26 DIAGNOSIS — E78.2 MIXED HYPERLIPIDEMIA: ICD-10-CM

## 2025-08-26 DIAGNOSIS — F33.1 MODERATE EPISODE OF RECURRENT MAJOR DEPRESSIVE DISORDER: ICD-10-CM

## 2025-08-26 DIAGNOSIS — E53.8 B12 DEFICIENCY: ICD-10-CM

## 2025-08-26 DIAGNOSIS — F20.0 PARANOID SCHIZOPHRENIA: ICD-10-CM

## 2025-08-26 DIAGNOSIS — F51.01 PRIMARY INSOMNIA: ICD-10-CM

## 2025-08-26 DIAGNOSIS — N31.9 NEUROGENIC BLADDER: ICD-10-CM

## 2025-08-26 PROCEDURE — 99214 OFFICE O/P EST MOD 30 MIN: CPT | Performed by: FAMILY MEDICINE

## 2025-08-26 PROCEDURE — G2211 COMPLEX E/M VISIT ADD ON: HCPCS | Performed by: FAMILY MEDICINE

## 2025-08-26 PROCEDURE — 1126F AMNT PAIN NOTED NONE PRSNT: CPT | Performed by: FAMILY MEDICINE

## 2025-08-26 PROCEDURE — 72100 X-RAY EXAM L-S SPINE 2/3 VWS: CPT

## (undated) DEVICE — VAGINAL PREP TRAY: Brand: MEDLINE INDUSTRIES, INC.

## (undated) DEVICE — MAJOR-LF: Brand: MEDLINE INDUSTRIES, INC.

## (undated) DEVICE — SUT MNCRYL 4/0 PS2 18 IN

## (undated) DEVICE — TIP COVER ACCESSORY

## (undated) DEVICE — ELECTRD BLD EDGE COAT 3IN

## (undated) DEVICE — INTENDED FOR TISSUE SEPARATION, AND OTHER PROCEDURES THAT REQUIRE A SHARP SURGICAL BLADE TO PUNCTURE OR CUT.: Brand: BARD-PARKER ® CARBON RIB-BACK BLADES

## (undated) DEVICE — CYSTO/BLADDER IRRIGATION SET, REGULATING CLAMP

## (undated) DEVICE — DAVINCI-LF: Brand: MEDLINE INDUSTRIES, INC.

## (undated) DEVICE — LAPAROVUE VISIBILITY SYSTEM LAPAROSCOPIC SOLUTIONS: Brand: LAPAROVUE

## (undated) DEVICE — SUT ETHLN 3-0 FS118IN 663H

## (undated) DEVICE — UROLOGIC DRAIN BAG: Brand: UNBRANDED

## (undated) DEVICE — PENCL E/S SMOKEEVAC W/TELESCP CANN

## (undated) DEVICE — STRIP CLS WND SUTURESTRIP/PLS 0.5X4IN TP1103

## (undated) DEVICE — PENCL SMOKE/EVAC MEGADYNE TELESCP 10FT

## (undated) DEVICE — SUT MERSILENE POLYSTR CT1 BR 0 75CM GRN

## (undated) DEVICE — STERILE POLYISOPRENE POWDER-FREE SURGICAL GLOVES: Brand: PROTEXIS

## (undated) DEVICE — ADHS SKIN PREMIERPRO EXOFIN TOPICAL HI/VISC .5ML

## (undated) DEVICE — TOWEL,OR,DSP,ST,BLUE,STD,4/PK,20PK/CS: Brand: MEDLINE

## (undated) DEVICE — SLV SCD KN/LEN ADJ EXPRSS BLENDED MD 1P/U

## (undated) DEVICE — SOL IRRG H2O BG 3000ML STRL

## (undated) DEVICE — APPL CHLORAPREP HI/LITE 26ML ORNG

## (undated) DEVICE — NEEDLE: Brand: SPROTTE®

## (undated) DEVICE — Device

## (undated) DEVICE — SYS CLOSE PORTII CARTR/THOMASN XL

## (undated) DEVICE — TRAY,IRRIGATION,BULBSYRINGE,60ML,CSR,PVP: Brand: MEDLINE

## (undated) DEVICE — TOTAL TRAY, 16FR 10ML SIL FOLEY, URN: Brand: MEDLINE

## (undated) DEVICE — SUT VIC 3/0 SH 27IN J416H

## (undated) DEVICE — SOL IRR NACL 0.9PCT BT 1000ML

## (undated) DEVICE — ARM DRAPE

## (undated) DEVICE — GLV SURG SENSICARE PI ORTHO SZ8 LF STRL

## (undated) DEVICE — INTRO SUPRAPUB 1STEP 18F 20CM

## (undated) DEVICE — NON-WOVEN ADHESIVE WOUND DRESSING: Brand: PRIMAPORE ADHESIVE WOUND DRSG 7.2*5CM

## (undated) DEVICE — LAPAROSCOPIC SCISSORS: Brand: EPIX LAPAROSCOPIC SCISSORS

## (undated) DEVICE — METER,URINE,400ML,DRAIN BAG,L/F,LL,SLIDE: Brand: MEDLINE

## (undated) DEVICE — STERILE POLYISOPRENE POWDER-FREE SURGICAL GLOVES WITH EMOLLIENT COATING: Brand: PROTEXIS

## (undated) DEVICE — PENCL E/S HNDSWCH ROCKR CB

## (undated) DEVICE — ANTIBACTERIAL UNDYED BRAIDED (POLYGLACTIN 910), SYNTHETIC ABSORBABLE SUTURE: Brand: COATED VICRYL

## (undated) DEVICE — GLV SURG BIOGEL LTX PF 7 1/2

## (undated) DEVICE — PAD GRND REM POLYHESIVE A/ DISP

## (undated) DEVICE — CANNULA SEAL

## (undated) DEVICE — CATHETER,FOLEY,100%SILICONE,18FR,10ML,LF: Brand: MEDLINE

## (undated) DEVICE — GOWN,REINFORCE,POLY,SIRUS,BREATH SLV,XLG: Brand: MEDLINE

## (undated) DEVICE — SHEET,DRAPE,70X85,STERILE: Brand: MEDLINE

## (undated) DEVICE — SUT NLY 2/0 664G

## (undated) DEVICE — ENDOPATH XCEL WITH OPTIVIEW TECHNOLOGY BLADELESS TROCARS WITH STABILITY SLEEVES: Brand: ENDOPATH XCEL OPTIVIEW

## (undated) DEVICE — CYSTO PACK: Brand: MEDLINE INDUSTRIES, INC.